# Patient Record
Sex: FEMALE | Race: WHITE | Employment: OTHER | URBAN - METROPOLITAN AREA
[De-identification: names, ages, dates, MRNs, and addresses within clinical notes are randomized per-mention and may not be internally consistent; named-entity substitution may affect disease eponyms.]

---

## 2017-01-31 ENCOUNTER — HOSPITAL ENCOUNTER (OUTPATIENT)
Dept: MAMMOGRAPHY | Age: 72
Discharge: HOME | End: 2017-01-31
Attending: FAMILY MEDICINE
Payer: MEDICARE

## 2017-01-31 DIAGNOSIS — Z12.31 VISIT FOR SCREENING MAMMOGRAM: ICD-10-CM

## 2017-01-31 PROCEDURE — 77067 SCR MAMMO BI INCL CAD: CPT

## 2017-03-30 ENCOUNTER — HOSPITAL ENCOUNTER (OUTPATIENT)
Dept: ULTRASOUND IMAGING | Age: 72
Discharge: HOME | End: 2017-03-30
Attending: INTERNAL MEDICINE
Payer: MEDICARE

## 2017-03-30 DIAGNOSIS — E04.9 GOITER: ICD-10-CM

## 2017-03-30 PROCEDURE — 76536 US EXAM OF HEAD AND NECK: CPT

## 2017-10-31 ENCOUNTER — HOSPITAL ENCOUNTER (OUTPATIENT)
Dept: NON INVASIVE DIAGNOSTICS | Age: 72
Discharge: HOME | End: 2017-10-31
Attending: INTERNAL MEDICINE
Payer: MEDICARE

## 2017-10-31 DIAGNOSIS — R00.0 TACHYCARDIA: ICD-10-CM

## 2017-10-31 PROCEDURE — 93225 XTRNL ECG REC<48 HRS REC: CPT

## 2017-11-02 NOTE — PROCEDURES
[place] Fermin Daniel, [address] [facility]       Name:  Elizabeth Gee   MR#:  [id number]   :  1945   Account #:  [de-identified]        Date of Adm:  10/31/2017       PROCEDURE PERFORMED: Holter Monitor. INDICATION: \"Tachycardia. \"     DESCRIPTION OF PROCEDURE: Holter monitor was carried out   between 10/31 and . During the recording, there were isolated   premature ventricular contractions. There was multiple atrial premature   contractions. The minimum heart rate was 55 beats per minute. The   maximum heart rate was 113 beats per minute. The patient has   sequential APCs resulting in 3 beats of sequential APCs. No significant   pauses. SUMMARY   1. Regular sinus rhythm is observed. 2. Sinus tachycardia with a heart rate of 113.         Onur Werner MD       / [place].[facility]   D:  2017   17:14   T:  2017   11:08   Job #:  [id number]

## 2018-02-27 ENCOUNTER — APPOINTMENT (OUTPATIENT)
Dept: GENERAL RADIOLOGY | Age: 73
DRG: 287 | End: 2018-02-27
Attending: EMERGENCY MEDICINE
Payer: MEDICARE

## 2018-02-27 ENCOUNTER — HOSPITAL ENCOUNTER (INPATIENT)
Age: 73
LOS: 1 days | Discharge: HOME | DRG: 287 | End: 2018-02-28
Attending: EMERGENCY MEDICINE | Admitting: INTERNAL MEDICINE
Payer: MEDICARE

## 2018-02-27 DIAGNOSIS — I21.4 NSTEMI (NON-ST ELEVATED MYOCARDIAL INFARCTION) (HCC): ICD-10-CM

## 2018-02-27 DIAGNOSIS — J20.9 ACUTE BRONCHITIS, UNSPECIFIED ORGANISM: Primary | ICD-10-CM

## 2018-02-27 PROBLEM — I10 HTN (HYPERTENSION): Status: ACTIVE | Noted: 2018-02-27

## 2018-02-27 PROBLEM — K21.9 GERD (GASTROESOPHAGEAL REFLUX DISEASE): Status: ACTIVE | Noted: 2018-02-27

## 2018-02-27 PROBLEM — J45.909 ASTHMA: Status: ACTIVE | Noted: 2018-02-27

## 2018-02-27 PROBLEM — E11.9 DM (DIABETES MELLITUS) (HCC): Status: ACTIVE | Noted: 2018-02-27

## 2018-02-27 LAB
ALBUMIN SERPL-MCNC: 3.5 G/DL (ref 3.5–5)
ALBUMIN/GLOB SERPL: 0.9 {RATIO} (ref 1.1–2.2)
ALP SERPL-CCNC: 113 U/L (ref 45–117)
ALT SERPL-CCNC: 40 U/L (ref 12–78)
ANION GAP SERPL CALC-SCNC: 6 MMOL/L (ref 5–15)
APPEARANCE UR: CLEAR
AST SERPL-CCNC: 31 U/L (ref 15–37)
ATRIAL RATE: 84 BPM
ATRIAL RATE: 93 BPM
BACTERIA URNS QL MICRO: NEGATIVE /HPF
BASOPHILS # BLD: 0.1 K/UL (ref 0–0.1)
BASOPHILS NFR BLD: 0 % (ref 0–1)
BILIRUB SERPL-MCNC: 0.4 MG/DL (ref 0.2–1)
BILIRUB UR QL: NEGATIVE
BNP SERPL-MCNC: 1507 PG/ML (ref 0–125)
BUN SERPL-MCNC: 25 MG/DL (ref 6–20)
BUN/CREAT SERPL: 20 (ref 12–20)
CALCIUM SERPL-MCNC: 9.1 MG/DL (ref 8.5–10.1)
CALCULATED P AXIS, ECG09: 75 DEGREES
CALCULATED P AXIS, ECG09: 86 DEGREES
CALCULATED R AXIS, ECG10: 29 DEGREES
CALCULATED R AXIS, ECG10: 45 DEGREES
CALCULATED T AXIS, ECG11: 45 DEGREES
CALCULATED T AXIS, ECG11: 61 DEGREES
CHLORIDE SERPL-SCNC: 103 MMOL/L (ref 97–108)
CK MB CFR SERPL CALC: 2.3 % (ref 0–2.5)
CK MB CFR SERPL CALC: 4.2 % (ref 0–2.5)
CK MB SERPL-MCNC: 1.3 NG/ML (ref 5–25)
CK MB SERPL-MCNC: 2.2 NG/ML (ref 5–25)
CK SERPL-CCNC: 53 U/L (ref 26–192)
CK SERPL-CCNC: 57 U/L (ref 26–192)
CO2 SERPL-SCNC: 30 MMOL/L (ref 21–32)
COLOR UR: ABNORMAL
CREAT SERPL-MCNC: 1.28 MG/DL (ref 0.55–1.02)
DIAGNOSIS, 93000: NORMAL
DIAGNOSIS, 93000: NORMAL
DIFFERENTIAL METHOD BLD: ABNORMAL
EOSINOPHIL # BLD: 0.3 K/UL (ref 0–0.4)
EOSINOPHIL NFR BLD: 1 % (ref 0–7)
EPITH CASTS URNS QL MICRO: ABNORMAL /LPF
ERYTHROCYTE [DISTWIDTH] IN BLOOD BY AUTOMATED COUNT: 13.7 % (ref 11.5–14.5)
GLOBULIN SER CALC-MCNC: 4 G/DL (ref 2–4)
GLUCOSE BLD STRIP.AUTO-MCNC: 221 MG/DL (ref 65–100)
GLUCOSE BLD STRIP.AUTO-MCNC: 286 MG/DL (ref 65–100)
GLUCOSE BLD STRIP.AUTO-MCNC: 313 MG/DL (ref 65–100)
GLUCOSE SERPL-MCNC: 181 MG/DL (ref 65–100)
GLUCOSE UR STRIP.AUTO-MCNC: 100 MG/DL
HCT VFR BLD AUTO: 38.9 % (ref 35–47)
HGB BLD-MCNC: 12.1 G/DL (ref 11.5–16)
HGB UR QL STRIP: ABNORMAL
HYALINE CASTS URNS QL MICRO: ABNORMAL /LPF (ref 0–5)
IMM GRANULOCYTES # BLD: 0.1 K/UL (ref 0–0.04)
IMM GRANULOCYTES NFR BLD AUTO: 1 % (ref 0–0.5)
INR PPP: 1 (ref 0.9–1.1)
KETONES UR QL STRIP.AUTO: NEGATIVE MG/DL
LACTATE SERPL-SCNC: 1.4 MMOL/L (ref 0.4–2)
LEUKOCYTE ESTERASE UR QL STRIP.AUTO: NEGATIVE
LYMPHOCYTES # BLD: 3.5 K/UL (ref 0.8–3.5)
LYMPHOCYTES NFR BLD: 19 % (ref 12–49)
MCH RBC QN AUTO: 26.7 PG (ref 26–34)
MCHC RBC AUTO-ENTMCNC: 31.1 G/DL (ref 30–36.5)
MCV RBC AUTO: 85.9 FL (ref 80–99)
MONOCYTES # BLD: 1 K/UL (ref 0–1)
MONOCYTES NFR BLD: 5 % (ref 5–13)
NEUTS SEG # BLD: 13.7 K/UL (ref 1.8–8)
NEUTS SEG NFR BLD: 74 % (ref 32–75)
NITRITE UR QL STRIP.AUTO: NEGATIVE
NRBC # BLD: 0 K/UL (ref 0–0.01)
NRBC BLD-RTO: 0 PER 100 WBC
P-R INTERVAL, ECG05: 160 MS
P-R INTERVAL, ECG05: 180 MS
PH UR STRIP: 7 [PH] (ref 5–8)
PLATELET # BLD AUTO: 292 K/UL (ref 150–400)
PMV BLD AUTO: 10.6 FL (ref 8.9–12.9)
POTASSIUM SERPL-SCNC: 3.5 MMOL/L (ref 3.5–5.1)
PROT SERPL-MCNC: 7.5 G/DL (ref 6.4–8.2)
PROT UR STRIP-MCNC: 100 MG/DL
PROTHROMBIN TIME: 10.3 SEC (ref 9–11.1)
Q-T INTERVAL, ECG07: 324 MS
Q-T INTERVAL, ECG07: 362 MS
QRS DURATION, ECG06: 84 MS
QRS DURATION, ECG06: 90 MS
QTC CALCULATION (BEZET), ECG08: 402 MS
QTC CALCULATION (BEZET), ECG08: 427 MS
RBC # BLD AUTO: 4.53 M/UL (ref 3.8–5.2)
RBC #/AREA URNS HPF: ABNORMAL /HPF (ref 0–5)
SERVICE CMNT-IMP: ABNORMAL
SODIUM SERPL-SCNC: 139 MMOL/L (ref 136–145)
SP GR UR REFRACTOMETRY: 1.01 (ref 1–1.03)
TROPONIN I BLD-MCNC: 0.12 NG/ML (ref 0–0.08)
TROPONIN I SERPL-MCNC: 0.32 NG/ML
TROPONIN I SERPL-MCNC: 0.53 NG/ML
TROPONIN I SERPL-MCNC: <0.04 NG/ML
UA: UC IF INDICATED,UAUC: ABNORMAL
UROBILINOGEN UR QL STRIP.AUTO: 0.2 EU/DL (ref 0.2–1)
VENTRICULAR RATE, ECG03: 84 BPM
VENTRICULAR RATE, ECG03: 93 BPM
WBC # BLD AUTO: 18.6 K/UL (ref 3.6–11)
WBC URNS QL MICRO: ABNORMAL /HPF (ref 0–4)

## 2018-02-27 PROCEDURE — 93005 ELECTROCARDIOGRAM TRACING: CPT

## 2018-02-27 PROCEDURE — 96365 THER/PROPH/DIAG IV INF INIT: CPT

## 2018-02-27 PROCEDURE — 83880 ASSAY OF NATRIURETIC PEPTIDE: CPT | Performed by: EMERGENCY MEDICINE

## 2018-02-27 PROCEDURE — 87040 BLOOD CULTURE FOR BACTERIA: CPT | Performed by: EMERGENCY MEDICINE

## 2018-02-27 PROCEDURE — 85025 COMPLETE CBC W/AUTO DIFF WBC: CPT | Performed by: EMERGENCY MEDICINE

## 2018-02-27 PROCEDURE — 82962 GLUCOSE BLOOD TEST: CPT

## 2018-02-27 PROCEDURE — 93306 TTE W/DOPPLER COMPLETE: CPT

## 2018-02-27 PROCEDURE — 71046 X-RAY EXAM CHEST 2 VIEWS: CPT

## 2018-02-27 PROCEDURE — 74011250636 HC RX REV CODE- 250/636: Performed by: EMERGENCY MEDICINE

## 2018-02-27 PROCEDURE — 36415 COLL VENOUS BLD VENIPUNCTURE: CPT | Performed by: EMERGENCY MEDICINE

## 2018-02-27 PROCEDURE — 99285 EMERGENCY DEPT VISIT HI MDM: CPT

## 2018-02-27 PROCEDURE — 82553 CREATINE MB FRACTION: CPT | Performed by: EMERGENCY MEDICINE

## 2018-02-27 PROCEDURE — 65660000000 HC RM CCU STEPDOWN

## 2018-02-27 PROCEDURE — 94762 N-INVAS EAR/PLS OXIMTRY CONT: CPT

## 2018-02-27 PROCEDURE — 81001 URINALYSIS AUTO W/SCOPE: CPT | Performed by: EMERGENCY MEDICINE

## 2018-02-27 PROCEDURE — 84484 ASSAY OF TROPONIN QUANT: CPT | Performed by: EMERGENCY MEDICINE

## 2018-02-27 PROCEDURE — 83605 ASSAY OF LACTIC ACID: CPT | Performed by: EMERGENCY MEDICINE

## 2018-02-27 PROCEDURE — 74011250636 HC RX REV CODE- 250/636: Performed by: INTERNAL MEDICINE

## 2018-02-27 PROCEDURE — 74011250637 HC RX REV CODE- 250/637: Performed by: EMERGENCY MEDICINE

## 2018-02-27 PROCEDURE — 80053 COMPREHEN METABOLIC PANEL: CPT | Performed by: EMERGENCY MEDICINE

## 2018-02-27 PROCEDURE — 74011636637 HC RX REV CODE- 636/637: Performed by: INTERNAL MEDICINE

## 2018-02-27 PROCEDURE — 85610 PROTHROMBIN TIME: CPT | Performed by: EMERGENCY MEDICINE

## 2018-02-27 PROCEDURE — 74011250637 HC RX REV CODE- 250/637: Performed by: INTERNAL MEDICINE

## 2018-02-27 RX ORDER — LEVOTHYROXINE SODIUM 100 UG/1
100 TABLET ORAL
COMMUNITY

## 2018-02-27 RX ORDER — PREDNISONE 5 MG/1
TABLET ORAL
Qty: 21 TAB | Refills: 0 | Status: SHIPPED | OUTPATIENT
Start: 2018-02-27 | End: 2018-10-10

## 2018-02-27 RX ORDER — ACETAMINOPHEN 500 MG
1000 TABLET ORAL 2 TIMES DAILY
COMMUNITY
End: 2021-05-07

## 2018-02-27 RX ORDER — SODIUM CHLORIDE 0.9 % (FLUSH) 0.9 %
5-10 SYRINGE (ML) INJECTION EVERY 8 HOURS
Status: DISCONTINUED | OUTPATIENT
Start: 2018-02-27 | End: 2018-02-28 | Stop reason: HOSPADM

## 2018-02-27 RX ORDER — AZITHROMYCIN 250 MG/1
TABLET, FILM COATED ORAL
Qty: 6 TAB | Refills: 0 | Status: SHIPPED | OUTPATIENT
Start: 2018-02-27 | End: 2018-02-28

## 2018-02-27 RX ORDER — CLONIDINE HYDROCHLORIDE 0.1 MG/1
0.1 TABLET ORAL 2 TIMES DAILY
Status: DISCONTINUED | OUTPATIENT
Start: 2018-02-27 | End: 2018-02-28 | Stop reason: HOSPADM

## 2018-02-27 RX ORDER — INSULIN LISPRO 100 [IU]/ML
INJECTION, SOLUTION INTRAVENOUS; SUBCUTANEOUS
Status: DISCONTINUED | OUTPATIENT
Start: 2018-02-27 | End: 2018-02-28 | Stop reason: HOSPADM

## 2018-02-27 RX ORDER — METOPROLOL SUCCINATE 50 MG/1
200 TABLET, EXTENDED RELEASE ORAL
Status: DISCONTINUED | OUTPATIENT
Start: 2018-02-27 | End: 2018-02-28 | Stop reason: HOSPADM

## 2018-02-27 RX ORDER — CLONIDINE HYDROCHLORIDE 0.1 MG/1
0.1 TABLET ORAL 2 TIMES DAILY
Status: ON HOLD | COMMUNITY
End: 2021-05-07 | Stop reason: SDUPTHER

## 2018-02-27 RX ORDER — GUAIFENESIN 100 MG/5ML
400 SOLUTION ORAL 3 TIMES DAILY
Status: DISCONTINUED | OUTPATIENT
Start: 2018-02-27 | End: 2018-02-27

## 2018-02-27 RX ORDER — METOPROLOL SUCCINATE 200 MG/1
100 TABLET, EXTENDED RELEASE ORAL
COMMUNITY
End: 2021-05-07

## 2018-02-27 RX ORDER — VALSARTAN AND HYDROCHLOROTHIAZIDE 320; 25 MG/1; MG/1
1 TABLET, FILM COATED ORAL DAILY
COMMUNITY
End: 2019-05-08

## 2018-02-27 RX ORDER — ONDANSETRON 2 MG/ML
4 INJECTION INTRAMUSCULAR; INTRAVENOUS
Status: DISCONTINUED | OUTPATIENT
Start: 2018-02-27 | End: 2018-02-28 | Stop reason: HOSPADM

## 2018-02-27 RX ORDER — ACETAMINOPHEN 325 MG/1
650 TABLET ORAL
Status: DISCONTINUED | OUTPATIENT
Start: 2018-02-27 | End: 2018-02-28 | Stop reason: HOSPADM

## 2018-02-27 RX ORDER — GUAIFENESIN 100 MG/5ML
400 SOLUTION ORAL 3 TIMES DAILY
Status: DISCONTINUED | OUTPATIENT
Start: 2018-02-27 | End: 2018-02-28 | Stop reason: HOSPADM

## 2018-02-27 RX ORDER — MORPHINE SULFATE 10 MG/ML
2 INJECTION, SOLUTION INTRAMUSCULAR; INTRAVENOUS
Status: DISCONTINUED | OUTPATIENT
Start: 2018-02-27 | End: 2018-02-28 | Stop reason: HOSPADM

## 2018-02-27 RX ORDER — MORPHINE SULFATE 2 MG/ML
2 INJECTION, SOLUTION INTRAMUSCULAR; INTRAVENOUS
Status: DISCONTINUED | OUTPATIENT
Start: 2018-02-27 | End: 2018-02-27

## 2018-02-27 RX ORDER — ENOXAPARIN SODIUM 100 MG/ML
1 INJECTION SUBCUTANEOUS
Status: DISCONTINUED | OUTPATIENT
Start: 2018-02-27 | End: 2018-02-27

## 2018-02-27 RX ORDER — IPRATROPIUM BROMIDE AND ALBUTEROL SULFATE 2.5; .5 MG/3ML; MG/3ML
3 SOLUTION RESPIRATORY (INHALATION)
Status: DISCONTINUED | OUTPATIENT
Start: 2018-02-27 | End: 2018-02-28 | Stop reason: HOSPADM

## 2018-02-27 RX ORDER — LEVOTHYROXINE SODIUM 25 UG/1
100 TABLET ORAL
Status: DISCONTINUED | OUTPATIENT
Start: 2018-02-27 | End: 2018-02-28 | Stop reason: HOSPADM

## 2018-02-27 RX ORDER — LEVOTHYROXINE SODIUM 50 UG/1
50 TABLET ORAL
Status: DISCONTINUED | OUTPATIENT
Start: 2018-02-28 | End: 2018-02-27

## 2018-02-27 RX ORDER — SODIUM CHLORIDE 0.9 % (FLUSH) 0.9 %
5-10 SYRINGE (ML) INJECTION AS NEEDED
Status: DISCONTINUED | OUTPATIENT
Start: 2018-02-27 | End: 2018-02-28 | Stop reason: HOSPADM

## 2018-02-27 RX ORDER — INSULIN LISPRO 100 [IU]/ML
0-15 INJECTION, SOLUTION INTRAVENOUS; SUBCUTANEOUS
COMMUNITY
End: 2021-05-07

## 2018-02-27 RX ORDER — FUROSEMIDE 40 MG/1
40 TABLET ORAL
COMMUNITY

## 2018-02-27 RX ORDER — AMLODIPINE BESYLATE 5 MG/1
10 TABLET ORAL DAILY
Status: DISCONTINUED | OUTPATIENT
Start: 2018-02-27 | End: 2018-02-27

## 2018-02-27 RX ORDER — GUAIFENESIN 100 MG/5ML
81 LIQUID (ML) ORAL DAILY
Status: DISCONTINUED | OUTPATIENT
Start: 2018-02-28 | End: 2018-02-28

## 2018-02-27 RX ORDER — METOPROLOL SUCCINATE 50 MG/1
200 TABLET, EXTENDED RELEASE ORAL DAILY
Status: DISCONTINUED | OUTPATIENT
Start: 2018-02-27 | End: 2018-02-27

## 2018-02-27 RX ORDER — ENOXAPARIN SODIUM 100 MG/ML
100 INJECTION SUBCUTANEOUS EVERY 12 HOURS
Status: DISCONTINUED | OUTPATIENT
Start: 2018-02-27 | End: 2018-02-27

## 2018-02-27 RX ORDER — GUAIFENESIN 100 MG/5ML
324 LIQUID (ML) ORAL
Status: COMPLETED | OUTPATIENT
Start: 2018-02-27 | End: 2018-02-27

## 2018-02-27 RX ORDER — PANTOPRAZOLE SODIUM 40 MG/1
40 TABLET, DELAYED RELEASE ORAL
Status: DISCONTINUED | OUTPATIENT
Start: 2018-02-28 | End: 2018-02-28 | Stop reason: HOSPADM

## 2018-02-27 RX ORDER — ALBUTEROL SULFATE 90 UG/1
1 AEROSOL, METERED RESPIRATORY (INHALATION)
Status: ON HOLD | COMMUNITY
End: 2018-02-28

## 2018-02-27 RX ORDER — DEXTROSE 50 % IN WATER (D50W) INTRAVENOUS SYRINGE
12.5-25 AS NEEDED
Status: DISCONTINUED | OUTPATIENT
Start: 2018-02-27 | End: 2018-02-28 | Stop reason: HOSPADM

## 2018-02-27 RX ORDER — ALBUTEROL SULFATE 90 UG/1
2 AEROSOL, METERED RESPIRATORY (INHALATION)
Qty: 1 INHALER | Refills: 0 | Status: SHIPPED | OUTPATIENT
Start: 2018-02-27

## 2018-02-27 RX ORDER — METOPROLOL TARTRATE 25 MG/1
25 TABLET, FILM COATED ORAL 2 TIMES DAILY
Status: DISCONTINUED | OUTPATIENT
Start: 2018-02-27 | End: 2018-02-27

## 2018-02-27 RX ORDER — LABETALOL HYDROCHLORIDE 5 MG/ML
10 INJECTION, SOLUTION INTRAVENOUS
Status: DISCONTINUED | OUTPATIENT
Start: 2018-02-27 | End: 2018-02-28 | Stop reason: HOSPADM

## 2018-02-27 RX ORDER — MAGNESIUM SULFATE 100 %
4 CRYSTALS MISCELLANEOUS AS NEEDED
Status: DISCONTINUED | OUTPATIENT
Start: 2018-02-27 | End: 2018-02-28 | Stop reason: HOSPADM

## 2018-02-27 RX ADMIN — CLONIDINE HYDROCHLORIDE 0.1 MG: 0.1 TABLET ORAL at 14:21

## 2018-02-27 RX ADMIN — Medication 10 ML: at 21:21

## 2018-02-27 RX ADMIN — INSULIN LISPRO 5 UNITS: 100 INJECTION, SOLUTION INTRAVENOUS; SUBCUTANEOUS at 18:13

## 2018-02-27 RX ADMIN — CLONIDINE HYDROCHLORIDE 0.1 MG: 0.1 TABLET ORAL at 18:13

## 2018-02-27 RX ADMIN — GUAIFENESIN 400 MG: 200 SOLUTION ORAL at 18:13

## 2018-02-27 RX ADMIN — INSULIN LISPRO 4 UNITS: 100 INJECTION, SOLUTION INTRAVENOUS; SUBCUTANEOUS at 21:44

## 2018-02-27 RX ADMIN — METOPROLOL SUCCINATE 200 MG: 50 TABLET, EXTENDED RELEASE ORAL at 21:20

## 2018-02-27 RX ADMIN — GUAIFENESIN 400 MG: 200 SOLUTION ORAL at 21:20

## 2018-02-27 RX ADMIN — Medication 10 ML: at 18:14

## 2018-02-27 RX ADMIN — ASPIRIN 81 MG 324 MG: 81 TABLET ORAL at 07:40

## 2018-02-27 RX ADMIN — AZITHROMYCIN 500 MG: 500 INJECTION, POWDER, LYOPHILIZED, FOR SOLUTION INTRAVENOUS at 06:56

## 2018-02-27 RX ADMIN — Medication 10 ML: at 14:27

## 2018-02-27 RX ADMIN — INSULIN LISPRO 3 UNITS: 100 INJECTION, SOLUTION INTRAVENOUS; SUBCUTANEOUS at 11:32

## 2018-02-27 RX ADMIN — ENOXAPARIN SODIUM 100 MG: 100 INJECTION SUBCUTANEOUS at 10:56

## 2018-02-27 NOTE — IP AVS SNAPSHOT
[address] [facility] 
[phone] Patient: Sherren Mar MRN: [id number] LOIS: About your hospitalization You were admitted on:  2018 [facility] You were discharged on:  2018 Why you were hospitalized Your primary diagnosis was:  Not on File Your diagnoses also included:  Nstemi (Non-St Elevated Myocardial Infarction) (Hcc), Asthma, Dm (Diabetes Mellitus) (Hcc), Htn (Hypertension), Gerd (Gastroesophageal Reflux Disease) Follow-up Information Follow up With Details Comments Contact Info Isela Melchor MD In 2 days  [address] 
[phone] [facility] EMERGENCY DEPT  If symptoms worsen [address] [facility] 
[phone] Discharge Orders None A check masood indicates which time of day the medication should be taken. My Medications START taking these medications Instructions Each Dose to Equal  
 Morning Noon Evening Bedtime  
 aspirin delayed-release 81 mg tablet Your last dose was: Your next dose is: Take 1 Tab by mouth daily. 81 mg  
    
   
   
   
  
 azithromycin 250 mg tablet Commonly known as:  Angela Ortiz Your last dose was: Your next dose is: As directed  
     
   
   
   
  
 predniSONE 5 mg dose pack Commonly known as:  STERAPRED Your last dose was: Your next dose is:    
   
   
 See administration instruction per 5mg dose pack CHANGE how you take these medications Instructions Each Dose to Equal  
 Morning Noon Evening Bedtime * albuterol 90 mcg/actuation inhaler Commonly known as:  PROVENTIL HFA, VENTOLIN HFA, PROAIR HFA What changed:   You were already taking a medication with the same name, and this prescription was added. Make sure you understand how and when to take each. Your last dose was: Your next dose is: Take 2 Puffs by inhalation every four (4) hours as needed for Wheezing. 2 Puff * albuterol 90 mcg/actuation inhaler Commonly known as:  PROVENTIL HFA, VENTOLIN HFA, PROAIR HFA What changed:  Another medication with the same name was added. Make sure you understand how and when to take each. Your last dose was: Your next dose is: Take 1 Puff by inhalation every six (6) hours as needed for Wheezing. 1 Puff  
    
   
   
   
  
 levothyroxine 100 mcg tablet Commonly known as:  SYNTHROID What changed:  Another medication with the same name was removed. Continue taking this medication, and follow the directions you see here. Your last dose was: Your next dose is: Take 100 mcg by mouth Daily (before breakfast). 100 mcg * Notice: This list has 2 medication(s) that are the same as other medications prescribed for you. Read the directions carefully, and ask your doctor or other care provider to review them with you. CONTINUE taking these medications Instructions Each Dose to Equal  
 Morning Noon Evening Bedtime  
 acetaminophen 500 mg tablet Commonly known as:  TYLENOL Your last dose was: Your next dose is: Take 1,000 mg by mouth two (2) times a day. 1000 mg  
    
   
   
   
  
 cloNIDine HCl 0.1 mg tablet Commonly known as:  CATAPRES Your last dose was: Your next dose is: Take 0.1 mg by mouth two (2) times a day. 0.1 mg  
    
   
   
   
  
 furosemide 40 mg tablet Commonly known as:  LASIX Your last dose was: Your next dose is: Take 40 mg by mouth daily as needed (lower extremity swelling).   
 40 mg  
    
   
   
   
  
 insulin lispro 100 unit/mL injection Commonly known as:  HUMALOG Your last dose was: Your next dose is:    
   
   
 0-15 Units by SubCUTAneous route Before breakfast, lunch, and dinner. Patient states that she administers agent per corrective scale, but was unable to clarify the blood glucose ranges and associated insulin dosages per scale 0-15 Units  
    
   
   
   
  
 insulin  unit/mL injection Commonly known as:  Christinia Crate Your last dose was: Your next dose is:    
   
   
 0-4 Units by SubCUTAneous route nightly. Patient states that she administers agent per corrective scale, but was unable to clarify the blood glucose ranges and associated insulin dosages per scale 0-4 Units  
    
   
   
   
  
 metFORMIN 1,000 mg tablet Commonly known as:  GLUCOPHAGE Your last dose was: Your next dose is: Take 1,000 mg by mouth two (2) times daily (with meals). 1000 mg  
    
   
   
   
  
 metoprolol succinate 200 mg XL tablet Commonly known as:  TOPROL-XL Your last dose was: Your next dose is: Take 200 mg by mouth nightly. 200 mg NexIUM 40 mg capsule Generic drug:  esomeprazole Your last dose was: Your next dose is: Take 40 mg by mouth daily. 40 mg  
    
   
   
   
  
 valsartan-hydroCHLOROthiazide 320-25 mg per tablet Commonly known as:  DIOVAN-HCT Your last dose was: Your next dose is: Take 1 Tab by mouth daily. 1 Tab ASK your doctor about these medications Instructions Each Dose to Equal  
 Morning Noon Evening Bedtime  
 metoprolol tartrate 25 mg tablet Commonly known as:  LOPRESSOR Your last dose was: Your next dose is: Take 25 mg by mouth two (2) times a day. 25 mg Where to Get Your Medications Information on where to get these meds will be given to you by the nurse or doctor. ! Ask your nurse or doctor about these medications  
  albuterol 90 mcg/actuation inhaler  
 albuterol 90 mcg/actuation inhaler  
 aspirin delayed-release 81 mg tablet  
 azithromycin 250 mg tablet  
 predniSONE 5 mg dose pack Discharge Instructions Left Heart Catheterization: About This Test 
What is it? Cardiac catheterization is a test to check the left side of your heart. Your doctor might look at the shape of your heart, the motion of your heart, or the blood pressure inside the chambers. Why is this test done? This test gives information about how your heart is working. It can: · Check blood flow and blood pressure in the chambers of the heart. · Check the pumping action of the heart. · Find out if a heart defect is present and how severe it is. · Find out how well the heart valves work. What happens during the test? 
· You will get medicine to help you relax. · A thin tube called a catheter is put into a blood vessel in the groin or the arm. The doctor moves the catheter through the blood vessel into your heart. · You will get a shot to numb the skin where the catheter goes in. You may feel pressure when the doctor moves the catheter through your blood vessel into your heart. · Dye may be injected into your heart. Your doctor can watch on special monitors as the dye moves in your heart. The dye helps your doctor see blood flow in your heart. · You may feel hot or flushed for several seconds when the dye is put in. 
· If a heart defect is found, cardiac catheterization sometimes is used to correct it during the test. 
How long does it take? · The test will take about 30 minutes. If a problem is found and the doctor treats it, it can take a few hours longer.  
What happens after the test? 
· You will stay in a room for at least a few hours to make sure the catheter site starts to heal. You may have a bandage or a compression device on your groin or arm to prevent bleeding. · If the catheter was placed in your groin, you may lie in bed for a few hours. If the catheter was put in your arm, you will need to keep your arm still for at least 1 hour. · You may or may not need to stay in the hospital overnight. You will get more instructions for what to do at home. · Drink plenty of fluids for several hours after the test. 
Follow-up care is a key part of your treatment and safety. Be sure to make and go to all appointments, and call your doctor if you are having problems. It's also a good idea to know your test results and keep a list of the medicines you take. Where can you learn more? Go to http://mell-narendra.info/. Enter W306 in the search box to learn more about \"Left Heart Catheterization: About This Test.\" Current as of: September 21, 2016 Content Version: 11.4 © 6712-3312 [employer]. Care instructions adapted under license by [employer] (which disclaims liability or warranty for this information). If you have questions about a medical condition or this instruction, always ask your healthcare professional. [address] any warranty or liability for your use of this information. Bronchitis: Care Instructions Your Care Instructions Bronchitis is inflammation of the bronchial tubes, which carry air to the lungs. The tubes swell and produce mucus, or phlegm. The mucus and inflamed bronchial tubes make you cough. You may have trouble breathing. Most cases of bronchitis are caused by viruses like those that cause colds. Antibiotics usually do not help and they may be harmful. Bronchitis usually develops rapidly and lasts about 2 to 3 weeks in otherwise healthy people. Follow-up care is a key part of your treatment and safety.  Be sure to make and go to all appointments, and call your doctor if you are having problems. It's also a good idea to know your test results and keep a list of the medicines you take. How can you care for yourself at home? · Take all medicines exactly as prescribed. Call your doctor if you think you are having a problem with your medicine. · Get some extra rest. 
· Take an over-the-counter pain medicine, such as acetaminophen (Tylenol), ibuprofen (Advil, Motrin), or naproxen (Aleve) to reduce fever and relieve body aches. Read and follow all instructions on the label. · Do not take two or more pain medicines at the same time unless the doctor told you to. Many pain medicines have acetaminophen, which is Tylenol. Too much acetaminophen (Tylenol) can be harmful. · Take an over-the-counter cough medicine that contains dextromethorphan to help quiet a dry, hacking cough so that you can sleep. Avoid cough medicines that have more than one active ingredient. Read and follow all instructions on the label. · Breathe moist air from a humidifier, hot shower, or sink filled with hot water. The heat and moisture will thin mucus so you can cough it out. · Do not smoke. Smoking can make bronchitis worse. If you need help quitting, talk to your doctor about stop-smoking programs and medicines. These can increase your chances of quitting for good. When should you call for help? Call 911 anytime you think you may need emergency care. For example, call if: 
? · You have severe trouble breathing. ?Call your doctor now or seek immediate medical care if: 
? · You have new or worse trouble breathing. ? · You cough up dark brown or bloody mucus (sputum). ? · You have a new or higher fever. ? · You have a new rash. ? Watch closely for changes in your health, and be sure to contact your doctor if: 
? · You cough more deeply or more often, especially if you notice more mucus or a change in the color of your mucus. ? · You are not getting better as expected. Where can you learn more? Go to http://mell-narendra.info/. Enter H333 in the search box to learn more about \"Bronchitis: Care Instructions. \" Current as of: May 12, 2017 Content Version: 11.4 © 5301-7974 [employer]. Care instructions adapted under license by [employer] (which disclaims liability or warranty for this information). If you have questions about a medical condition or this instruction, always ask your healthcare professional. [address] any warranty or liability for your use of this information. Other instructions: Take your medications as prescribed. Follow-up with your doctor Seek medical attention if recurrence of symptoms Doostang Announcement We are excited to announce that we are making your provider's discharge notes available to you in Doostang. You will see these notes when they are completed and signed by the physician that discharged you from your recent hospital stay. If you have any questions or concerns about any information you see in Doostang, please call the Health Information Department where you were seen or reach out to your Primary Care Provider for more information about your plan of care. Introducing \[facility]\"" & HEALTH SERVICES! [facility] introduces Doostang patient portal. Now you can access parts of your medical record, email your doctor's office, and request medication refills online. 1. In your internet browser, go to [email or web address] 2. Click on the First Time User? Click Here link in the Sign In box. You will see the New Member Sign Up page. 3. Enter your Doostang Access Code exactly as it appears below. You will not need to use this code after youve completed the sign-up process. If you do not sign up before the expiration date, you must request a new code.  
 
· Doostang Access Code: [email or web address] 
 Expires: 5/28/2018  6:39 AM 
 
4. Enter the last four digits of your Social Security Number (xxxx) and Date of Birth (mm/dd/yyyy) as indicated and click Submit. You will be taken to the next sign-up page. 5. Create a EyeScribest ID. This will be your MenInvest login ID and cannot be changed, so think of one that is secure and easy to remember. 6. Create a MenInvest password. You can change your password at any time. 7. Enter your Password Reset Question and Answer. This can be used at a later time if you forget your password. 8. Enter your e-mail address. You will receive e-mail notification when new information is available in [address] 9. Click Sign Up. You can now view and download portions of your medical record. 10. Click the Download Summary menu link to download a portable copy of your medical information. If you have questions, please visit the Frequently Asked Questions section of the MenInvest website. Remember, MenInvest is NOT to be used for urgent needs. For medical emergencies, dial 911. Now available from your iPhone and Android! Unresulted Labs-Please follow up with your PCP about these lab tests Order Current Status CULTURE, BLOOD, PAIRED Preliminary result Providers Seen During Your Hospitalization Provider Specialty Primary office phone April Jelani Motley MD Emergency Medicine [phone] Liane Walton MD Internal Medicine [phone] Your Primary Care Physician (PCP) Primary Care Physician Office Phone Office Fax Mickel Mortimer [phone] You are allergic to the following No active allergies Recent Documentation Height Weight BMI OB Status Smoking Status 1.727 m 108.4 kg 36.34 kg/m2 Postmenopausal Never Smoker Emergency Contacts Name Discharge Info Relation Home Work [address] CAREGIVER [3] Son [22] [phone] Patient Belongings The following personal items are in your possession at time of discharge: 
  Dental Appliances: None  Visual Aid: Glasses, At home      Home Medications: Kept at bedside   Jewelry: With patient  Clothing: At bedside    Other Valuables: With patient Please provide this summary of care documentation to your next provider. Signatures-by signing, you are acknowledging that this After Visit Summary has been reviewed with you and you have received a copy. Patient Signature:  ____________________________________________________________ Date:  ____________________________________________________________  
  
[facility] [place] Provider Signature:  ____________________________________________________________ Date:  ____________________________________________________________

## 2018-02-27 NOTE — PROGRESS NOTES
Pharmacy Clarification of the Prior to Admission Medication Regimen Retrospective to the Admission Medication Reconciliation    The patient was interviewed regarding clarification of the prior to admission medication regimen. Daughter-in-law present in room and obtained permission from patient to discuss drug regimen with visitor(s) present. Patient was questioned regarding use of any other inhalers, topical products, over the counter medications, herbal medications, vitamin products or ophthalmic/nasal/otic medication use. Information Obtained From: RX query, medication bottles, medication list and from patient    Recommendations/Findings: The following amendments were made to the patient's active medication list on file at [facility]:     1) Additions:    acetaminophen (TYLENOL) 500 mg tablet   albuterol (PROVENTIL HFA, VENTOLIN HFA, PROAIR HFA) 90 mcg/actuation inhaler   cloNIDine HCl (CATAPRES) 0.1 mg tablet   furosemide (LASIX) 40 mg tablet   insulin NPH (NOVOLIN N, HUMULIN N) 100 unit/mL injection   insulin lispro (HUMALOG) 100 unit/mL injection   valsartan-hydroCHLOROthiazide (DIOVAN-HCT) 320-25 mg per tablet    2) Removals:    Amlodipine 10 mg tab   Insulin regular    3) Changes:   Levothyroxine tab (Old regimen: 50 mcg orally once daily /New regimen: 100 mcg orally once daily)   Metoprolol (Old regimen: (tartrate) 25 mg orally bid /New regimen: (succinate) 200 mg orally QHS)    4) Pertinent Pharmacy Findings:   Updated patients preferred outpatient pharmacy to: [facility] located on [address]   albuterol (PROVENTIL HFA, VENTOLIN HFA, PROAIR HFA) 90 mcg/actuation inhaler-patient stated that she used her \"old\" inhaler yesterday when she experienced her SOB, but that was the last dose in the inhaler   Insulin NPH and Lispro: patient states she administers agents via a \"correctional\" dose range, but unable to clarify what blood glucose ranges correspond to a specific insulin dose. Pharmacy was able to capture the lowest and highest doses that the patient administers to herself based on the corrective scale. PTA medication list was corrected to the following:     Prior to Admission Medications   Prescriptions Last Dose Informant Patient Reported? Taking?   acetaminophen (TYLENOL) 500 mg tablet 2018 at Unknown time Self Yes Yes   Sig: Take 1,000 mg by mouth two (2) times a day. albuterol (PROVENTIL HFA, VENTOLIN HFA, PROAIR HFA) 90 mcg/actuation inhaler 2018 at Unknown time Other Yes Yes   Sig: Take 1 Puff by inhalation every six (6) hours as needed for Wheezing. cloNIDine HCl (CATAPRES) 0.1 mg tablet 2018 at Unknown time Self Yes Yes   Sig: Take 0.1 mg by mouth two (2) times a day. esomeprazole (NEXIUM) 40 mg capsule 2018 at Unknown time Self Yes Yes   Sig: Take 40 mg by mouth daily. furosemide (LASIX) 40 mg tablet 2018 at Unknown time Other Yes Yes   Sig: Take 40 mg by mouth daily as needed (lower extremity swelling). insulin NPH (NOVOLIN N, HUMULIN N) 100 unit/mL injection 2018 at Unknown time Self Yes Yes   Si-4 Units by SubCUTAneous route nightly. Patient states that she administers agent per corrective scale, but was unable to clarify the blood glucose ranges and associated insulin dosages per scale   insulin lispro (HUMALOG) 100 unit/mL injection 2018 at Unknown time Self Yes Yes   Si-15 Units by SubCUTAneous route Before breakfast, lunch, and dinner. Patient states that she administers agent per corrective scale, but was unable to clarify the blood glucose ranges and associated insulin dosages per scale   levothyroxine (SYNTHROID) 100 mcg tablet 2018 at Unknown time Self Yes Yes   Sig: Take 100 mcg by mouth Daily (before breakfast). metFORMIN (GLUCOPHAGE) 1,000 mg tablet 2018 at Unknown time Self Yes Yes   Sig: Take 1,000 mg by mouth two (2) times daily (with meals).      metoprolol succinate (TOPROL-XL) 200 mg XL tablet 2/26/2018 at Unknown time Self Yes Yes   Sig: Take 200 mg by mouth nightly. valsartan-hydroCHLOROthiazide (DIOVAN-HCT) 320-25 mg per tablet 2/27/2018 at Unknown time Self Yes Yes   Sig: Take 1 Tab by mouth daily.       Facility-Administered Medications: None          Thank you,    Alisson Solis, PharmD, BCPS  Clinical Pharmacy Specialist

## 2018-02-27 NOTE — ED PROVIDER NOTES
EMERGENCY DEPARTMENT HISTORY AND PHYSICAL EXAM      Date: 2/27/2018  Patient Name: Agatha Babinski    History of Presenting Illness     Chief Complaint   Patient presents with    Cough     dry since yesterday. hx of asthma. EMS reports given pt duoneb PTA. pt states \"i feel much better now\"    Shortness of Breath     since 10pm       History Provided By: Patient and EMS    HPI: Agatha Babinski is a 74 y.o. female, pmhx HTN / DM / GERD / hypothyroid, who presents via EMS to the ED c/o gradually worsening dry cough that began yesterday. She reports additional SOB that began x 2200 yesterday evening. Pt states her sxs were exacerbated when lying flat to try and sleep. She denies any recent medications for her current sxs. Pt denies any use of inhalers or nebulizers at home. She denies any recent ABX. Pt reports a hx of BLE swelling at baseline and states she has a diuretic to take PRN. EMS notes giving pt a duoneb en route to the ED. On evaluation, pt states \"I feel much better now\". She otherwise specifically denies any recent fever, chills, nausea, vomiting, diarrhea, abd pain, CP, lightheadedness, dizziness, numbness, weakness, tingling, BLE swelling, HA, heart palpitations, urinary sxs, changes in BM, changes in PO intake, melena, hematochezia, or congestion. PCP: Dr. Cari Montana  Endocrinology: [address] [place] [address]   Cardiology: Kyleigh Johnson  ENT: Mary Saab MD    Allergies: NKDA  PMHx: Significant for HTN asthma, DM, GERD, hypothyroid  PSHx: Significant for hysterectomy, lipoma removal, L knee surgery  Social Hx: -tobacco, -EtOH, -Illicit Drugs    There are no other complaints, changes, or physical findings at this time.      Current Facility-Administered Medications   Medication Dose Route Frequency Provider Last Rate Last Dose    sodium chloride (NS) flush 5-10 mL  5-10 mL IntraVENous Q8H Odalys Oconnell MD        sodium chloride (NS) flush 5-10 mL  5-10 mL IntraVENous PRN Odalys Oconnell MD        azithromycin (ZITHROMAX) 500 mg in 0.9% sodium chloride 250 mL IVPB  500 mg IntraVENous Q24H April N MD Korutney   Stopped at 02/27/18 0732     Current Outpatient Prescriptions   Medication Sig Dispense Refill    azithromycin (ZITHROMAX Z-EMERITA) 250 mg tablet As directed 6 Tab 0    albuterol (PROVENTIL HFA, VENTOLIN HFA, PROAIR HFA) 90 mcg/actuation inhaler Take 2 Puffs by inhalation every four (4) hours as needed for Wheezing. 1 Inhaler 0    predniSONE (STERAPRED) 5 mg dose pack See administration instruction per 5mg dose pack 21 Tab 0    esomeprazole (NEXIUM) 40 mg capsule Take  by mouth daily.  metoprolol (LOPRESSOR) 25 mg tablet Take 25 mg by mouth two (2) times a day.  insulin regular (NOVOLIN, HUMULIN) 100 unit/mL injection by SubCUTAneous route.  metFORMIN (GLUCOPHAGE) 1,000 mg tablet Take 1,000 mg by mouth two (2) times daily (with meals).  levothyroxine (SYNTHROID) 50 mcg tablet Take 50 mcg by mouth daily (before breakfast).  amLODIPine (NORVASC) 10 mg tablet Take  by mouth daily. Past History     Past Medical History:  Past Medical History:   Diagnosis Date    Asthma     Diabetes ([facility] [address]     GERD (gastroesophageal reflux disease)     Hypertension     Other ill-defined conditions(799.89)     hypothyroid       Past Surgical History:  Past Surgical History:   Procedure Laterality Date    HX GYN      hysterectomy    HX ORTHOPAEDIC      left knee    HX OTHER SURGICAL      lipoma removal       Family History:  History reviewed. No pertinent family history. Social History:  Social History   Substance Use Topics    Smoking status: Never Smoker    Smokeless tobacco: None    Alcohol use No       Allergies:  No Known Allergies      Review of Systems   Review of Systems   Constitutional: Negative for chills and fever. HENT: Negative for congestion, ear pain, rhinorrhea and sore throat. Respiratory: Positive for cough and shortness of breath.     Cardiovascular: Negative for chest pain, palpitations and leg swelling. Gastrointestinal: Negative for abdominal pain, constipation, diarrhea, nausea and vomiting. No melena  No hematochezia   Endocrine: Negative for polyuria. Genitourinary: Negative for dysuria, frequency and hematuria. Neurological: Negative for dizziness, weakness, light-headedness, numbness and headaches. No tingling   All other systems reviewed and are negative. Physical Exam   Physical Exam   Nursing note and vitals reviewed.     General appearance - well nourished, well appearing, and in no distress  Eyes - pupils equal and reactive, extraocular eye movements intact  ENT - mucous membranes moist, pharynx normal without lesions  Neck - supple, no significant adenopathy; non-tender to palpation  Chest - clear to auscultation, no wheezes, rales or rhonchi; non-tender to palpation, tachypneic, increased WOB  Heart - normal rate and regular rhythm, S1 and S2 normal, no murmurs noted  Abdomen - soft, nontender, nondistended, no masses or organomegaly  Musculoskeletal - no joint tenderness, deformity or swelling; normal ROM  Extremities - peripheral pulses normal, no pedal edema  Skin - normal coloration and turgor, no rashes  Neurological - alert, oriented x3, normal speech, no focal findings or movement disorder noted  Written by Jasmeet Lopez ED Scribkristen, as dictated by Odalys Carlson MD    Diagnostic Study Results     Labs -     Recent Results (from the past 12 hour(s))   EKG, 12 LEAD, INITIAL    Collection Time: 02/27/18  3:41 AM   Result Value Ref Range    Ventricular Rate 93 BPM    Atrial Rate 93 BPM    P-R Interval 180 ms    QRS Duration 90 ms    Q-T Interval 324 ms    QTC Calculation (Bezet) 402 ms    Calculated P Axis 75 degrees    Calculated R Axis 45 degrees    Calculated T Axis 61 degrees    Diagnosis       ** Poor data quality, interpretation may be adversely affected  Sinus rhythm with occasional ventricular-paced complexes  Nonspecific ST and T wave abnormality  When compared with ECG of 31-MAY-2011 00:46,  Electronic ventricular pacemaker has replaced Sinus rhythm     CBC WITH AUTOMATED DIFF    Collection Time: 02/27/18  3:49 AM   Result Value Ref Range    WBC 18.6 (H) 3.6 - 11.0 K/uL    RBC 4.53 3.80 - 5.20 M/uL    HGB 12.1 11.5 - 16.0 g/dL    HCT 38.9 35.0 - 47.0 %    MCV 85.9 80.0 - 99.0 FL    MCH 26.7 26.0 - 34.0 PG    MCHC 31.1 30.0 - 36.5 g/dL    RDW 13.7 11.5 - 14.5 %    PLATELET 233 674 - 008 K/uL    MPV 10.6 8.9 - 12.9 FL    NRBC 0.0 0  WBC    ABSOLUTE NRBC 0.00 0.00 - 0.01 K/uL    NEUTROPHILS 74 32 - 75 %    LYMPHOCYTES 19 12 - 49 %    MONOCYTES 5 5 - 13 %    EOSINOPHILS 1 0 - 7 %    BASOPHILS 0 0 - 1 %    IMMATURE GRANULOCYTES 1 (H) 0.0 - 0.5 %    ABS. NEUTROPHILS 13.7 (H) 1.8 - 8.0 K/UL    ABS. LYMPHOCYTES 3.5 0.8 - 3.5 K/UL    ABS. MONOCYTES 1.0 0.0 - 1.0 K/UL    ABS. EOSINOPHILS 0.3 0.0 - 0.4 K/UL    ABS. BASOPHILS 0.1 0.0 - 0.1 K/UL    ABS. IMM. GRANS. 0.1 (H) 0.00 - 0.04 K/UL    DF AUTOMATED     METABOLIC PANEL, COMPREHENSIVE    Collection Time: 02/27/18  3:49 AM   Result Value Ref Range    Sodium 139 136 - 145 mmol/L    Potassium 3.5 3.5 - 5.1 mmol/L    Chloride 103 97 - 108 mmol/L    CO2 30 21 - 32 mmol/L    Anion gap 6 5 - 15 mmol/L    Glucose 181 (H) 65 - 100 mg/dL    BUN 25 (H) 6 - 20 MG/DL    Creatinine 1.28 (H) 0.55 - 1.02 MG/DL    BUN/Creatinine ratio 20 12 - 20      GFR est AA 49 (L) >60 ml/min/1.73m2    GFR est non-AA 41 (L) >60 ml/min/1.73m2    Calcium 9.1 8.5 - 10.1 MG/DL    Bilirubin, total 0.4 0.2 - 1.0 MG/DL    ALT (SGPT) 40 12 - 78 U/L    AST (SGOT) 31 15 - 37 U/L    Alk.  phosphatase 113 45 - 117 U/L    Protein, total 7.5 6.4 - 8.2 g/dL    Albumin 3.5 3.5 - 5.0 g/dL    Globulin 4.0 2.0 - 4.0 g/dL    A-G Ratio 0.9 (L) 1.1 - 2.2     CK W/ CKMB & INDEX    Collection Time: 02/27/18  3:49 AM   Result Value Ref Range    CK 57 26 - 192 U/L    CK - MB 1.3 <3.6 NG/ML    CK-MB Index 2.3 0 - 2.5 NT-PRO BNP    Collection Time: 02/27/18  3:49 AM   Result Value Ref Range    NT pro-BNP 1507 (H) 0 - 125 PG/ML   TROPONIN I    Collection Time: 02/27/18  3:49 AM   Result Value Ref Range    Troponin-I, Qt. <0.04 <0.05 ng/mL   LACTIC ACID    Collection Time: 02/27/18  4:37 AM   Result Value Ref Range    Lactic acid 1.4 0.4 - 2.0 MMOL/L   URINALYSIS W/ REFLEX CULTURE    Collection Time: 02/27/18  4:37 AM   Result Value Ref Range    Color YELLOW/STRAW      Appearance CLEAR CLEAR      Specific gravity 1.012 1.003 - 1.030      pH (UA) 7.0 5.0 - 8.0      Protein 100 (A) NEG mg/dL    Glucose 100 (A) NEG mg/dL    Ketone NEGATIVE  NEG mg/dL    Bilirubin NEGATIVE  NEG      Blood TRACE (A) NEG      Urobilinogen 0.2 0.2 - 1.0 EU/dL    Nitrites NEGATIVE  NEG      Leukocyte Esterase NEGATIVE  NEG      WBC 0-4 0 - 4 /hpf    RBC 0-5 0 - 5 /hpf    Epithelial cells FEW FEW /lpf    Bacteria NEGATIVE  NEG /hpf    UA:UC IF INDICATED CULTURE NOT INDICATED BY UA RESULT CNI      Hyaline cast 0-2 0 - 5 /lpf   CULTURE, BLOOD, PAIRED    Collection Time: 02/27/18  4:50 AM   Result Value Ref Range    Special Requests: NO SPECIAL REQUESTS      Culture result: NO GROWTH AFTER 2 HOURS     POC TROPONIN-I    Collection Time: 02/27/18  7:06 AM   Result Value Ref Range    Troponin-I (POC) 0.12 (H) 0.00 - 0.08 ng/mL       Radiologic Studies -     CXR Results  (Last 48 hours)               02/27/18 0521  XR CHEST PA LAT Final result    Impression:  IMPRESSION:       Possible trace effusion. Otherwise unremarkable. Narrative:  CLINICAL HISTORY: Chest pain    INDICATION: Chest pain       COMPARISON: None       FINDINGS:    PA and lateral views of the chest are obtained. The cardiopericardial silhouette is within normal limits. There is no   pneumothorax or focal consolidation present. Likely small effusion on the left. Medical Decision Making   I am the first provider for this patient.     I reviewed the vital signs, available nursing notes, past medical history, past surgical history, family history and social history. Vital Signs-Reviewed the patient's vital signs. Patient Vitals for the past 12 hrs:   Temp Pulse Resp BP SpO2   02/27/18 0715 - 87 16 133/62 96 %   02/27/18 0707 - 87 15 147/61 97 %   02/27/18 0542 - 89 17 - 96 %   02/27/18 0530 - 91 17 144/66 95 %   02/27/18 0523 - 91 16 149/64 95 %   02/27/18 0503 - 91 18 - 98 %   02/27/18 0415 - 93 15 149/68 97 %   02/27/18 0400 - 89 16 166/85 96 %   02/27/18 0345 - 95 25 (!) 192/101 96 %   02/27/18 0341 98.2 °F (36.8 °C) 91 21 (!) 192/101 97 %       Pulse Oximetry Analysis - 96% on RA    Cardiac Monitor:   Rate: 95bpm  Rhythm: Normal Sinus Rhythm       Records Reviewed: Nursing Notes, Old Medical Records, Previous electrocardiograms, Ambulance Run Sheet, Previous Radiology Studies and Previous Laboratory Studies    Provider Notes (Medical Decision Making):     DDx: bronchitis, URI, viral syndrome, PNA, ACS    ED Course:   Initial assessment performed. The patients presenting problems have been discussed, and they are in agreement with the care plan formulated and outlined with them. I have encouraged them to ask questions as they arise throughout their visit. EKG interpretation: (Preliminary) 0345  Rhythm: sinus rhythm. Rate (approx.): 93bpm; Axis: normal; Normal [place], QRS, QTc intervals; ST/T wave: non-specific changes; Other findings: possible ischemia. Written by Laina Nicolas ED Scribe, as dictated by Corine Reyna MD    PROGRESS NOTE:  5:45 AM  Pt reevaluated. Pt states she is feeling much better at this time. Will await IV Zithromax prior to discharge. Written by JR Dong, as dictated by Corine Reyna MD    PROGRESS NOTE:  7:19 AM  POC troponin 0.12 at this time. Will reevaluate with lab troponin.    Written by JR Dong, as dictated by Corine Reyna MD    SIGN OUT:  8:00 AM  Patient's presentation, labs/imaging and plan of care was reviewed with Blaise Shaffer DO as part of sign out. They will await repeat troponin and dispo as part of the plan discussed with the patient. Blaise Shaffer DO's assistance in completion of this plan is greatly appreciated but it should be noted that I will be the provider of record for this patient. Kathleen Mcmahon MD     This note is prepared by Wayne Vazquez, acting as Scribe for MD Odalys Cortez MD : The scribe's documentation has been prepared under my direction and personally reviewed by me in its entirety. I confirm that the note above accurately reflects all work, treatment, procedures, and medical decision making performed by me.    8:33 AM  Second troponin 0.32. Will call Dr. Deborah Olvera. Written by JR Galdamezibkristen, as dictated by Blaise Shaffer DO    EKG interpretation: (Repeat) 8:43 AM  Rhythm: sinus rhythm with premature supraventricular complexes. Rate (approx.): 84; ST/T wave: non specific lateral ST segment changes. Written by Alisson Jensen ED Scribkristen, as dictated by Blaise Shaffer DO    Consult Note:  9:25 AM  Blaise Shaffer DO spoke with Rd English MD  Specialty: Cardiology  Discussed pt's hx, disposition, and available diagnostic and imaging results. Dr. Deborah Olvera recommended admitting to hospitalist  Written by JR Galdamezibkristen, as dictated by Blaise Shaffer DO    2:16 PM  Pt's family member Chalo Chow is leaving but would like to be contacted if needed at [phone]. Disposition  Admit Note:  10:00 AM  Pt is being admitted by Tita Gonzalez MD. The results of their tests and reason(s) for their admission have been discussed with pt and/or available family. They convey agreement and understanding for the need to be admitted and for admission diagnosis. Diagnosis     Clinical Impression:   1. Acute bronchitis, unspecified organism    2.  NSTEMI (non-ST elevated myocardial infarction) ([facility] [address] Attestations: This note is prepared by Micki Mckinney, acting as a Scribe for MD Odalys Sheehan MD: The scribe's documentation has been prepared under my direction and personally reviewed by me in its entirety. I confirm that the notes above accurately reflects all work, treatment, procedures, and medical decision making performed by me. This note will not be viewable in [address]

## 2018-02-27 NOTE — CONSULTS
[address]  MR#: [id number]  : 1945  ACCOUNT #: [de-identified]   DATE OF SERVICE: 2018    HISTORY OF PRESENT ILLNESS:  The patient is a 57-year-old lady seen at the request of Dr. Lisa Griffin in reference to chest tightness and elevated troponin. The patient has experienced significant recent difficulty with hypertension. In addition, she has had asthma. She awakened and had difficulty with shortness of breath, chest tightness. She has had bronchitis and has been coughing recently. She thought she was coming down with bronchitis. She has not had a fever, but she thought she had some purulent sputum. She has the following known problems:  1. Diabetes mellitus, on insulin. 2.  Hypertension. SOCIAL HISTORY:  She is a hairdresser. She was born and raised in [address] [place] [address]. Tobacco:  She never smoked. Alcohol:  None. CURRENT MEDICATIONS:  As follows:  Metoprolol 200 mg daily, clonidine 0.1 twice daily, Nifedical 60 daily, valsartan 320/25 daily, Levoxyl, furosemide 40 as needed, metformin 1000 twice daily, Novolin, and Humalog. REVIEW OF SYSTEMS:    HEENT:  No dizziness, no fainting. No nausea, no diaphoresis. The chest tightness that she experienced has been alleviated by breathing treatment. GASTROINTESTINAL:  No difficulty with blood in stools, black stools or abdominal pain. GENITOURINARY:  No difficulty with dysuria, frequency or hematuria. EXTREMITIES:  She has had edema. Her blood pressure has been difficult to manage and she has recently stopped her Nifedical and started clonidine 0.1 twice daily. PHYSICAL EXAMINATION:  VITAL SIGNS:  Blood pressure is currently 146/84. Her weight is 246. HEENT:  She is a very pleasant lady in no distress. No facial asymmetry. NECK:  Carotids are full. Pharynx normal.  LUNGS:  Reveal diminished breath sounds, a few dry rales at the bases.   HEART: Reveals regular rhythm. No gallop, click or murmur. ABDOMEN:  Nontender, no organomegaly. EXTREMITIES:  Pulses are present, no edema. EKG:  Regular sinus rhythm, nonspecific ST changes. CHEST X-RAY:  No acute infiltrates. LABORATORY DATA:  Initial troponin was negative. It subsequently has risen to 0.3. PROBLEMS:  1. Unstable angina with positive troponin. 2.  Severe hypertension. 3.  Strong family history of coronary heart disease. 4.  Diabetes mellitus. DISCUSSION AND RECOMMENDATIONS:  This lady had tightness in the chest, which may have been respiratory in etiology, but nevertheless her troponin has gone up significantly. She has a significant family history of coronary heart disease. She also has a history of gastroesophageal reflux disorder. Her father  at 64 of heart trouble. Mother lived to be 66,  of cancer. Two brothers, one has had stents and a pacemaker, one  of heart trouble at age 61. One sister  at 32 of cancer. The patient will be admitted to the hospital.  I agree with initiation of Lovenox. She should continue aspirin. I believe that she will likely need invasive evaluation. We will follow up with you.       Renetta Bergman MD       SCT / Lashae Sal  D: 2018 15:22     T: 2018 15:42  JOB #: [id number]  CC: Davida Roldan MD  CC: Joycelyn Das MD  CC: Merlin Richter MD

## 2018-02-27 NOTE — ED NOTES
TRANSFER - OUT REPORT:    Verbal report given to Alix Naila (name) on Merline Ponds  being transferred to [facility] (unit) for routine progression of care       Report consisted of patients Situation, Background, Assessment and   Recommendations(SBAR). Information from the following report(s) SBAR, Kardex, ED Summary and MAR was reviewed with the receiving nurse. Lines:   Peripheral IV 02/27/18 Right Antecubital (Active)   Site Assessment Clean, dry, & intact 2/27/2018  5:08 AM   Phlebitis Assessment 0 2/27/2018  5:08 AM   Infiltration Assessment 0 2/27/2018  5:08 AM   Dressing Status Clean, dry, & intact 2/27/2018  5:08 AM   Dressing Type 4 X 4 2/27/2018  5:08 AM        Opportunity for questions and clarification was provided.

## 2018-02-27 NOTE — ED NOTES
Report received from [facility]. Blair CAPUTO, ED Summary, MAR and Recent Results was discussed.     Micheal Wilder RN

## 2018-02-27 NOTE — ED NOTES
While administering pt's meds, she mentioned that she normally takes metoprolol 200 mg at 7:00pm (did last night) and not 25mg during the day.  Spoke with pharmacy and recommends holding the medication until tonight

## 2018-02-27 NOTE — PROGRESS NOTES
Pharmacy  Enoxaparin (Lovenox®) Monitoring/Dosing      Indication: NSTEMI     Current Dose: Enoxaparin 110 subcutaneously every 12 hours    Creatinine Clearance (mL/min): 38 mL/min    Concomitant antiplatelets: aspirin (received 325 mg x 1 on 2/27 and 81 mg daily ordered to con't there after)    Labs:  Recent Labs      02/27/18   0850  02/27/18   0349   CREA   --   1.28*   HGB   --   12.1   PLT   --   292   INR  1.0   --      Wt Readings from Last 1 Encounters:   02/27/18 108.4 kg (239 lb)     Ht Readings from Last 1 Encounters:   02/27/18 172.7 cm (68\")       Impression/Plan:    Scr and H/H stable   Pharmacy adjusted dose to 100 mg SC q 12 hours per protocol for indication, weight and renal function   CMP and CBC w/dif already ordered for 2/28, so pharmacy ordered daily BMP and every other day CBC w/o diff to be collected per protocol after 2/28 labs     Thanks,    Durga Dean, PharmD, [facility]

## 2018-02-27 NOTE — IP AVS SNAPSHOT
[address] [facility] [address]. 
[phone] Patient: Zackery Aguilar MRN: [id number] MDS:[birth year] A check masood indicates which time of day the medication should be taken. My Medications START taking these medications Instructions Each Dose to Equal  
 Morning Noon Evening Bedtime  
 aspirin delayed-release 81 mg tablet Your last dose was: Your next dose is: Take 1 Tab by mouth daily. 81 mg  
    
   
   
   
  
 azithromycin 250 mg tablet Commonly known as:  Elfrieda Fill Your last dose was: Your next dose is: As directed  
     
   
   
   
  
 predniSONE 5 mg dose pack Commonly known as:  STERAPRED Your last dose was: Your next dose is:    
   
   
 See administration instruction per 5mg dose pack CHANGE how you take these medications Instructions Each Dose to Equal  
 Morning Noon Evening Bedtime * albuterol 90 mcg/actuation inhaler Commonly known as:  PROVENTIL HFA, VENTOLIN HFA, PROAIR HFA What changed: You were already taking a medication with the same name, and this prescription was added. Make sure you understand how and when to take each. Your last dose was: Your next dose is: Take 2 Puffs by inhalation every four (4) hours as needed for Wheezing. 2 Puff * albuterol 90 mcg/actuation inhaler Commonly known as:  PROVENTIL HFA, VENTOLIN HFA, PROAIR HFA What changed:  Another medication with the same name was added. Make sure you understand how and when to take each. Your last dose was: Your next dose is: Take 1 Puff by inhalation every six (6) hours as needed for Wheezing. 1 Puff  
    
   
   
   
  
 levothyroxine 100 mcg tablet Commonly known as:  SYNTHROID  
 What changed:  Another medication with the same name was removed. Continue taking this medication, and follow the directions you see here. Your last dose was: Your next dose is: Take 100 mcg by mouth Daily (before breakfast). 100 mcg * Notice: This list has 2 medication(s) that are the same as other medications prescribed for you. Read the directions carefully, and ask your doctor or other care provider to review them with you. CONTINUE taking these medications Instructions Each Dose to Equal  
 Morning Noon Evening Bedtime  
 acetaminophen 500 mg tablet Commonly known as:  TYLENOL Your last dose was: Your next dose is: Take 1,000 mg by mouth two (2) times a day. 1000 mg  
    
   
   
   
  
 cloNIDine HCl 0.1 mg tablet Commonly known as:  CATAPRES Your last dose was: Your next dose is: Take 0.1 mg by mouth two (2) times a day. 0.1 mg  
    
   
   
   
  
 furosemide 40 mg tablet Commonly known as:  LASIX Your last dose was: Your next dose is: Take 40 mg by mouth daily as needed (lower extremity swelling). 40 mg  
    
   
   
   
  
 insulin lispro 100 unit/mL injection Commonly known as:  HUMALOG Your last dose was: Your next dose is:    
   
   
 0-15 Units by SubCUTAneous route Before breakfast, lunch, and dinner. Patient states that she administers agent per corrective scale, but was unable to clarify the blood glucose ranges and associated insulin dosages per scale 0-15 Units  
    
   
   
   
  
 insulin  unit/mL injection Commonly known as:  Gary Turner Your last dose was: Your next dose is:    
   
   
 0-4 Units by SubCUTAneous route nightly.  Patient states that she administers agent per corrective scale, but was unable to clarify the blood glucose ranges and associated insulin dosages per scale 0-4 Units  
    
   
   
   
  
 metFORMIN 1,000 mg tablet Commonly known as:  GLUCOPHAGE Your last dose was: Your next dose is: Take 1,000 mg by mouth two (2) times daily (with meals). 1000 mg  
    
   
   
   
  
 metoprolol succinate 200 mg XL tablet Commonly known as:  TOPROL-XL Your last dose was: Your next dose is: Take 200 mg by mouth nightly. 200 mg NexIUM 40 mg capsule Generic drug:  esomeprazole Your last dose was: Your next dose is: Take 40 mg by mouth daily. 40 mg  
    
   
   
   
  
 valsartan-hydroCHLOROthiazide 320-25 mg per tablet Commonly known as:  DIOVAN-HCT Your last dose was: Your next dose is: Take 1 Tab by mouth daily. 1 Tab ASK your doctor about these medications Instructions Each Dose to Equal  
 Morning Noon Evening Bedtime  
 metoprolol tartrate 25 mg tablet Commonly known as:  LOPRESSOR Your last dose was: Your next dose is: Take 25 mg by mouth two (2) times a day. 25 mg Where to Get Your Medications Information on where to get these meds will be given to you by the nurse or doctor. ! Ask your nurse or doctor about these medications  
  albuterol 90 mcg/actuation inhaler  
 albuterol 90 mcg/actuation inhaler  
 aspirin delayed-release 81 mg tablet  
 azithromycin 250 mg tablet  
 predniSONE 5 mg dose pack

## 2018-02-27 NOTE — H&P
Hospitalist Admission Note    NAME: Feliz Tay   :  1945   MRN:  [id number]     Date/Time:  2018 10:03 AM    Patient PCP: Rach Harris MD  ______________________________________________________________________  Given the patient's current clinical presentation, I have a high level of concern for decompensation if discharged from the emergency department. Complex decision making was performed, which includes reviewing the patient's available past medical records, laboratory results, and x-ray films. My assessment of this patient's clinical condition and my plan of care is as follows. Assessment / Plan:  Chest Pain with increased troponin (NSTEMI): check serial enzymes, start ASA, BB, check lipids, check ECHO, start lovenox full dose, get Cardiology evaluation. Asthma/Acute Bronchitis: start Z-max, bronchodilators, monitor. HTN: c/w BB, c/w clonidine, use labetalol prn. Hold Diovan /HCTZ  DM: hold metformin for now, place on SSI, check HbA1C. GERD: c/w PPI. Hypothyroidism: c/w L-thyroxine  Code Status: DNR as per patient decision  Surrogate Decision Maker: shola Jay [phone] or DTR in law [place] [phone]  DVT Prophylaxis: lovenox  GI Prophylaxis: on PPI  Baseline: fairly independent full ADL      Subjective:   CHIEF COMPLAINT: \"I have chest tightness and SOB\"    HISTORY OF PRESENT ILLNESS:     Feliz Tay is a 74 y.o.  female with pmhx HTN / DM / GERD / hypothyroid, who presents via EMS to the ED c/o gradually worsening dry cough that began yesterday. She reports additional SOB that began x 2200 yesterday evening. Pt states her sxs were exacerbated when lying flat to try and sleep. She denies any recent medications for her current sxs. Pt denies any use of inhalers or nebulizers at home. She denies any recent ABX. Pt reports a hx of BLE swelling at baseline and states she has a diuretic to take PRN. EMS notes giving pt a duoneb en route to the ED.  On evaluation, pt states \"I feel much better now\". She otherwise specifically denies any recent fever, chills, nausea, vomiting, diarrhea, abd pain, CP, lightheadedness, dizziness, numbness, weakness, tingling, BLE swelling, HA, heart palpitations, urinary sxs, changes in BM, changes in PO intake, melena, hematochezia, or congestion. At this time patient is lying in bed c/o chest tightness, SOB, cough, malaise, denies fever, no N./V no diarrhea, no urinary symptoms, no other associated symptoms. We were asked to admit for work up and evaluation of the above problems. Past Medical History:   Diagnosis Date    Asthma     Diabetes ([facility] [address]     GERD (gastroesophageal reflux disease)     Hypertension     Other ill-defined conditions(799.89)     hypothyroid        Past Surgical History:   Procedure Laterality Date    HX GYN      hysterectomy    HX ORTHOPAEDIC      left knee    HX OTHER SURGICAL      lipoma removal       Social History   Substance Use Topics    Smoking status: Never Smoker    Smokeless tobacco: Never Used    Alcohol use No        Family History   Problem Relation Age of Onset    No Known Problems Mother     No Known Problems Father      No Known Allergies     Prior to Admission medications    Medication Sig Start Date End Date Taking? Authorizing Provider   azithromycin (ZITHROMAX Z-EMERITA) 250 mg tablet As directed 2/27/18  Yes Odalys Fortune MD   albuterol (PROVENTIL HFA, VENTOLIN HFA, PROAIR HFA) 90 mcg/actuation inhaler Take 2 Puffs by inhalation every four (4) hours as needed for Wheezing. 2/27/18  Yes Odalys Oconnell MD   predniSONE (STERAPRED) 5 mg dose pack See administration instruction per 5mg dose pack 2/27/18  Yes Odalys Oconnell MD   esomeprazole (NEXIUM) 40 mg capsule Take  by mouth daily. Yes Olman Armenta MD   metoprolol (LOPRESSOR) 25 mg tablet Take 25 mg by mouth two (2) times a day.      Yes Olman Armenta MD   insulin regular (NOVOLIN, HUMULIN) 100 unit/mL injection by SubCUTAneous route. Yes Olman Armenta MD   metFORMIN (GLUCOPHAGE) 1,000 mg tablet Take 1,000 mg by mouth two (2) times daily (with meals). Yes Olman Armenta MD   levothyroxine (SYNTHROID) 50 mcg tablet Take 50 mcg by mouth daily (before breakfast). Yes Olman Armenta MD   amLODIPine (NORVASC) 10 mg tablet Take  by mouth daily. Olman Armenta MD       REVIEW OF SYSTEMS:     I am not able to complete the review of systems because:    The patient is intubated and sedated    The patient has altered mental status due to his acute medical problems    The patient has baseline aphasia from prior stroke(s)    The patient has baseline dementia and is not reliable historian    The patient is in acute medical distress and unable to provide information           Total of 12 systems reviewed as follows:       POSITIVE= underlined text  Negative = text not underlined  General:  fever, chills, sweats, generalized weakness, weight loss/gain,      loss of appetite   Eyes:    blurred vision, eye pain, loss of vision, double vision  ENT:    rhinorrhea, pharyngitis   Respiratory:   cough, sputum production, SOB, GARCIA, wheezing, pleuritic pain   Cardiology:   chest pain, palpitations, orthopnea, PND, edema, syncope   Gastrointestinal:  abdominal pain , N/V, diarrhea, dysphagia, constipation, bleeding   Genitourinary:  frequency, urgency, dysuria, hematuria, incontinence   Muskuloskeletal :  arthralgia, myalgia, back pain  Hematology:  easy bruising, nose or gum bleeding, lymphadenopathy   Dermatological: rash, ulceration, pruritis, color change / jaundice  Endocrine:   hot flashes or polydipsia   Neurological:  headache, dizziness, confusion, focal weakness, paresthesia,     Speech difficulties, memory loss, gait difficulty  Psychological: Feelings of anxiety, depression, agitation    Objective:   VITALS:    Visit Vitals    /57    Pulse 80    Temp 98.2 °F (36.8 °C)    Resp 21    Ht 5' 8\" (1.727 m)    Wt 108.4 kg (239 lb)  SpO2 94%    BMI 36.34 kg/m2       PHYSICAL EXAM:    General:    Alert, cooperative, no distress, appears stated age. HEENT: Atraumatic, anicteric sclerae, pink conjunctivae     No oral ulcers, mucosa moist, throat clear, dentition poor  Neck:  Supple, symmetrical,  thyroid: non tender  Lungs:   Coarse BS, rhonchi, not wheezing at this time  Chest wall:  No tenderness  No Accessory muscle use. Heart:   Regular  rhythm,  No  murmur   No edema  Abdomen:   Soft, non-tender. Not distended. Bowel sounds normal  Extremities: No cyanosis. No clubbing,      Skin turgor normal, Capillary refill normal, Radial  pulse 2+  Skin:     Not pale. Not Jaundiced  No rashes   Psych:  Good insight. Not depressed. Not anxious or agitated. Neurologic: EOMs intact. No facial asymmetry. No aphasia or slurred speech. Symmetrical strength, Sensation grossly intact. Alert and oriented X 4.     _______________________________________________________________________  Care Plan discussed with:    Comments   Patient y    Family  y DTR in law   RN y    Care Manager                    Consultant:      _______________________________________________________________________  Expected  Disposition:   Home with Family    HH/PT/OT/RN y   SNF/LTC    PEGGY    ________________________________________________________________________  TOTAL TIME:  61 Minutes    Critical Care Provided     Minutes non procedure based      Comments    y Reviewed previous records   >50% of visit spent in counseling and coordination of care y Discussion with patient and/or family and questions answered       ________________________________________________________________________  Signed: Allegra Butterfield MD    Procedures: see electronic medical records for all procedures/Xrays and details which were not copied into this note but were reviewed prior to creation of Plan.     LAB DATA REVIEWED:    Recent Results (from the past 24 hour(s))   EKG, 12 LEAD, INITIAL Collection Time: 02/27/18  3:41 AM   Result Value Ref Range    Ventricular Rate 93 BPM    Atrial Rate 93 BPM    P-R Interval 180 ms    QRS Duration 90 ms    Q-T Interval 324 ms    QTC Calculation (Bezet) 402 ms    Calculated P Axis 75 degrees    Calculated R Axis 45 degrees    Calculated T Axis 61 degrees    Diagnosis       ** Poor data quality, interpretation may be adversely affected  Sinus rhythm  Nonspecific ST and T wave abnormality    Confirmed by Jeffrey Skinner ([id number]) on 2/27/2018 8:14:04 AM     CBC WITH AUTOMATED DIFF    Collection Time: 02/27/18  3:49 AM   Result Value Ref Range    WBC 18.6 (H) 3.6 - 11.0 K/uL    RBC 4.53 3.80 - 5.20 M/uL    HGB 12.1 11.5 - 16.0 g/dL    HCT 38.9 35.0 - 47.0 %    MCV 85.9 80.0 - 99.0 FL    MCH 26.7 26.0 - 34.0 PG    MCHC 31.1 30.0 - 36.5 g/dL    RDW 13.7 11.5 - 14.5 %    PLATELET 741 166 - 696 K/uL    MPV 10.6 8.9 - 12.9 FL    NRBC 0.0 0  WBC    ABSOLUTE NRBC 0.00 0.00 - 0.01 K/uL    NEUTROPHILS 74 32 - 75 %    LYMPHOCYTES 19 12 - 49 %    MONOCYTES 5 5 - 13 %    EOSINOPHILS 1 0 - 7 %    BASOPHILS 0 0 - 1 %    IMMATURE GRANULOCYTES 1 (H) 0.0 - 0.5 %    ABS. NEUTROPHILS 13.7 (H) 1.8 - 8.0 K/UL    ABS. LYMPHOCYTES 3.5 0.8 - 3.5 K/UL    ABS. MONOCYTES 1.0 0.0 - 1.0 K/UL    ABS. EOSINOPHILS 0.3 0.0 - 0.4 K/UL    ABS. BASOPHILS 0.1 0.0 - 0.1 K/UL    ABS. IMM.  GRANS. 0.1 (H) 0.00 - 0.04 K/UL    DF AUTOMATED     METABOLIC PANEL, COMPREHENSIVE    Collection Time: 02/27/18  3:49 AM   Result Value Ref Range    Sodium 139 136 - 145 mmol/L    Potassium 3.5 3.5 - 5.1 mmol/L    Chloride 103 97 - 108 mmol/L    CO2 30 21 - 32 mmol/L    Anion gap 6 5 - 15 mmol/L    Glucose 181 (H) 65 - 100 mg/dL    BUN 25 (H) 6 - 20 MG/DL    Creatinine 1.28 (H) 0.55 - 1.02 MG/DL    BUN/Creatinine ratio 20 12 - 20      GFR est AA 49 (L) >60 ml/min/1.73m2    GFR est non-AA 41 (L) >60 ml/min/1.73m2    Calcium 9.1 8.5 - 10.1 MG/DL    Bilirubin, total 0.4 0.2 - 1.0 MG/DL    ALT (SGPT) 40 12 - 78 U/L AST (SGOT) 31 15 - 37 U/L    Alk.  phosphatase 113 45 - 117 U/L    Protein, total 7.5 6.4 - 8.2 g/dL    Albumin 3.5 3.5 - 5.0 g/dL    Globulin 4.0 2.0 - 4.0 g/dL    A-G Ratio 0.9 (L) 1.1 - 2.2     CK W/ CKMB & INDEX    Collection Time: 02/27/18  3:49 AM   Result Value Ref Range    CK 57 26 - 192 U/L    CK - MB 1.3 <3.6 NG/ML    CK-MB Index 2.3 0 - 2.5     NT-PRO BNP    Collection Time: 02/27/18  3:49 AM   Result Value Ref Range    NT pro-BNP 1507 (H) 0 - 125 PG/ML   TROPONIN I    Collection Time: 02/27/18  3:49 AM   Result Value Ref Range    Troponin-I, Qt. <0.04 <0.05 ng/mL   LACTIC ACID    Collection Time: 02/27/18  4:37 AM   Result Value Ref Range    Lactic acid 1.4 0.4 - 2.0 MMOL/L   URINALYSIS W/ REFLEX CULTURE    Collection Time: 02/27/18  4:37 AM   Result Value Ref Range    Color YELLOW/STRAW      Appearance CLEAR CLEAR      Specific gravity 1.012 1.003 - 1.030      pH (UA) 7.0 5.0 - 8.0      Protein 100 (A) NEG mg/dL    Glucose 100 (A) NEG mg/dL    Ketone NEGATIVE  NEG mg/dL    Bilirubin NEGATIVE  NEG      Blood TRACE (A) NEG      Urobilinogen 0.2 0.2 - 1.0 EU/dL    Nitrites NEGATIVE  NEG      Leukocyte Esterase NEGATIVE  NEG      WBC 0-4 0 - 4 /hpf    RBC 0-5 0 - 5 /hpf    Epithelial cells FEW FEW /lpf    Bacteria NEGATIVE  NEG /hpf    UA:UC IF INDICATED CULTURE NOT INDICATED BY UA RESULT CNI      Hyaline cast 0-2 0 - 5 /lpf   CULTURE, BLOOD, PAIRED    Collection Time: 02/27/18  4:50 AM   Result Value Ref Range    Special Requests: NO SPECIAL REQUESTS      Culture result: NO GROWTH AFTER 2 HOURS     POC TROPONIN-I    Collection Time: 02/27/18  7:06 AM   Result Value Ref Range    Troponin-I (POC) 0.12 (H) 0.00 - 0.08 ng/mL   CK W/ CKMB & INDEX    Collection Time: 02/27/18  8:02 AM   Result Value Ref Range    CK 53 26 - 192 U/L    CK - MB 2.2 <3.6 NG/ML    CK-MB Index 4.2 (H) 0 - 2.5     TROPONIN I    Collection Time: 02/27/18  8:02 AM   Result Value Ref Range    Troponin-I, Qt. 0.32 (H) <0.05 ng/mL   EKG, 12 LEAD, SUBSEQUENT    Collection Time: 02/27/18  8:43 AM   Result Value Ref Range    Ventricular Rate 84 BPM    Atrial Rate 84 BPM    P-R Interval 160 ms    QRS Duration 84 ms    Q-T Interval 362 ms    QTC Calculation (Bezet) 427 ms    Calculated P Axis 86 degrees    Calculated R Axis 29 degrees    Calculated T Axis 45 degrees    Diagnosis       Sinus rhythm with premature supraventricular complexes  Nonspecific ST abnormality    Confirmed by Neha Hernandez ([id number]) on 2/27/2018 9:29:09 AM     PROTHROMBIN TIME + INR    Collection Time: 02/27/18  8:50 AM   Result Value Ref Range    INR 1.0 0.9 - 1.1      Prothrombin time 10.3 9.0 - 11.1 sec

## 2018-02-27 NOTE — ED NOTES
Pt presents by EMS with dry cough and SOB since yesterday. Pt reports hx of asthma. EMS reports given pt duoneb PTA. pt states \"i feel much better now\". Cardiac monitor x3. Call bell in reach. SRx2.

## 2018-02-28 VITALS
DIASTOLIC BLOOD PRESSURE: 72 MMHG | RESPIRATION RATE: 18 BRPM | HEIGHT: 68 IN | BODY MASS INDEX: 36.22 KG/M2 | HEART RATE: 78 BPM | SYSTOLIC BLOOD PRESSURE: 153 MMHG | WEIGHT: 239 LBS | TEMPERATURE: 97.5 F | OXYGEN SATURATION: 97 %

## 2018-02-28 PROBLEM — I21.4 NSTEMI, INITIAL EPISODE OF CARE (HCC): Status: ACTIVE | Noted: 2018-02-28

## 2018-02-28 LAB
ALBUMIN SERPL-MCNC: 3.1 G/DL (ref 3.5–5)
ALBUMIN/GLOB SERPL: 0.9 {RATIO} (ref 1.1–2.2)
ALP SERPL-CCNC: 86 U/L (ref 45–117)
ALT SERPL-CCNC: 27 U/L (ref 12–78)
ANION GAP SERPL CALC-SCNC: 8 MMOL/L (ref 5–15)
AST SERPL-CCNC: 17 U/L (ref 15–37)
BASOPHILS # BLD: 0 K/UL (ref 0–0.1)
BASOPHILS NFR BLD: 0 % (ref 0–1)
BILIRUB SERPL-MCNC: 1.1 MG/DL (ref 0.2–1)
BUN SERPL-MCNC: 18 MG/DL (ref 6–20)
BUN/CREAT SERPL: 17 (ref 12–20)
CALCIUM SERPL-MCNC: 9.2 MG/DL (ref 8.5–10.1)
CHLORIDE SERPL-SCNC: 102 MMOL/L (ref 97–108)
CHOLEST SERPL-MCNC: 133 MG/DL
CO2 SERPL-SCNC: 27 MMOL/L (ref 21–32)
CREAT SERPL-MCNC: 1.03 MG/DL (ref 0.55–1.02)
DIFFERENTIAL METHOD BLD: ABNORMAL
EOSINOPHIL # BLD: 0.1 K/UL (ref 0–0.4)
EOSINOPHIL NFR BLD: 1 % (ref 0–7)
ERYTHROCYTE [DISTWIDTH] IN BLOOD BY AUTOMATED COUNT: 14 % (ref 11.5–14.5)
EST. AVERAGE GLUCOSE BLD GHB EST-MCNC: 157 MG/DL
GLOBULIN SER CALC-MCNC: 3.6 G/DL (ref 2–4)
GLUCOSE BLD STRIP.AUTO-MCNC: 223 MG/DL (ref 65–100)
GLUCOSE BLD STRIP.AUTO-MCNC: 248 MG/DL (ref 65–100)
GLUCOSE SERPL-MCNC: 204 MG/DL (ref 65–100)
HBA1C MFR BLD: 7.1 % (ref 4.2–6.3)
HCT VFR BLD AUTO: 33.4 % (ref 35–47)
HDLC SERPL-MCNC: 63 MG/DL
HDLC SERPL: 2.1 {RATIO} (ref 0–5)
HGB BLD-MCNC: 10.6 G/DL (ref 11.5–16)
IMM GRANULOCYTES # BLD: 0.1 K/UL (ref 0–0.04)
IMM GRANULOCYTES NFR BLD AUTO: 0 % (ref 0–0.5)
LDLC SERPL CALC-MCNC: 53.4 MG/DL (ref 0–100)
LIPID PROFILE,FLP: NORMAL
LYMPHOCYTES # BLD: 2.5 K/UL (ref 0.8–3.5)
LYMPHOCYTES NFR BLD: 21 % (ref 12–49)
MAGNESIUM SERPL-MCNC: 1.8 MG/DL (ref 1.6–2.4)
MCH RBC QN AUTO: 26.4 PG (ref 26–34)
MCHC RBC AUTO-ENTMCNC: 31.7 G/DL (ref 30–36.5)
MCV RBC AUTO: 83.3 FL (ref 80–99)
MONOCYTES # BLD: 1 K/UL (ref 0–1)
MONOCYTES NFR BLD: 8 % (ref 5–13)
NEUTS SEG # BLD: 8.4 K/UL (ref 1.8–8)
NEUTS SEG NFR BLD: 70 % (ref 32–75)
NRBC # BLD: 0 K/UL (ref 0–0.01)
NRBC BLD-RTO: 0 PER 100 WBC
PLATELET # BLD AUTO: 238 K/UL (ref 150–400)
PMV BLD AUTO: 10.8 FL (ref 8.9–12.9)
POTASSIUM SERPL-SCNC: 3.5 MMOL/L (ref 3.5–5.1)
PROT SERPL-MCNC: 6.7 G/DL (ref 6.4–8.2)
RBC # BLD AUTO: 4.01 M/UL (ref 3.8–5.2)
SERVICE CMNT-IMP: ABNORMAL
SERVICE CMNT-IMP: ABNORMAL
SODIUM SERPL-SCNC: 137 MMOL/L (ref 136–145)
TRIGL SERPL-MCNC: 83 MG/DL (ref ?–150)
TROPONIN I SERPL-MCNC: 0.36 NG/ML
VLDLC SERPL CALC-MCNC: 16.6 MG/DL
WBC # BLD AUTO: 12.1 K/UL (ref 3.6–11)

## 2018-02-28 PROCEDURE — 74011250636 HC RX REV CODE- 250/636

## 2018-02-28 PROCEDURE — C1769 GUIDE WIRE: HCPCS

## 2018-02-28 PROCEDURE — 74011250636 HC RX REV CODE- 250/636: Performed by: EMERGENCY MEDICINE

## 2018-02-28 PROCEDURE — 4A023N7 MEASUREMENT OF CARDIAC SAMPLING AND PRESSURE, LEFT HEART, PERCUTANEOUS APPROACH: ICD-10-PCS | Performed by: INTERNAL MEDICINE

## 2018-02-28 PROCEDURE — 83036 HEMOGLOBIN GLYCOSYLATED A1C: CPT | Performed by: INTERNAL MEDICINE

## 2018-02-28 PROCEDURE — 80061 LIPID PANEL: CPT | Performed by: INTERNAL MEDICINE

## 2018-02-28 PROCEDURE — 77030029065 HC DRSG HEMO QCLOT ZMED -B

## 2018-02-28 PROCEDURE — 77030004543 HC CATH ANGI DX MRTM -A

## 2018-02-28 PROCEDURE — 83735 ASSAY OF MAGNESIUM: CPT | Performed by: INTERNAL MEDICINE

## 2018-02-28 PROCEDURE — 80053 COMPREHEN METABOLIC PANEL: CPT | Performed by: INTERNAL MEDICINE

## 2018-02-28 PROCEDURE — B2151ZZ FLUOROSCOPY OF LEFT HEART USING LOW OSMOLAR CONTRAST: ICD-10-PCS | Performed by: INTERNAL MEDICINE

## 2018-02-28 PROCEDURE — 82962 GLUCOSE BLOOD TEST: CPT

## 2018-02-28 PROCEDURE — 77030028837 HC SYR ANGI PWR INJ COEU -A

## 2018-02-28 PROCEDURE — 84484 ASSAY OF TROPONIN QUANT: CPT | Performed by: INTERNAL MEDICINE

## 2018-02-28 PROCEDURE — 74011636637 HC RX REV CODE- 636/637: Performed by: INTERNAL MEDICINE

## 2018-02-28 PROCEDURE — 74011250636 HC RX REV CODE- 250/636: Performed by: INTERNAL MEDICINE

## 2018-02-28 PROCEDURE — B2111ZZ FLUOROSCOPY OF MULTIPLE CORONARY ARTERIES USING LOW OSMOLAR CONTRAST: ICD-10-PCS | Performed by: INTERNAL MEDICINE

## 2018-02-28 PROCEDURE — 77030000299 HC FEMSTP COMP GLD STJU -B

## 2018-02-28 PROCEDURE — 36415 COLL VENOUS BLD VENIPUNCTURE: CPT | Performed by: INTERNAL MEDICINE

## 2018-02-28 PROCEDURE — 93458 L HRT ARTERY/VENTRICLE ANGIO: CPT

## 2018-02-28 PROCEDURE — 74011636320 HC RX REV CODE- 636/320

## 2018-02-28 PROCEDURE — 85025 COMPLETE CBC W/AUTO DIFF WBC: CPT | Performed by: INTERNAL MEDICINE

## 2018-02-28 PROCEDURE — 74011250637 HC RX REV CODE- 250/637: Performed by: INTERNAL MEDICINE

## 2018-02-28 PROCEDURE — 74011000250 HC RX REV CODE- 250

## 2018-02-28 PROCEDURE — C1894 INTRO/SHEATH, NON-LASER: HCPCS

## 2018-02-28 RX ORDER — MIDAZOLAM HYDROCHLORIDE 1 MG/ML
.5-2 INJECTION, SOLUTION INTRAMUSCULAR; INTRAVENOUS
Status: DISCONTINUED | OUTPATIENT
Start: 2018-02-28 | End: 2018-02-28

## 2018-02-28 RX ORDER — ALBUTEROL SULFATE 90 UG/1
1 AEROSOL, METERED RESPIRATORY (INHALATION)
Qty: 1 INHALER | Refills: 0 | Status: SHIPPED | OUTPATIENT
Start: 2018-02-28 | End: 2019-05-08

## 2018-02-28 RX ORDER — HEPARIN SODIUM 200 [USP'U]/100ML
500 INJECTION, SOLUTION INTRAVENOUS ONCE
Status: COMPLETED | OUTPATIENT
Start: 2018-02-28 | End: 2018-02-28

## 2018-02-28 RX ORDER — SODIUM CHLORIDE 0.9 % (FLUSH) 0.9 %
5-10 SYRINGE (ML) INJECTION AS NEEDED
Status: DISCONTINUED | OUTPATIENT
Start: 2018-02-28 | End: 2018-02-28 | Stop reason: HOSPADM

## 2018-02-28 RX ORDER — FENTANYL CITRATE 50 UG/ML
INJECTION, SOLUTION INTRAMUSCULAR; INTRAVENOUS
Status: COMPLETED
Start: 2018-02-28 | End: 2018-02-28

## 2018-02-28 RX ORDER — HEPARIN SODIUM 200 [USP'U]/100ML
INJECTION, SOLUTION INTRAVENOUS
Status: COMPLETED
Start: 2018-02-28 | End: 2018-02-28

## 2018-02-28 RX ORDER — SODIUM CHLORIDE 9 MG/ML
75 INJECTION, SOLUTION INTRAVENOUS CONTINUOUS
Status: DISCONTINUED | OUTPATIENT
Start: 2018-02-28 | End: 2018-02-28 | Stop reason: HOSPADM

## 2018-02-28 RX ORDER — ASPIRIN 81 MG/1
81 TABLET ORAL DAILY
Qty: 100 TAB | Refills: 0 | Status: SHIPPED | OUTPATIENT
Start: 2018-02-28 | End: 2019-05-08

## 2018-02-28 RX ORDER — AZITHROMYCIN 250 MG/1
TABLET, FILM COATED ORAL
Qty: 6 TAB | Refills: 0 | Status: SHIPPED | OUTPATIENT
Start: 2018-02-28 | End: 2018-10-10

## 2018-02-28 RX ORDER — SODIUM CHLORIDE 0.9 % (FLUSH) 0.9 %
5-10 SYRINGE (ML) INJECTION EVERY 8 HOURS
Status: DISCONTINUED | OUTPATIENT
Start: 2018-02-28 | End: 2018-02-28 | Stop reason: HOSPADM

## 2018-02-28 RX ORDER — FENTANYL CITRATE 50 UG/ML
25-50 INJECTION, SOLUTION INTRAMUSCULAR; INTRAVENOUS
Status: DISCONTINUED | OUTPATIENT
Start: 2018-02-28 | End: 2018-02-28

## 2018-02-28 RX ORDER — GUAIFENESIN 100 MG/5ML
81 LIQUID (ML) ORAL DAILY
Status: DISCONTINUED | OUTPATIENT
Start: 2018-03-01 | End: 2018-02-28 | Stop reason: HOSPADM

## 2018-02-28 RX ORDER — LIDOCAINE HYDROCHLORIDE 10 MG/ML
INJECTION, SOLUTION EPIDURAL; INFILTRATION; INTRACAUDAL; PERINEURAL
Status: COMPLETED
Start: 2018-02-28 | End: 2018-02-28

## 2018-02-28 RX ORDER — LIDOCAINE HYDROCHLORIDE 10 MG/ML
1-30 INJECTION, SOLUTION EPIDURAL; INFILTRATION; INTRACAUDAL; PERINEURAL
Status: DISCONTINUED | OUTPATIENT
Start: 2018-02-28 | End: 2018-02-28

## 2018-02-28 RX ORDER — MIDAZOLAM HYDROCHLORIDE 1 MG/ML
INJECTION, SOLUTION INTRAMUSCULAR; INTRAVENOUS
Status: COMPLETED
Start: 2018-02-28 | End: 2018-02-28

## 2018-02-28 RX ADMIN — HEPARIN SODIUM 1000 UNITS: 200 INJECTION, SOLUTION INTRAVENOUS at 07:48

## 2018-02-28 RX ADMIN — IOPAMIDOL 50 ML: 755 INJECTION, SOLUTION INTRAVENOUS at 08:14

## 2018-02-28 RX ADMIN — PANTOPRAZOLE SODIUM 40 MG: 40 TABLET, DELAYED RELEASE ORAL at 09:39

## 2018-02-28 RX ADMIN — ASPIRIN 81 MG 81 MG: 81 TABLET ORAL at 07:22

## 2018-02-28 RX ADMIN — INSULIN LISPRO 3 UNITS: 100 INJECTION, SOLUTION INTRAVENOUS; SUBCUTANEOUS at 13:10

## 2018-02-28 RX ADMIN — LEVOTHYROXINE SODIUM 100 MCG: 100 TABLET ORAL at 05:35

## 2018-02-28 RX ADMIN — LIDOCAINE HYDROCHLORIDE 17 ML: 10 INJECTION, SOLUTION EPIDURAL; INFILTRATION; INTRACAUDAL; PERINEURAL at 07:47

## 2018-02-28 RX ADMIN — FENTANYL CITRATE 50 MCG: 50 INJECTION, SOLUTION INTRAMUSCULAR; INTRAVENOUS at 07:41

## 2018-02-28 RX ADMIN — MIDAZOLAM 1 MG: 1 INJECTION INTRAMUSCULAR; INTRAVENOUS at 07:41

## 2018-02-28 RX ADMIN — INSULIN LISPRO 3 UNITS: 100 INJECTION, SOLUTION INTRAVENOUS; SUBCUTANEOUS at 09:39

## 2018-02-28 RX ADMIN — MIDAZOLAM HYDROCHLORIDE 1 MG: 1 INJECTION, SOLUTION INTRAMUSCULAR; INTRAVENOUS at 07:55

## 2018-02-28 RX ADMIN — AZITHROMYCIN 500 MG: 500 INJECTION, POWDER, LYOPHILIZED, FOR SOLUTION INTRAVENOUS at 05:27

## 2018-02-28 RX ADMIN — GUAIFENESIN 400 MG: 200 SOLUTION ORAL at 09:39

## 2018-02-28 RX ADMIN — IOPAMIDOL 24 ML: 755 INJECTION, SOLUTION INTRAVENOUS at 08:15

## 2018-02-28 RX ADMIN — CLONIDINE HYDROCHLORIDE 0.1 MG: 0.1 TABLET ORAL at 07:22

## 2018-02-28 RX ADMIN — Medication 10 ML: at 05:44

## 2018-02-28 RX ADMIN — MIDAZOLAM HYDROCHLORIDE 1 MG: 1 INJECTION, SOLUTION INTRAMUSCULAR; INTRAVENOUS at 07:41

## 2018-02-28 NOTE — PROGRESS NOTES
Occupational Therapy  Chart reviewed. Referral received. Pt is in cardiac cath lab. Will continue to follow. Thank you.  Aries Clement MS, OTR/L

## 2018-02-28 NOTE — PROGRESS NOTES
Pt is a 73 yo  female admitted on 2/27/18 for NSTEMI. Pt lives alone in a two-story house (0 NOAH through back), bed/bath on main level of home. Pt's son and daughter-in-law live nearby, provide support and assistance as needed. PTA, pt independent in ADLs/IADLs to include driving. Pt works part-time 3 days/week. Pt has no history of HH, SNF, or acute inpatient rehab. Pt has straight canes at home. Pt to discharge home by private vehicle with daughter-in-law or son. Pt to transport self or use family support for follow-up care. Pt's preferred Rx is [facility] ([address]). CM met with pt to verify demographic info and complete initial assessment, dc planning. Pt is alert and oriented x 4, currently on bed rest after cardiac cath early this morning. PT/OT to evaluate pt once off of bedrest. Pt sees Dr. Jeff Barrera or Dr Gabriella Granados (PCP) and Dr. Darryl Cordon (Cardiology) outpatient. Pt is currently on continuous O2 at this time, nursing to wean pt off of O2 or complete home oxygen challenge test and CM will arrange as needed. CM will continue to follow-up to ensure any additional dc planning needs are met. Possible dc 24-48 hrs. Care Management Interventions  PCP Verified by CM: Yes (Sees both Dr. Jeff Barrera and Dr. Gabriella Granados)  Palliative Care Criteria Met (RRAT>21 & CHF Dx)?: No  Mode of Transport at Discharge:  Other (see comment) (By private vehicle with daughter-in-law)  Transition of Care Consult (CM Consult): Discharge Planning  Discharge Durable Medical Equipment: No (Canes at home)  Health Maintenance Reviewed: Yes  Physical Therapy Consult: Yes  Occupational Therapy Consult: Yes  Speech Therapy Consult: No  Current Support Network: Family Lives [place], Lives Alone, Own Home ([address] ([address] through back); bed/bath on main level; lives alone; son and DIL in area)  Confirm Follow Up Transport: Self  Plan discussed with Pt/Family/Caregiver: Yes  Discharge Location  Discharge Placement:  (TBD)    Akira Og MSW Super[employer]ise[employer] in Social Work, [address] [place] [address]  [phone]

## 2018-02-28 NOTE — PROGRESS NOTES
Bedside shift change report given to Tayla Mulligan (oncoming nurse) by Beatriz Malin (offgoing nurse). Report included the following information SBAR, Kardex, ED Summary, Procedure Summary, Intake/Output, MAR and Recent Results. SHIFT SUMMARY:            [facility] NURSING NOTE   Admission Date 2/27/2018   Admission Diagnosis NSTEMI (non-ST elevated myocardial infarction) ([address]   Consults IP CONSULT TO CARDIOLOGY      Cardiac Monitoring [x] Yes [] No      Purposeful Hourly Rounding [x] Yes    Medina Score Total Score: 1   Medina score 3 or > [] Bed Alarm [] Avasys [] 1:1 sitter [] Patient refused (Place signed refusal form in chart)   Leonard Score Leonard Score: 22   Leonard score 14 or < [] PMT consult [] Wound Care consult    []  Specialty bed  [] Nutrition consult      Influenza Vaccine Received Flu Vaccine for Current Season (usually Sept-March): Yes           Oxygen needs? [x] Room air Oxygen @  []1L    []2L    []3L   []4L    []5L   []6L     Use home O2? [] Yes [] No  Perform O2 challenge test using  smartphrase (.Homeoxygen)      Last bowel movement        Urinary Catheter             LDAs               Peripheral IV 02/27/18 Right Antecubital (Active)   Site Assessment Clean, dry, & intact 2/28/2018  3:58 AM   Phlebitis Assessment 0 2/28/2018  3:58 AM   Infiltration Assessment 0 2/28/2018  3:58 AM   Dressing Status Clean, dry, & intact 2/28/2018  3:58 AM   Dressing Type Transparent 2/28/2018  3:58 AM   Hub Color/Line Status Patent; Flushed;Pink 2/28/2018  3:58 AM                         Readmission Risk Assessment Tool Score Medium Risk            16       Total Score        3 Has Seen PCP in Last 6 Months (Yes=3, No=0)    5 Pt. Coverage (Medicare=5 , Medicaid, or Self-Pay=4)    8 Charlson Comorbidity Score (Age + Comorbid Conditions)        Criteria that do not apply:    . Living with Significant Other. Assisted Living. LTAC. SNF.  or   Rehab    Patient Length of Stay (>5 days = 3)    IP Visits Last 12 Months (1-3=4, 4=9, >4=11)       Expected Length of Stay - - -   Actual Length of Stay 1

## 2018-02-28 NOTE — DIABETES MGMT
DTC Progress Note    Recommendations/ Comments: If appropriate, please consider adding diabetic restrictions to current diet    Chart reviewed on [facility]. Patient is a 74 y.o. female with known diabetes on Metformin and correctional insulin at home. A1c:   Lab Results   Component Value Date/Time    Hemoglobin A1c 7.1 (H) 02/28/2018 02:59 AM       Recent Glucose Results:   Lab Results   Component Value Date/Time     (H) 02/28/2018 02:59 AM    GLUCPOC 248 (H) 02/28/2018 08:30 AM    GLUCPOC 313 (H) 02/27/2018 09:31 PM    GLUCPOC 286 (H) 02/27/2018 05:59 PM        Lab Results   Component Value Date/Time    Creatinine 1.03 (H) 02/28/2018 02:59 AM     Estimated Creatinine Clearance: 61.8 mL/min (based on Cr of 1.03). Active Orders   Diet    DIET CARDIAC Regular        PO intake: No data found. Current hospital DM medication: Lispro Correctional insulin with normal sensitivity      Will continue to follow as needed. Thank you.   Alisson Nelson, [address] [place] [address], [address]  Office:  [phone]

## 2018-02-28 NOTE — DISCHARGE SUMMARY
Discharge Summary    Patient: Meir Lynn               Sex: female          DOA: 2/27/2018         YOB: 1945      Age:  74 y.o.        LOS:  LOS: 1 day                Admit Date: 2/27/2018    Discharge Date: 2/28/2018    Admission Diagnoses: NSTEMI (non-ST elevated myocardial infarction) [facility])    Discharge Diagnoses:    Problem List as of 2/28/2018  Date Reviewed: 2/27/2018          Codes Class Noted - Resolved    Chest pain with elevated troponin ICD-10-CM: I21.4  ICD-9-CM: 410.70  2/27/2018 - Present        Asthma/Bronchitis ICD-10-CM: J45.909  ICD-9-CM: 493.90  2/27/2018 - Present        DM (diabetes mellitus) ([facility] ICD-10-CM: E11.9  ICD-9-CM: 250.00  2/27/2018 - Present        HTN (hypertension) ICD-10-CM: I10  ICD-9-CM: 401.9  2/27/2018 - Present        GERD (gastroesophageal reflux disease) ICD-10-CM: K21.9  ICD-9-CM: 530.81  2/27/2018 - Present              Discharge Medications:     Current Discharge Medication List      START taking these medications    Details   azithromycin (ZITHROMAX Z-EMERITA) 250 mg tablet As directed  Qty: 6 Tab, Refills: 0      aspirin delayed-release 81 mg tablet Take 1 Tab by mouth daily. Qty: 100 Tab, Refills: 0      predniSONE (STERAPRED) 5 mg dose pack See administration instruction per 5mg dose pack  Qty: 21 Tab, Refills: 0         CONTINUE these medications which have CHANGED    Details   !! albuterol (PROVENTIL HFA, VENTOLIN HFA, PROAIR HFA) 90 mcg/actuation inhaler Take 1 Puff by inhalation every six (6) hours as needed for Wheezing. Qty: 1 Inhaler, Refills: 0      !! albuterol (PROVENTIL HFA, VENTOLIN HFA, PROAIR HFA) 90 mcg/actuation inhaler Take 2 Puffs by inhalation every four (4) hours as needed for Wheezing. Qty: 1 Inhaler, Refills: 0       !! - Potential duplicate medications found. Please discuss with provider.       CONTINUE these medications which have NOT CHANGED    Details   insulin NPH (NOVOLIN N, HUMULIN N) 100 unit/mL injection 0-4 Units by SubCUTAneous route nightly. Patient states that she administers agent per corrective scale, but was unable to clarify the blood glucose ranges and associated insulin dosages per scale      insulin lispro (HUMALOG) 100 unit/mL injection 0-15 Units by SubCUTAneous route Before breakfast, lunch, and dinner. Patient states that she administers agent per corrective scale, but was unable to clarify the blood glucose ranges and associated insulin dosages per scale      levothyroxine (SYNTHROID) 100 mcg tablet Take 100 mcg by mouth Daily (before breakfast). metoprolol succinate (TOPROL-XL) 200 mg XL tablet Take 200 mg by mouth nightly. furosemide (LASIX) 40 mg tablet Take 40 mg by mouth daily as needed (lower extremity swelling). cloNIDine HCl (CATAPRES) 0.1 mg tablet Take 0.1 mg by mouth two (2) times a day. valsartan-hydroCHLOROthiazide (DIOVAN-HCT) 320-25 mg per tablet Take 1 Tab by mouth daily. acetaminophen (TYLENOL) 500 mg tablet Take 1,000 mg by mouth two (2) times a day. esomeprazole (NEXIUM) 40 mg capsule Take 40 mg by mouth daily. metFORMIN (GLUCOPHAGE) 1,000 mg tablet Take 1,000 mg by mouth two (2) times daily (with meals). STOP taking these medications       metoprolol (LOPRESSOR) 25 mg tablet Comments:   Reason for Stopping:                Follow-up:pcp    Discharge condition:good    Diet:heart healthy    Discharge:home    Labs:  Labs: Results:       Chemistry Recent Labs      02/28/18 0259 02/27/18   0349   GLU  204*  181*   NA  137  139   K  3.5  3.5   CL  102  103   CO2  27  30   BUN  18  25*   CREA  1.03*  1.28*   CA  9.2  9.1   AGAP  8  6   BUCR  17  20   AP  86  113   TP  6.7  7.5   ALB  3.1*  3.5   GLOB  3.6  4.0   AGRAT  0.9*  0.9*      CBC w/Diff Recent Labs      02/28/18   0259  02/27/18   0349   WBC  12.1*  18.6*   RBC  4.01  4.53   HGB  10.6*  12.1   HCT  33.4*  38.9   PLT  238  292   GRANS  70  74   LYMPH  21  19   EOS  1  1      Cardiac Enzymes Recent Labs      02/27/18   0802  02/27/18   0349   CPK  53  57      Coagulation Recent Labs      02/27/18   0850   PTP  10.3   INR  1.0       Lipid Panel Lab Results   Component Value Date/Time    Cholesterol, total 133 02/28/2018 02:59 AM    HDL Cholesterol 63 02/28/2018 02:59 AM    LDL, calculated 53.4 02/28/2018 02:59 AM    VLDL, calculated 16.6 02/28/2018 02:59 AM    Triglyceride 83 02/28/2018 02:59 AM    CHOL/HDL Ratio 2.1 02/28/2018 02:59 AM      BNP No results for input(s): BNPP in the last 72 hours. Liver Enzymes Recent Labs      02/28/18   0259   TP  6.7   ALB  3.1*   AP  86   SGOT  17      Thyroid Studies No results found for: T4, T3U, TSH, TSHEXT       Imaging:    CXR on 2/27/2018  Possible trace effusion. Otherwise unremarkable    Consults:cardiology    Treatment Team: Treatment Team: Attending Provider: Katheren Fothergill, MD; Physician: Alex Brooks MD; Consulting Provider: Alex Brooks MD; Care Manager: Maico Carl    Significant Diagnostic Studies:see recent lab results    HISTORY OF PRESENT ILLNESS:     Merline Ponds is a 74 y.o.  female with pmhx HTN / DM / GERD / hypothyroid, who presents via EMS to the ED c/o gradually worsening dry cough that began yesterday. She reports additional SOB that began x 2200 yesterday evening. Pt states her sxs were exacerbated when lying flat to try and sleep. She denies any recent medications for her current sxs. Pt denies any use of inhalers or nebulizers at home. She denies any recent ABX. Pt reports a hx of BLE swelling at baseline and states she has a diuretic to take PRN. EMS notes giving pt a duoneb en route to the ED. On evaluation, pt states \"I feel much better now\".  She otherwise specifically denies any recent fever, chills, nausea, vomiting, diarrhea, abd pain, CP, lightheadedness, dizziness, numbness, weakness, tingling, BLE swelling, HA, heart palpitations, urinary sxs, changes in BM, changes in PO intake, melena, hematochezia, or congestion. At this time patient is lying in bed c/o chest tightness, SOB, cough, malaise, denies fever, no N./V no diarrhea, no urinary symptoms, no other associated symptoms. We were asked to admit for work up and evaluation of the above problems. Hospital Course:    Chest Pain with increased troponin:   -No evidence of ACS on cardiac cath  -Troponin 0.32 ->0.53 -> 0.36 - inderterminate  - Patient was started on aspirin,bb  - Was started on lovenox  - ECHO c/w EF 55 % to 60 %,No obvious wall motion  -Pt had cardiac cath today 2/28:Mild coronary artery disease,normal left ventricular function and ejection fraction  -Cardiology has cleared patient for discharge. .  Asthma/Acute Bronchitis: On Z-max, bronchodilators. Improved  HTN: c/w BB, c/w clonidine. Use labetalol prn while in the hospital.  DM:resume home meds. Ildx6e=8.1  GERD: c/w PPI. Hypothyroidism: c/w L-thyroxine    P/E  Lungs ctab  Heart s1s2 nl  Abdm:soft,bs present  Extr:no edema  Neuro:aaox4    Time for discharge:40 minutes.   Cy Moncada MD  February 28, 2018

## 2018-02-28 NOTE — PROGRESS NOTES
6fr sheath removed from the right femoral artery, manual pressure and quickclot held for 12 min. Upon release no oozing or hematoma noted. femostop in place at 40mmhg, groin inspected and care take over by MICHAEL Wood R.N.

## 2018-02-28 NOTE — PROCEDURES
[address]    Prabha Pink  MR#: [id number]  : 1945  ACCOUNT #: [de-identified]   DATE OF SERVICE: 2018    PROCEDURE NUMBER:  . PROCEDURES:  1. Left heart catheterization. 2.  Coronary arteriography. 3.  Left ventriculography. INDICATIONS AND HISTORY:  This 77-year-old diabetic hypertensive female, with strong family history of coronary heart disease, presented with severe chest tightness and difficulty breathing. She had a troponin which was initially negative but subsequently went to 0.3. It was felt that the patient had suffered a non-STEMI. Coronary angiography was indicated. TECHNIQUE:  1.  Joanna right groin. 2.  Catheters:  5 for left, 5 for right, pigtail, 6-Niuean sheath. ESTIMATED BLOOD LOSS:  10 mL. SPECIMENS REMOVED:  None. HEMODYNAMICS:  Ascending aorta 158/75, mean 110, /20. COMMENTS:  Mildly elevated left ventricular end diastolic pressure, systemic arterial hypertension, no aortic transvalvular gradient. CORONARY ANGIOGRAPHY:  1. Left main:  Normal.  2.  Left anterior descending:  Mild disease, 5-10%. 3.  Diagonal branches and interventricular septal branches:  Normal.  4.  Circumflex: The circumflex and its marginal branches are free of disease. 5.  Right coronary artery:  Right coronary is dominant. Right coronary PDA and posterolateral branches are all free of disease. 6.  Left ventriculogram:  SAM monoplane left ventriculography reveals normal size left ventricle, ejection fraction 55-60%, normal ejection fraction. SUMMARY:  1.  Mild coronary artery disease. 2.  Normal left ventricular function and ejection fraction.       Randa Lobo MD       SCT / MN  D: 2018 08:19     T: 2018 13:26  JOB #: [id number]

## 2018-02-28 NOTE — PHYSICIAN ADVISORY
Short Stay Review       Pt Name:  Angel Burrows   MR#  [id number]   CSN#   [id number]   [address] [place] [address]  2160/01  @ [facility]   Hospitalization date  2/27/2018  3:35 AM   Current Attending Physician  Carlitos Hicks MD     A discharge order has been placed for this episode of hospital care for Ms. Angel Burrows; since this hospital stay is less than two midnights, I reviewed MsBoom Tijerinaanthony BLACK Rayo's chart. Ms. Rick Rayo's healthcare insurance/benefit include:  Payor: [place] MEDICARE / Plan: VA MEDICARE PART A & B / Product Type: Medicare /     Utilization Review related clinical summary:   The pt was hospitalized for Rx of NSTEMI. Appropriate measures were taken and cardiac cath performed. Medical management is opted.      On the basis of chart review, this patient's hospitalization status      is appropriate for Shane Harp MD MPH FACP   Physician Advisor    [address]   Utilization Review, Care Management         CSN:  [id number]   SOFIA:   [id number]  Admitted on :  2/27/2018   Discharge order

## 2018-02-28 NOTE — DISCHARGE INSTRUCTIONS
Left Heart Catheterization: About This Test  What is it? Cardiac catheterization is a test to check the left side of your heart. Your doctor might look at the shape of your heart, the motion of your heart, or the blood pressure inside the chambers. Why is this test done? This test gives information about how your heart is working. It can:  · Check blood flow and blood pressure in the chambers of the heart. · Check the pumping action of the heart. · Find out if a heart defect is present and how severe it is. · Find out how well the heart valves work. What happens during the test?  · You will get medicine to help you relax. · A thin tube called a catheter is put into a blood vessel in the groin or the arm. The doctor moves the catheter through the blood vessel into your heart. · You will get a shot to numb the skin where the catheter goes in. You may feel pressure when the doctor moves the catheter through your blood vessel into your heart. · Dye may be injected into your heart. Your doctor can watch on special monitors as the dye moves in your heart. The dye helps your doctor see blood flow in your heart. · You may feel hot or flushed for several seconds when the dye is put in.  · If a heart defect is found, cardiac catheterization sometimes is used to correct it during the test.  How long does it take? · The test will take about 30 minutes. If a problem is found and the doctor treats it, it can take a few hours longer. What happens after the test?  · You will stay in a room for at least a few hours to make sure the catheter site starts to heal. You may have a bandage or a compression device on your groin or arm to prevent bleeding. · If the catheter was placed in your groin, you may lie in bed for a few hours. If the catheter was put in your arm, you will need to keep your arm still for at least 1 hour. · You may or may not need to stay in the hospital overnight.  You will get more instructions for what to do at home. · Drink plenty of fluids for several hours after the test.  Follow-up care is a key part of your treatment and safety. Be sure to make and go to all appointments, and call your doctor if you are having problems. It's also a good idea to know your test results and keep a list of the medicines you take. Where can you learn more? Go to http://mell-narendra.info/. Enter W306 in the search box to learn more about \"Left Heart Catheterization: About This Test.\"  Current as of: September 21, 2016  Content Version: 11.4  © 7480-3537 [employer]. Care instructions adapted under license by [employer] (which disclaims liability or warranty for this information). If you have questions about a medical condition or this instruction, always ask your healthcare professional. [address] any warranty or liability for your use of this information. Bronchitis: Care Instructions  Your Care Instructions    Bronchitis is inflammation of the bronchial tubes, which carry air to the lungs. The tubes swell and produce mucus, or phlegm. The mucus and inflamed bronchial tubes make you cough. You may have trouble breathing. Most cases of bronchitis are caused by viruses like those that cause colds. Antibiotics usually do not help and they may be harmful. Bronchitis usually develops rapidly and lasts about 2 to 3 weeks in otherwise healthy people. Follow-up care is a key part of your treatment and safety. Be sure to make and go to all appointments, and call your doctor if you are having problems. It's also a good idea to know your test results and keep a list of the medicines you take. How can you care for yourself at home? · Take all medicines exactly as prescribed. Call your doctor if you think you are having a problem with your medicine.   · Get some extra rest.  · Take an over-the-counter pain medicine, such as acetaminophen (Tylenol), ibuprofen (Advil, Motrin), or naproxen (Aleve) to reduce fever and relieve body aches. Read and follow all instructions on the label. · Do not take two or more pain medicines at the same time unless the doctor told you to. Many pain medicines have acetaminophen, which is Tylenol. Too much acetaminophen (Tylenol) can be harmful. · Take an over-the-counter cough medicine that contains dextromethorphan to help quiet a dry, hacking cough so that you can sleep. Avoid cough medicines that have more than one active ingredient. Read and follow all instructions on the label. · Breathe moist air from a humidifier, hot shower, or sink filled with hot water. The heat and moisture will thin mucus so you can cough it out. · Do not smoke. Smoking can make bronchitis worse. If you need help quitting, talk to your doctor about stop-smoking programs and medicines. These can increase your chances of quitting for good. When should you call for help? Call 911 anytime you think you may need emergency care. For example, call if:  ? · You have severe trouble breathing. ?Call your doctor now or seek immediate medical care if:  ? · You have new or worse trouble breathing. ? · You cough up dark brown or bloody mucus (sputum). ? · You have a new or higher fever. ? · You have a new rash. ? Watch closely for changes in your health, and be sure to contact your doctor if:  ? · You cough more deeply or more often, especially if you notice more mucus or a change in the color of your mucus. ? · You are not getting better as expected. Where can you learn more? Go to http://mell-narendra.info/. Enter H333 in the search box to learn more about \"Bronchitis: Care Instructions. \"  Current as of: May 12, 2017  Content Version: 11.4  © 1905-2100 [employer], [employer]. Care instructions adapted under license by [employer] (which disclaims liability or warranty for this information).  If you have questions about a medical condition or this instruction, always ask your healthcare professional. [place] [address] any warranty or liability for your use of this information. Other instructions: Take your medications as prescribed.   Follow-up with your doctor  Seek medical attention if recurrence of symptoms

## 2018-02-28 NOTE — PROGRESS NOTES
Physical Therapy  Consult received and chart reviewed. Patient has just had cardiac cath and is currently on bedrest.  Will follow back once bedrest orders discontinued and patient cleared for mobility.   Thank you,  Efraín Fairchild, PT

## 2018-02-28 NOTE — PROGRESS NOTES
Pt received discharge instructions and prescriptions. Pt states understanding of follow-up care and side effects of medications. Pt given opportunity for questions and clarifications. IV removed. Pt has all belongings.  Cristal Espinosa RN

## 2018-02-28 NOTE — PROGRESS NOTES
Bedside shift change report given to [address] (oncoming nurse) by Argelia George (offgoing nurse). Report included the following information SBAR.

## 2018-02-28 NOTE — CDMP QUERY
Dr. Colon :  Patient is noted to have a BMI of 36.34. Please clarify if this patient is:     =>Morbidly obese (BMI ³ 40)  =>Obese (BMI 30 - 39.9)  =>Overweight (BMI 25 - 29.9)  =>Other explanation of clinical findings  =>Unable to determine (no explanation for clinical findings)    Presentation: 5'8\", 239 lbs = BMI 36.34    REFERENCE:  The [address] [place] [address] has issued a statement indicating that, \"Individuals who are overweight, obese, or morbidly obese are at an increased risk for certain medical conditions when compared to persons of normal weight. Therefore, these conditions are always clinically significant and reportable when documented by the provider. Please clarify and document your clinical opinion in the progress notes and discharge summary, including the definitive and or presumptive diagnosis, (suspected or probable), related to the above clinical findings. Please include clinical findings supporting your diagnosis.      Thank Na Heard

## 2018-03-01 NOTE — PROGRESS NOTES
Documented on March 1, 2018. Spiritual Care Partner Volunteer visited patient in [place] on February 28, 2018.   Documented by:  PRITESH Rodriguez, [address],   VONDA HENSLEY [facility] Paging Service  [phone]

## 2018-03-04 LAB
BACTERIA SPEC CULT: NORMAL
SERVICE CMNT-IMP: NORMAL

## 2018-03-19 ENCOUNTER — TELEPHONE (OUTPATIENT)
Dept: CARDIAC REHAB | Age: 73
End: 2018-03-19

## 2018-03-19 NOTE — TELEPHONE ENCOUNTER
Cardiopulmonary Rehab Nursing Entry:    Phone call to check on status of enrollment in out-patient Cardiac Rehab Program.  Left message on voicemail.

## 2018-03-21 ENCOUNTER — TELEPHONE (OUTPATIENT)
Dept: CARDIAC REHAB | Age: 73
End: 2018-03-21

## 2018-03-21 NOTE — TELEPHONE ENCOUNTER
Cardiopulmonary Rehab Nursing Entry:    Phone call to check on status of enrollment in out-patient Cardiac Rehab Program.  Pt reports that she would like to attend, however, she has \"two bad knees\". She reports decreased ability to weight-bear, has some tests in progress right now to evaluate the swelling. Advised pt that Dr. Benjamin Oconnell can refer her again at a later date if her knee function improves.

## 2018-04-23 ENCOUNTER — HOSPITAL ENCOUNTER (OUTPATIENT)
Dept: MAMMOGRAPHY | Age: 73
Discharge: HOME | End: 2018-04-23
Attending: FAMILY MEDICINE
Payer: MEDICARE

## 2018-04-23 DIAGNOSIS — Z12.39 SCREENING BREAST EXAMINATION: ICD-10-CM

## 2018-04-23 PROCEDURE — 77067 SCR MAMMO BI INCL CAD: CPT

## 2018-10-10 ENCOUNTER — HOSPITAL ENCOUNTER (EMERGENCY)
Age: 73
Discharge: HOME | End: 2018-10-10
Attending: EMERGENCY MEDICINE | Admitting: EMERGENCY MEDICINE
Payer: MEDICARE

## 2018-10-10 VITALS
BODY MASS INDEX: 34.51 KG/M2 | HEART RATE: 72 BPM | OXYGEN SATURATION: 99 % | WEIGHT: 233.03 LBS | HEIGHT: 69 IN | DIASTOLIC BLOOD PRESSURE: 61 MMHG | RESPIRATION RATE: 16 BRPM | TEMPERATURE: 98.1 F | SYSTOLIC BLOOD PRESSURE: 134 MMHG

## 2018-10-10 DIAGNOSIS — G57.11 MERALGIA PARESTHETICA, RIGHT LOWER LIMB: ICD-10-CM

## 2018-10-10 DIAGNOSIS — N17.9 AKI (ACUTE KIDNEY INJURY) (HCC): Primary | ICD-10-CM

## 2018-10-10 LAB
ALBUMIN SERPL-MCNC: 3.2 G/DL (ref 3.5–5)
ALBUMIN/GLOB SERPL: 1 {RATIO} (ref 1.1–2.2)
ALP SERPL-CCNC: 91 U/L (ref 45–117)
ALT SERPL-CCNC: 29 U/L (ref 12–78)
ANION GAP SERPL CALC-SCNC: 7 MMOL/L (ref 5–15)
AST SERPL-CCNC: 17 U/L (ref 15–37)
BASOPHILS # BLD: 0 K/UL (ref 0–0.1)
BASOPHILS NFR BLD: 0 % (ref 0–1)
BILIRUB SERPL-MCNC: 0.6 MG/DL (ref 0.2–1)
BUN SERPL-MCNC: 52 MG/DL (ref 6–20)
BUN/CREAT SERPL: 29 (ref 12–20)
CALCIUM SERPL-MCNC: 9.1 MG/DL (ref 8.5–10.1)
CHLORIDE SERPL-SCNC: 102 MMOL/L (ref 97–108)
CO2 SERPL-SCNC: 27 MMOL/L (ref 21–32)
CREAT SERPL-MCNC: 1.78 MG/DL (ref 0.55–1.02)
DIFFERENTIAL METHOD BLD: ABNORMAL
EOSINOPHIL # BLD: 0.2 K/UL (ref 0–0.4)
EOSINOPHIL NFR BLD: 2 % (ref 0–7)
ERYTHROCYTE [DISTWIDTH] IN BLOOD BY AUTOMATED COUNT: 14.4 % (ref 11.5–14.5)
GLOBULIN SER CALC-MCNC: 3.2 G/DL (ref 2–4)
GLUCOSE SERPL-MCNC: 167 MG/DL (ref 65–100)
HCT VFR BLD AUTO: 33.8 % (ref 35–47)
HGB BLD-MCNC: 10.7 G/DL (ref 11.5–16)
IMM GRANULOCYTES # BLD: 0 K/UL (ref 0–0.04)
IMM GRANULOCYTES NFR BLD AUTO: 0 % (ref 0–0.5)
LYMPHOCYTES # BLD: 2.7 K/UL (ref 0.8–3.5)
LYMPHOCYTES NFR BLD: 34 % (ref 12–49)
MCH RBC QN AUTO: 26.3 PG (ref 26–34)
MCHC RBC AUTO-ENTMCNC: 31.7 G/DL (ref 30–36.5)
MCV RBC AUTO: 83 FL (ref 80–99)
MONOCYTES # BLD: 0.6 K/UL (ref 0–1)
MONOCYTES NFR BLD: 7 % (ref 5–13)
NEUTS SEG # BLD: 4.4 K/UL (ref 1.8–8)
NEUTS SEG NFR BLD: 56 % (ref 32–75)
NRBC # BLD: 0 K/UL (ref 0–0.01)
NRBC BLD-RTO: 0 PER 100 WBC
PLATELET # BLD AUTO: 241 K/UL (ref 150–400)
PMV BLD AUTO: 10.7 FL (ref 8.9–12.9)
POTASSIUM SERPL-SCNC: 3.5 MMOL/L (ref 3.5–5.1)
PROT SERPL-MCNC: 6.4 G/DL (ref 6.4–8.2)
RBC # BLD AUTO: 4.07 M/UL (ref 3.8–5.2)
SODIUM SERPL-SCNC: 136 MMOL/L (ref 136–145)
WBC # BLD AUTO: 7.9 K/UL (ref 3.6–11)

## 2018-10-10 PROCEDURE — 74011250636 HC RX REV CODE- 250/636: Performed by: EMERGENCY MEDICINE

## 2018-10-10 PROCEDURE — 96360 HYDRATION IV INFUSION INIT: CPT

## 2018-10-10 PROCEDURE — 80053 COMPREHEN METABOLIC PANEL: CPT | Performed by: EMERGENCY MEDICINE

## 2018-10-10 PROCEDURE — 36415 COLL VENOUS BLD VENIPUNCTURE: CPT | Performed by: EMERGENCY MEDICINE

## 2018-10-10 PROCEDURE — 85025 COMPLETE CBC W/AUTO DIFF WBC: CPT | Performed by: EMERGENCY MEDICINE

## 2018-10-10 PROCEDURE — 99282 EMERGENCY DEPT VISIT SF MDM: CPT

## 2018-10-10 RX ORDER — SODIUM CHLORIDE 9 MG/ML
1000 INJECTION, SOLUTION INTRAVENOUS ONCE
Status: COMPLETED | OUTPATIENT
Start: 2018-10-10 | End: 2018-10-10

## 2018-10-10 RX ORDER — METHYLPREDNISOLONE 4 MG/1
TABLET ORAL
Qty: 1 DOSE PACK | Refills: 0 | Status: SHIPPED | OUTPATIENT
Start: 2018-10-10 | End: 2019-05-08

## 2018-10-10 RX ADMIN — SODIUM CHLORIDE 1000 ML: 900 INJECTION, SOLUTION INTRAVENOUS at 07:40

## 2018-10-10 NOTE — ED PROVIDER NOTES
EMERGENCY DEPARTMENT HISTORY AND PHYSICAL EXAM 
 
 
Date: 10/10/2018 Patient Name: Michelle Lira History of Presenting Illness Chief Complaint Patient presents with  Leg Pain Ambulatory c/o R upper leg/thigh pain x yesterday Pt states \"hurts to touch it\"  Vomiting Pt c/o of \"bug\" x several days with vomiting History Provided By: Patient HPI: Michelle Lira, 74 y.o. female with PMHx significant for HTN, DM, GERD, and asthma, presents ambulatory to the ED with cc of burning right lateral thigh pain x ~1 day. Pt reports subjective warmth to touch over the affected area. She reports onset while working outside yesterday. She states she has been applying warm and cold compresses without improvement. Pt denies recent trauma, injury, or fall to which her symptoms might be attributed. She states she has been ambulatory since onset of her current symptoms. Pt notes she had N/V/D \"for a few days last week,\" all of which have resolved as of Sunday (10/7/18). Pt states she takes diuretics at baseline, but has not been taking them since initial onset of nausea and vomiting. She states she last took steroids ~4 weeks ago. Pt specifically denies hip pain, knee pain, ABD pain, rash, and numbness or tingling of any nature. There are no other complaints, changes, or physical findings at this time. PCP: Nydia Flores MD 
 
Current Outpatient Prescriptions Medication Sig Dispense Refill  methylPREDNISolone (MEDROL, EMERITA,) 4 mg tablet Take as directed on package lable  Indications: Meralgia Paresthetica 1 Dose Pack 0  
 albuterol (PROVENTIL HFA, VENTOLIN HFA, PROAIR HFA) 90 mcg/actuation inhaler Take 1 Puff by inhalation every six (6) hours as needed for Wheezing. 1 Inhaler 0  
 albuterol (PROVENTIL HFA, VENTOLIN HFA, PROAIR HFA) 90 mcg/actuation inhaler Take 2 Puffs by inhalation every four (4) hours as needed for Wheezing.  1 Inhaler 0  
  insulin NPH (NOVOLIN N, HUMULIN N) 100 unit/mL injection 0-4 Units by SubCUTAneous route nightly. Patient states that she administers agent per corrective scale, but was unable to clarify the blood glucose ranges and associated insulin dosages per scale  insulin lispro (HUMALOG) 100 unit/mL injection 0-15 Units by SubCUTAneous route Before breakfast, lunch, and dinner. Patient states that she administers agent per corrective scale, but was unable to clarify the blood glucose ranges and associated insulin dosages per scale  levothyroxine (SYNTHROID) 100 mcg tablet Take 100 mcg by mouth Daily (before breakfast).  metoprolol succinate (TOPROL-XL) 200 mg XL tablet Take 200 mg by mouth nightly.  furosemide (LASIX) 40 mg tablet Take 40 mg by mouth daily as needed (lower extremity swelling).  esomeprazole (NEXIUM) 40 mg capsule Take 40 mg by mouth daily.  metFORMIN (GLUCOPHAGE) 1,000 mg tablet Take 1,000 mg by mouth two (2) times daily (with meals).  aspirin delayed-release 81 mg tablet Take 1 Tab by mouth daily. 100 Tab 0  cloNIDine HCl (CATAPRES) 0.1 mg tablet Take 0.1 mg by mouth two (2) times a day.  valsartan-hydroCHLOROthiazide (DIOVAN-HCT) 320-25 mg per tablet Take 1 Tab by mouth daily.  acetaminophen (TYLENOL) 500 mg tablet Take 1,000 mg by mouth two (2) times a day. Past History Past Medical History: 
Past Medical History:  
Diagnosis Date  Asthma  Diabetes ([address]  GERD (gastroesophageal reflux disease)  Hypertension  Other ill-defined conditions(799.89) hypothyroid Past Surgical History: 
Past Surgical History:  
Procedure Laterality Date  HX GYN    
 hysterectomy  HX ORTHOPAEDIC    
 left knee  HX OTHER SURGICAL    
 lipoma removal  
 
 
Family History: 
Family History Problem Relation Age of Onset  No Known Problems Mother  No Known Problems Father Social History: 
Social History Substance Use Topics  Smoking status: Never Smoker  Smokeless tobacco: Never Used  Alcohol use No  
 
 
Allergies: Allergies Allergen Reactions  Pcn [Penicillins] Hives Review of Systems Review of Systems Constitutional: Negative for chills and fever. HENT: Negative for congestion. Eyes: Negative for visual disturbance. Respiratory: Negative for chest tightness. Cardiovascular: Negative for chest pain and leg swelling. Gastrointestinal: Positive for diarrhea (resolved), nausea (resolved) and vomiting (resolved). Negative for abdominal pain. Endocrine: Negative for polyuria. Genitourinary: Negative for dysuria and frequency. Musculoskeletal: Positive for myalgias (right lateral thigh). Negative for arthralgias (knees, hips). Skin: Negative for color change and rash. Allergic/Immunologic: Negative for immunocompromised state. Neurological: Negative for numbness. Physical Exam  
Physical Exam  
 
Nursing note and vitals reviewed. General appearance: non-toxic Eyes: PERRL, EOMI, conjunctiva normal, anicteric sclera HEENT: mucous membranes tacky, oropharynx is clear; lips dry Pulmonary: clear to auscultation bilaterally Cardiac: normal rate and regular rhythm, no murmurs, gallops, or rubs, 2+DP pulses, 2+ radial pulses Abdomen: soft, nontender, nondistended, bowel sounds present MSK: no pre-tibial edema; hip ROM full without pain; -SLR, localizes sx to R lateral thigh, compartments soft Neuro: Alert, answers questions appropriately Skin: capillary refill brisk; no overlying rash or vesicles, no erythema or signs of cellulitis Diagnostic Study Results Labs - Recent Results (from the past 12 hour(s)) CBC WITH AUTOMATED DIFF Collection Time: 10/10/18  7:37 AM  
Result Value Ref Range WBC 7.9 3.6 - 11.0 K/uL  
 RBC 4.07 3.80 - 5.20 M/uL  
 HGB 10.7 (L) 11.5 - 16.0 g/dL HCT 33.8 (L) 35.0 - 47.0 %  MCV 83.0 80.0 - 99.0 FL  
 MCH 26.3 26.0 - 34.0 PG  
 MCHC 31.7 30.0 - 36.5 g/dL  
 RDW 14.4 11.5 - 14.5 % PLATELET 984 779 - 997 K/uL MPV 10.7 8.9 - 12.9 FL  
 NRBC 0.0 0  WBC ABSOLUTE NRBC 0.00 0.00 - 0.01 K/uL NEUTROPHILS 56 32 - 75 % LYMPHOCYTES 34 12 - 49 % MONOCYTES 7 5 - 13 % EOSINOPHILS 2 0 - 7 % BASOPHILS 0 0 - 1 % IMMATURE GRANULOCYTES 0 0.0 - 0.5 % ABS. NEUTROPHILS 4.4 1.8 - 8.0 K/UL  
 ABS. LYMPHOCYTES 2.7 0.8 - 3.5 K/UL  
 ABS. MONOCYTES 0.6 0.0 - 1.0 K/UL  
 ABS. EOSINOPHILS 0.2 0.0 - 0.4 K/UL  
 ABS. BASOPHILS 0.0 0.0 - 0.1 K/UL  
 ABS. IMM. GRANS. 0.0 0.00 - 0.04 K/UL  
 DF AUTOMATED METABOLIC PANEL, COMPREHENSIVE Collection Time: 10/10/18  7:37 AM  
Result Value Ref Range Sodium 136 136 - 145 mmol/L Potassium 3.5 3.5 - 5.1 mmol/L Chloride 102 97 - 108 mmol/L  
 CO2 27 21 - 32 mmol/L Anion gap 7 5 - 15 mmol/L Glucose 167 (H) 65 - 100 mg/dL BUN 52 (H) 6 - 20 MG/DL Creatinine 1.78 (H) 0.55 - 1.02 MG/DL  
 BUN/Creatinine ratio 29 (H) 12 - 20 GFR est AA 34 (L) >60 ml/min/1.73m2 GFR est non-AA 28 (L) >60 ml/min/1.73m2 Calcium 9.1 8.5 - 10.1 MG/DL Bilirubin, total 0.6 0.2 - 1.0 MG/DL  
 ALT (SGPT) 29 12 - 78 U/L  
 AST (SGOT) 17 15 - 37 U/L Alk. phosphatase 91 45 - 117 U/L Protein, total 6.4 6.4 - 8.2 g/dL Albumin 3.2 (L) 3.5 - 5.0 g/dL Globulin 3.2 2.0 - 4.0 g/dL A-G Ratio 1.0 (L) 1.1 - 2.2 Medical Decision Making I am the first provider for this patient. I reviewed the vital signs, available nursing notes, past medical history, past surgical history, family history and social history. Vital Signs-Reviewed the patient's vital signs. Patient Vitals for the past 12 hrs: 
 Temp Pulse Resp BP SpO2  
10/10/18 0720 - - - - 99 % 10/10/18 0718 - - - 134/61 -  
10/10/18 0704 98.1 °F (36.7 °C) 72 16 158/66 100 % Pulse Oximetry Analysis - 100% on RA Records Reviewed: Nursing Notes and Old Medical Records Provider Notes (Medical Decision Making): DDx: meralgia paresthetica, lumbar referred pain, dehydration; sx isolated to lateral thigh only and given hx, no suspicion for DVT. GI symptoms resolved, ABD exam benign ED Course:  
Initial assessment performed. The patients presenting problems have been discussed, and they are in agreement with the care plan formulated and outlined with them. I have encouraged them to ask questions as they arise throughout their visit. 8:22 AM 
Pt's labs reviewed, showing mild SILAS. Will finish IVF prior to discharged, follow up PCP for recheck. Disposition: 
DISCHARGE NOTE: 
8:57 AM 
The patient is ready for discharge. The patients signs, symptoms, diagnosis, and instructions for discharge have been discussed and the pt has conveyed their understanding. The patient is to follow up as recommended or return to the ER should their symptoms worsen. Plan has been discussed and patient has conveyed their agreement. PLAN: 
1. Current Discharge Medication List  
  
START taking these medications Details  
methylPREDNISolone (MEDROL, EMERITA,) 4 mg tablet Take as directed on package lable  Indications: Meralgia Paresthetica Qty: 1 Dose Pack, Refills: 0 CONTINUE these medications which have NOT CHANGED Details  
!! albuterol (PROVENTIL HFA, VENTOLIN HFA, PROAIR HFA) 90 mcg/actuation inhaler Take 1 Puff by inhalation every six (6) hours as needed for Wheezing. Qty: 1 Inhaler, Refills: 0  
  
!! albuterol (PROVENTIL HFA, VENTOLIN HFA, PROAIR HFA) 90 mcg/actuation inhaler Take 2 Puffs by inhalation every four (4) hours as needed for Wheezing. Qty: 1 Inhaler, Refills: 0  
  
insulin NPH (NOVOLIN N, HUMULIN N) 100 unit/mL injection 0-4 Units by SubCUTAneous route nightly. Patient states that she administers agent per corrective scale, but was unable to clarify the blood glucose ranges and associated insulin dosages per scale insulin lispro (HUMALOG) 100 unit/mL injection 0-15 Units by SubCUTAneous route Before breakfast, lunch, and dinner. Patient states that she administers agent per corrective scale, but was unable to clarify the blood glucose ranges and associated insulin dosages per scale  
  
levothyroxine (SYNTHROID) 100 mcg tablet Take 100 mcg by mouth Daily (before breakfast). metoprolol succinate (TOPROL-XL) 200 mg XL tablet Take 200 mg by mouth nightly. furosemide (LASIX) 40 mg tablet Take 40 mg by mouth daily as needed (lower extremity swelling). esomeprazole (NEXIUM) 40 mg capsule Take 40 mg by mouth daily. metFORMIN (GLUCOPHAGE) 1,000 mg tablet Take 1,000 mg by mouth two (2) times daily (with meals). aspirin delayed-release 81 mg tablet Take 1 Tab by mouth daily. Qty: 100 Tab, Refills: 0  
  
cloNIDine HCl (CATAPRES) 0.1 mg tablet Take 0.1 mg by mouth two (2) times a day. valsartan-hydroCHLOROthiazide (DIOVAN-HCT) 320-25 mg per tablet Take 1 Tab by mouth daily. acetaminophen (TYLENOL) 500 mg tablet Take 1,000 mg by mouth two (2) times a day. !! - Potential duplicate medications found. Please discuss with provider. STOP taking these medications  
  
 predniSONE (STERAPRED) 5 mg dose pack Comments:  
Reason for Stoppin.  
Follow-up Information Follow up With Details Comments Contact Info Edward Jaime MD Schedule an appointment as soon as possible for a visit in 2 days to recheck your kidney function and assess right leg pain [address] 
[phone] [facility] EMERGENCY DEPT Go in 1 day If symptoms worsen [address] [place] [address] [address] [facility] 
[phone] Return to ED if worse Diagnosis Clinical Impression: 1. SILAS (acute kidney injury) ([address] 2. Meralgia paresthetica, right lower limb Attestations: This note is prepared by Zack Obrien, acting as Scribe for Deborah Castanon.  Christophe Henao MD. 
 
 Rick Parisi. Rajesh Nuno MD: The scribe's documentation has been prepared under my direction and personally reviewed by me in its entirety. I confirm that the note above accurately reflects all work, treatment, procedures, and medical decision making performed by me.

## 2018-10-10 NOTE — ED TRIAGE NOTES
Assumed care of this patient. She is alert and oriented x4. Patient placed on monitor x2. She presents ambulatory to ED with c/o right hip and leg pain that began this morning. She denies injury to the area. Patient states that this is a burning pain sensation and warmth.

## 2018-10-10 NOTE — DISCHARGE INSTRUCTIONS
Acute Kidney Injury: Care Instructions  Your Care Instructions    Acute kidney injury (SILAS) is a sudden decrease in kidney function. This can happen over a period of hours, days or, in some cases, weeks. SILAS used to be called acute renal failure, but kidney failure doesn't always happen with SILAS. Common causes of SILAS are dehydration, blood loss, and medicines. When SILAS happens, the kidneys have trouble removing waste and excess fluids from the body. The waste and fluids build up and become harmful. Kidney function may return to normal if the cause of SILAS is treated quickly. Your chance of a full recovery depends on what caused the problem, how quickly the cause was treated, and what other medical problems you have. A machine may be used to help your kidneys remove waste and fluids for a short period of time. This is called dialysis. Follow-up care is a key part of your treatment and safety. Be sure to make and go to all appointments, and call your doctor if you are having problems. It's also a good idea to know your test results and keep a list of the medicines you take. How can you care for yourself at home? · Talk to your doctor about how much fluid you should drink. · Eat a balanced diet. Talk to your doctor or a dietitian about what type of diet may be best for you. You may need to limit sodium, potassium, and phosphorus. · If you need dialysis, follow the instructions and schedule for dialysis that your doctor gives you. · Do not smoke. Smoking can make your condition worse. If you need help quitting, talk to your doctor about stop-smoking programs and medicines. These can increase your chances of quitting for good. · Do not drink alcohol. · Review all of your medicines with your doctor. Do not take any medicines, including nonsteroidal anti-inflammatory drugs (NSAIDs) such as ibuprofen (Advil, Motrin) or naproxen (Aleve), unless your doctor says it is safe for you to do so.   · Make sure that anyone treating you for any health problem knows that you have had SILAS. When should you call for help? Call 911 anytime you think you may need emergency care. For example, call if:    · You passed out (lost consciousness).    Call your doctor now or seek immediate medical care if:    · You have new or worse nausea and vomiting.     · You have much less urine than normal, or you have no urine.     · You are feeling confused or cannot think clearly.     · You have new or more blood in your urine.     · You have new swelling.     · You are dizzy or lightheaded, or feel like you may faint.    Watch closely for changes in your health, and be sure to contact your doctor if:    · You do not get better as expected. Where can you learn more? Go to http://mell-narendra.info/. Enter Z507 in the search box to learn more about \"Acute Kidney Injury: Care Instructions. \"  Current as of: March 15, 2018  Content Version: 11.8  © 8339-8856 [employer]. Care instructions adapted under license by [employer] (which disclaims liability or warranty for this information). If you have questions about a medical condition or this instruction, always ask your healthcare professional. [place] [address] any warranty or liability for your use of this information. Meralgia Paresthetica: Care Instructions  Your Care Instructions  Meralgia paresthetica (say \"muh-RAL-juh pof-fvi-VEGC-ick-uh\") is pain and numbness in the outer part of your thigh. The pain might get worse after you walk or stand for a long time. This pain and numbness occur when a nerve in your thigh is pinched (compressed). Sometimes the problem is caused by wearing tight clothing or being overweight. Most of the time the problem goes away on its own in a few months. Lowering any pressure on the thigh area may help. Wear loose clothes, and lose weight if you need to.   Follow-up care is a key part of your treatment and safety. Be sure to make and go to all appointments, and call your doctor if you are having problems. It's also a good idea to know your test results and keep a list of the medicines you take. How can you care for yourself at home? · Most times the problem gets better on its own. Try wearing loose clothing to see if this helps. · Lose weight if you need to. Talk with your doctor if you need help. When should you call for help? Watch closely for changes in your health, and be sure to contact your doctor if:    · You have new symptoms, such as pain that gets worse or new numbness in your thigh.     · You do not get better as expected. Where can you learn more? Go to http://mell-narendra.info/. Enter D965 in the search box to learn more about \"Meralgia Paresthetica: Care Instructions. \"  Current as of: June 4, 2018  Content Version: 11.8  © 7704-5132 [employer]. Care instructions adapted under license by [employer] (which disclaims liability or warranty for this information). If you have questions about a medical condition or this instruction, always ask your healthcare professional. [address] any warranty or liability for your use of this information.

## 2018-10-10 NOTE — ED NOTES
Patient discharged by Dr. Wyatt Majano. Patient provided with discharge instructions Rx and instructions on follow up care. Patient out of ED ambulatory accompanied by family.

## 2019-02-04 ENCOUNTER — HOSPITAL ENCOUNTER (OUTPATIENT)
Dept: ULTRASOUND IMAGING | Age: 74
Discharge: HOME | End: 2019-02-04
Attending: INTERNAL MEDICINE
Payer: MEDICARE

## 2019-02-04 DIAGNOSIS — N18.30 CHRONIC KIDNEY DISEASE, STAGE III (MODERATE) (HCC): ICD-10-CM

## 2019-02-04 PROCEDURE — 76770 US EXAM ABDO BACK WALL COMP: CPT

## 2019-02-13 ENCOUNTER — OFFICE VISIT (OUTPATIENT)
Dept: ONCOLOGY | Age: 74
End: 2019-02-13

## 2019-02-13 VITALS
SYSTOLIC BLOOD PRESSURE: 136 MMHG | HEART RATE: 67 BPM | OXYGEN SATURATION: 96 % | BODY MASS INDEX: 34.8 KG/M2 | WEIGHT: 235 LBS | DIASTOLIC BLOOD PRESSURE: 76 MMHG | RESPIRATION RATE: 16 BRPM | HEIGHT: 69 IN | TEMPERATURE: 97.6 F

## 2019-02-13 DIAGNOSIS — N18.30 ANEMIA DUE TO STAGE 3 CHRONIC KIDNEY DISEASE (HCC): Primary | ICD-10-CM

## 2019-02-13 DIAGNOSIS — D63.1 ANEMIA DUE TO STAGE 3 CHRONIC KIDNEY DISEASE (HCC): Primary | ICD-10-CM

## 2019-02-13 NOTE — PROGRESS NOTES
Robbie Alicia is a 74 y.o. female new patient referred by Dr. Tam Call to provider for anemia. VS stable. Patient denies pain. Patient denies falls. Recent colonoscopy. Patient denies active bleeding. Visit Vitals /76 (BP 1 Location: Left arm, BP Patient Position: Sitting) Pulse 67 Temp 97.6 °F (36.4 °C) (Oral) Resp 16 SpO2 96% Pain Scale: /10 Pain Location:  
 
Health Maintenance Review: Patient reminded of \"due or due soon\" health maintenance. I have asked the patient to contact his/her primary care provider (PCP) for follow-up on his/her health maintenance.

## 2019-02-14 NOTE — PROGRESS NOTES
Hematology Consultation Patient: Katie Rousseau MRN: [id number]  SSN: [id number] YOB: 1945  Age: 74 y.o. Sex: female Subjective:  
  
Katie Rousseau is a 74 y.o. female who I am seeing in consultation for mild anemia. She is asymptomatic. She denies bleeding. Anemia has been present for over 2-3 years. She denies excessive fatigue. She also suffers with chronic kidney disease and sees Dr. Michael Hoyos. Hgb has been relatively stable recently. She has undergone both upper and lower endoscopy. .  
 
Review of Systems: 
 
Constitutional: negative Eyes: negative Ears, Nose, Mouth, Throat, and Face: negative Respiratory: negative Cardiovascular: negative Gastrointestinal: negative Genitourinary:negative Integument/Breast: negative Hematologic/Lymphatic: negative Musculoskeletal:negative Neurological: negative Past Medical History:  
Diagnosis Date  Asthma  Diabetes ([facility] [address]  GERD (gastroesophageal reflux disease)  Hypertension  Other ill-defined conditions(799.89) hypothyroid Past Surgical History:  
Procedure Laterality Date  HX GYN    
 hysterectomy  HX ORTHOPAEDIC    
 left knee  HX OTHER SURGICAL    
 lipoma removal  
  
Family History Problem Relation Age of Onset  No Known Problems Mother  No Known Problems Father Social History Tobacco Use  Smoking status: Never Smoker  Smokeless tobacco: Never Used Substance Use Topics  Alcohol use: No  
  
Prior to Admission medications Medication Sig Start Date End Date Taking? Authorizing Provider  
albuterol (PROVENTIL HFA, VENTOLIN HFA, PROAIR HFA) 90 mcg/actuation inhaler Take 1 Puff by inhalation every six (6) hours as needed for Wheezing. 2/28/18  Yes Stef Tirado MD  
aspirin delayed-release 81 mg tablet Take 1 Tab by mouth daily.  2/28/18  Yes Stef Tirado MD  
albuterol (PROVENTIL HFA, VENTOLIN HFA, PROAIR HFA) 90 mcg/actuation inhaler Take 2 Puffs by inhalation every four (4) hours as needed for Wheezing. 2/27/18  Yes Odalys Oconnell MD  
insulin NPH (NOVOLIN N, HUMULIN N) 100 unit/mL injection 0-4 Units by SubCUTAneous route nightly. Patient states that she administers agent per corrective scale, but was unable to clarify the blood glucose ranges and associated insulin dosages per scale   Yes Provider, Historical  
insulin lispro (HUMALOG) 100 unit/mL injection 0-15 Units by SubCUTAneous route Before breakfast, lunch, and dinner. Patient states that she administers agent per corrective scale, but was unable to clarify the blood glucose ranges and associated insulin dosages per scale   Yes Provider, Historical  
levothyroxine (SYNTHROID) 100 mcg tablet Take 100 mcg by mouth Daily (before breakfast). Yes Provider, Historical  
metoprolol succinate (TOPROL-XL) 200 mg XL tablet Take 200 mg by mouth nightly. Yes Provider, Historical  
furosemide (LASIX) 40 mg tablet Take 40 mg by mouth daily as needed (lower extremity swelling). Yes Provider, Historical  
cloNIDine HCl (CATAPRES) 0.1 mg tablet Take 0.1 mg by mouth two (2) times a day. Yes Provider, Historical  
valsartan-hydroCHLOROthiazide (DIOVAN-HCT) 320-25 mg per tablet Take 1 Tab by mouth daily. Yes Provider, Historical  
acetaminophen (TYLENOL) 500 mg tablet Take 1,000 mg by mouth two (2) times a day. Yes Provider, Historical  
esomeprazole (NEXIUM) 40 mg capsule Take 40 mg by mouth daily. Yes Other, MD Olman  
metFORMIN (GLUCOPHAGE) 1,000 mg tablet Take 1,000 mg by mouth two (2) times daily (with meals). Yes Other, MD Olman  
methylPREDNISolone (MEDROL, EMERITA,) 4 mg tablet Take as directed on package lable  Indications: Meralgia Paresthetica 10/10/18   Darling Cannon MD  
  
 
 
 
 
Allergies Allergen Reactions  Pcn [Penicillins] Hives Objective:  
 
Vitals:  
 02/13/19 1351 BP: 136/76 Pulse: 67 Resp: 16 Temp: 97.6 °F (36.4 °C) TempSrc: Oral  
SpO2: 96% Weight: 235 lb (106.6 kg) Height: 5' 9\" (1.753 m) Physical Exam: 
 
GENERAL: alert, cooperative, no distress, appears stated age EYE: conjunctivae/corneas clear. PERRL, EOM's intact. Fundi benign LYMPHATIC: Cervical, supraclavicular, and axillary nodes normal.  
THROAT & NECK: normal and no erythema or exudates noted. LUNG: clear to auscultation bilaterally HEART: regular rate and rhythm, S1, S2 normal, no murmur, click, rub or gallop ABDOMEN: soft, non-tender. Bowel sounds normal. No masses,  no organomegaly EXTREMITIES:  extremities normal, atraumatic, no cyanosis or edema SKIN: Normal. 
NEUROLOGIC: AOx3. Gait normal. Reflexes and motor strength normal and symmetric. Cranial nerves 2-12 and sensation grossly intact. External labs reviewed Hgb: 9.9 WBC and platelet count are normal 
 
 
 
 
Assessment: 1. Anemia secondary to chronic kidney disease: 
 
Mild Asymptomatic I recommend observation She sees Nephrology. Dr. Danyel Rivero should be able to follow the anemia Plan: 1. Return as needed Signed by: Clement Parsons MD 
                   February 14, 2019  
 
 
CC.  Zaynab Kahn MD

## 2019-03-02 ENCOUNTER — APPOINTMENT (OUTPATIENT)
Dept: GENERAL RADIOLOGY | Age: 74
End: 2019-03-02
Attending: EMERGENCY MEDICINE
Payer: MEDICARE

## 2019-03-02 ENCOUNTER — HOSPITAL ENCOUNTER (EMERGENCY)
Age: 74
Discharge: HOME | End: 2019-03-02
Attending: EMERGENCY MEDICINE
Payer: MEDICARE

## 2019-03-02 VITALS
OXYGEN SATURATION: 96 % | WEIGHT: 235.89 LBS | RESPIRATION RATE: 16 BRPM | DIASTOLIC BLOOD PRESSURE: 69 MMHG | SYSTOLIC BLOOD PRESSURE: 167 MMHG | TEMPERATURE: 100.2 F | BODY MASS INDEX: 33.77 KG/M2 | HEART RATE: 99 BPM | HEIGHT: 70 IN

## 2019-03-02 DIAGNOSIS — R06.03 RESPIRATORY DISTRESS: ICD-10-CM

## 2019-03-02 DIAGNOSIS — J10.1 INFLUENZA A: Primary | ICD-10-CM

## 2019-03-02 LAB
ALBUMIN SERPL-MCNC: 3.7 G/DL (ref 3.5–5)
ALBUMIN/GLOB SERPL: 1.1 {RATIO} (ref 1.1–2.2)
ALP SERPL-CCNC: 134 U/L (ref 45–117)
ALT SERPL-CCNC: 127 U/L (ref 12–78)
ANION GAP SERPL CALC-SCNC: 9 MMOL/L (ref 5–15)
AST SERPL-CCNC: 78 U/L (ref 15–37)
ATRIAL RATE: 88 BPM
BASOPHILS # BLD: 0.1 K/UL (ref 0–0.1)
BASOPHILS NFR BLD: 1 % (ref 0–1)
BILIRUB SERPL-MCNC: 0.5 MG/DL (ref 0.2–1)
BUN SERPL-MCNC: 22 MG/DL (ref 6–20)
BUN/CREAT SERPL: 19 (ref 12–20)
CALCIUM SERPL-MCNC: 9.4 MG/DL (ref 8.5–10.1)
CALCULATED R AXIS, ECG10: 161 DEGREES
CALCULATED T AXIS, ECG11: 136 DEGREES
CHLORIDE SERPL-SCNC: 104 MMOL/L (ref 97–108)
CK MB CFR SERPL CALC: NORMAL % (ref 0–2.5)
CK MB SERPL-MCNC: <1 NG/ML (ref 5–25)
CK SERPL-CCNC: 30 U/L (ref 26–192)
CO2 SERPL-SCNC: 26 MMOL/L (ref 21–32)
CREAT SERPL-MCNC: 1.16 MG/DL (ref 0.55–1.02)
DIAGNOSIS, 93000: NORMAL
DIFFERENTIAL METHOD BLD: ABNORMAL
EOSINOPHIL # BLD: 0.1 K/UL (ref 0–0.4)
EOSINOPHIL NFR BLD: 1 % (ref 0–7)
ERYTHROCYTE [DISTWIDTH] IN BLOOD BY AUTOMATED COUNT: 13.7 % (ref 11.5–14.5)
FLUAV AG NPH QL IA: POSITIVE
FLUBV AG NOSE QL IA: NEGATIVE
GLOBULIN SER CALC-MCNC: 3.4 G/DL (ref 2–4)
GLUCOSE SERPL-MCNC: 182 MG/DL (ref 65–100)
HCT VFR BLD AUTO: 32.6 % (ref 35–47)
HGB BLD-MCNC: 10 G/DL (ref 11.5–16)
IMM GRANULOCYTES # BLD AUTO: 0.1 K/UL (ref 0–0.04)
IMM GRANULOCYTES NFR BLD AUTO: 1 % (ref 0–0.5)
LYMPHOCYTES # BLD: 2.1 K/UL (ref 0.8–3.5)
LYMPHOCYTES NFR BLD: 16 % (ref 12–49)
MCH RBC QN AUTO: 25.7 PG (ref 26–34)
MCHC RBC AUTO-ENTMCNC: 30.7 G/DL (ref 30–36.5)
MCV RBC AUTO: 83.8 FL (ref 80–99)
MONOCYTES # BLD: 1.4 K/UL (ref 0–1)
MONOCYTES NFR BLD: 11 % (ref 5–13)
NEUTS SEG # BLD: 9.6 K/UL (ref 1.8–8)
NEUTS SEG NFR BLD: 72 % (ref 32–75)
NRBC # BLD: 0 K/UL (ref 0–0.01)
NRBC BLD-RTO: 0 PER 100 WBC
PLATELET # BLD AUTO: 296 K/UL (ref 150–400)
PMV BLD AUTO: 10.8 FL (ref 8.9–12.9)
POTASSIUM SERPL-SCNC: 3.7 MMOL/L (ref 3.5–5.1)
PROT SERPL-MCNC: 7.1 G/DL (ref 6.4–8.2)
Q-T INTERVAL, ECG07: 344 MS
QRS DURATION, ECG06: 82 MS
QTC CALCULATION (BEZET), ECG08: 418 MS
RBC # BLD AUTO: 3.89 M/UL (ref 3.8–5.2)
SODIUM SERPL-SCNC: 139 MMOL/L (ref 136–145)
TROPONIN I SERPL-MCNC: <0.05 NG/ML
VENTRICULAR RATE, ECG03: 89 BPM
WBC # BLD AUTO: 13.3 K/UL (ref 3.6–11)

## 2019-03-02 PROCEDURE — 74011000250 HC RX REV CODE- 250: Performed by: EMERGENCY MEDICINE

## 2019-03-02 PROCEDURE — 36415 COLL VENOUS BLD VENIPUNCTURE: CPT

## 2019-03-02 PROCEDURE — 99284 EMERGENCY DEPT VISIT MOD MDM: CPT

## 2019-03-02 PROCEDURE — 96361 HYDRATE IV INFUSION ADD-ON: CPT

## 2019-03-02 PROCEDURE — 94640 AIRWAY INHALATION TREATMENT: CPT

## 2019-03-02 PROCEDURE — 85025 COMPLETE CBC W/AUTO DIFF WBC: CPT

## 2019-03-02 PROCEDURE — 82550 ASSAY OF CK (CPK): CPT

## 2019-03-02 PROCEDURE — 84484 ASSAY OF TROPONIN QUANT: CPT

## 2019-03-02 PROCEDURE — 87804 INFLUENZA ASSAY W/OPTIC: CPT

## 2019-03-02 PROCEDURE — 74011250636 HC RX REV CODE- 250/636: Performed by: EMERGENCY MEDICINE

## 2019-03-02 PROCEDURE — 74011250637 HC RX REV CODE- 250/637: Performed by: EMERGENCY MEDICINE

## 2019-03-02 PROCEDURE — 71046 X-RAY EXAM CHEST 2 VIEWS: CPT

## 2019-03-02 PROCEDURE — 80053 COMPREHEN METABOLIC PANEL: CPT

## 2019-03-02 PROCEDURE — 96360 HYDRATION IV INFUSION INIT: CPT

## 2019-03-02 PROCEDURE — 77030029684 HC NEB SM VOL KT MONA -A

## 2019-03-02 PROCEDURE — 93005 ELECTROCARDIOGRAM TRACING: CPT

## 2019-03-02 RX ORDER — ALBUTEROL SULFATE 2.5 MG/.5ML
5 SOLUTION RESPIRATORY (INHALATION)
Status: COMPLETED | OUTPATIENT
Start: 2019-03-02 | End: 2019-03-02

## 2019-03-02 RX ORDER — ACETAMINOPHEN 500 MG
1000 TABLET ORAL ONCE
Status: COMPLETED | OUTPATIENT
Start: 2019-03-02 | End: 2019-03-02

## 2019-03-02 RX ORDER — SODIUM CHLORIDE 0.9 % (FLUSH) 0.9 %
5-40 SYRINGE (ML) INJECTION EVERY 8 HOURS
Status: DISCONTINUED | OUTPATIENT
Start: 2019-03-02 | End: 2019-03-02 | Stop reason: HOSPADM

## 2019-03-02 RX ORDER — SODIUM CHLORIDE 0.9 % (FLUSH) 0.9 %
5-40 SYRINGE (ML) INJECTION AS NEEDED
Status: DISCONTINUED | OUTPATIENT
Start: 2019-03-02 | End: 2019-03-02 | Stop reason: HOSPADM

## 2019-03-02 RX ORDER — ALBUTEROL SULFATE 0.83 MG/ML
2.5 SOLUTION RESPIRATORY (INHALATION)
Qty: 75 EACH | Refills: 0 | Status: SHIPPED | OUTPATIENT
Start: 2019-03-02 | End: 2019-05-08

## 2019-03-02 RX ORDER — ALBUTEROL SULFATE 0.83 MG/ML
2.5 SOLUTION RESPIRATORY (INHALATION)
Qty: 24 EACH | Refills: 0 | Status: SHIPPED | OUTPATIENT
Start: 2019-03-02

## 2019-03-02 RX ORDER — OSELTAMIVIR PHOSPHATE 75 MG/1
75 CAPSULE ORAL 2 TIMES DAILY
Qty: 10 CAP | Refills: 0 | Status: SHIPPED | OUTPATIENT
Start: 2019-03-02 | End: 2019-03-07

## 2019-03-02 RX ORDER — ALBUTEROL SULFATE 0.83 MG/ML
2.5 SOLUTION RESPIRATORY (INHALATION)
Qty: 24 EACH | Refills: 0 | Status: SHIPPED | OUTPATIENT
Start: 2019-03-02 | End: 2019-03-02

## 2019-03-02 RX ORDER — PROMETHAZINE HYDROCHLORIDE AND DEXTROMETHORPHAN HYDROBROMIDE 6.25; 15 MG/5ML; MG/5ML
5 SYRUP ORAL
Qty: 120 ML | Refills: 0 | Status: SHIPPED | OUTPATIENT
Start: 2019-03-02 | End: 2019-03-09

## 2019-03-02 RX ADMIN — SODIUM CHLORIDE 1000 ML: 900 INJECTION, SOLUTION INTRAVENOUS at 07:49

## 2019-03-02 RX ADMIN — ACETAMINOPHEN 1000 MG: 500 TABLET ORAL at 07:49

## 2019-03-02 RX ADMIN — ALBUTEROL SULFATE 5 MG: 2.5 SOLUTION RESPIRATORY (INHALATION) at 07:51

## 2019-03-02 RX ADMIN — ALBUTEROL SULFATE 5 MG: 2.5 SOLUTION RESPIRATORY (INHALATION) at 10:05

## 2019-03-02 NOTE — ED PROVIDER NOTES
EMERGENCY DEPARTMENT HISTORY AND PHYSICAL EXAM 
 
 
Date: 3/2/2019 Patient Name: Luis Fernandez History of Presenting Illness Chief Complaint Patient presents with  Shortness of Breath  
  having difficulty ambulating & notes trouble breathing when walking around. saw MD & has been sick since wednesday History Provided By: Patient HPI: Luis Fernandez, 74 y.o. female with PMHx significant for HTN, asthma, DM, GERD, presents ambulatory to the ED with cc of gradual onset SOB, onset s/p diagnoses of bronchitis by PCP on 02/27/2019. Pt reports SOB exacerbated by ambulation, and reports onset dizziness with ambulation. Pt denies being prescribed abx by PCP. Pt also c/o associated chest tightness, which pt reports is constant. She also reports having a non-productive cough and chills. Pt denies myalgia, or having flu test performed on 02/27/2019. There are no other complaints, changes, or physical findings at this time. PCP: Judith Trujillo MD 
 
No current facility-administered medications on file prior to encounter. Current Outpatient Medications on File Prior to Encounter Medication Sig Dispense Refill  methylPREDNISolone (MEDROL, EMERITA,) 4 mg tablet Take as directed on package lable  Indications: Meralgia Paresthetica 1 Dose Pack 0  
 albuterol (PROVENTIL HFA, VENTOLIN HFA, PROAIR HFA) 90 mcg/actuation inhaler Take 1 Puff by inhalation every six (6) hours as needed for Wheezing. 1 Inhaler 0  
 aspirin delayed-release 81 mg tablet Take 1 Tab by mouth daily. 100 Tab 0  
 albuterol (PROVENTIL HFA, VENTOLIN HFA, PROAIR HFA) 90 mcg/actuation inhaler Take 2 Puffs by inhalation every four (4) hours as needed for Wheezing. 1 Inhaler 0  
 insulin NPH (NOVOLIN N, HUMULIN N) 100 unit/mL injection 0-4 Units by SubCUTAneous route nightly.  Patient states that she administers agent per corrective scale, but was unable to clarify the blood glucose ranges and associated insulin dosages per scale  insulin lispro (HUMALOG) 100 unit/mL injection 0-15 Units by SubCUTAneous route Before breakfast, lunch, and dinner. Patient states that she administers agent per corrective scale, but was unable to clarify the blood glucose ranges and associated insulin dosages per scale  levothyroxine (SYNTHROID) 100 mcg tablet Take 100 mcg by mouth Daily (before breakfast).  metoprolol succinate (TOPROL-XL) 200 mg XL tablet Take 200 mg by mouth nightly.  furosemide (LASIX) 40 mg tablet Take 40 mg by mouth daily as needed (lower extremity swelling).  cloNIDine HCl (CATAPRES) 0.1 mg tablet Take 0.1 mg by mouth two (2) times a day.  valsartan-hydroCHLOROthiazide (DIOVAN-HCT) 320-25 mg per tablet Take 1 Tab by mouth daily.  acetaminophen (TYLENOL) 500 mg tablet Take 1,000 mg by mouth two (2) times a day.  esomeprazole (NEXIUM) 40 mg capsule Take 40 mg by mouth daily.  metFORMIN (GLUCOPHAGE) 1,000 mg tablet Take 1,000 mg by mouth two (2) times daily (with meals). Past History Past Medical History: 
Past Medical History:  
Diagnosis Date  Asthma  Diabetes ([address]  GERD (gastroesophageal reflux disease)  Hypertension  Other ill-defined conditions(799.89) hypothyroid Past Surgical History: 
Past Surgical History:  
Procedure Laterality Date  HX GYN    
 hysterectomy  HX ORTHOPAEDIC    
 left knee  HX OTHER SURGICAL    
 lipoma removal  
 
 
Family History: 
Family History Problem Relation Age of Onset  No Known Problems Mother  No Known Problems Father Social History: 
Social History Tobacco Use  Smoking status: Never Smoker  Smokeless tobacco: Never Used Substance Use Topics  Alcohol use: No  
 Drug use: No  
 
 
Allergies: Allergies Allergen Reactions  Pcn [Penicillins] Hives Review of Systems Review of Systems Constitutional: Positive for chills. Negative for appetite change, fatigue and fever. HENT: Negative. Negative for congestion, rhinorrhea, sinus pressure and sore throat. Eyes: Negative. Respiratory: Positive for cough, chest tightness and shortness of breath. Negative for choking and wheezing. Cardiovascular: Negative. Negative for chest pain, palpitations and leg swelling. Gastrointestinal: Negative for abdominal pain, constipation, diarrhea, nausea and vomiting. Endocrine: Negative. Genitourinary: Negative. Negative for difficulty urinating, dysuria, flank pain and urgency. Musculoskeletal: Negative. Negative for myalgias. Skin: Negative. Neurological: Positive for dizziness. Negative for speech difficulty, weakness, light-headedness, numbness and headaches. Psychiatric/Behavioral: Negative. All other systems reviewed and are negative. Physical Exam  
Physical Exam  
Constitutional: She is oriented to person, place, and time. She appears well-developed and well-nourished. Elderly female, appears fatigued HENT:  
Head: Normocephalic and atraumatic. Right Ear: External ear normal.  
Left Ear: External ear normal.  
Mouth/Throat: Oropharynx is clear and moist. No oropharyngeal exudate. Eyes: Conjunctivae and EOM are normal. Pupils are equal, round, and reactive to light. Neck: Normal range of motion. Neck supple. No JVD present. No tracheal deviation present. Cardiovascular: Normal rate, regular rhythm, normal heart sounds and intact distal pulses. No murmur heard. Pulmonary/Chest: No stridor. She is in respiratory distress (Mild-mod- no accessory muscle use, but increase resp rate). She has wheezes. She has no rales. She exhibits no tenderness. Increase effort Abdominal: Soft. She exhibits no distension. There is no tenderness. There is no rebound and no guarding. Musculoskeletal: Normal range of motion. She exhibits no edema or tenderness. Neurological: She is alert and oriented to person, place, and time. No cranial nerve deficit. No gross motor or sensory deficits Skin: Skin is warm and dry. She is not diaphoretic. Psychiatric: She has a normal mood and affect. Her behavior is normal.  
Nursing note and vitals reviewed. Diagnostic Study Results Labs - Recent Results (from the past 12 hour(s)) EKG, 12 LEAD, INITIAL Collection Time: 03/02/19  7:02 AM  
Result Value Ref Range Ventricular Rate 89 BPM  
 Atrial Rate 88 BPM  
 QRS Duration 82 ms Q-T Interval 344 ms QTC Calculation (Bezet) 418 ms Calculated R Axis 161 degrees Calculated T Axis 136 degrees Diagnosis ** Suspect arm lead reversal, interpretation assumes no reversal 
Accelerated Junctional rhythm Lateral infarct , age undetermined When compared with ECG of 27-FEB-2018 08:43, 
Junctional rhythm has replaced Sinus rhythm QRS axis shifted right Lateral infarct is now present T wave inversion now evident in Lateral leads CBC WITH AUTOMATED DIFF Collection Time: 03/02/19  7:33 AM  
Result Value Ref Range WBC 13.3 (H) 3.6 - 11.0 K/uL  
 RBC 3.89 3.80 - 5.20 M/uL  
 HGB 10.0 (L) 11.5 - 16.0 g/dL HCT 32.6 (L) 35.0 - 47.0 % MCV 83.8 80.0 - 99.0 FL  
 MCH 25.7 (L) 26.0 - 34.0 PG  
 MCHC 30.7 30.0 - 36.5 g/dL  
 RDW 13.7 11.5 - 14.5 % PLATELET 462 135 - 876 K/uL MPV 10.8 8.9 - 12.9 FL  
 NRBC 0.0 0  WBC ABSOLUTE NRBC 0.00 0.00 - 0.01 K/uL NEUTROPHILS 72 32 - 75 % LYMPHOCYTES 16 12 - 49 % MONOCYTES 11 5 - 13 % EOSINOPHILS 1 0 - 7 % BASOPHILS 1 0 - 1 % IMMATURE GRANULOCYTES 1 (H) 0.0 - 0.5 % ABS. NEUTROPHILS 9.6 (H) 1.8 - 8.0 K/UL  
 ABS. LYMPHOCYTES 2.1 0.8 - 3.5 K/UL  
 ABS. MONOCYTES 1.4 (H) 0.0 - 1.0 K/UL  
 ABS. EOSINOPHILS 0.1 0.0 - 0.4 K/UL  
 ABS. BASOPHILS 0.1 0.0 - 0.1 K/UL  
 ABS. IMM. GRANS. 0.1 (H) 0.00 - 0.04 K/UL  
 DF AUTOMATED METABOLIC PANEL, COMPREHENSIVE  
 Collection Time: 03/02/19  7:33 AM  
Result Value Ref Range Sodium 139 136 - 145 mmol/L Potassium 3.7 3.5 - 5.1 mmol/L Chloride 104 97 - 108 mmol/L  
 CO2 26 21 - 32 mmol/L Anion gap 9 5 - 15 mmol/L Glucose 182 (H) 65 - 100 mg/dL BUN 22 (H) 6 - 20 MG/DL Creatinine 1.16 (H) 0.55 - 1.02 MG/DL  
 BUN/Creatinine ratio 19 12 - 20 GFR est AA 55 (L) >60 ml/min/1.73m2 GFR est non-AA 46 (L) >60 ml/min/1.73m2 Calcium 9.4 8.5 - 10.1 MG/DL Bilirubin, total 0.5 0.2 - 1.0 MG/DL  
 ALT (SGPT) 127 (H) 12 - 78 U/L  
 AST (SGOT) 78 (H) 15 - 37 U/L Alk. phosphatase 134 (H) 45 - 117 U/L Protein, total 7.1 6.4 - 8.2 g/dL Albumin 3.7 3.5 - 5.0 g/dL Globulin 3.4 2.0 - 4.0 g/dL A-G Ratio 1.1 1.1 - 2.2 CK W/ CKMB & INDEX Collection Time: 03/02/19  7:33 AM  
Result Value Ref Range CK 30 26 - 192 U/L  
 CK - MB <1.0 <3.6 NG/ML  
 CK-MB Index Cannot be calculated 0.0 - 2.5    
TROPONIN I Collection Time: 03/02/19  7:33 AM  
Result Value Ref Range Troponin-I, Qt. <0.05 <0.05 ng/mL INFLUENZA A & B AG (RAPID TEST) Collection Time: 03/02/19  8:04 AM  
Result Value Ref Range Influenza A Antigen POSITIVE (A) NEG Influenza B Antigen NEGATIVE  NEG Radiologic Studies -  
XR CHEST PA LAT Final Result IMPRESSION:   
Clear lungs. CXR Results  (Last 48 hours) 03/02/19 0742  XR CHEST PA LAT Final result Impression:  IMPRESSION:   
Clear lungs. Narrative:  PA AND LATERAL CHEST RADIOGRAPHS: 3/2/2019 7:42 AM  
   
INDICATION: Chest pain. COMPARISON: 2/27/2018, 5/31/2011. TECHNIQUE: Frontal and lateral radiographs of the chest.  
   
FINDINGS:  
The lungs are clear. The central airways are patent. The heart size is normal.  
No pneumothorax or pleural effusion. Medical Decision Making I am the first provider for this patient.  
 
I reviewed the vital signs, available nursing notes, past medical history, past surgical history, family history and social history. Vital Signs-Reviewed the patient's vital signs. Patient Vitals for the past 12 hrs: 
 Temp Pulse Resp BP SpO2  
03/02/19 1121 100.2 °F (37.9 °C)      
03/02/19 1120 98.6 °F (37 °C) 99 16 167/69 96 % 03/02/19 1100    183/58 95 % 03/02/19 1030    177/62 94 % 03/02/19 0727 (!) 100.8 °F (38.2 °C) 93 18 (!) 192/96 95 % 03/02/19 0656 98.1 °F (36.7 °C) 95 22 (!) 149/132 92 % Pulse Oximetry Analysis - 92% on RA Cardiac Monitor:  
Rate: 95 bpm 
Rhythm: Normal Sinus Rhythm 0656 Records Reviewed: Nursing Notes and Old Medical Records Provider Notes (Medical Decision Making): DDx: flu, bronchitis, pneumonia, asthma exacerbation EKG: Read as done Accelerated Junctional, rate 89, normal axis/qrs, T wave inversion lateral, no other acute ST changes, Sonda Comings, DO 
 
ED Course:  
Initial assessment performed. The patients presenting problems have been discussed, and they are in agreement with the care plan formulated and outlined with them. I have encouraged them to ask questions as they arise throughout their visit. Progress Note: 
9:31 AM 
Pt is positive for flu. Disposition: 
Discharge Note: 
11:28 AM 
The pt is ready for discharge. The pt's signs, symptoms, diagnosis, and discharge instructions have been discussed and pt has conveyed their understanding. The pt is to follow up as recommended or return to ER should their symptoms worsen. Plan has been discussed and pt is in agreement. PLAN: 
1. Current Discharge Medication List  
  
START taking these medications Details  
oseltamivir (TAMIFLU) 75 mg capsule Take 1 Cap by mouth two (2) times a day for 5 days. Qty: 10 Cap, Refills: 0 Compressor, For Nebulizer jen 1 Each by Does Not Apply route every four (4) hours as needed for Other.  
Qty: 1 Device, Refills: 0  
  
promethazine-dextromethorphan (PROMETHAZINE-DM) 6.25-15 mg/5 mL syrup Take 5 mL by mouth every four (4) hours as needed for Cough for up to 7 days. Qty: 120 mL, Refills: 0  
  
!! albuterol (PROVENTIL VENTOLIN) 2.5 mg /3 mL (0.083 %) nebulizer solution 3 mL by Nebulization route every four (4) hours as needed for Wheezing. Qty: 24 Each, Refills: 0  
  
!! albuterol (PROVENTIL VENTOLIN) 2.5 mg /3 mL (0.083 %) nebulizer solution 3 mL by Nebulization route every four (4) hours as needed for Wheezing. Qty: 75 Each, Refills: 0  
  
 !! - Potential duplicate medications found. Please discuss with provider. CONTINUE these medications which have NOT CHANGED Details  
!! albuterol (PROVENTIL HFA, VENTOLIN HFA, PROAIR HFA) 90 mcg/actuation inhaler Take 1 Puff by inhalation every six (6) hours as needed for Wheezing. Qty: 1 Inhaler, Refills: 0  
  
!! albuterol (PROVENTIL HFA, VENTOLIN HFA, PROAIR HFA) 90 mcg/actuation inhaler Take 2 Puffs by inhalation every four (4) hours as needed for Wheezing. Qty: 1 Inhaler, Refills: 0  
  
 !! - Potential duplicate medications found. Please discuss with provider. 2.  
Follow-up Information Follow up With Specialties Details Why Contact Info Miguel Hernandez MD Family Practice  As needed [address] 
[phone] Return to ED if worse Diagnosis Clinical Impression:  
1. Influenza A 2. Respiratory distress Attestations: This note is prepared by Alisa Malloy. Savi, acting as Scribe for DO Madhuri Wells DO: The scribe's documentation has been prepared under my direction and personally reviewed by me in its entirety. I confirm that the note above accurately reflects all work, treatment, procedures, and medical decision making performed by me. This note will not be viewable in [address]

## 2019-03-02 NOTE — DISCHARGE INSTRUCTIONS
Patient Education        Influenza (Flu): Care Instructions  Your Care Instructions    Influenza (flu) is an infection in the lungs and breathing passages. It is caused by the influenza virus. There are different strains, or types, of the flu virus from year to year. Unlike the common cold, the flu comes on suddenly and the symptoms, such as a cough, congestion, fever, chills, fatigue, aches, and pains, are more severe. These symptoms may last up to 10 days. Although the flu can make you feel very sick, it usually doesn't cause serious health problems. Home treatment is usually all you need for flu symptoms. But your doctor may prescribe antiviral medicine to prevent other health problems, such as pneumonia, from developing. Older people and those who have a long-term health condition, such as lung disease, are most at risk for having pneumonia or other health problems. Follow-up care is a key part of your treatment and safety. Be sure to make and go to all appointments, and call your doctor if you are having problems. It's also a good idea to know your test results and keep a list of the medicines you take. How can you care for yourself at home? · Get plenty of rest.  · Drink plenty of fluids, enough so that your urine is light yellow or clear like water. If you have kidney, heart, or liver disease and have to limit fluids, talk with your doctor before you increase the amount of fluids you drink. · Take an over-the-counter pain medicine if needed, such as acetaminophen (Tylenol), ibuprofen (Advil, Motrin), or naproxen (Aleve), to relieve fever, headache, and muscle aches. Read and follow all instructions on the label. No one younger than 20 should take aspirin. It has been linked to Reye syndrome, a serious illness. · Do not smoke. Smoking can make the flu worse. If you need help quitting, talk to your doctor about stop-smoking programs and medicines.  These can increase your chances of quitting for good.  · Breathe moist air from a hot shower or from a sink filled with hot water to help clear a stuffy nose. · Before you use cough and cold medicines, check the label. These medicines may not be safe for young children or for people with certain health problems. · If the skin around your nose and lips becomes sore, put some petroleum jelly on the area. · To ease coughing:  ? Drink fluids to soothe a scratchy throat. ? Suck on cough drops or plain hard candy. ? Take an over-the-counter cough medicine that contains dextromethorphan to help you get some sleep. Read and follow all instructions on the label. ? Raise your head at night with an extra pillow. This may help you rest if coughing keeps you awake. · Take any prescribed medicine exactly as directed. Call your doctor if you think you are having a problem with your medicine. To avoid spreading the flu  · Wash your hands regularly, and keep your hands away from your face. · Stay home from school, work, and other public places until you are feeling better and your fever has been gone for at least 24 hours. The fever needs to have gone away on its own without the help of medicine. · Ask people living with you to talk to their doctors about preventing the flu. They may get antiviral medicine to keep from getting the flu from you. · To prevent the flu in the future, get a flu vaccine every fall. Encourage people living with you to get the vaccine. · Cover your mouth when you cough or sneeze. When should you call for help? Call 911 anytime you think you may need emergency care.  For example, call if:    · You have severe trouble breathing.    Call your doctor now or seek immediate medical care if:    · You have new or worse trouble breathing.     · You seem to be getting much sicker.     · You feel very sleepy or confused.     · You have a new or higher fever.     · You get a new rash.    Watch closely for changes in your health, and be sure to contact your doctor if:    · You begin to get better and then get worse.     · You are not getting better after 1 week. Where can you learn more? Go to http://mell-narendra.info/. Enter G486 in the search box to learn more about \"Influenza (Flu): Care Instructions. \"  Current as of: September 5, 2018  Content Version: 11.9  © 6364-2776 [employer]. Care instructions adapted under license by [employer] (which disclaims liability or warranty for this information). If you have questions about a medical condition or this instruction, always ask your healthcare professional. [place] [address] any warranty or liability for your use of this information. Patient Education        Shortness of Breath: Care Instructions  Your Care Instructions  Shortness of breath has many causes. Sometimes conditions such as anxiety can lead to shortness of breath. Some people get mild shortness of breath when they exercise. Trouble breathing also can be a symptom of a serious problem, such as asthma, lung disease, emphysema, heart problems, and pneumonia. If your shortness of breath continues, you may need tests and treatment. Watch for any changes in your breathing and other symptoms. Follow-up care is a key part of your treatment and safety. Be sure to make and go to all appointments, and call your doctor if you are having problems. It's also a good idea to know your test results and keep a list of the medicines you take. How can you care for yourself at home? · Do not smoke or allow others to smoke around you. If you need help quitting, talk to your doctor about stop-smoking programs and medicines. These can increase your chances of quitting for good. · Get plenty of rest and sleep. · Take your medicines exactly as prescribed. Call your doctor if you think you are having a problem with your medicine. · Find healthy ways to deal with stress. ? Exercise daily. ?  Get plenty of sleep. ? Eat regularly and well. When should you call for help? Call 911 anytime you think you may need emergency care. For example, call if:    · You have severe shortness of breath.     · You have symptoms of a heart attack. These may include:  ? Chest pain or pressure, or a strange feeling in the chest.  ? Sweating. ? Shortness of breath. ? Nausea or vomiting. ? Pain, pressure, or a strange feeling in the back, neck, jaw, or upper belly or in one or both shoulders or arms. ? Lightheadedness or sudden weakness. ? A fast or irregular heartbeat. After you call 911, the  may tell you to chew 1 adult-strength or 2 to 4 low-dose aspirin. Wait for an ambulance. Do not try to drive yourself.    Call your doctor now or seek immediate medical care if:    · Your shortness of breath gets worse or you start to wheeze. Wheezing is a high-pitched sound when you breathe.     · You wake up at night out of breath or have to prop your head up on several pillows to breathe.     · You are short of breath after only light activity or while at rest.    Watch closely for changes in your health, and be sure to contact your doctor if:    · You do not get better over the next 1 to 2 days. Where can you learn more? Go to http://mell-narendra.info/. Enter S780 in the search box to learn more about \"Shortness of Breath: Care Instructions. \"  Current as of: September 5, 2018  Content Version: 11.9  © 6689-9213 [employer]. Care instructions adapted under license by [employer] (which disclaims liability or warranty for this information). If you have questions about a medical condition or this instruction, always ask your healthcare professional. [address] any warranty or liability for your use of this information.

## 2019-04-30 ENCOUNTER — HOSPITAL ENCOUNTER (OUTPATIENT)
Dept: MAMMOGRAPHY | Age: 74
Discharge: HOME | End: 2019-04-30
Attending: FAMILY MEDICINE
Payer: MEDICARE

## 2019-04-30 DIAGNOSIS — Z12.31 VISIT FOR SCREENING MAMMOGRAM: ICD-10-CM

## 2019-04-30 PROCEDURE — 77067 SCR MAMMO BI INCL CAD: CPT

## 2019-05-08 ENCOUNTER — HOSPITAL ENCOUNTER (OUTPATIENT)
Dept: NON INVASIVE DIAGNOSTICS | Age: 74
Discharge: HOME | End: 2019-05-08
Payer: MEDICARE

## 2019-05-08 VITALS
HEART RATE: 55 BPM | HEIGHT: 70 IN | BODY MASS INDEX: 33.64 KG/M2 | OXYGEN SATURATION: 89 % | RESPIRATION RATE: 18 BRPM | SYSTOLIC BLOOD PRESSURE: 119 MMHG | WEIGHT: 235 LBS | DIASTOLIC BLOOD PRESSURE: 64 MMHG

## 2019-05-08 DIAGNOSIS — I48.91 ATRIAL FIBRILLATION, UNSPECIFIED TYPE (HCC): ICD-10-CM

## 2019-05-08 LAB
ATRIAL RATE: 51 BPM
CALCULATED P AXIS, ECG09: 82 DEGREES
CALCULATED R AXIS, ECG10: 48 DEGREES
CALCULATED T AXIS, ECG11: 44 DEGREES
DIAGNOSIS, 93000: NORMAL
P-R INTERVAL, ECG05: 194 MS
Q-T INTERVAL, ECG07: 462 MS
QRS DURATION, ECG06: 84 MS
QTC CALCULATION (BEZET), ECG08: 425 MS
VENTRICULAR RATE, ECG03: 51 BPM

## 2019-05-08 PROCEDURE — 74011250636 HC RX REV CODE- 250/636

## 2019-05-08 PROCEDURE — 99152 MOD SED SAME PHYS/QHP 5/>YRS: CPT

## 2019-05-08 PROCEDURE — 93005 ELECTROCARDIOGRAM TRACING: CPT

## 2019-05-08 PROCEDURE — 77030018729 HC ELECTRD DEFIB PAD CARD -B

## 2019-05-08 PROCEDURE — 92960 CARDIOVERSION ELECTRIC EXT: CPT

## 2019-05-08 RX ORDER — LOSARTAN POTASSIUM 100 MG/1
100 TABLET ORAL DAILY
COMMUNITY
End: 2021-05-07

## 2019-05-08 RX ORDER — POTASSIUM CHLORIDE 20 MEQ/1
20 TABLET, EXTENDED RELEASE ORAL DAILY
COMMUNITY

## 2019-05-08 RX ORDER — MIDAZOLAM HYDROCHLORIDE 1 MG/ML
.5-2 INJECTION, SOLUTION INTRAMUSCULAR; INTRAVENOUS
Status: DISCONTINUED | OUTPATIENT
Start: 2019-05-08 | End: 2019-05-08

## 2019-05-08 RX ORDER — NIFEDIPINE 60 MG/1
60 TABLET, EXTENDED RELEASE ORAL DAILY
COMMUNITY
End: 2021-05-07

## 2019-05-08 RX ORDER — FENTANYL CITRATE 50 UG/ML
25-50 INJECTION, SOLUTION INTRAMUSCULAR; INTRAVENOUS
Status: DISCONTINUED | OUTPATIENT
Start: 2019-05-08 | End: 2019-05-08

## 2019-05-08 RX ORDER — HYDRALAZINE HYDROCHLORIDE 25 MG/1
25 TABLET, FILM COATED ORAL 2 TIMES DAILY
COMMUNITY
End: 2021-05-07

## 2019-05-08 RX ADMIN — MIDAZOLAM HYDROCHLORIDE 1 MG: 1 INJECTION, SOLUTION INTRAMUSCULAR; INTRAVENOUS at 07:40

## 2019-05-08 RX ADMIN — FENTANYL CITRATE 25 MCG: 50 INJECTION, SOLUTION INTRAMUSCULAR; INTRAVENOUS at 07:38

## 2019-05-08 RX ADMIN — MIDAZOLAM HYDROCHLORIDE 2 MG: 1 INJECTION, SOLUTION INTRAMUSCULAR; INTRAVENOUS at 07:37

## 2019-05-08 NOTE — PROGRESS NOTES
Pt sedated with 3mg Versed and 25mcg Fentanyl for Cardioversion, given 1 synchronized shock(s) at 360 Joules, Afib converted to Sinus Glorya Stair with PACs.  (monitored sedation from 0737 to 0753)    EKG obtained

## 2019-05-08 NOTE — DISCHARGE INSTRUCTIONS
DISCHARGE SUMMARY       The following personal items collected during your admission are returned to you:   Clothing:  shirt        PATIENT INSTRUCTIONS: Continue taking all the same medications. The cardioversion procedure can cause redness to the skin where the patches were placed. You may treat this with Aloe or Cortisone cream over the counter lotion. If the skin appears very reddened or blistered contact your physician      Dr [facility] office will contact you to make an appointment with  [facility] in 2-3 week(s). What to do at Home:  Recommended activity: No driving today      The discharge information has been reviewed with the PATIENT . The PATIENT  verbalized understanding.

## 2019-05-08 NOTE — PROGRESS NOTES
Discharge instructions reviewed with patient and son, Ryan Cox. Allowed adequate time to ask questions, all questions answered. Printed copy of AVS given to patient. All belongings gathered, IV and tele discontinued. Transported via wheelchair to main entrance and into care of family.

## 2019-05-10 NOTE — PROCEDURES
[facility]    Name:  Uche Glasgow  MR#:  [id number]  :  1945  ACCOUNT #:  [de-identified]  DATE OF SERVICE:  2019      PROCEDURE:  Direct current cardioversion. :  Saba Lou MD    ASSISTANT:  Win Pereira RN    INDICATION:  Atrial fibrillation. DESCRIPTION OF PROCEDURE:  Following Versed and fentanyl, the patient received single 360 joule synchronized shock with restoration of sinus rhythm. Versed 3, fentanyl 25. Start time 5356, stop 1629 9717. SUMMARY:  Successful direct current cardioversion.       Myla Norwood MD      [id number]  D:  2019 15:19  T:  2019 20:56  JOB #:  [id number]

## 2019-07-22 ENCOUNTER — HOSPITAL ENCOUNTER (OUTPATIENT)
Dept: ULTRASOUND IMAGING | Age: 74
Discharge: HOME | End: 2019-07-22
Attending: INTERNAL MEDICINE
Payer: MEDICARE

## 2019-07-22 DIAGNOSIS — E04.1 NONTOXIC UNINODULAR GOITER: ICD-10-CM

## 2019-07-22 PROCEDURE — 76536 US EXAM OF HEAD AND NECK: CPT

## 2020-08-11 ENCOUNTER — HOSPITAL ENCOUNTER (OUTPATIENT)
Dept: MAMMOGRAPHY | Age: 75
Discharge: HOME | End: 2020-08-11
Attending: FAMILY MEDICINE
Payer: MEDICARE

## 2020-08-11 DIAGNOSIS — Z12.31 VISIT FOR SCREENING MAMMOGRAM: ICD-10-CM

## 2020-08-11 PROCEDURE — 77067 SCR MAMMO BI INCL CAD: CPT

## 2021-04-27 ENCOUNTER — APPOINTMENT (OUTPATIENT)
Dept: GENERAL RADIOLOGY | Age: 76
DRG: 481 | End: 2021-04-27
Attending: EMERGENCY MEDICINE
Payer: MEDICARE

## 2021-04-27 ENCOUNTER — HOSPITAL ENCOUNTER (INPATIENT)
Age: 76
LOS: 10 days | Discharge: NURSING, REHAB OR LONG-TERM CARE | DRG: 481 | End: 2021-05-07
Attending: EMERGENCY MEDICINE | Admitting: INTERNAL MEDICINE
Payer: MEDICARE

## 2021-04-27 DIAGNOSIS — S72.22XA CLOSED DISPLACED SUBTROCHANTERIC FRACTURE OF LEFT FEMUR, INITIAL ENCOUNTER (HCC): ICD-10-CM

## 2021-04-27 DIAGNOSIS — S72.142A CLOSED DISPLACED INTERTROCHANTERIC FRACTURE OF LEFT FEMUR, INITIAL ENCOUNTER (HCC): Primary | ICD-10-CM

## 2021-04-27 PROBLEM — S72.009A HIP FRACTURE (HCC): Status: ACTIVE | Noted: 2021-04-27

## 2021-04-27 LAB
ALBUMIN SERPL-MCNC: 3.4 G/DL (ref 3.5–5)
ALBUMIN SERPL-MCNC: 3.7 G/DL (ref 3.5–5)
ALBUMIN/GLOB SERPL: 1.1 {RATIO} (ref 1.1–2.2)
ALBUMIN/GLOB SERPL: 1.2 {RATIO} (ref 1.1–2.2)
ALP SERPL-CCNC: 77 U/L (ref 45–117)
ALP SERPL-CCNC: 80 U/L (ref 45–117)
ALT SERPL-CCNC: 17 U/L (ref 12–78)
ALT SERPL-CCNC: 18 U/L (ref 12–78)
ANION GAP SERPL CALC-SCNC: 5 MMOL/L (ref 5–15)
ANION GAP SERPL CALC-SCNC: 6 MMOL/L (ref 5–15)
APPEARANCE UR: CLEAR
AST SERPL-CCNC: 11 U/L (ref 15–37)
AST SERPL-CCNC: 13 U/L (ref 15–37)
BACTERIA URNS QL MICRO: NEGATIVE /HPF
BASOPHILS # BLD: 0 K/UL (ref 0–0.1)
BASOPHILS # BLD: 0 K/UL (ref 0–0.1)
BASOPHILS NFR BLD: 0 % (ref 0–1)
BASOPHILS NFR BLD: 0 % (ref 0–1)
BILIRUB SERPL-MCNC: 0.9 MG/DL (ref 0.2–1)
BILIRUB SERPL-MCNC: 1.1 MG/DL (ref 0.2–1)
BILIRUB UR QL: NEGATIVE
BUN SERPL-MCNC: 20 MG/DL (ref 6–20)
BUN SERPL-MCNC: 20 MG/DL (ref 6–20)
BUN/CREAT SERPL: 18 (ref 12–20)
BUN/CREAT SERPL: 19 (ref 12–20)
CALCIUM SERPL-MCNC: 10 MG/DL (ref 8.5–10.1)
CALCIUM SERPL-MCNC: 9.5 MG/DL (ref 8.5–10.1)
CHLORIDE SERPL-SCNC: 102 MMOL/L (ref 97–108)
CHLORIDE SERPL-SCNC: 103 MMOL/L (ref 97–108)
CO2 SERPL-SCNC: 28 MMOL/L (ref 21–32)
CO2 SERPL-SCNC: 30 MMOL/L (ref 21–32)
COLOR UR: ABNORMAL
CREAT SERPL-MCNC: 1.04 MG/DL (ref 0.55–1.02)
CREAT SERPL-MCNC: 1.12 MG/DL (ref 0.55–1.02)
DIFFERENTIAL METHOD BLD: ABNORMAL
DIFFERENTIAL METHOD BLD: ABNORMAL
EOSINOPHIL # BLD: 0.1 K/UL (ref 0–0.4)
EOSINOPHIL # BLD: 0.2 K/UL (ref 0–0.4)
EOSINOPHIL NFR BLD: 0 % (ref 0–7)
EOSINOPHIL NFR BLD: 2 % (ref 0–7)
EPITH CASTS URNS QL MICRO: ABNORMAL /LPF
ERYTHROCYTE [DISTWIDTH] IN BLOOD BY AUTOMATED COUNT: 13.6 % (ref 11.5–14.5)
ERYTHROCYTE [DISTWIDTH] IN BLOOD BY AUTOMATED COUNT: 13.7 % (ref 11.5–14.5)
GLOBULIN SER CALC-MCNC: 3.2 G/DL (ref 2–4)
GLOBULIN SER CALC-MCNC: 3.2 G/DL (ref 2–4)
GLUCOSE BLD STRIP.AUTO-MCNC: 255 MG/DL (ref 65–100)
GLUCOSE SERPL-MCNC: 182 MG/DL (ref 65–100)
GLUCOSE SERPL-MCNC: 240 MG/DL (ref 65–100)
GLUCOSE UR STRIP.AUTO-MCNC: 100 MG/DL
HCT VFR BLD AUTO: 37.5 % (ref 35–47)
HCT VFR BLD AUTO: 41.3 % (ref 35–47)
HGB BLD-MCNC: 11.8 G/DL (ref 11.5–16)
HGB BLD-MCNC: 12.9 G/DL (ref 11.5–16)
HGB UR QL STRIP: NEGATIVE
HYALINE CASTS URNS QL MICRO: ABNORMAL /LPF (ref 0–5)
IMM GRANULOCYTES # BLD AUTO: 0.1 K/UL (ref 0–0.04)
IMM GRANULOCYTES # BLD AUTO: 0.1 K/UL (ref 0–0.04)
IMM GRANULOCYTES NFR BLD AUTO: 1 % (ref 0–0.5)
IMM GRANULOCYTES NFR BLD AUTO: 1 % (ref 0–0.5)
INR PPP: 1.1 (ref 0.9–1.1)
KETONES UR QL STRIP.AUTO: 15 MG/DL
LEUKOCYTE ESTERASE UR QL STRIP.AUTO: NEGATIVE
LYMPHOCYTES # BLD: 1.6 K/UL (ref 0.8–3.5)
LYMPHOCYTES # BLD: 3.3 K/UL (ref 0.8–3.5)
LYMPHOCYTES NFR BLD: 33 % (ref 12–49)
LYMPHOCYTES NFR BLD: 9 % (ref 12–49)
MCH RBC QN AUTO: 27.2 PG (ref 26–34)
MCH RBC QN AUTO: 27.3 PG (ref 26–34)
MCHC RBC AUTO-ENTMCNC: 31.2 G/DL (ref 30–36.5)
MCHC RBC AUTO-ENTMCNC: 31.5 G/DL (ref 30–36.5)
MCV RBC AUTO: 86.8 FL (ref 80–99)
MCV RBC AUTO: 87.1 FL (ref 80–99)
MONOCYTES # BLD: 0.6 K/UL (ref 0–1)
MONOCYTES # BLD: 0.8 K/UL (ref 0–1)
MONOCYTES NFR BLD: 5 % (ref 5–13)
MONOCYTES NFR BLD: 6 % (ref 5–13)
NEUTS SEG # BLD: 14.9 K/UL (ref 1.8–8)
NEUTS SEG # BLD: 5.8 K/UL (ref 1.8–8)
NEUTS SEG NFR BLD: 58 % (ref 32–75)
NEUTS SEG NFR BLD: 85 % (ref 32–75)
NITRITE UR QL STRIP.AUTO: NEGATIVE
NRBC # BLD: 0 K/UL (ref 0–0.01)
NRBC # BLD: 0 K/UL (ref 0–0.01)
NRBC BLD-RTO: 0 PER 100 WBC
NRBC BLD-RTO: 0 PER 100 WBC
PH UR STRIP: 5.5 [PH] (ref 5–8)
PLATELET # BLD AUTO: 239 K/UL (ref 150–400)
PLATELET # BLD AUTO: 250 K/UL (ref 150–400)
PMV BLD AUTO: 10.5 FL (ref 8.9–12.9)
PMV BLD AUTO: 10.5 FL (ref 8.9–12.9)
POTASSIUM SERPL-SCNC: 3.5 MMOL/L (ref 3.5–5.1)
POTASSIUM SERPL-SCNC: 3.7 MMOL/L (ref 3.5–5.1)
PROT SERPL-MCNC: 6.6 G/DL (ref 6.4–8.2)
PROT SERPL-MCNC: 6.9 G/DL (ref 6.4–8.2)
PROT UR STRIP-MCNC: NEGATIVE MG/DL
PROTHROMBIN TIME: 11.6 SEC (ref 9–11.1)
RBC # BLD AUTO: 4.32 M/UL (ref 3.8–5.2)
RBC # BLD AUTO: 4.74 M/UL (ref 3.8–5.2)
RBC #/AREA URNS HPF: ABNORMAL /HPF (ref 0–5)
SERVICE CMNT-IMP: ABNORMAL
SODIUM SERPL-SCNC: 137 MMOL/L (ref 136–145)
SODIUM SERPL-SCNC: 137 MMOL/L (ref 136–145)
SP GR UR REFRACTOMETRY: 1.02 (ref 1–1.03)
UA: UC IF INDICATED,UAUC: ABNORMAL
UROBILINOGEN UR QL STRIP.AUTO: 0.2 EU/DL (ref 0.2–1)
WBC # BLD AUTO: 10 K/UL (ref 3.6–11)
WBC # BLD AUTO: 17.5 K/UL (ref 3.6–11)
WBC URNS QL MICRO: ABNORMAL /HPF (ref 0–4)

## 2021-04-27 PROCEDURE — 85025 COMPLETE CBC W/AUTO DIFF WBC: CPT

## 2021-04-27 PROCEDURE — 74011250636 HC RX REV CODE- 250/636: Performed by: EMERGENCY MEDICINE

## 2021-04-27 PROCEDURE — 81001 URINALYSIS AUTO W/SCOPE: CPT

## 2021-04-27 PROCEDURE — 73502 X-RAY EXAM HIP UNI 2-3 VIEWS: CPT

## 2021-04-27 PROCEDURE — 0QH736Z INSERTION OF INTRAMEDULLARY INTERNAL FIXATION DEVICE INTO LEFT UPPER FEMUR, PERCUTANEOUS APPROACH: ICD-10-PCS | Performed by: ORTHOPAEDIC SURGERY

## 2021-04-27 PROCEDURE — 94761 N-INVAS EAR/PLS OXIMETRY MLT: CPT

## 2021-04-27 PROCEDURE — 99285 EMERGENCY DEPT VISIT HI MDM: CPT

## 2021-04-27 PROCEDURE — 65270000029 HC RM PRIVATE

## 2021-04-27 PROCEDURE — 85610 PROTHROMBIN TIME: CPT

## 2021-04-27 PROCEDURE — 96374 THER/PROPH/DIAG INJ IV PUSH: CPT

## 2021-04-27 PROCEDURE — 73110 X-RAY EXAM OF WRIST: CPT

## 2021-04-27 PROCEDURE — 74011250636 HC RX REV CODE- 250/636: Performed by: PHYSICIAN ASSISTANT

## 2021-04-27 PROCEDURE — 93005 ELECTROCARDIOGRAM TRACING: CPT

## 2021-04-27 PROCEDURE — 96375 TX/PRO/DX INJ NEW DRUG ADDON: CPT

## 2021-04-27 PROCEDURE — 36415 COLL VENOUS BLD VENIPUNCTURE: CPT

## 2021-04-27 PROCEDURE — 82962 GLUCOSE BLOOD TEST: CPT

## 2021-04-27 PROCEDURE — 73552 X-RAY EXAM OF FEMUR 2/>: CPT

## 2021-04-27 PROCEDURE — 86901 BLOOD TYPING SEROLOGIC RH(D): CPT

## 2021-04-27 PROCEDURE — 74011250637 HC RX REV CODE- 250/637: Performed by: PHYSICIAN ASSISTANT

## 2021-04-27 PROCEDURE — 80053 COMPREHEN METABOLIC PANEL: CPT

## 2021-04-27 PROCEDURE — 71045 X-RAY EXAM CHEST 1 VIEW: CPT

## 2021-04-27 RX ORDER — SODIUM CHLORIDE 0.9 % (FLUSH) 0.9 %
5-40 SYRINGE (ML) INJECTION AS NEEDED
Status: DISCONTINUED | OUTPATIENT
Start: 2021-04-27 | End: 2021-05-07 | Stop reason: HOSPADM

## 2021-04-27 RX ORDER — POTASSIUM CHLORIDE 20 MEQ/1
20 TABLET, EXTENDED RELEASE ORAL DAILY
Status: DISCONTINUED | OUTPATIENT
Start: 2021-04-28 | End: 2021-05-07 | Stop reason: HOSPADM

## 2021-04-27 RX ORDER — FENTANYL CITRATE 50 UG/ML
50 INJECTION, SOLUTION INTRAMUSCULAR; INTRAVENOUS
Status: COMPLETED | OUTPATIENT
Start: 2021-04-27 | End: 2021-04-27

## 2021-04-27 RX ORDER — PROMETHAZINE HYDROCHLORIDE 25 MG/1
12.5 TABLET ORAL
Status: DISCONTINUED | OUTPATIENT
Start: 2021-04-27 | End: 2021-05-07 | Stop reason: HOSPADM

## 2021-04-27 RX ORDER — POLYETHYLENE GLYCOL 3350 17 G/17G
17 POWDER, FOR SOLUTION ORAL DAILY PRN
Status: DISCONTINUED | OUTPATIENT
Start: 2021-04-27 | End: 2021-05-07 | Stop reason: HOSPADM

## 2021-04-27 RX ORDER — NALOXONE HYDROCHLORIDE 0.4 MG/ML
0.4 INJECTION, SOLUTION INTRAMUSCULAR; INTRAVENOUS; SUBCUTANEOUS AS NEEDED
Status: DISCONTINUED | OUTPATIENT
Start: 2021-04-27 | End: 2021-04-28 | Stop reason: SDUPTHER

## 2021-04-27 RX ORDER — LOSARTAN POTASSIUM 100 MG/1
100 TABLET ORAL DAILY
Status: DISCONTINUED | OUTPATIENT
Start: 2021-04-28 | End: 2021-04-30

## 2021-04-27 RX ORDER — OXYCODONE HYDROCHLORIDE 5 MG/1
5 TABLET ORAL
Status: DISCONTINUED | OUTPATIENT
Start: 2021-04-27 | End: 2021-05-07

## 2021-04-27 RX ORDER — AMOXICILLIN 250 MG
2 CAPSULE ORAL 2 TIMES DAILY
Status: DISCONTINUED | OUTPATIENT
Start: 2021-04-28 | End: 2021-05-07 | Stop reason: HOSPADM

## 2021-04-27 RX ORDER — FENTANYL CITRATE 50 UG/ML
50 INJECTION, SOLUTION INTRAMUSCULAR; INTRAVENOUS
Status: COMPLETED | OUTPATIENT
Start: 2021-04-27 | End: 2021-04-30

## 2021-04-27 RX ORDER — LEVOTHYROXINE SODIUM 100 UG/1
100 TABLET ORAL
Status: DISCONTINUED | OUTPATIENT
Start: 2021-04-28 | End: 2021-05-07 | Stop reason: HOSPADM

## 2021-04-27 RX ORDER — OXYCODONE HYDROCHLORIDE 5 MG/1
2.5 TABLET ORAL
Status: DISCONTINUED | OUTPATIENT
Start: 2021-04-27 | End: 2021-04-28 | Stop reason: SDUPTHER

## 2021-04-27 RX ORDER — METFORMIN HYDROCHLORIDE 500 MG/1
1000 TABLET ORAL 2 TIMES DAILY WITH MEALS
Status: DISCONTINUED | OUTPATIENT
Start: 2021-04-28 | End: 2021-05-07 | Stop reason: HOSPADM

## 2021-04-27 RX ORDER — SODIUM CHLORIDE 0.9 % (FLUSH) 0.9 %
5-40 SYRINGE (ML) INJECTION EVERY 8 HOURS
Status: DISCONTINUED | OUTPATIENT
Start: 2021-04-27 | End: 2021-05-07 | Stop reason: HOSPADM

## 2021-04-27 RX ORDER — PANTOPRAZOLE SODIUM 40 MG/1
40 TABLET, DELAYED RELEASE ORAL
Status: DISCONTINUED | OUTPATIENT
Start: 2021-04-28 | End: 2021-04-29 | Stop reason: CLARIF

## 2021-04-27 RX ORDER — CLONIDINE HYDROCHLORIDE 0.1 MG/1
0.1 TABLET ORAL 2 TIMES DAILY
Status: DISCONTINUED | OUTPATIENT
Start: 2021-04-28 | End: 2021-05-03

## 2021-04-27 RX ORDER — FUROSEMIDE 40 MG/1
40 TABLET ORAL
Status: DISCONTINUED | OUTPATIENT
Start: 2021-04-27 | End: 2021-05-03

## 2021-04-27 RX ORDER — ACETAMINOPHEN 325 MG/1
650 TABLET ORAL
Status: DISCONTINUED | OUTPATIENT
Start: 2021-04-27 | End: 2021-05-07 | Stop reason: HOSPADM

## 2021-04-27 RX ORDER — ALBUTEROL SULFATE 0.83 MG/ML
2.5 SOLUTION RESPIRATORY (INHALATION)
Status: DISCONTINUED | OUTPATIENT
Start: 2021-04-27 | End: 2021-05-07 | Stop reason: HOSPADM

## 2021-04-27 RX ORDER — METOPROLOL SUCCINATE 50 MG/1
100 TABLET, EXTENDED RELEASE ORAL
Status: DISCONTINUED | OUTPATIENT
Start: 2021-04-27 | End: 2021-05-02

## 2021-04-27 RX ORDER — NIFEDIPINE 30 MG/1
60 TABLET, EXTENDED RELEASE ORAL DAILY
Status: DISCONTINUED | OUTPATIENT
Start: 2021-04-28 | End: 2021-05-04

## 2021-04-27 RX ORDER — HYDROMORPHONE HYDROCHLORIDE 1 MG/ML
.5-1 INJECTION, SOLUTION INTRAMUSCULAR; INTRAVENOUS; SUBCUTANEOUS
Status: DISCONTINUED | OUTPATIENT
Start: 2021-04-27 | End: 2021-04-28 | Stop reason: SDUPTHER

## 2021-04-27 RX ORDER — ONDANSETRON 2 MG/ML
4 INJECTION INTRAMUSCULAR; INTRAVENOUS
Status: DISCONTINUED | OUTPATIENT
Start: 2021-04-27 | End: 2021-04-28 | Stop reason: SDUPTHER

## 2021-04-27 RX ORDER — ACETAMINOPHEN 650 MG/1
650 SUPPOSITORY RECTAL
Status: DISCONTINUED | OUTPATIENT
Start: 2021-04-27 | End: 2021-05-07 | Stop reason: HOSPADM

## 2021-04-27 RX ORDER — DEXTROSE 50 % IN WATER (D50W) INTRAVENOUS SYRINGE
12.5-25 AS NEEDED
Status: DISCONTINUED | OUTPATIENT
Start: 2021-04-27 | End: 2021-05-07 | Stop reason: HOSPADM

## 2021-04-27 RX ORDER — MAGNESIUM SULFATE 100 %
4 CRYSTALS MISCELLANEOUS AS NEEDED
Status: DISCONTINUED | OUTPATIENT
Start: 2021-04-27 | End: 2021-05-07 | Stop reason: HOSPADM

## 2021-04-27 RX ORDER — ACETAMINOPHEN 325 MG/1
650 TABLET ORAL EVERY 6 HOURS
Status: DISCONTINUED | OUTPATIENT
Start: 2021-04-27 | End: 2021-05-07 | Stop reason: HOSPADM

## 2021-04-27 RX ORDER — INSULIN LISPRO 100 [IU]/ML
INJECTION, SOLUTION INTRAVENOUS; SUBCUTANEOUS
Status: DISCONTINUED | OUTPATIENT
Start: 2021-04-28 | End: 2021-05-07 | Stop reason: HOSPADM

## 2021-04-27 RX ORDER — HYDRALAZINE HYDROCHLORIDE 25 MG/1
25 TABLET, FILM COATED ORAL 2 TIMES DAILY
Status: DISCONTINUED | OUTPATIENT
Start: 2021-04-28 | End: 2021-05-07 | Stop reason: HOSPADM

## 2021-04-27 RX ADMIN — HYDROMORPHONE HYDROCHLORIDE 1 MG: 1 INJECTION, SOLUTION INTRAMUSCULAR; INTRAVENOUS; SUBCUTANEOUS at 22:43

## 2021-04-27 RX ADMIN — FENTANYL CITRATE 50 MCG: 50 INJECTION, SOLUTION INTRAMUSCULAR; INTRAVENOUS at 20:41

## 2021-04-27 RX ADMIN — ACETAMINOPHEN 650 MG: 325 TABLET ORAL at 22:43

## 2021-04-28 ENCOUNTER — APPOINTMENT (OUTPATIENT)
Dept: GENERAL RADIOLOGY | Age: 76
DRG: 481 | End: 2021-04-28
Attending: PHYSICIAN ASSISTANT
Payer: MEDICARE

## 2021-04-28 ENCOUNTER — ANESTHESIA EVENT (OUTPATIENT)
Dept: SURGERY | Age: 76
DRG: 481 | End: 2021-04-28
Payer: MEDICARE

## 2021-04-28 ENCOUNTER — APPOINTMENT (OUTPATIENT)
Dept: GENERAL RADIOLOGY | Age: 76
DRG: 481 | End: 2021-04-28
Attending: ORTHOPAEDIC SURGERY
Payer: MEDICARE

## 2021-04-28 ENCOUNTER — ANESTHESIA (OUTPATIENT)
Dept: SURGERY | Age: 76
DRG: 481 | End: 2021-04-28
Payer: MEDICARE

## 2021-04-28 LAB
ABO + RH BLD: NORMAL
ANION GAP SERPL CALC-SCNC: 8 MMOL/L (ref 5–15)
ATRIAL RATE: 119 BPM
BLOOD GROUP ANTIBODIES SERPL: NORMAL
BUN SERPL-MCNC: 20 MG/DL (ref 6–20)
BUN/CREAT SERPL: 19 (ref 12–20)
CALCIUM SERPL-MCNC: 9.7 MG/DL (ref 8.5–10.1)
CALCULATED R AXIS, ECG10: 29 DEGREES
CALCULATED T AXIS, ECG11: -164 DEGREES
CHLORIDE SERPL-SCNC: 102 MMOL/L (ref 97–108)
CO2 SERPL-SCNC: 25 MMOL/L (ref 21–32)
CREAT SERPL-MCNC: 1.07 MG/DL (ref 0.55–1.02)
DIAGNOSIS, 93000: NORMAL
ERYTHROCYTE [DISTWIDTH] IN BLOOD BY AUTOMATED COUNT: 13.8 % (ref 11.5–14.5)
GLUCOSE BLD STRIP.AUTO-MCNC: 226 MG/DL (ref 65–100)
GLUCOSE BLD STRIP.AUTO-MCNC: 249 MG/DL (ref 65–100)
GLUCOSE BLD STRIP.AUTO-MCNC: 294 MG/DL (ref 65–100)
GLUCOSE BLD STRIP.AUTO-MCNC: 301 MG/DL (ref 65–100)
GLUCOSE BLD STRIP.AUTO-MCNC: 327 MG/DL (ref 65–100)
GLUCOSE SERPL-MCNC: 334 MG/DL (ref 65–100)
HCT VFR BLD AUTO: 37.5 % (ref 35–47)
HGB BLD-MCNC: 11.8 G/DL (ref 11.5–16)
MCH RBC QN AUTO: 27.5 PG (ref 26–34)
MCHC RBC AUTO-ENTMCNC: 31.5 G/DL (ref 30–36.5)
MCV RBC AUTO: 87.4 FL (ref 80–99)
NRBC # BLD: 0 K/UL (ref 0–0.01)
NRBC BLD-RTO: 0 PER 100 WBC
PLATELET # BLD AUTO: 235 K/UL (ref 150–400)
PMV BLD AUTO: 10.8 FL (ref 8.9–12.9)
POTASSIUM SERPL-SCNC: 3.8 MMOL/L (ref 3.5–5.1)
Q-T INTERVAL, ECG07: 294 MS
QRS DURATION, ECG06: 104 MS
QTC CALCULATION (BEZET), ECG08: 418 MS
RBC # BLD AUTO: 4.29 M/UL (ref 3.8–5.2)
SERVICE CMNT-IMP: ABNORMAL
SODIUM SERPL-SCNC: 135 MMOL/L (ref 136–145)
SPECIMEN EXP DATE BLD: NORMAL
VENTRICULAR RATE, ECG03: 122 BPM
WBC # BLD AUTO: 15.3 K/UL (ref 3.6–11)

## 2021-04-28 PROCEDURE — C1713 ANCHOR/SCREW BN/BN,TIS/BN: HCPCS | Performed by: ORTHOPAEDIC SURGERY

## 2021-04-28 PROCEDURE — 74011250636 HC RX REV CODE- 250/636: Performed by: NURSE ANESTHETIST, CERTIFIED REGISTERED

## 2021-04-28 PROCEDURE — 77030002933 HC SUT MCRYL J&J -A: Performed by: ORTHOPAEDIC SURGERY

## 2021-04-28 PROCEDURE — 80048 BASIC METABOLIC PNL TOTAL CA: CPT

## 2021-04-28 PROCEDURE — 74011250637 HC RX REV CODE- 250/637: Performed by: INTERNAL MEDICINE

## 2021-04-28 PROCEDURE — 74011250636 HC RX REV CODE- 250/636: Performed by: ORTHOPAEDIC SURGERY

## 2021-04-28 PROCEDURE — 76060000035 HC ANESTHESIA 2 TO 2.5 HR: Performed by: ORTHOPAEDIC SURGERY

## 2021-04-28 PROCEDURE — 76210000016 HC OR PH I REC 1 TO 1.5 HR: Performed by: ORTHOPAEDIC SURGERY

## 2021-04-28 PROCEDURE — 74011000250 HC RX REV CODE- 250: Performed by: NURSE PRACTITIONER

## 2021-04-28 PROCEDURE — 74011636637 HC RX REV CODE- 636/637: Performed by: STUDENT IN AN ORGANIZED HEALTH CARE EDUCATION/TRAINING PROGRAM

## 2021-04-28 PROCEDURE — 74011636637 HC RX REV CODE- 636/637: Performed by: PHYSICIAN ASSISTANT

## 2021-04-28 PROCEDURE — 74011250637 HC RX REV CODE- 250/637: Performed by: ORTHOPAEDIC SURGERY

## 2021-04-28 PROCEDURE — 2709999900 HC NON-CHARGEABLE SUPPLY

## 2021-04-28 PROCEDURE — 74011250637 HC RX REV CODE- 250/637: Performed by: STUDENT IN AN ORGANIZED HEALTH CARE EDUCATION/TRAINING PROGRAM

## 2021-04-28 PROCEDURE — 74011250637 HC RX REV CODE- 250/637: Performed by: PHYSICIAN ASSISTANT

## 2021-04-28 PROCEDURE — 74011250636 HC RX REV CODE- 250/636: Performed by: PHYSICIAN ASSISTANT

## 2021-04-28 PROCEDURE — 74011636637 HC RX REV CODE- 636/637: Performed by: INTERNAL MEDICINE

## 2021-04-28 PROCEDURE — 74011000250 HC RX REV CODE- 250: Performed by: ANESTHESIOLOGY

## 2021-04-28 PROCEDURE — 74011250636 HC RX REV CODE- 250/636

## 2021-04-28 PROCEDURE — 65660000000 HC RM CCU STEPDOWN

## 2021-04-28 PROCEDURE — 76010000131 HC OR TIME 2 TO 2.5 HR: Performed by: ORTHOPAEDIC SURGERY

## 2021-04-28 PROCEDURE — 76000 FLUOROSCOPY <1 HR PHYS/QHP: CPT

## 2021-04-28 PROCEDURE — 77030008684 HC TU ET CUF COVD -B: Performed by: NURSE ANESTHETIST, CERTIFIED REGISTERED

## 2021-04-28 PROCEDURE — 74011250636 HC RX REV CODE- 250/636: Performed by: INTERNAL MEDICINE

## 2021-04-28 PROCEDURE — 77030028907 HC WRP KNEE WO BGS SOLM -B

## 2021-04-28 PROCEDURE — 74011000250 HC RX REV CODE- 250: Performed by: NURSE ANESTHETIST, CERTIFIED REGISTERED

## 2021-04-28 PROCEDURE — 82962 GLUCOSE BLOOD TEST: CPT

## 2021-04-28 PROCEDURE — 77030008846 HC WRE K STRY -C: Performed by: ORTHOPAEDIC SURGERY

## 2021-04-28 PROCEDURE — 72170 X-RAY EXAM OF PELVIS: CPT

## 2021-04-28 PROCEDURE — 77030016451 HC BIT DRL AO3 STRY -C: Performed by: ORTHOPAEDIC SURGERY

## 2021-04-28 PROCEDURE — 77030040361 HC SLV COMPR DVT MDII -B: Performed by: ORTHOPAEDIC SURGERY

## 2021-04-28 PROCEDURE — 77030013079 HC BLNKT BAIR HGGR 3M -A: Performed by: NURSE ANESTHETIST, CERTIFIED REGISTERED

## 2021-04-28 PROCEDURE — 2709999900 HC NON-CHARGEABLE SUPPLY: Performed by: ORTHOPAEDIC SURGERY

## 2021-04-28 PROCEDURE — 77030005518 HC CATH URETH FOL 2W BARD -B

## 2021-04-28 PROCEDURE — 77030026438 HC STYL ET INTUB CARD -A: Performed by: NURSE ANESTHETIST, CERTIFIED REGISTERED

## 2021-04-28 PROCEDURE — 77030031139 HC SUT VCRL2 J&J -A: Performed by: ORTHOPAEDIC SURGERY

## 2021-04-28 PROCEDURE — 77030008771 HC TU NG SALEM SUMP -A: Performed by: NURSE ANESTHETIST, CERTIFIED REGISTERED

## 2021-04-28 PROCEDURE — 85027 COMPLETE CBC AUTOMATED: CPT

## 2021-04-28 PROCEDURE — 74011250636 HC RX REV CODE- 250/636: Performed by: NURSE PRACTITIONER

## 2021-04-28 PROCEDURE — 77030010507 HC ADH SKN DERMBND J&J -B: Performed by: ORTHOPAEDIC SURGERY

## 2021-04-28 PROCEDURE — 36415 COLL VENOUS BLD VENIPUNCTURE: CPT

## 2021-04-28 PROCEDURE — 94760 N-INVAS EAR/PLS OXIMETRY 1: CPT

## 2021-04-28 PROCEDURE — 74011250636 HC RX REV CODE- 250/636: Performed by: ANESTHESIOLOGY

## 2021-04-28 PROCEDURE — 73501 X-RAY EXAM HIP UNI 1 VIEW: CPT

## 2021-04-28 DEVICE — TROCHANTERIC NAIL KIT, TI
Type: IMPLANTABLE DEVICE | Site: FEMUR | Status: FUNCTIONAL
Brand: GAMMA

## 2021-04-28 DEVICE — LOCKING SCREW, FULLY THREADED: Type: IMPLANTABLE DEVICE | Site: FEMUR | Status: FUNCTIONAL

## 2021-04-28 DEVICE — LAG SCREW, TI
Type: IMPLANTABLE DEVICE | Site: FEMUR | Status: FUNCTIONAL
Brand: GAMMA

## 2021-04-28 RX ORDER — SODIUM CHLORIDE, SODIUM LACTATE, POTASSIUM CHLORIDE, CALCIUM CHLORIDE 600; 310; 30; 20 MG/100ML; MG/100ML; MG/100ML; MG/100ML
25 INJECTION, SOLUTION INTRAVENOUS CONTINUOUS
Status: DISCONTINUED | OUTPATIENT
Start: 2021-04-28 | End: 2021-04-28 | Stop reason: HOSPADM

## 2021-04-28 RX ORDER — DIPHENHYDRAMINE HYDROCHLORIDE 50 MG/ML
12.5 INJECTION, SOLUTION INTRAMUSCULAR; INTRAVENOUS AS NEEDED
Status: DISCONTINUED | OUTPATIENT
Start: 2021-04-28 | End: 2021-04-28 | Stop reason: HOSPADM

## 2021-04-28 RX ORDER — NEOSTIGMINE METHYLSULFATE 1 MG/ML
INJECTION, SOLUTION INTRAVENOUS AS NEEDED
Status: DISCONTINUED | OUTPATIENT
Start: 2021-04-28 | End: 2021-04-28 | Stop reason: HOSPADM

## 2021-04-28 RX ORDER — FENTANYL CITRATE 50 UG/ML
25 INJECTION, SOLUTION INTRAMUSCULAR; INTRAVENOUS
Status: DISCONTINUED | OUTPATIENT
Start: 2021-04-28 | End: 2021-04-28 | Stop reason: HOSPADM

## 2021-04-28 RX ORDER — PHENYLEPHRINE HCL IN 0.9% NACL 0.4MG/10ML
SYRINGE (ML) INTRAVENOUS AS NEEDED
Status: DISCONTINUED | OUTPATIENT
Start: 2021-04-28 | End: 2021-04-28 | Stop reason: HOSPADM

## 2021-04-28 RX ORDER — TRAMADOL HYDROCHLORIDE 50 MG/1
50 TABLET ORAL
Status: DISCONTINUED | OUTPATIENT
Start: 2021-04-28 | End: 2021-05-07 | Stop reason: HOSPADM

## 2021-04-28 RX ORDER — SODIUM CHLORIDE 0.9 % (FLUSH) 0.9 %
5-40 SYRINGE (ML) INJECTION EVERY 8 HOURS
Status: DISCONTINUED | OUTPATIENT
Start: 2021-04-28 | End: 2021-04-28 | Stop reason: HOSPADM

## 2021-04-28 RX ORDER — INSULIN GLARGINE 100 [IU]/ML
10 INJECTION, SOLUTION SUBCUTANEOUS
Status: DISCONTINUED | OUTPATIENT
Start: 2021-04-28 | End: 2021-04-29

## 2021-04-28 RX ORDER — METOPROLOL TARTRATE 5 MG/5ML
5 INJECTION INTRAVENOUS ONCE
Status: COMPLETED | OUTPATIENT
Start: 2021-04-28 | End: 2021-04-28

## 2021-04-28 RX ORDER — METOPROLOL TARTRATE 5 MG/5ML
INJECTION INTRAVENOUS AS NEEDED
Status: DISCONTINUED | OUTPATIENT
Start: 2021-04-28 | End: 2021-04-28 | Stop reason: HOSPADM

## 2021-04-28 RX ORDER — CALCIUM CARBONATE 200(500)MG
200 TABLET,CHEWABLE ORAL
Status: DISCONTINUED | OUTPATIENT
Start: 2021-04-28 | End: 2021-05-07 | Stop reason: HOSPADM

## 2021-04-28 RX ORDER — NALOXONE HYDROCHLORIDE 0.4 MG/ML
0.4 INJECTION, SOLUTION INTRAMUSCULAR; INTRAVENOUS; SUBCUTANEOUS AS NEEDED
Status: DISCONTINUED | OUTPATIENT
Start: 2021-04-28 | End: 2021-05-07 | Stop reason: HOSPADM

## 2021-04-28 RX ORDER — ONDANSETRON 4 MG/1
4 TABLET, ORALLY DISINTEGRATING ORAL
Status: DISCONTINUED | OUTPATIENT
Start: 2021-04-28 | End: 2021-05-07 | Stop reason: HOSPADM

## 2021-04-28 RX ORDER — CEFAZOLIN SODIUM 1 G/3ML
INJECTION, POWDER, FOR SOLUTION INTRAMUSCULAR; INTRAVENOUS AS NEEDED
Status: DISCONTINUED | OUTPATIENT
Start: 2021-04-28 | End: 2021-04-28 | Stop reason: HOSPADM

## 2021-04-28 RX ORDER — ONDANSETRON 2 MG/ML
INJECTION INTRAMUSCULAR; INTRAVENOUS AS NEEDED
Status: DISCONTINUED | OUTPATIENT
Start: 2021-04-28 | End: 2021-04-28 | Stop reason: HOSPADM

## 2021-04-28 RX ORDER — HYDROMORPHONE HYDROCHLORIDE 1 MG/ML
0.5 INJECTION, SOLUTION INTRAMUSCULAR; INTRAVENOUS; SUBCUTANEOUS
Status: ACTIVE | OUTPATIENT
Start: 2021-04-28 | End: 2021-04-29

## 2021-04-28 RX ORDER — FENTANYL CITRATE 50 UG/ML
INJECTION, SOLUTION INTRAMUSCULAR; INTRAVENOUS AS NEEDED
Status: DISCONTINUED | OUTPATIENT
Start: 2021-04-28 | End: 2021-04-28 | Stop reason: HOSPADM

## 2021-04-28 RX ORDER — GLYCOPYRROLATE 0.2 MG/ML
INJECTION INTRAMUSCULAR; INTRAVENOUS AS NEEDED
Status: DISCONTINUED | OUTPATIENT
Start: 2021-04-28 | End: 2021-04-28 | Stop reason: HOSPADM

## 2021-04-28 RX ORDER — HYDRALAZINE HYDROCHLORIDE 20 MG/ML
10 INJECTION INTRAMUSCULAR; INTRAVENOUS
Status: DISCONTINUED | OUTPATIENT
Start: 2021-04-28 | End: 2021-05-03

## 2021-04-28 RX ORDER — SUCCINYLCHOLINE CHLORIDE 20 MG/ML
INJECTION INTRAMUSCULAR; INTRAVENOUS AS NEEDED
Status: DISCONTINUED | OUTPATIENT
Start: 2021-04-28 | End: 2021-04-28 | Stop reason: HOSPADM

## 2021-04-28 RX ORDER — PROPOFOL 10 MG/ML
INJECTION, EMULSION INTRAVENOUS AS NEEDED
Status: DISCONTINUED | OUTPATIENT
Start: 2021-04-28 | End: 2021-04-28 | Stop reason: HOSPADM

## 2021-04-28 RX ORDER — ROCURONIUM BROMIDE 10 MG/ML
INJECTION, SOLUTION INTRAVENOUS AS NEEDED
Status: DISCONTINUED | OUTPATIENT
Start: 2021-04-28 | End: 2021-04-28 | Stop reason: HOSPADM

## 2021-04-28 RX ORDER — MIDAZOLAM HYDROCHLORIDE 1 MG/ML
INJECTION, SOLUTION INTRAMUSCULAR; INTRAVENOUS AS NEEDED
Status: DISCONTINUED | OUTPATIENT
Start: 2021-04-28 | End: 2021-04-28 | Stop reason: HOSPADM

## 2021-04-28 RX ORDER — POLYETHYLENE GLYCOL 3350 17 G/17G
17 POWDER, FOR SOLUTION ORAL DAILY
Status: DISCONTINUED | OUTPATIENT
Start: 2021-04-29 | End: 2021-05-07 | Stop reason: HOSPADM

## 2021-04-28 RX ORDER — SODIUM CHLORIDE 0.9 % (FLUSH) 0.9 %
5-40 SYRINGE (ML) INJECTION AS NEEDED
Status: DISCONTINUED | OUTPATIENT
Start: 2021-04-28 | End: 2021-04-28 | Stop reason: HOSPADM

## 2021-04-28 RX ORDER — HYDROXYZINE HYDROCHLORIDE 10 MG/1
10 TABLET, FILM COATED ORAL
Status: DISCONTINUED | OUTPATIENT
Start: 2021-04-28 | End: 2021-05-07 | Stop reason: HOSPADM

## 2021-04-28 RX ORDER — LIDOCAINE HYDROCHLORIDE 10 MG/ML
0.1 INJECTION, SOLUTION EPIDURAL; INFILTRATION; INTRACAUDAL; PERINEURAL AS NEEDED
Status: DISCONTINUED | OUTPATIENT
Start: 2021-04-28 | End: 2021-04-28 | Stop reason: HOSPADM

## 2021-04-28 RX ORDER — FERROUS SULFATE, DRIED 160(50) MG
1 TABLET, EXTENDED RELEASE ORAL
Status: DISCONTINUED | OUTPATIENT
Start: 2021-04-29 | End: 2021-05-07 | Stop reason: HOSPADM

## 2021-04-28 RX ORDER — SODIUM CHLORIDE, SODIUM LACTATE, POTASSIUM CHLORIDE, CALCIUM CHLORIDE 600; 310; 30; 20 MG/100ML; MG/100ML; MG/100ML; MG/100ML
INJECTION, SOLUTION INTRAVENOUS
Status: DISCONTINUED | OUTPATIENT
Start: 2021-04-28 | End: 2021-04-28 | Stop reason: HOSPADM

## 2021-04-28 RX ORDER — LIDOCAINE HYDROCHLORIDE 20 MG/ML
INJECTION, SOLUTION EPIDURAL; INFILTRATION; INTRACAUDAL; PERINEURAL AS NEEDED
Status: DISCONTINUED | OUTPATIENT
Start: 2021-04-28 | End: 2021-04-28 | Stop reason: HOSPADM

## 2021-04-28 RX ORDER — ONDANSETRON 2 MG/ML
4 INJECTION INTRAMUSCULAR; INTRAVENOUS
Status: ACTIVE | OUTPATIENT
Start: 2021-04-28 | End: 2021-04-29

## 2021-04-28 RX ORDER — HYDROMORPHONE HYDROCHLORIDE 1 MG/ML
0.2 INJECTION, SOLUTION INTRAMUSCULAR; INTRAVENOUS; SUBCUTANEOUS
Status: DISCONTINUED | OUTPATIENT
Start: 2021-04-28 | End: 2021-04-28 | Stop reason: HOSPADM

## 2021-04-28 RX ORDER — FACIAL-BODY WIPES
10 EACH TOPICAL DAILY PRN
Status: DISCONTINUED | OUTPATIENT
Start: 2021-04-30 | End: 2021-05-07 | Stop reason: HOSPADM

## 2021-04-28 RX ORDER — LABETALOL HYDROCHLORIDE 5 MG/ML
10 INJECTION, SOLUTION INTRAVENOUS
Status: DISCONTINUED | OUTPATIENT
Start: 2021-04-28 | End: 2021-05-03

## 2021-04-28 RX ADMIN — OXYCODONE 5 MG: 5 TABLET ORAL at 04:18

## 2021-04-28 RX ADMIN — ROCURONIUM BROMIDE 10 MG: 10 INJECTION INTRAVENOUS at 17:48

## 2021-04-28 RX ADMIN — METOPROLOL SUCCINATE 100 MG: 50 TABLET, EXTENDED RELEASE ORAL at 21:49

## 2021-04-28 RX ADMIN — OXYCODONE 5 MG: 5 TABLET ORAL at 20:23

## 2021-04-28 RX ADMIN — OXYCODONE 5 MG: 5 TABLET ORAL at 13:16

## 2021-04-28 RX ADMIN — LIDOCAINE HYDROCHLORIDE 60 MG: 20 INJECTION, SOLUTION EPIDURAL; INFILTRATION; INTRACAUDAL; PERINEURAL at 16:36

## 2021-04-28 RX ADMIN — MEPERIDINE HYDROCHLORIDE 12.5 MG: 25 INJECTION INTRAMUSCULAR; INTRAVENOUS; SUBCUTANEOUS at 19:00

## 2021-04-28 RX ADMIN — METOPROLOL SUCCINATE 100 MG: 50 TABLET, EXTENDED RELEASE ORAL at 00:24

## 2021-04-28 RX ADMIN — NIFEDIPINE 60 MG: 30 TABLET, EXTENDED RELEASE ORAL at 08:48

## 2021-04-28 RX ADMIN — LOSARTAN POTASSIUM 100 MG: 100 TABLET, FILM COATED ORAL at 08:48

## 2021-04-28 RX ADMIN — CEFAZOLIN 2 G: 1 INJECTION, POWDER, FOR SOLUTION INTRAMUSCULAR; INTRAVENOUS; PARENTERAL at 17:19

## 2021-04-28 RX ADMIN — Medication 40 MCG: at 17:57

## 2021-04-28 RX ADMIN — FENTANYL CITRATE 25 MCG: 50 INJECTION, SOLUTION INTRAMUSCULAR; INTRAVENOUS at 17:45

## 2021-04-28 RX ADMIN — Medication 3 AMPULE: at 15:00

## 2021-04-28 RX ADMIN — ONDANSETRON HYDROCHLORIDE 4 MG: 2 INJECTION, SOLUTION INTRAMUSCULAR; INTRAVENOUS at 16:30

## 2021-04-28 RX ADMIN — MEPERIDINE HYDROCHLORIDE 12.5 MG: 25 INJECTION, SOLUTION INTRAMUSCULAR; INTRAVENOUS; SUBCUTANEOUS at 19:00

## 2021-04-28 RX ADMIN — SUCCINYLCHOLINE CHLORIDE 120 MG: 20 INJECTION, SOLUTION INTRAMUSCULAR; INTRAVENOUS at 16:36

## 2021-04-28 RX ADMIN — Medication 40 MCG: at 16:45

## 2021-04-28 RX ADMIN — SODIUM CHLORIDE, POTASSIUM CHLORIDE, SODIUM LACTATE AND CALCIUM CHLORIDE: 600; 310; 30; 20 INJECTION, SOLUTION INTRAVENOUS at 16:26

## 2021-04-28 RX ADMIN — HUMAN INSULIN 7 UNITS: 100 INJECTION, SUSPENSION SUBCUTANEOUS at 10:16

## 2021-04-28 RX ADMIN — LABETALOL HYDROCHLORIDE 10 MG: 5 INJECTION INTRAVENOUS at 02:37

## 2021-04-28 RX ADMIN — Medication 40 MCG: at 18:03

## 2021-04-28 RX ADMIN — FENTANYL CITRATE 100 MCG: 50 INJECTION, SOLUTION INTRAMUSCULAR; INTRAVENOUS at 16:36

## 2021-04-28 RX ADMIN — VANCOMYCIN HYDROCHLORIDE 1000 MG: 1 INJECTION, POWDER, LYOPHILIZED, FOR SOLUTION INTRAVENOUS at 15:07

## 2021-04-28 RX ADMIN — FENTANYL CITRATE 25 MCG: 50 INJECTION, SOLUTION INTRAMUSCULAR; INTRAVENOUS at 19:00

## 2021-04-28 RX ADMIN — METOPROLOL TARTRATE 5 MG: 5 INJECTION, SOLUTION INTRAVENOUS at 15:17

## 2021-04-28 RX ADMIN — NEOSTIGMINE METHYLSULFATE 3 MG: 1 INJECTION, SOLUTION INTRAVENOUS at 18:15

## 2021-04-28 RX ADMIN — LEVOTHYROXINE SODIUM 100 MCG: 0.1 TABLET ORAL at 06:07

## 2021-04-28 RX ADMIN — FENTANYL CITRATE 25 MCG: 50 INJECTION, SOLUTION INTRAMUSCULAR; INTRAVENOUS at 19:10

## 2021-04-28 RX ADMIN — INSULIN LISPRO 5 UNITS: 100 INJECTION, SOLUTION INTRAVENOUS; SUBCUTANEOUS at 13:16

## 2021-04-28 RX ADMIN — ROCURONIUM BROMIDE 30 MG: 10 INJECTION INTRAVENOUS at 16:49

## 2021-04-28 RX ADMIN — METOPROLOL TARTRATE 1 MG: 5 INJECTION, SOLUTION INTRAVENOUS at 18:20

## 2021-04-28 RX ADMIN — SODIUM CHLORIDE, POTASSIUM CHLORIDE, SODIUM LACTATE AND CALCIUM CHLORIDE 25 ML/HR: 600; 310; 30; 20 INJECTION, SOLUTION INTRAVENOUS at 15:06

## 2021-04-28 RX ADMIN — MIDAZOLAM HYDROCHLORIDE 1 MG: 1 INJECTION, SOLUTION INTRAMUSCULAR; INTRAVENOUS at 16:28

## 2021-04-28 RX ADMIN — HYDROMORPHONE HYDROCHLORIDE 1 MG: 1 INJECTION, SOLUTION INTRAMUSCULAR; INTRAVENOUS; SUBCUTANEOUS at 01:05

## 2021-04-28 RX ADMIN — Medication 40 MCG: at 17:00

## 2021-04-28 RX ADMIN — FENTANYL CITRATE 50 MCG: 50 INJECTION, SOLUTION INTRAMUSCULAR; INTRAVENOUS at 17:19

## 2021-04-28 RX ADMIN — CLONIDINE HYDROCHLORIDE 0.1 MG: 0.1 TABLET ORAL at 08:48

## 2021-04-28 RX ADMIN — Medication 80 MCG: at 17:25

## 2021-04-28 RX ADMIN — METOPROLOL TARTRATE 3 MG: 5 INJECTION, SOLUTION INTRAVENOUS at 18:25

## 2021-04-28 RX ADMIN — ROCURONIUM BROMIDE 10 MG: 10 INJECTION INTRAVENOUS at 17:45

## 2021-04-28 RX ADMIN — SODIUM CHLORIDE, POTASSIUM CHLORIDE, SODIUM LACTATE AND CALCIUM CHLORIDE: 600; 310; 30; 20 INJECTION, SOLUTION INTRAVENOUS at 18:30

## 2021-04-28 RX ADMIN — GLYCOPYRROLATE 0.5 MG: 0.2 INJECTION, SOLUTION INTRAMUSCULAR; INTRAVENOUS at 18:15

## 2021-04-28 RX ADMIN — ACETAMINOPHEN 650 MG: 325 TABLET ORAL at 10:16

## 2021-04-28 RX ADMIN — MEPERIDINE HYDROCHLORIDE 12.5 MG: 25 INJECTION INTRAMUSCULAR; INTRAVENOUS; SUBCUTANEOUS at 18:55

## 2021-04-28 RX ADMIN — OXYCODONE 5 MG: 5 TABLET ORAL at 08:50

## 2021-04-28 RX ADMIN — LABETALOL HYDROCHLORIDE 10 MG: 5 INJECTION INTRAVENOUS at 06:40

## 2021-04-28 RX ADMIN — ONDANSETRON 4 MG: 2 INJECTION INTRAMUSCULAR; INTRAVENOUS at 06:06

## 2021-04-28 RX ADMIN — PANTOPRAZOLE SODIUM 40 MG: 40 TABLET, DELAYED RELEASE ORAL at 08:48

## 2021-04-28 RX ADMIN — CALCIUM CARBONATE (ANTACID) CHEW TAB 500 MG 200 MG: 500 CHEW TAB at 13:16

## 2021-04-28 RX ADMIN — POTASSIUM CHLORIDE 20 MEQ: 1500 TABLET, EXTENDED RELEASE ORAL at 08:49

## 2021-04-28 RX ADMIN — INSULIN LISPRO 7 UNITS: 100 INJECTION, SOLUTION INTRAVENOUS; SUBCUTANEOUS at 08:48

## 2021-04-28 RX ADMIN — Medication 10 ML: at 06:07

## 2021-04-28 RX ADMIN — HYDRALAZINE HYDROCHLORIDE 10 MG: 20 INJECTION INTRAMUSCULAR; INTRAVENOUS at 04:17

## 2021-04-28 RX ADMIN — HYDRALAZINE HYDROCHLORIDE 25 MG: 25 TABLET, FILM COATED ORAL at 08:48

## 2021-04-28 RX ADMIN — MIDAZOLAM HYDROCHLORIDE 1 MG: 1 INJECTION, SOLUTION INTRAMUSCULAR; INTRAVENOUS at 16:30

## 2021-04-28 RX ADMIN — Medication 10 ML: at 21:52

## 2021-04-28 RX ADMIN — INSULIN LISPRO 4 UNITS: 100 INJECTION, SOLUTION INTRAVENOUS; SUBCUTANEOUS at 21:48

## 2021-04-28 RX ADMIN — ACETAMINOPHEN 650 MG: 325 TABLET ORAL at 04:18

## 2021-04-28 RX ADMIN — Medication 10 ML: at 06:08

## 2021-04-28 RX ADMIN — FENTANYL CITRATE 25 MCG: 50 INJECTION, SOLUTION INTRAMUSCULAR; INTRAVENOUS at 19:15

## 2021-04-28 RX ADMIN — METOPROLOL TARTRATE 1 MG: 5 INJECTION, SOLUTION INTRAVENOUS at 18:17

## 2021-04-28 RX ADMIN — INSULIN GLARGINE 10 UNITS: 100 INJECTION, SOLUTION SUBCUTANEOUS at 21:48

## 2021-04-28 RX ADMIN — PROPOFOL 130 MG: 10 INJECTION, EMULSION INTRAVENOUS at 16:36

## 2021-04-28 RX ADMIN — LABETALOL HYDROCHLORIDE 10 MG: 5 INJECTION INTRAVENOUS at 22:33

## 2021-04-28 NOTE — OP NOTES
Name: Tierra Sexton  MRN:  [id number]  : 1945  Age:  66 y.o. Surgery Date: 2021      OPERATIVE REPORT -LEFT HIP CEPHALOMEDULLARY NAIL      PREOPERATIVE DIAGNOSIS: Left hip fracture- subtrochanteric femur    POSTOPERATIVE DIAGNOSIS: Left hip fracture- subtrochanteric femur    PROCEDURE PERFORMED: Cephalomedullary nailing left hip    SURGEON: Violette Maynard MD    FIRST ASSISTANT:  Fede Rivero PA-C    ANESTHESIA: Gen    PRE-OP ANTIBIOTIC: Ancef 2 g    COMPLICATIONS: None. ESTIMATED BLOOD LOSS: 200 mL    SPECIMENS REMOVED: None. COMPONENTS IMPLANTED:   Implant Name Type Inv. Item Serial No.  Lot No. LRB No. Used Action   NAIL IM [id number] 125DEG TROCHANTERIC FEM TI SUSANNE RUBÉN - SNA Rajiv NAIL IM L170MM [id number] 125DEG TROCHANTERIC FEM TI SUSANNE RUBÉN NA [employer] [facility]_ [id number] Left 1 Implanted   SCREW BNE L110MM DIA10.5MM CANC HIP TI LAG ST SUSANNE RUBÉN - SNA Screw SCREW BNE L110MM DIA10.5MM CANC HIP TI LAG ST SUSANNE RUBÉN NA [employer]_ [id number] Left 1 Implanted   SCREW BNE L40MM DIA5MM JACE TI RUBÉN FULL THRD SHFT FOR T2 IM - SNA Screw SCREW BNE L40MM DIA5MM JACE TI RUBÉN FULL THRD SHFT FOR T2 IM NA [employer] HOW_ [id number] Left 1 Implanted       INDICATIONS: The patient is an 66 yrs female with a left hip fracture. Pre-operaitve medical optimization was confirmed with the medical team.  Risks, benefits, alternatives of the procedure were reviewed with him in detail and he desires to proceed. He understands the increased risk for perioperative medical complications due to his medical comorbidities. DESCRIPTION OF PROCEDURE: Anesthetic was initiated. Preoperative dose of IV  antibiotic was given. Case catheter was placed. The left side was confirmed as the operative side, prepped and draped in the usual sterile fashion. Skin was covered with Ioban occlusive dressing.     After a time out was held, xrays were taken to confirm fracture site as well as entry for skin incision. An incision was made proximal to the tip of the greater trochanter and carried down through skin and subcutaneous tissue. The IT band was incised and a grossman scissor was used to clear a track to the greater trochanter. A guide wire was drilled into the tip of the greater trochanter and position was check under fluoroscopy. An exntry reamer was then used with a protective sleeve to gain access to the intramedullary canal. A ball tipped guidewire was advanced to the physeal scar and the length was measure. Flexible reamers were used sequentially to 1.5mm above the planned nail. The nail was then opened (size mentioned above) and advanced. Position was confirmed under fluoroscopy. Using the 125 degree jig a skin incision was made in the appropriate position and a guide wire was drilled to within 2 mm of the cortex of the femoral head. This was again check fluoroscopically in AP and lateral planes and the wire was then measured and overdrilled. The above mentioned cephalomedullary screw was advanced and locked into place. One screw was placed distally using the jig. Final shots were taken. After copious irrigation, the wounds were closed with #2 Vicryl sutures. I irrigated the skin, subcutaneous tissue. Soft tissues were infiltrated with local anesthetic. Skin and subcutaneous were closed in a standard fashion with 2-0 vicryl and 3-0 monocryl followed by staples. Sterile dressings were applied. Marla Subramanian PA-C was critical throughout the case to assist with positioning, retraction, instrument manipulation, and wound closure. Please note that no intern, resident, or other hospital surgical staff was available to assist during this procedure. There were no complications. No specimen was sent. The patient was transferred to the recovery room in stable condition. I was present for the entirety of the case.     Jovani Wan MD

## 2021-04-28 NOTE — ANESTHESIA PREPROCEDURE EVALUATION
Relevant Problems   RESPIRATORY SYSTEM   (+) Asthma      CARDIOVASCULAR   (+) HTN (hypertension)   (+) NSTEMI (non-ST elevated myocardial infarction) (HCC)   (+) NSTEMI, initial episode of care ([facility]      GASTROINTESTINAL   (+) GERD (gastroesophageal reflux disease)      ENDOCRINE   (+) DM (diabetes mellitus) ([facility]       Anesthetic History   No history of anesthetic complications            Review of Systems / Medical History  Patient summary reviewed, nursing notes reviewed and pertinent labs reviewed    Pulmonary            Asthma        Neuro/Psych   Within defined limits           Cardiovascular    Hypertension        Dysrhythmias : atrial fibrillation  CAD    Exercise tolerance: >4 METS  Comments: EKG:  A fib with rapid ventricular response     EF:  55-60% in 2018    Giving metoprolol 2.5 mg in pre-op for HR in 120's   GI/Hepatic/Renal     GERD           Endo/Other    Diabetes    Obesity     Other Findings   Comments: Left hip fracture due to fall     Took Xarelto 2 days ago         Physical Exam    Airway  Mallampati: III  TM Distance: 4 - 6 cm  Neck ROM: normal range of motion   Mouth opening: Normal     Cardiovascular    Rhythm: irregular  Rate: abnormal         Dental    Dentition: Bridges     Pulmonary  Breath sounds clear to auscultation               Abdominal  GI exam deferred       Other Findings            Anesthetic Plan    ASA: 3  Anesthesia type: general    Monitoring Plan: BIS      Induction: Intravenous  Anesthetic plan and risks discussed with: Patient

## 2021-04-28 NOTE — PROGRESS NOTES
Received notification from bedside RN about patient with regards to: elavated DBP and HR, needs prn medication  VS: /112, , RR 18, O2 sat 96% on RA    Intervention given: Labetalol IV prn ordered    0351: Notified of persistently elevated BP s/p Labetalol  VS: /104, , RR 18, O2 sat 96% on RA    - Hydralazine IV prn ordered, address pain it it is a contributory factor

## 2021-04-28 NOTE — ED NOTES
Spoke with Dr. Lui Hawkins regarding the patient's EKG showing afib with RVR. He said to resume the patient's home meds because she has not taken them.

## 2021-04-28 NOTE — PROGRESS NOTES
Problem: Falls - Risk of  Goal: *Absence of Falls  Description: Document [facility] Fall Risk and appropriate interventions in the flowsheet. Outcome: Progressing Towards Goal  Note: Fall Risk Interventions:            Medication Interventions: Evaluate medications/consider consulting pharmacy, Patient to call before getting OOB    Elimination Interventions: Call light in reach, Patient to call for help with toileting needs    History of Falls Interventions: Bed/chair exit alarm, Door open when patient unattended, Room close to nurse's station         Problem: Patient Education: Go to Patient Education Activity  Goal: Patient/Family Education  Outcome: Progressing Towards Goal     Problem: Pressure Injury - Risk of  Goal: *Prevention of pressure injury  Description: Document Leonard Scale and appropriate interventions in the flowsheet. Outcome: Progressing Towards Goal  Note: Pressure Injury Interventions:       Moisture Interventions: Absorbent underpads, Internal/External urinary devices    Activity Interventions: Pressure redistribution bed/mattress(bed type)    Mobility Interventions: Float heels, HOB 30 degrees or less, Pressure redistribution bed/mattress (bed type)    Nutrition Interventions: Document food/fluid/supplement intake                     Problem: Patient Education: Go to Patient Education Activity  Goal: Patient/Family Education  Outcome: Progressing Towards Goal     Problem: Falls - Risk of  Goal: *Absence of Falls  Description: Document [facility] Fall Risk and appropriate interventions in the flowsheet.   Outcome: Progressing Towards Goal  Note: Fall Risk Interventions:            Medication Interventions: Evaluate medications/consider consulting pharmacy, Patient to call before getting OOB    Elimination Interventions: Call light in reach, Patient to call for help with toileting needs    History of Falls Interventions: Bed/chair exit alarm, Door open when patient unattended, Room close to nurse's station         Problem: Patient Education: Go to Patient Education Activity  Goal: Patient/Family Education  Outcome: Progressing Towards Goal     Problem: Pressure Injury - Risk of  Goal: *Prevention of pressure injury  Description: Document Leonard Scale and appropriate interventions in the flowsheet.   Outcome: Progressing Towards Goal  Note: Pressure Injury Interventions:       Moisture Interventions: Absorbent underpads, Internal/External urinary devices    Activity Interventions: Pressure redistribution bed/mattress(bed type)    Mobility Interventions: Float heels, HOB 30 degrees or less, Pressure redistribution bed/mattress (bed type)    Nutrition Interventions: Document food/fluid/supplement intake                     Problem: Patient Education: Go to Patient Education Activity  Goal: Patient/Family Education  Outcome: Progressing Towards Goal

## 2021-04-28 NOTE — PERIOP NOTES
TRANSFER - IN REPORT:    Verbal report received from Fior Nicolas RN(name) on [employer]  being received from Orthopedics(unit) for ordered procedure      Report consisted of patients Situation, Background, Assessment and   Recommendations(SBAR). Information from the following report(s) SBAR, Kardex, Intake/Output, MAR, Recent Results and Cardiac Rhythm Afib was reviewed with the receiving nurse. Opportunity for questions and clarification was provided. Assessment completed upon patients arrival to unit and care assumed.

## 2021-04-28 NOTE — PROGRESS NOTES
1:55 AM  NP notified of elevated BP, HR. No PRN available and patient states she hasn't had all her BP meds.     2:37 AM  IV labetalol given after patient placed on cardiac monitor    3:44 AM  NP notified of /104,

## 2021-04-28 NOTE — CONSULTS
Left hip fracture  Admit to medicine  Npo after midnight  Bedrest  Consent for left hip intramedullary nail -  Surgery tomorrow afternoon if cleared    Case discussed with DR Valladares.  Agrees with above

## 2021-04-28 NOTE — H&P
Hospitalist Admission Note    NAME: Alva Rivers   :  1945   MRN:  [id number]     Date/Time:  2021 11:19 PM    Patient PCP: Edmar Washington MD  ______________________________________________________________________  Given the patient's current clinical presentation, I have a high level of concern for decompensation if discharged from the emergency department. Complex decision making was performed, which includes reviewing the patient's available past medical records, laboratory results, and x-ray films. My assessment of this patient's clinical condition and my plan of care is as follows. Assessment / Plan:  Left hip fracture POA  -Patient presented with mechanical fall and noticed to have left hip fracture  -Appreciate orthopedic consultation, plan for intramedullary nail fixation tomorrow  -Pain control  -Postop PT OT  -SCDs for now, will resume Xarelto postoperatively  -Patient's last dose of Xarelto was 24 hours ago  -According to RCRI criteria this patient is moderate risk for moderate risk surgery like hip surgery and she is clinically optimized to undergo left hip surgery    History of hypertension  -Resume home medication including clonidine, hydralazine, losartan, metoprolol and nifedipine    Hypothyroidism  -Continue levothyroxine    Chronic atrial fibrillation  -Continue metoprolol  -Patient is rate controlled  -Hold Xarelto for anticipated surgery, will resume after surgery    Insulin-dependent type 2 diabetes  -Continue Metformin and sliding scale insulin    GERD  -Continue PPI    Code Status: Patient wishes to be DNR  Surrogate Decision Maker: She wants to designate her harryon Trevon Ewing as surrogate POA    DVT Prophylaxis: SCDs for now, resume Xarelto after surgery  GI Prophylaxis: not indicated    Baseline: Independent/ambulatory      Subjective:   CHIEF COMPLAINT: Left hip pain    HISTORY OF PRESENT ILLNESS:     Sissy Dewitt is a 66 y.o.    female relation, hypertension, GERD, hypothyroidism and type 2 diabetes who presents with status post fall. Patient reports she was trying to change birdfeeders in her lawn today when she stumbled on a rock and fell resulting in left hip pain she denies head or losing consciousness. Denies any chest pain, palpitations, nausea, vomiting, diarrhea, urine urgency frequency dysuria, heat or cold intolerance  On evaluation in the ED she was noted to have left hip fracture. Her exercise capacity is more than 4 METS    We were asked to admit for work up and evaluation of the above problems. Past Medical History:   Diagnosis Date    Asthma     Diabetes ([facility]     GERD (gastroesophageal reflux disease)     Hypertension     Other ill-defined conditions(799.89)     hypothyroid        Past Surgical History:   Procedure Laterality Date    HX GYN      hysterectomy    HX ORTHOPAEDIC      left knee    HX OTHER SURGICAL      lipoma removal       Social History     Tobacco Use    Smoking status: Never Smoker    Smokeless tobacco: Never Used   Substance Use Topics    Alcohol use: No        Family History   Problem Relation Age of Onset    No Known Problems Mother     No Known Problems Father    Reviewed  Allergies   Allergen Reactions    Pcn [Penicillins] Hives        Prior to Admission medications    Medication Sig Start Date End Date Taking? Authorizing Provider   rivaroxaban (XARELTO) 20 mg tab tablet Take 20 mg by mouth daily. Yes Provider, Historical   losartan (COZAAR) 100 mg tablet Take 100 mg by mouth daily. Yes Provider, Historical   hydrALAZINE (APRESOLINE) 25 mg tablet Take 25 mg by mouth two (2) times a day. Yes Provider, Historical   potassium chloride (KLOR-CON M20) 20 mEq tablet Take 20 mEq by mouth daily.    Yes Provider, Historical   albuterol (PROVENTIL HFA, VENTOLIN HFA, PROAIR HFA) 90 mcg/actuation inhaler Take 2 Puffs by inhalation every four (4) hours as needed for Wheezing. 2/27/18  Yes Kourtney, Odalys LAI MD   insulin NPH (NOVOLIN N, HUMULIN N) 100 unit/mL injection 0-4 Units by SubCUTAneous route nightly. Patient states that she administers agent per corrective scale, but was unable to clarify the blood glucose ranges and associated insulin dosages per scale   Yes Provider, Historical   insulin lispro (HUMALOG) 100 unit/mL injection 0-15 Units by SubCUTAneous route Before breakfast, lunch, and dinner. Patient states that she administers agent per corrective scale, but was unable to clarify the blood glucose ranges and associated insulin dosages per scale   Yes Provider, Historical   levothyroxine (SYNTHROID) 100 mcg tablet Take 100 mcg by mouth Daily (before breakfast). Yes Provider, Historical   metoprolol succinate (TOPROL-XL) 200 mg XL tablet Take 100 mg by mouth nightly. Yes Provider, Historical   furosemide (LASIX) 40 mg tablet Take 40 mg by mouth daily as needed (lower extremity swelling). Yes Provider, Historical   cloNIDine HCl (CATAPRES) 0.1 mg tablet Take 0.1 mg by mouth two (2) times a day. Yes Provider, Historical   acetaminophen (TYLENOL) 500 mg tablet Take 1,000 mg by mouth two (2) times a day. Yes Provider, Historical   esomeprazole (NEXIUM) 40 mg capsule Take 40 mg by mouth daily. Yes Other, MD Olman   metFORMIN (GLUCOPHAGE) 1,000 mg tablet Take 1,000 mg by mouth two (2) times daily (with meals). Yes Other, MD Olman   NIFEdipine ER (PROCARDIA XL) 60 mg ER tablet Take 60 mg by mouth daily. Provider, Historical   Compressor, For Nebulizer jen 1 Each by Does Not Apply route every four (4) hours as needed for Other. 3/2/19   Fouzia Martínez DO   albuterol (PROVENTIL VENTOLIN) 2.5 mg /3 mL (0.083 %) nebulizer solution 3 mL by Nebulization route every four (4) hours as needed for Wheezing. 3/2/19   Fouzia Martínez DO       REVIEW OF SYSTEMS:     I am not able to complete the review of systems because:    The patient is intubated and sedated    The patient has altered mental status due to his acute medical problems    The patient has baseline aphasia from prior stroke(s)    The patient has baseline dementia and is not reliable historian    The patient is in acute medical distress and unable to provide information           Total of 12 systems reviewed as follows:       POSITIVE= underlined text  Negative = text not underlined  General:  fever, chills, sweats, generalized weakness, weight loss/gain,      loss of appetite   Eyes:    blurred vision, eye pain, loss of vision, double vision  ENT:    rhinorrhea, pharyngitis   Respiratory:   cough, sputum production, SOB, GARCIA, wheezing, pleuritic pain   Cardiology:   chest pain, palpitations, orthopnea, PND, edema, syncope   Gastrointestinal:  abdominal pain , N/V, diarrhea, dysphagia, constipation, bleeding   Genitourinary:  frequency, urgency, dysuria, hematuria, incontinence   Muskuloskeletal :  arthralgia, myalgia, back pain  Hematology:  easy bruising, nose or gum bleeding, lymphadenopathy   Dermatological: rash, ulceration, pruritis, color change / jaundice  Endocrine:   hot flashes or polydipsia   Neurological:  headache, dizziness, confusion, focal weakness, paresthesia,     Speech difficulties, memory loss, gait difficulty  Psychological: Feelings of anxiety, depression, agitation    Objective:   VITALS:    Visit Vitals  BP (!) 176/104   Pulse 91   Temp 98.4 °F (36.9 °C)   Resp 18   Ht 5' 10\" (1.778 m)   Wt 99.8 kg (220 lb)   SpO2 94%   BMI 31.57 kg/m²       PHYSICAL EXAM:    General:    Alert, cooperative, no distress, appears stated age. HEENT: Atraumatic, anicteric sclerae, pink conjunctivae     No oral ulcers, mucosa moist, throat clear, dentition fair  Neck:  Supple, symmetrical,  thyroid: non tender  Lungs:   Clear to auscultation bilaterally. No Wheezing or Rhonchi. No rales. Chest wall:  No tenderness  No Accessory muscle use. Heart:   Irregularly irregular rhythm,  No  murmur   No edema  Abdomen:   Soft, non-tender.  Not distended. Bowel sounds normal  Extremities: No cyanosis. No clubbing,      Skin turgor normal, Capillary refill normal, Radial dial pulse 2+  Skin:     Not pale. Not Jaundiced  No rashes   Psych:  Good insight. Not depressed. Not anxious or agitated. Neurologic: EOMs intact. No facial asymmetry. No aphasia or slurred speech. Symmetrical strength, Sensation grossly intact. Alert and oriented X 4.     _______________________________________________________________________  Care Plan discussed with:    Comments   Patient x    Family      RN x    Care Manager                    Consultant:      _______________________________________________________________________  Expected  Disposition:   Home with Family    HH/PT/OT/RN    SNF/LTC x   PEGGY    ________________________________________________________________________  TOTAL TIME: 36 Minutes    Critical Care Provided     Minutes non procedure based      Comments    x Reviewed previous records   >50% of visit spent in counseling and coordination of care x Discussion with patient and/or family and questions answered       ________________________________________________________________________  Signed: Shazia Trujillo MD    Procedures: see electronic medical records for all procedures/Xrays and details which were not copied into this note but were reviewed prior to creation of Plan.     LAB DATA REVIEWED:    Recent Results (from the past 24 hour(s))   CBC WITH AUTOMATED DIFF    Collection Time: 04/27/21  7:47 PM   Result Value Ref Range    WBC 10.0 3.6 - 11.0 K/uL    RBC 4.74 3.80 - 5.20 M/uL    HGB 12.9 11.5 - 16.0 g/dL    HCT 41.3 35.0 - 47.0 %    MCV 87.1 80.0 - 99.0 FL    MCH 27.2 26.0 - 34.0 PG    MCHC 31.2 30.0 - 36.5 g/dL    RDW 13.7 11.5 - 14.5 %    PLATELET 871 962 - 164 K/uL    MPV 10.5 8.9 - 12.9 FL    NRBC 0.0 0  WBC    ABSOLUTE NRBC 0.00 0.00 - 0.01 K/uL    NEUTROPHILS 58 32 - 75 %    LYMPHOCYTES 33 12 - 49 %    MONOCYTES 6 5 - 13 %    EOSINOPHILS 2 0 - 7 %    BASOPHILS 0 0 - 1 %    IMMATURE GRANULOCYTES 1 (H) 0.0 - 0.5 %    ABS. NEUTROPHILS 5.8 1.8 - 8.0 K/UL    ABS. LYMPHOCYTES 3.3 0.8 - 3.5 K/UL    ABS. MONOCYTES 0.6 0.0 - 1.0 K/UL    ABS. EOSINOPHILS 0.2 0.0 - 0.4 K/UL    ABS. BASOPHILS 0.0 0.0 - 0.1 K/UL    ABS. IMM. GRANS. 0.1 (H) 0.00 - 0.04 K/UL    DF AUTOMATED     METABOLIC PANEL, COMPREHENSIVE    Collection Time: 04/27/21  7:47 PM   Result Value Ref Range    Sodium 137 136 - 145 mmol/L    Potassium 3.5 3.5 - 5.1 mmol/L    Chloride 102 97 - 108 mmol/L    CO2 30 21 - 32 mmol/L    Anion gap 5 5 - 15 mmol/L    Glucose 182 (H) 65 - 100 mg/dL    BUN 20 6 - 20 MG/DL    Creatinine 1.12 (H) 0.55 - 1.02 MG/DL    BUN/Creatinine ratio 18 12 - 20      GFR est AA 57 (L) >60 ml/min/1.73m2    GFR est non-AA 47 (L) >60 ml/min/1.73m2    Calcium 10.0 8.5 - 10.1 MG/DL    Bilirubin, total 0.9 0.2 - 1.0 MG/DL    ALT (SGPT) 18 12 - 78 U/L    AST (SGOT) 13 (L) 15 - 37 U/L    Alk. phosphatase 80 45 - 117 U/L    Protein, total 6.9 6.4 - 8.2 g/dL    Albumin 3.7 3.5 - 5.0 g/dL    Globulin 3.2 2.0 - 4.0 g/dL    A-G Ratio 1.2 1.1 - 2.2     CBC WITH AUTOMATED DIFF    Collection Time: 04/27/21 10:22 PM   Result Value Ref Range    WBC 17.5 (H) 3.6 - 11.0 K/uL    RBC 4.32 3.80 - 5.20 M/uL    HGB 11.8 11.5 - 16.0 g/dL    HCT 37.5 35.0 - 47.0 %    MCV 86.8 80.0 - 99.0 FL    MCH 27.3 26.0 - 34.0 PG    MCHC 31.5 30.0 - 36.5 g/dL    RDW 13.6 11.5 - 14.5 %    PLATELET 974 846 - 205 K/uL    MPV 10.5 8.9 - 12.9 FL    NRBC 0.0 0  WBC    ABSOLUTE NRBC 0.00 0.00 - 0.01 K/uL    NEUTROPHILS 85 (H) 32 - 75 %    LYMPHOCYTES 9 (L) 12 - 49 %    MONOCYTES 5 5 - 13 %    EOSINOPHILS 0 0 - 7 %    BASOPHILS 0 0 - 1 %    IMMATURE GRANULOCYTES 1 (H) 0.0 - 0.5 %    ABS. NEUTROPHILS 14.9 (H) 1.8 - 8.0 K/UL    ABS. LYMPHOCYTES 1.6 0.8 - 3.5 K/UL    ABS. MONOCYTES 0.8 0.0 - 1.0 K/UL    ABS. EOSINOPHILS 0.1 0.0 - 0.4 K/UL    ABS. BASOPHILS 0.0 0.0 - 0.1 K/UL    ABS. IMM.  GRANS. 0.1 (H) 0.00 - 0.04 K/UL DF AUTOMATED     PROTHROMBIN TIME + INR    Collection Time: 04/27/21 10:22 PM   Result Value Ref Range    INR 1.1 0.9 - 1.1      Prothrombin time 11.6 (H) 9.0 - 11.1 sec   GLUCOSE, POC    Collection Time: 04/27/21 10:56 PM   Result Value Ref Range    Glucose (POC) 255 (H) 65 - 100 mg/dL    Performed by Kain Carreon RN

## 2021-04-28 NOTE — ED NOTES
Patient is being transferred to [facility] Orthopedics, Room # 8961. Report given to NORRIS Jiménez on [employer] for routine progression of care. Report consisted of the following information SBAR, Kardex and ED Summary. Patient transferred to receiving unit by: Donna Hyatt (RN or tech name). Outstanding consults needed: No     Next labs due: Yes    The following personal items will be sent with the patient during transfer to the floor:     All valuables:    Cardiac monitoring ordered: No     The following CURRENT information was reported to the receiving RN:    Code status: DNR at time of transfer    Last set of vital signs:  Vital Signs  Level of Consciousness: Alert (0) (04/27/21 1938)  Temp: 98.4 °F (36.9 °C) (04/27/21 1938)  Temp Source: Oral (04/27/21 1938)  Pulse (Heart Rate): 91 (04/27/21 1938)  Heart Rate Source: Monitor (04/27/21 1938)  Resp Rate: 18 (04/27/21 1938)  BP: (!) 176/104 (04/27/21 2045)  MAP (Monitor): 121 (04/27/21 2045)  MAP (Calculated): 128 (04/27/21 2045)  BP 1 Location: Right upper arm (04/27/21 1938)  BP 1 Method: Automatic (04/27/21 1938)  BP Patient Position: At rest (04/27/21 1938)  MEWS Score: 1 (04/27/21 1938)         Oxygen Therapy  O2 Sat (%): 94 % (04/27/21 2045)  Pulse via Oximetry: 98 beats per minute (04/27/21 2045)  O2 Device: None (Room air) (04/27/21 1946)      Last pain assessment:  Pain 1  Pain Scale 1: Numeric (0 - 10)  Pain Intensity 1: 10  Patient Stated Pain Goal: 0      Wounds: No     Urinary catheter: voiding  Is there a lainez order: Yes    LDAs:       Peripheral IV 04/27/21 Right Antecubital (Active)   Site Assessment Clean, dry, & intact 04/27/21 1951   Phlebitis Assessment 0 04/27/21 1951   Infiltration Assessment 0 04/27/21 1951   Dressing Status Clean, dry, & intact 04/27/21 1951   Dressing Type Tape;Transparent 04/27/21 1951   Hub Color/Line Status Pink;Flushed;Patent 04/27/21 1951   Action Taken Blood drawn 04/27/21 1951   Alcohol Cap Used No 04/27/21 1951 Opportunity for questions and clarification was provided.     Thony Mayberry

## 2021-04-28 NOTE — PROGRESS NOTES
End of Shift Note    Bedside shift change report given to Jessy (oncoming nurse) by Cathey Sicard (offgoing nurse). Report included the following information SBAR, Kardex, Intake/Output, MAR and Accordion    Shift worked:  [id number]     Shift summary and any significant changes:     Patient currently off of floor in surgery. Patient received PRN pain medication x2. Concerns for physician to address:       Zone phone for oncoming shift:   [phone]       Activity:  Activity Level: Bed Rest  Number times ambulated in hallways past shift: 0  Number of times OOB to chair past shift: 0    Cardiac:   Cardiac Monitoring: Yes      Cardiac Rhythm: Atrial fibrillation    Access:   Current line(s): PIV     Genitourinary:   Urinary status: lainez    Respiratory:   O2 Device: Nasal cannula  Chronic home O2 use?: NO  Incentive spirometer at bedside: NO     GI:  Last Bowel Movement Date: 04/26/21  Current diet:  DIET NPO  Passing flatus: YES  Tolerating current diet: YES       Pain Management:   Patient states pain is manageable on current regimen: YES    Skin:  Leonard Score: 17  Interventions: float heels, increase time out of bed and internal/external urinary devices    Patient Safety:  Fall Score:  Total Score: 3  Interventions: bed/chair alarm, assistive device (walker, cane, etc), gripper socks and pt to call before getting OOB  High Fall Risk: Yes    Length of Stay:  Expected LOS: 2d 21h  Actual LOS: [facility]

## 2021-04-28 NOTE — PERIOP NOTES
1436 - SBAR IN NOTE - PT ARRIVED INTO PRE-OP HOLDING 16 VIA BED AND MONITOR. PT A&O X4. BRISENO SPON AND TO COMMAND. RESP EVEN AND UNLABORED. BSB AND CLEAR ON AUSC. ABD SLIGHTLY DISTENDED, BS HYPOACTIVE, NON-TENDER TO TOUCH. PT C/O FEELING NAUSEATED - PT RECEIVED PO PAIN MEDICATION APPROX 45 MINS AGO. PT ALSO C/O 7/10 LEFT HIP PAIN - REPOSITIONED FOR COMFORT - ABLE TO REST IN SHORT NAPS. [address] NINO IN TO SEE PT - AWARE HR AND RHYTHM - LOPRESSOR 5MG IVP ADM WITH FAIR EFFECT. PRE-OP TCHING DONE - PT VERBALIZES UNDERSTANDING. SR UP X4 AND CB IN PLACE. Tracee Profit

## 2021-04-28 NOTE — PERIOP NOTES
TRANSFER - OUT REPORT:    Verbal report given to Jessy RN(name) on Toña Garcia  being transferred to [facility](unit) for routine progression of care       Report consisted of patients Situation, Background, Assessment and   Recommendations(SBAR). Information from the following report(s) OR Summary, Intake/Output and MAR was reviewed with the receiving nurse. Opportunity for questions and clarification was provided.       Patient transported with:   Monitor  O2 @ 2 liters  Registered Nurse

## 2021-04-28 NOTE — CONSULTS
ORTHOPAEDIC CONSULT NOTE    Subjective:     Date of Consultation:  April 28, 2021      Adeel Lopez is a 66 y.o. female who is being seen for left hip fracture Pt reports injury GLF, tripped in the garden  Ambulates at baseline unassisted. Family at bedside      Patient Active Problem List    Diagnosis Date Noted    Hip fracture ([facility] 04/27/2021    NSTEMI, initial episode of care [facility]) 02/28/2018    NSTEMI (non-ST elevated myocardial infarction) ([facility] 02/27/2018    Asthma 02/27/2018    DM (diabetes mellitus) ([facility] 02/27/2018    HTN (hypertension) 02/27/2018    GERD (gastroesophageal reflux disease) 02/27/2018     Family History   Problem Relation Age of Onset    No Known Problems Mother     No Known Problems Father       Social History     Tobacco Use    Smoking status: Never Smoker    Smokeless tobacco: Never Used   Substance Use Topics    Alcohol use: No     Past Medical History:   Diagnosis Date    A-fib ([facility]     Asthma     Diabetes ([facility]     GERD (gastroesophageal reflux disease)     Hypertension     Other ill-defined conditions(799.89)     hypothyroid      Past Surgical History:   Procedure Laterality Date    HX GYN      hysterectomy    HX ORTHOPAEDIC      left knee    HX OTHER SURGICAL      lipoma removal      Prior to Admission medications    Medication Sig Start Date End Date Taking? Authorizing Provider   rivaroxaban (XARELTO) 20 mg tab tablet Take 20 mg by mouth daily. Yes Provider, Historical   losartan (COZAAR) 100 mg tablet Take 100 mg by mouth daily. Yes Provider, Historical   hydrALAZINE (APRESOLINE) 25 mg tablet Take 25 mg by mouth two (2) times a day. Yes Provider, Historical   potassium chloride (KLOR-CON M20) 20 mEq tablet Take 20 mEq by mouth daily. Yes Provider, Historical   albuterol (PROVENTIL VENTOLIN) 2.5 mg /3 mL (0.083 %) nebulizer solution 3 mL by Nebulization route every four (4) hours as needed for Wheezing.  3/2/19  Yes Vasquez Nath, DO   albuterol (PROVENTIL HFA, VENTOLIN HFA, PROAIR HFA) 90 mcg/actuation inhaler Take 2 Puffs by inhalation every four (4) hours as needed for Wheezing. 2/27/18  Yes Odalys Oconnell MD   insulin NPH (NOVOLIN N, HUMULIN N) 100 unit/mL injection 0-4 Units by SubCUTAneous route nightly. Patient states that she administers agent per corrective scale, but was unable to clarify the blood glucose ranges and associated insulin dosages per scale   Yes Provider, Historical   insulin lispro (HUMALOG) 100 unit/mL injection 0-15 Units by SubCUTAneous route Before breakfast, lunch, and dinner. Patient states that she administers agent per corrective scale, but was unable to clarify the blood glucose ranges and associated insulin dosages per scale   Yes Provider, Historical   levothyroxine (SYNTHROID) 100 mcg tablet Take 100 mcg by mouth Daily (before breakfast). Yes Provider, Historical   metoprolol succinate (TOPROL-XL) 200 mg XL tablet Take 100 mg by mouth nightly. Yes Provider, Historical   furosemide (LASIX) 40 mg tablet Take 40 mg by mouth daily as needed (lower extremity swelling). Yes Provider, Historical   cloNIDine HCl (CATAPRES) 0.1 mg tablet Take 0.1 mg by mouth two (2) times a day. Yes Provider, Historical   acetaminophen (TYLENOL) 500 mg tablet Take 1,000 mg by mouth two (2) times a day. Yes Provider, Historical   esomeprazole (NEXIUM) 40 mg capsule Take 40 mg by mouth daily. Yes Other, MD Olman   metFORMIN (GLUCOPHAGE) 1,000 mg tablet Take 1,000 mg by mouth two (2) times daily (with meals). Yes Other, MD Olman   NIFEdipine ER (PROCARDIA XL) 60 mg ER tablet Take 60 mg by mouth daily. Provider, Historical   Compressor, For Nebulizer jen 1 Each by Does Not Apply route every four (4) hours as needed for Other.  3/2/19   Onalee Angelucci, DO     Current Facility-Administered Medications   Medication Dose Route Frequency    labetaloL (NORMODYNE;TRANDATE) injection 10 mg  10 mg IntraVENous Q4H PRN    hydrALAZINE (APRESOLINE) 20 mg/mL injection 10 mg  10 mg IntraVENous Q4H PRN    insulin glargine (LANTUS) injection 10 Units  10 Units SubCUTAneous QHS    calcium carbonate (TUMS) chewable tablet 200 mg [elemental]  200 mg Oral TID WITH MEALS    lactated Ringers infusion  25 mL/hr IntraVENous CONTINUOUS    lactated Ringers infusion  25 mL/hr IntraVENous CONTINUOUS    sodium chloride (NS) flush 5-40 mL  5-40 mL IntraVENous Q8H    sodium chloride (NS) flush 5-40 mL  5-40 mL IntraVENous PRN    lidocaine (PF) (XYLOCAINE) 10 mg/mL (1 %) injection 0.1 mL  0.1 mL SubCUTAneous PRN    sodium chloride (NS) flush 5-40 mL  5-40 mL IntraVENous Q8H    sodium chloride (NS) flush 5-40 mL  5-40 mL IntraVENous PRN    acetaminophen (TYLENOL) tablet 650 mg  650 mg Oral Q6H    oxyCODONE IR (ROXICODONE) tablet 2.5 mg  2.5 mg Oral Q4H PRN    oxyCODONE IR (ROXICODONE) tablet 5 mg  5 mg Oral Q4H PRN    naloxone (NARCAN) injection 0.4 mg  0.4 mg IntraVENous PRN    senna-docusate (PERICOLACE) 8.6-50 mg per tablet 2 Tab  2 Tab Oral BID    vancomycin (VANCOCIN) 1,000 mg in 0.9% sodium chloride 250 mL (VIAL-MATE)  1,000 mg IntraVENous ON CALL TO OR    HYDROmorphone (DILAUDID) injection 0.5-1 mg  0.5-1 mg IntraVENous Q2H PRN    albuterol (PROVENTIL VENTOLIN) nebulizer solution 2.5 mg  2.5 mg Nebulization Q4H PRN    cloNIDine HCL (CATAPRES) tablet 0.1 mg  0.1 mg Oral BID    pantoprazole (PROTONIX) tablet 40 mg  40 mg Oral ACB    furosemide (LASIX) tablet 40 mg  40 mg Oral DAILY PRN    hydrALAZINE (APRESOLINE) tablet 25 mg  25 mg Oral BID    levothyroxine (SYNTHROID) tablet 100 mcg  100 mcg Oral 6am    losartan (COZAAR) tablet 100 mg  100 mg Oral DAILY    [Held by provider] metFORMIN (GLUCOPHAGE) tablet 1,000 mg  1,000 mg Oral BID WITH MEALS    metoprolol succinate (TOPROL-XL) XL tablet 100 mg  100 mg Oral QHS    NIFEdipine ER (PROCARDIA XL) tablet 60 mg  60 mg Oral DAILY    potassium chloride (K-DUR, KLOR-CON) SR tablet 20 mEq  20 mEq Oral DAILY    sodium chloride (NS) flush 5-40 mL  5-40 mL IntraVENous Q8H    sodium chloride (NS) flush 5-40 mL  5-40 mL IntraVENous PRN    acetaminophen (TYLENOL) tablet 650 mg  650 mg Oral Q6H PRN    Or    acetaminophen (TYLENOL) suppository 650 mg  650 mg Rectal Q6H PRN    polyethylene glycol (MIRALAX) packet 17 g  17 g Oral DAILY PRN    promethazine (PHENERGAN) tablet 12.5 mg  12.5 mg Oral Q6H PRN    Or    ondansetron (ZOFRAN) injection 4 mg  4 mg IntraVENous Q6H PRN    fentaNYL citrate (PF) injection 50 mcg  50 mcg IntraVENous Q4H PRN    insulin lispro (HUMALOG) injection   SubCUTAneous AC&HS    glucose chewable tablet 16 g  4 Tab Oral PRN    dextrose (D50W) injection syrg 12.5-25 g  12.5-25 g IntraVENous PRN    glucagon (GLUCAGEN) injection 1 mg  1 mg IntraMUSCular PRN      Allergies   Allergen Reactions    Pcn [Penicillins] Hives        Review of Systems:  A comprehensive review of systems was negative except for that written in the HPI. Mental Status: no dementia    Objective:     Patient Vitals for the past 8 hrs:   BP Temp Pulse Resp SpO2 Height Weight   21 1517 (!) 155/80  (!) 112 14 93 %     21 1436 (!) 145/78 98.6 °F (37 °C) (!) 118 13 94 % 5' 10\" (1.778 m) 108.5 kg (239 lb 3.2 oz)   21 1109 (!) 152/71 98.3 °F (36.8 °C) (!) 117 18 94 %     21 0740 (!) 167/85 98.5 °F (36.9 °C) (!) 118 18 97 %       Temp (24hrs), Av.3 °F (36.8 °C), Min:97.9 °F (36.6 °C), Max:98.6 °F (37 °C)      Gen: Well-developed,  in no acute distress   HEENT: Pink conjunctivae, hearing intact to voice, moist mucous membranes   Neck: Supple  Resp: No respiratory distress   Card: RRR, palpable distal pulse-equal bilaterally, birsk cap refill all distal digits   Abd: Soft, non-distended  Musc: LLE  Shortened and rotated. No sign of LE VTE. Intact plant/dorsiflexion of the ankle   Skin: No skin breakdown noted.  Skin warm, pink, dry  Neuro: Cranial nerves are grossly intact, no focal motor weakness, follows commands appropriately   Psych: Good insight, oriented to person, place and time, alert    Imaging Review: EXAM: XR HIP LT W OR WO PELV 2-3 VWS, XR FEMUR LT 2 V   INDICATION: Left hip and thigh pain after injury   COMPARISON: None.   TECHNIQUE: AP pelvis and crosstable lateral left hip views; 4 images of AP and  crosstable lateral left femur views (2 separate studies reported together).   FINDINGS: Proximal left femoral fracture is comminuted and both  intertrochanteric and subtrochanteric. 2.5 cm proximal migration of the lesser  trochanter. No underlying bone lesion. No evidence of intra-articular extension. Mild bilateral hip osteoarthritis. Left knee osteoarthritis is partially imaged. Iliac bones and sacrum are obscured by body habitus.   IMPRESSION  Intertrochanteric and subtrochanteric comminuted proximal left femoral fracture. No underlying bone lesion. Labs:   Recent Results (from the past 24 hour(s))   CBC WITH AUTOMATED DIFF    Collection Time: 04/27/21  7:47 PM   Result Value Ref Range    WBC 10.0 3.6 - 11.0 K/uL    RBC 4.74 3.80 - 5.20 M/uL    HGB 12.9 11.5 - 16.0 g/dL    HCT 41.3 35.0 - 47.0 %    MCV 87.1 80.0 - 99.0 FL    MCH 27.2 26.0 - 34.0 PG    MCHC 31.2 30.0 - 36.5 g/dL    RDW 13.7 11.5 - 14.5 %    PLATELET 500 783 - 914 K/uL    MPV 10.5 8.9 - 12.9 FL    NRBC 0.0 0  WBC    ABSOLUTE NRBC 0.00 0.00 - 0.01 K/uL    NEUTROPHILS 58 32 - 75 %    LYMPHOCYTES 33 12 - 49 %    MONOCYTES 6 5 - 13 %    EOSINOPHILS 2 0 - 7 %    BASOPHILS 0 0 - 1 %    IMMATURE GRANULOCYTES 1 (H) 0.0 - 0.5 %    ABS. NEUTROPHILS 5.8 1.8 - 8.0 K/UL    ABS. LYMPHOCYTES 3.3 0.8 - 3.5 K/UL    ABS. MONOCYTES 0.6 0.0 - 1.0 K/UL    ABS. EOSINOPHILS 0.2 0.0 - 0.4 K/UL    ABS. BASOPHILS 0.0 0.0 - 0.1 K/UL    ABS. IMM.  GRANS. 0.1 (H) 0.00 - 0.04 K/UL    DF AUTOMATED     METABOLIC PANEL, COMPREHENSIVE    Collection Time: 04/27/21  7:47 PM   Result Value Ref Range    Sodium 137 136 - 145 mmol/L Potassium 3.5 3.5 - 5.1 mmol/L    Chloride 102 97 - 108 mmol/L    CO2 30 21 - 32 mmol/L    Anion gap 5 5 - 15 mmol/L    Glucose 182 (H) 65 - 100 mg/dL    BUN 20 6 - 20 MG/DL    Creatinine 1.12 (H) 0.55 - 1.02 MG/DL    BUN/Creatinine ratio 18 12 - 20      GFR est AA 57 (L) >60 ml/min/1.73m2    GFR est non-AA 47 (L) >60 ml/min/1.73m2    Calcium 10.0 8.5 - 10.1 MG/DL    Bilirubin, total 0.9 0.2 - 1.0 MG/DL    ALT (SGPT) 18 12 - 78 U/L    AST (SGOT) 13 (L) 15 - 37 U/L    Alk. phosphatase 80 45 - 117 U/L    Protein, total 6.9 6.4 - 8.2 g/dL    Albumin 3.7 3.5 - 5.0 g/dL    Globulin 3.2 2.0 - 4.0 g/dL    A-G Ratio 1.2 1.1 - 2.2     METABOLIC PANEL, COMPREHENSIVE    Collection Time: 04/27/21 10:22 PM   Result Value Ref Range    Sodium 137 136 - 145 mmol/L    Potassium 3.7 3.5 - 5.1 mmol/L    Chloride 103 97 - 108 mmol/L    CO2 28 21 - 32 mmol/L    Anion gap 6 5 - 15 mmol/L    Glucose 240 (H) 65 - 100 mg/dL    BUN 20 6 - 20 MG/DL    Creatinine 1.04 (H) 0.55 - 1.02 MG/DL    BUN/Creatinine ratio 19 12 - 20      GFR est AA >60 >60 ml/min/1.73m2    GFR est non-AA 51 (L) >60 ml/min/1.73m2    Calcium 9.5 8.5 - 10.1 MG/DL    Bilirubin, total 1.1 (H) 0.2 - 1.0 MG/DL    ALT (SGPT) 17 12 - 78 U/L    AST (SGOT) 11 (L) 15 - 37 U/L    Alk.  phosphatase 77 45 - 117 U/L    Protein, total 6.6 6.4 - 8.2 g/dL    Albumin 3.4 (L) 3.5 - 5.0 g/dL    Globulin 3.2 2.0 - 4.0 g/dL    A-G Ratio 1.1 1.1 - 2.2     CBC WITH AUTOMATED DIFF    Collection Time: 04/27/21 10:22 PM   Result Value Ref Range    WBC 17.5 (H) 3.6 - 11.0 K/uL    RBC 4.32 3.80 - 5.20 M/uL    HGB 11.8 11.5 - 16.0 g/dL    HCT 37.5 35.0 - 47.0 %    MCV 86.8 80.0 - 99.0 FL    MCH 27.3 26.0 - 34.0 PG    MCHC 31.5 30.0 - 36.5 g/dL    RDW 13.6 11.5 - 14.5 %    PLATELET 482 924 - 782 K/uL    MPV 10.5 8.9 - 12.9 FL    NRBC 0.0 0  WBC    ABSOLUTE NRBC 0.00 0.00 - 0.01 K/uL    NEUTROPHILS 85 (H) 32 - 75 %    LYMPHOCYTES 9 (L) 12 - 49 %    MONOCYTES 5 5 - 13 %    EOSINOPHILS 0 0 - 7 % BASOPHILS 0 0 - 1 %    IMMATURE GRANULOCYTES 1 (H) 0.0 - 0.5 %    ABS. NEUTROPHILS 14.9 (H) 1.8 - 8.0 K/UL    ABS. LYMPHOCYTES 1.6 0.8 - 3.5 K/UL    ABS. MONOCYTES 0.8 0.0 - 1.0 K/UL    ABS. EOSINOPHILS 0.1 0.0 - 0.4 K/UL    ABS. BASOPHILS 0.0 0.0 - 0.1 K/UL    ABS. IMM.  GRANS. 0.1 (H) 0.00 - 0.04 K/UL    DF AUTOMATED     PROTHROMBIN TIME + INR    Collection Time: 04/27/21 10:22 PM   Result Value Ref Range    INR 1.1 0.9 - 1.1      Prothrombin time 11.6 (H) 9.0 - 11.1 sec   URINALYSIS W/ REFLEX CULTURE    Collection Time: 04/27/21 10:22 PM    Specimen: Urine   Result Value Ref Range    Color YELLOW/STRAW      Appearance CLEAR CLEAR      Specific gravity 1.016 1.003 - 1.030      pH (UA) 5.5 5.0 - 8.0      Protein Negative NEG mg/dL    Glucose 100 (A) NEG mg/dL    Ketone 15 (A) NEG mg/dL    Bilirubin Negative NEG      Blood Negative NEG      Urobilinogen 0.2 0.2 - 1.0 EU/dL    Nitrites Negative NEG      Leukocyte Esterase Negative NEG      WBC 0-4 0 - 4 /hpf    RBC 0-5 0 - 5 /hpf    Epithelial cells FEW FEW /lpf    Bacteria Negative NEG /hpf    UA:UC IF INDICATED CULTURE NOT INDICATED BY UA RESULT CNI      Hyaline cast 0-2 0 - 5 /lpf   TYPE & SCREEN    Collection Time: 04/27/21 10:22 PM   Result Value Ref Range    Crossmatch Expiration 04/30/2021,2359     ABO/Rh(D) Veronda Jerardo POSITIVE     Antibody screen NEG    GLUCOSE, POC    Collection Time: 04/27/21 10:56 PM   Result Value Ref Range    Glucose (POC) 255 (H) 65 - 100 mg/dL    Performed by Ora Clay RN    EKG, 12 LEAD, INITIAL    Collection Time: 04/27/21 11:42 PM   Result Value Ref Range    Ventricular Rate 122 BPM    Atrial Rate 119 BPM    QRS Duration 104 ms    Q-T Interval 294 ms    QTC Calculation (Bezet) 418 ms    Calculated R Axis 29 degrees    Calculated T Axis -164 degrees    Diagnosis       Atrial fibrillation with rapid ventricular response  Nonspecific ST and T wave abnormality    Confirmed by Carmelo Wiley M.D. ([id number]) on 4/28/2021 9:00:49 AM METABOLIC PANEL, BASIC    Collection Time: 04/28/21  5:52 AM   Result Value Ref Range    Sodium 135 (L) 136 - 145 mmol/L    Potassium 3.8 3.5 - 5.1 mmol/L    Chloride 102 97 - 108 mmol/L    CO2 25 21 - 32 mmol/L    Anion gap 8 5 - 15 mmol/L    Glucose 334 (H) 65 - 100 mg/dL    BUN 20 6 - 20 MG/DL    Creatinine 1.07 (H) 0.55 - 1.02 MG/DL    BUN/Creatinine ratio 19 12 - 20      GFR est AA >60 >60 ml/min/1.73m2    GFR est non-AA 50 (L) >60 ml/min/1.73m2    Calcium 9.7 8.5 - 10.1 MG/DL   CBC W/O DIFF    Collection Time: 04/28/21  5:52 AM   Result Value Ref Range    WBC 15.3 (H) 3.6 - 11.0 K/uL    RBC 4.29 3.80 - 5.20 M/uL    HGB 11.8 11.5 - 16.0 g/dL    HCT 37.5 35.0 - 47.0 %    MCV 87.4 80.0 - 99.0 FL    MCH 27.5 26.0 - 34.0 PG    MCHC 31.5 30.0 - 36.5 g/dL    RDW 13.8 11.5 - 14.5 %    PLATELET 115 040 - 439 K/uL    MPV 10.8 8.9 - 12.9 FL    NRBC 0.0 0  WBC    ABSOLUTE NRBC 0.00 0.00 - 0.01 K/uL   GLUCOSE, POC    Collection Time: 04/28/21  6:37 AM   Result Value Ref Range    Glucose (POC) 327 (H) 65 - 100 mg/dL    Performed by Emiliana Carmona PCT    GLUCOSE, POC    Collection Time: 04/28/21 11:14 AM   Result Value Ref Range    Glucose (POC) 294 (H) 65 - 100 mg/dL    Performed by Saul Reyes PCT    GLUCOSE, POC    Collection Time: 04/28/21  3:00 PM   Result Value Ref Range    Glucose (POC) 226 (H) 65 - 100 mg/dL    Performed by [facility]          Impression:     Patient Active Problem List    Diagnosis Date Noted    Hip fracture ([facility] 04/27/2021    NSTEMI, initial episode of care [facility]) 02/28/2018    NSTEMI (non-ST elevated myocardial infarction) ([facility] 02/27/2018    Asthma 02/27/2018    DM (diabetes mellitus) ([facility] 02/27/2018    HTN (hypertension) 02/27/2018    GERD (gastroesophageal reflux disease) 02/27/2018     Active Problems:    Hip fracture ([facility] (4/27/2021)        Plan:     -  Case   -  Bedrest  -  NPO  -  Plan for Troch nail Weds afternoon  -  Medicine for Pre-Operative Clearence/ Post-Operative management.     -  DVT Prophylaxis - SCDs  -  D/C planning TBD      Dr. Nelson Search  aware and agrees with plan as above.         Susan Ferrara PA-C  [place] [id number]

## 2021-04-28 NOTE — PROGRESS NOTES
Pt has been diagnosed with comminuted proximal fracture of L femur and is currently on bedrest.  Orders are from hospitalist.  Pt is scheduled for hip fracture repair today. Will await orders from ortho with WB status and surgery to be completed prior to start of PT services.      Britney Fiore, PT

## 2021-04-28 NOTE — ED PROVIDER NOTES
EMERGENCY DEPARTMENT HISTORY AND PHYSICAL EXAM      Date: 4/27/2021  Patient Name: Inez Gutierrez    History of Presenting Illness     Chief Complaint   Patient presents with    Hip Pain     Patient tripped over rocks in her flower garden and fell. Patient's left leg is shorter than her right at this time. She is on xarelto for afib, but denies hitting her head or any loss of consciousness. History Provided By: Patient    HPI: Inez Gutierrez, 66 y.o. female with a past medical history significant for Asthma, diabetes, GERD, hypertension presents to the ED with cc of moderate to severe left hip pain and left wrist pain after trip and fall today. Patient reports losing her balance and falling and landing on her left hip and her most severe pain is in her left hip and she cannot move her left lower extremity. She did not hit her head or neck. She has no other associated symptoms. No other exacerbating or mounting factors. She reports she does not have a orthopedic surgeon. There are no other complaints, changes, or physical findings at this time. PCP: Ligia Aden MD    No current facility-administered medications on file prior to encounter. Current Outpatient Medications on File Prior to Encounter   Medication Sig Dispense Refill    rivaroxaban (XARELTO) 20 mg tab tablet Take 20 mg by mouth daily.  losartan (COZAAR) 100 mg tablet Take 100 mg by mouth daily.  hydrALAZINE (APRESOLINE) 25 mg tablet Take 25 mg by mouth two (2) times a day.  potassium chloride (KLOR-CON M20) 20 mEq tablet Take 20 mEq by mouth daily.  albuterol (PROVENTIL HFA, VENTOLIN HFA, PROAIR HFA) 90 mcg/actuation inhaler Take 2 Puffs by inhalation every four (4) hours as needed for Wheezing. 1 Inhaler 0    insulin NPH (NOVOLIN N, HUMULIN N) 100 unit/mL injection 0-4 Units by SubCUTAneous route nightly.  Patient states that she administers agent per corrective scale, but was unable to clarify the blood glucose ranges and associated insulin dosages per scale      insulin lispro (HUMALOG) 100 unit/mL injection 0-15 Units by SubCUTAneous route Before breakfast, lunch, and dinner. Patient states that she administers agent per corrective scale, but was unable to clarify the blood glucose ranges and associated insulin dosages per scale      levothyroxine (SYNTHROID) 100 mcg tablet Take 100 mcg by mouth Daily (before breakfast).  metoprolol succinate (TOPROL-XL) 200 mg XL tablet Take 100 mg by mouth nightly.  furosemide (LASIX) 40 mg tablet Take 40 mg by mouth daily as needed (lower extremity swelling).  cloNIDine HCl (CATAPRES) 0.1 mg tablet Take 0.1 mg by mouth two (2) times a day.  acetaminophen (TYLENOL) 500 mg tablet Take 1,000 mg by mouth two (2) times a day.  esomeprazole (NEXIUM) 40 mg capsule Take 40 mg by mouth daily.  metFORMIN (GLUCOPHAGE) 1,000 mg tablet Take 1,000 mg by mouth two (2) times daily (with meals).  NIFEdipine ER (PROCARDIA XL) 60 mg ER tablet Take 60 mg by mouth daily.  Compressor, For Nebulizer jen 1 Each by Does Not Apply route every four (4) hours as needed for Other. 1 Device 0    albuterol (PROVENTIL VENTOLIN) 2.5 mg /3 mL (0.083 %) nebulizer solution 3 mL by Nebulization route every four (4) hours as needed for Wheezing.  24 Each 0       Past History     Past Medical History:  Past Medical History:   Diagnosis Date    Asthma     Diabetes ([address]     GERD (gastroesophageal reflux disease)     Hypertension     Other ill-defined conditions(799.89)     hypothyroid       Past Surgical History:  Past Surgical History:   Procedure Laterality Date    HX GYN      hysterectomy    HX ORTHOPAEDIC      left knee    HX OTHER SURGICAL      lipoma removal       Family History:  Family History   Problem Relation Age of Onset    No Known Problems Mother     No Known Problems Father        Social History:  Social History     Tobacco Use    Smoking status: Never Smoker    Smokeless tobacco: Never Used   Substance Use Topics    Alcohol use: No    Drug use: No       Allergies: Allergies   Allergen Reactions    Pcn [Penicillins] Hives         Review of Systems   Review of Systems   Constitutional: Negative for chills, diaphoresis, fatigue and fever. HENT: Negative for ear pain and sore throat. Eyes: Negative for pain and redness. Respiratory: Negative for cough and shortness of breath. Cardiovascular: Negative for chest pain and leg swelling. Gastrointestinal: Negative for abdominal pain, diarrhea, nausea and vomiting. Endocrine: Negative for cold intolerance and heat intolerance. Genitourinary: Negative for flank pain and hematuria. Musculoskeletal: Negative for back pain and neck stiffness. Skin: Negative for rash and wound. Neurological: Negative for dizziness, syncope and headaches. All other systems reviewed and are negative. Physical Exam   Physical Exam  Vitals signs and nursing note reviewed. Constitutional:       General: She is in acute distress. Appearance: She is well-developed. She is not ill-appearing. Comments: Patient is in acute distress secondary to pain. HENT:      Head: Normocephalic and atraumatic. Mouth/Throat:      Pharynx: No oropharyngeal exudate. Eyes:      Conjunctiva/sclera: Conjunctivae normal.      Pupils: Pupils are equal, round, and reactive to light. Neck:      Musculoskeletal: Normal range of motion. Cardiovascular:      Rate and Rhythm: Normal rate and regular rhythm. Heart sounds: No murmur. Pulmonary:      Effort: Pulmonary effort is normal. No respiratory distress. Breath sounds: Normal breath sounds. No wheezing. Abdominal:      General: Bowel sounds are normal. There is no distension. Palpations: Abdomen is soft. Tenderness: There is no abdominal tenderness.    Musculoskeletal:      Left hip: She exhibits decreased range of motion, bony tenderness, swelling and deformity. Comments: Patient's left lower extremity is shortened and externally rotated. There is good distal DP and TP pulses. Patient is intact sensation L2-S1. Skin:     General: Skin is warm and dry. Findings: No rash. Neurological:      Mental Status: She is alert and oriented to person, place, and time. Coordination: Coordination normal.   Psychiatric:         Behavior: Behavior normal.         Diagnostic Study Results     Labs -     Recent Results (from the past 24 hour(s))   CBC WITH AUTOMATED DIFF    Collection Time: 04/27/21  7:47 PM   Result Value Ref Range    WBC 10.0 3.6 - 11.0 K/uL    RBC 4.74 3.80 - 5.20 M/uL    HGB 12.9 11.5 - 16.0 g/dL    HCT 41.3 35.0 - 47.0 %    MCV 87.1 80.0 - 99.0 FL    MCH 27.2 26.0 - 34.0 PG    MCHC 31.2 30.0 - 36.5 g/dL    RDW 13.7 11.5 - 14.5 %    PLATELET 922 861 - 278 K/uL    MPV 10.5 8.9 - 12.9 FL    NRBC 0.0 0  WBC    ABSOLUTE NRBC 0.00 0.00 - 0.01 K/uL    NEUTROPHILS 58 32 - 75 %    LYMPHOCYTES 33 12 - 49 %    MONOCYTES 6 5 - 13 %    EOSINOPHILS 2 0 - 7 %    BASOPHILS 0 0 - 1 %    IMMATURE GRANULOCYTES 1 (H) 0.0 - 0.5 %    ABS. NEUTROPHILS 5.8 1.8 - 8.0 K/UL    ABS. LYMPHOCYTES 3.3 0.8 - 3.5 K/UL    ABS. MONOCYTES 0.6 0.0 - 1.0 K/UL    ABS. EOSINOPHILS 0.2 0.0 - 0.4 K/UL    ABS. BASOPHILS 0.0 0.0 - 0.1 K/UL    ABS. IMM.  GRANS. 0.1 (H) 0.00 - 0.04 K/UL    DF AUTOMATED     METABOLIC PANEL, COMPREHENSIVE    Collection Time: 04/27/21  7:47 PM   Result Value Ref Range    Sodium 137 136 - 145 mmol/L    Potassium 3.5 3.5 - 5.1 mmol/L    Chloride 102 97 - 108 mmol/L    CO2 30 21 - 32 mmol/L    Anion gap 5 5 - 15 mmol/L    Glucose 182 (H) 65 - 100 mg/dL    BUN 20 6 - 20 MG/DL    Creatinine 1.12 (H) 0.55 - 1.02 MG/DL    BUN/Creatinine ratio 18 12 - 20      GFR est AA 57 (L) >60 ml/min/1.73m2    GFR est non-AA 47 (L) >60 ml/min/1.73m2    Calcium 10.0 8.5 - 10.1 MG/DL    Bilirubin, total 0.9 0.2 - 1.0 MG/DL    ALT (SGPT) 18 12 - 78 U/L AST (SGOT) 13 (L) 15 - 37 U/L    Alk. phosphatase 80 45 - 117 U/L    Protein, total 6.9 6.4 - 8.2 g/dL    Albumin 3.7 3.5 - 5.0 g/dL    Globulin 3.2 2.0 - 4.0 g/dL    A-G Ratio 1.2 1.1 - 2.2     METABOLIC PANEL, COMPREHENSIVE    Collection Time: 04/27/21 10:22 PM   Result Value Ref Range    Sodium 137 136 - 145 mmol/L    Potassium 3.7 3.5 - 5.1 mmol/L    Chloride 103 97 - 108 mmol/L    CO2 28 21 - 32 mmol/L    Anion gap 6 5 - 15 mmol/L    Glucose 240 (H) 65 - 100 mg/dL    BUN 20 6 - 20 MG/DL    Creatinine 1.04 (H) 0.55 - 1.02 MG/DL    BUN/Creatinine ratio 19 12 - 20      GFR est AA >60 >60 ml/min/1.73m2    GFR est non-AA 51 (L) >60 ml/min/1.73m2    Calcium 9.5 8.5 - 10.1 MG/DL    Bilirubin, total 1.1 (H) 0.2 - 1.0 MG/DL    ALT (SGPT) 17 12 - 78 U/L    AST (SGOT) 11 (L) 15 - 37 U/L    Alk. phosphatase 77 45 - 117 U/L    Protein, total 6.6 6.4 - 8.2 g/dL    Albumin 3.4 (L) 3.5 - 5.0 g/dL    Globulin 3.2 2.0 - 4.0 g/dL    A-G Ratio 1.1 1.1 - 2.2     CBC WITH AUTOMATED DIFF    Collection Time: 04/27/21 10:22 PM   Result Value Ref Range    WBC 17.5 (H) 3.6 - 11.0 K/uL    RBC 4.32 3.80 - 5.20 M/uL    HGB 11.8 11.5 - 16.0 g/dL    HCT 37.5 35.0 - 47.0 %    MCV 86.8 80.0 - 99.0 FL    MCH 27.3 26.0 - 34.0 PG    MCHC 31.5 30.0 - 36.5 g/dL    RDW 13.6 11.5 - 14.5 %    PLATELET 479 908 - 157 K/uL    MPV 10.5 8.9 - 12.9 FL    NRBC 0.0 0  WBC    ABSOLUTE NRBC 0.00 0.00 - 0.01 K/uL    NEUTROPHILS 85 (H) 32 - 75 %    LYMPHOCYTES 9 (L) 12 - 49 %    MONOCYTES 5 5 - 13 %    EOSINOPHILS 0 0 - 7 %    BASOPHILS 0 0 - 1 %    IMMATURE GRANULOCYTES 1 (H) 0.0 - 0.5 %    ABS. NEUTROPHILS 14.9 (H) 1.8 - 8.0 K/UL    ABS. LYMPHOCYTES 1.6 0.8 - 3.5 K/UL    ABS. MONOCYTES 0.8 0.0 - 1.0 K/UL    ABS. EOSINOPHILS 0.1 0.0 - 0.4 K/UL    ABS. BASOPHILS 0.0 0.0 - 0.1 K/UL    ABS. IMM.  GRANS. 0.1 (H) 0.00 - 0.04 K/UL    DF AUTOMATED     PROTHROMBIN TIME + INR    Collection Time: 04/27/21 10:22 PM   Result Value Ref Range    INR 1.1 0.9 - 1.1 Prothrombin time 11.6 (H) 9.0 - 11.1 sec   URINALYSIS W/ REFLEX CULTURE    Collection Time: 04/27/21 10:22 PM    Specimen: Urine   Result Value Ref Range    Color YELLOW/STRAW      Appearance CLEAR CLEAR      Specific gravity 1.016 1.003 - 1.030      pH (UA) 5.5 5.0 - 8.0      Protein Negative NEG mg/dL    Glucose 100 (A) NEG mg/dL    Ketone 15 (A) NEG mg/dL    Bilirubin Negative NEG      Blood Negative NEG      Urobilinogen 0.2 0.2 - 1.0 EU/dL    Nitrites Negative NEG      Leukocyte Esterase Negative NEG      WBC 0-4 0 - 4 /hpf    RBC 0-5 0 - 5 /hpf    Epithelial cells FEW FEW /lpf    Bacteria Negative NEG /hpf    UA:UC IF INDICATED CULTURE NOT INDICATED BY UA RESULT CNI      Hyaline cast 0-2 0 - 5 /lpf   TYPE & SCREEN    Collection Time: 04/27/21 10:22 PM   Result Value Ref Range    Crossmatch Expiration 04/30/2021,2359     ABO/Rh(D) [place] [facility] POSITIVE     Antibody screen NEG    GLUCOSE, POC    Collection Time: 04/27/21 10:56 PM   Result Value Ref Range    Glucose (POC) 255 (H) 65 - 100 mg/dL    Performed by Marce Johnston RN        Radiologic Studies -   XR CHEST SNGL V   Final Result   No acute cardiopulmonary disease radiographically. .  . Poor inspiration. XR WRIST LT AP/LAT/OBL MIN 3V   Final Result   No acute bony abnormality. XR FEMUR LT 2 V   Final Result   Intertrochanteric and subtrochanteric comminuted proximal left femoral fracture. No underlying bone lesion. XR HIP LT W OR WO PELV 2-3 VWS   Final Result   Intertrochanteric and subtrochanteric comminuted proximal left femoral fracture. No underlying bone lesion. CT Results  (Last 48 hours)    None        CXR Results  (Last 48 hours)               04/27/21 2120  XR CHEST SNGL V Final result    Impression:  No acute cardiopulmonary disease radiographically. .  . Poor inspiration. Narrative:  INDICATION:  fall        EXAM: Chest single view. COMPARISON: 3/2/2019.        FINDINGS: A single frontal view of the chest at 2100 hours shows mid inspiratory   effort with crowded lung markings in both lung bases. .  The heart, mediastinum   and pulmonary vasculature are stable . The bony thorax is unremarkable for age. .                   Medical Decision Making   I am the first provider for this patient. I reviewed the vital signs, available nursing notes, past medical history, past surgical history, family history and social history. Vital Signs-Reviewed the patient's vital signs. Patient Vitals for the past 12 hrs:   Temp Pulse Resp BP SpO2   04/28/21 0031 98.3 °F (36.8 °C) (!) 112 18 (!) 156/86 95 %   04/27/21 2045    (!) 176/104 94 %   04/27/21 2030    (!) 177/92 95 %   04/27/21 2000    (!) 167/94 97 %   04/27/21 1949    (!) 177/93 95 %   04/27/21 1946     96 %   04/27/21 1938 98.4 °F (36.9 °C) 91 18 (!) 164/98 95 %       Records Reviewed: Nursing records and medical records reviewed    MDM:  Hip fracture versus septic dislocation versus rib fracture    Provider Notes (Medical Decision Making):   Patient is a 59-year-old female presenting status post mechanical fall with a left intertrochanteric and subtrochanteric fracture of the left hip. Wrist x-rays were normal.  She is neurovascular intact in both extremities. Orthopedics has been consulted and Dr. Mami King will see the patient in the morning. I have communicated with the orthopedic physician assistant we will admit to the hospitalist for medical clearance. ED Course:   Initial assessment performed. The patients presenting problems have been discussed, and they are in agreement with the care plan formulated and outlined with them. I have encouraged them to ask questions as they arise throughout their visit. Critical Care:  None      Disposition:  Admit Note:  12:34 AM  Pt is being admitted by hospitalist. The results of their tests and reason(s) for their admission have been discussed with pt and/or available family.  They convey agreement and understanding for the need to be admitted and for admission diagnosis. Diagnosis     Clinical Impression:   1. Closed displaced intertrochanteric fracture of left femur, initial encounter ([facility] [address]    2. Closed displaced subtrochanteric fracture of left femur, initial encounter ([facility] [address]        Attestations:    Deyanira Pratt MD    Please note that this dictation was completed with Firm[employer], the computer voice recognition software. Quite often unanticipated grammatical, syntax, homophones, and other interpretive errors are inadvertently transcribed by the computer software. Please disregard these errors. Please excuse any errors that have escaped final proofreading. Thank you.

## 2021-04-28 NOTE — PROGRESS NOTES
Pt has been diagnosed with a acute hip fracture and is currently on bedrest.  Orders are from hospitalist.  Pt is scheduled for hip fracture repair today. Will await orders from ortho with WB status and surgery to be completed prior to start of OT services.

## 2021-04-28 NOTE — CONSULTS
FRACTURE CONSULT NOTE    Subjective:     Date of Consultation:  April 28, 2021  Referring Physician:  Lisandro Iniguez is a 66 y.o. female who presents after a GLF with displaced left subtrochanteric femur fracture. No other reported injuries of LOC. Deemed medically optimized for surgery. Patient Active Problem List    Diagnosis Date Noted    Hip fracture ([facility] 04/27/2021    NSTEMI, initial episode of care [facility]) 02/28/2018    NSTEMI (non-ST elevated myocardial infarction) ([facility] 02/27/2018    Asthma 02/27/2018    DM (diabetes mellitus) ([facility] 02/27/2018    HTN (hypertension) 02/27/2018    GERD (gastroesophageal reflux disease) 02/27/2018     Family History   Problem Relation Age of Onset    No Known Problems Mother     No Known Problems Father       Social History     Tobacco Use    Smoking status: Never Smoker    Smokeless tobacco: Never Used   Substance Use Topics    Alcohol use: No     Past Medical History:   Diagnosis Date    A-fib ([facility]     Asthma     Diabetes ([facility]     GERD (gastroesophageal reflux disease)     Hypertension     Other ill-defined conditions(799.89)     hypothyroid      Past Surgical History:   Procedure Laterality Date    HX GYN      hysterectomy    HX ORTHOPAEDIC       LEFT KNEE ARTHROSCOPY    HX OTHER SURGICAL      lipoma removal      Prior to Admission medications    Medication Sig Start Date End Date Taking? Authorizing Provider   rivaroxaban (XARELTO) 20 mg tab tablet Take 20 mg by mouth daily. Yes Provider, Historical   losartan (COZAAR) 100 mg tablet Take 100 mg by mouth daily. Yes Provider, Historical   hydrALAZINE (APRESOLINE) 25 mg tablet Take 25 mg by mouth two (2) times a day. Yes Provider, Historical   potassium chloride (KLOR-CON M20) 20 mEq tablet Take 20 mEq by mouth daily.    Yes Provider, Historical   albuterol (PROVENTIL VENTOLIN) 2.5 mg /3 mL (0.083 %) nebulizer solution 3 mL by Nebulization route every four (4) hours as needed for Wheezing. 3/2/19  Yes Rosa Vicente, DO   albuterol (PROVENTIL HFA, VENTOLIN HFA, PROAIR HFA) 90 mcg/actuation inhaler Take 2 Puffs by inhalation every four (4) hours as needed for Wheezing. 2/27/18  Yes Odalys Oconnell MD   insulin NPH (NOVOLIN N, HUMULIN N) 100 unit/mL injection 0-4 Units by SubCUTAneous route nightly. Patient states that she administers agent per corrective scale, but was unable to clarify the blood glucose ranges and associated insulin dosages per scale   Yes Provider, Historical   insulin lispro (HUMALOG) 100 unit/mL injection 0-15 Units by SubCUTAneous route Before breakfast, lunch, and dinner. Patient states that she administers agent per corrective scale, but was unable to clarify the blood glucose ranges and associated insulin dosages per scale   Yes Provider, Historical   levothyroxine (SYNTHROID) 100 mcg tablet Take 100 mcg by mouth Daily (before breakfast). Yes Provider, Historical   metoprolol succinate (TOPROL-XL) 200 mg XL tablet Take 100 mg by mouth nightly. Yes Provider, Historical   furosemide (LASIX) 40 mg tablet Take 40 mg by mouth daily as needed (lower extremity swelling). Yes Provider, Historical   cloNIDine HCl (CATAPRES) 0.1 mg tablet Take 0.1 mg by mouth two (2) times a day. Yes Provider, Historical   acetaminophen (TYLENOL) 500 mg tablet Take 1,000 mg by mouth two (2) times a day. Yes Provider, Historical   esomeprazole (NEXIUM) 40 mg capsule Take 40 mg by mouth daily. Yes Other, MD Olman   metFORMIN (GLUCOPHAGE) 1,000 mg tablet Take 1,000 mg by mouth two (2) times daily (with meals). Yes Other, MD Olman   NIFEdipine ER (PROCARDIA XL) 60 mg ER tablet Take 60 mg by mouth daily. Provider, Historical   Compressor, For Nebulizer ejn 1 Each by Does Not Apply route every four (4) hours as needed for Other.  3/2/19   Rosa Vicente, DO     Current Facility-Administered Medications   Medication Dose Route Frequency    labetaloL (NORMODYNE;TRANDATE) injection 10 mg  10 mg IntraVENous Q4H PRN    hydrALAZINE (APRESOLINE) 20 mg/mL injection 10 mg  10 mg IntraVENous Q4H PRN    insulin glargine (LANTUS) injection 10 Units  10 Units SubCUTAneous QHS    calcium carbonate (TUMS) chewable tablet 200 mg [elemental]  200 mg Oral TID WITH MEALS    lactated Ringers infusion  25 mL/hr IntraVENous CONTINUOUS    lactated Ringers infusion  25 mL/hr IntraVENous CONTINUOUS    sodium chloride (NS) flush 5-40 mL  5-40 mL IntraVENous Q8H    sodium chloride (NS) flush 5-40 mL  5-40 mL IntraVENous PRN    lidocaine (PF) (XYLOCAINE) 10 mg/mL (1 %) injection 0.1 mL  0.1 mL SubCUTAneous PRN    sodium chloride (NS) flush 5-40 mL  5-40 mL IntraVENous Q8H    sodium chloride (NS) flush 5-40 mL  5-40 mL IntraVENous PRN    acetaminophen (TYLENOL) tablet 650 mg  650 mg Oral Q6H    oxyCODONE IR (ROXICODONE) tablet 2.5 mg  2.5 mg Oral Q4H PRN    oxyCODONE IR (ROXICODONE) tablet 5 mg  5 mg Oral Q4H PRN    naloxone (NARCAN) injection 0.4 mg  0.4 mg IntraVENous PRN    senna-docusate (PERICOLACE) 8.6-50 mg per tablet 2 Tab  2 Tab Oral BID    vancomycin (VANCOCIN) 1,000 mg in 0.9% sodium chloride 250 mL (VIAL-MATE)  1,000 mg IntraVENous ON CALL TO OR    HYDROmorphone (DILAUDID) injection 0.5-1 mg  0.5-1 mg IntraVENous Q2H PRN    albuterol (PROVENTIL VENTOLIN) nebulizer solution 2.5 mg  2.5 mg Nebulization Q4H PRN    cloNIDine HCL (CATAPRES) tablet 0.1 mg  0.1 mg Oral BID    pantoprazole (PROTONIX) tablet 40 mg  40 mg Oral ACB    furosemide (LASIX) tablet 40 mg  40 mg Oral DAILY PRN    hydrALAZINE (APRESOLINE) tablet 25 mg  25 mg Oral BID    levothyroxine (SYNTHROID) tablet 100 mcg  100 mcg Oral 6am    losartan (COZAAR) tablet 100 mg  100 mg Oral DAILY    [Held by provider] metFORMIN (GLUCOPHAGE) tablet 1,000 mg  1,000 mg Oral BID WITH MEALS    metoprolol succinate (TOPROL-XL) XL tablet 100 mg  100 mg Oral QHS    NIFEdipine ER (PROCARDIA XL) tablet 60 mg  60 mg Oral DAILY    potassium chloride (K-DUR, KLOR-CON) SR tablet 20 mEq  20 mEq Oral DAILY    sodium chloride (NS) flush 5-40 mL  5-40 mL IntraVENous Q8H    sodium chloride (NS) flush 5-40 mL  5-40 mL IntraVENous PRN    acetaminophen (TYLENOL) tablet 650 mg  650 mg Oral Q6H PRN    Or    acetaminophen (TYLENOL) suppository 650 mg  650 mg Rectal Q6H PRN    polyethylene glycol (MIRALAX) packet 17 g  17 g Oral DAILY PRN    promethazine (PHENERGAN) tablet 12.5 mg  12.5 mg Oral Q6H PRN    Or    ondansetron (ZOFRAN) injection 4 mg  4 mg IntraVENous Q6H PRN    fentaNYL citrate (PF) injection 50 mcg  50 mcg IntraVENous Q4H PRN    insulin lispro (HUMALOG) injection   SubCUTAneous AC&HS    glucose chewable tablet 16 g  4 Tab Oral PRN    dextrose (D50W) injection syrg 12.5-25 g  12.5-25 g IntraVENous PRN    glucagon (GLUCAGEN) injection 1 mg  1 mg IntraMUSCular PRN      Allergies   Allergen Reactions    Pcn [Penicillins] Hives        Review of Systems:    Negative for fevers, chills, nausea, vomiting, chest pain, shortness of breath, headaches.         Objective:     Patient Vitals for the past 24 hrs:   Temp Pulse Resp BP SpO2   04/28/21 1600  (!) 104 14 (!) 153/77 94 %   04/28/21 1550  (!) 106 15 (!) 143/77 93 %   04/28/21 1545  (!) 109 17 (!) 153/85 95 %   04/28/21 1540  (!) 105 18 133/80 93 %   04/28/21 1535  (!) 106 14 (!) 151/97 93 %   04/28/21 1530  (!) 104 12 (!) 147/83 95 %   04/28/21 1525  (!) 108 12 135/85 93 %   04/28/21 1520  (!) 110 17 (!) 154/74 95 %   04/28/21 1517  (!) 112 14 (!) 155/80 93 %   04/28/21 1436 98.6 °F (37 °C) (!) 118 13 (!) 145/78 94 %   04/28/21 1109 98.3 °F (36.8 °C) (!) 117 18 (!) 152/71 94 %   04/28/21 0740 98.5 °F (36.9 °C) (!) 118 18 (!) 167/85 97 %   04/28/21 0640  (!) 137  (!) 179/91    04/28/21 0601  (!) 108  (!) 182/93    04/28/21 0337 97.9 °F (36.6 °C) (!) 113 18 (!) 184/104 96 %   04/28/21 0150  (!) 115  (!) 145/112    04/28/21 0054 98.2 °F (36.8 °C) (!) 109 18 (!) 172/90 96 % 21 0031 98.3 °F (36.8 °C) (!) 112 18 (!) 156/86 95 %   21    (!) 176/104 94 %   21    (!) 177/92 95 %   21    (!) 167/94 97 %   21    (!) 177/93 95 %   21     96 %   21 98.4 °F (36.9 °C) 91 18 (!) 164/98 95 %       Temp (24hrs), Av.3 °F (36.8 °C), Min:97.9 °F (36.6 °C), Max:98.6 °F (37 °C)      Gen: NAD, A&Ox3  Resp: Non-labored breathing  CV: Extremities well perfused  Abd: soft, NT  LLE: shortened, rotated, did not range due to pain, skin intact, warm well perfused, SILT in al distributions L3-S1, palpable pedal pulses, no calf tenderness  RLE: no pain with ROM, no swelling about knee or ankle, skin intact, warm well perfused, SILT in al distributions L3-S1, palpable pedal pulses, no calf tenderness    Imaging Review: Displaced left subtroch femur fracture    Labs:   Recent Results (from the past 24 hour(s))   CBC WITH AUTOMATED DIFF    Collection Time: 21  7:47 PM   Result Value Ref Range    WBC 10.0 3.6 - 11.0 K/uL    RBC 4.74 3.80 - 5.20 M/uL    HGB 12.9 11.5 - 16.0 g/dL    HCT 41.3 35.0 - 47.0 %    MCV 87.1 80.0 - 99.0 FL    MCH 27.2 26.0 - 34.0 PG    MCHC 31.2 30.0 - 36.5 g/dL    RDW 13.7 11.5 - 14.5 %    PLATELET 595 764 - 833 K/uL    MPV 10.5 8.9 - 12.9 FL    NRBC 0.0 0  WBC    ABSOLUTE NRBC 0.00 0.00 - 0.01 K/uL    NEUTROPHILS 58 32 - 75 %    LYMPHOCYTES 33 12 - 49 %    MONOCYTES 6 5 - 13 %    EOSINOPHILS 2 0 - 7 %    BASOPHILS 0 0 - 1 %    IMMATURE GRANULOCYTES 1 (H) 0.0 - 0.5 %    ABS. NEUTROPHILS 5.8 1.8 - 8.0 K/UL    ABS. LYMPHOCYTES 3.3 0.8 - 3.5 K/UL    ABS. MONOCYTES 0.6 0.0 - 1.0 K/UL    ABS. EOSINOPHILS 0.2 0.0 - 0.4 K/UL    ABS. BASOPHILS 0.0 0.0 - 0.1 K/UL    ABS. IMM.  GRANS. 0.1 (H) 0.00 - 0.04 K/UL    DF AUTOMATED     METABOLIC PANEL, COMPREHENSIVE    Collection Time: 21  7:47 PM   Result Value Ref Range    Sodium 137 136 - 145 mmol/L    Potassium 3.5 3.5 - 5.1 mmol/L    Chloride 102 97 - 108 mmol/L    CO2 30 21 - 32 mmol/L    Anion gap 5 5 - 15 mmol/L    Glucose 182 (H) 65 - 100 mg/dL    BUN 20 6 - 20 MG/DL    Creatinine 1.12 (H) 0.55 - 1.02 MG/DL    BUN/Creatinine ratio 18 12 - 20      GFR est AA 57 (L) >60 ml/min/1.73m2    GFR est non-AA 47 (L) >60 ml/min/1.73m2    Calcium 10.0 8.5 - 10.1 MG/DL    Bilirubin, total 0.9 0.2 - 1.0 MG/DL    ALT (SGPT) 18 12 - 78 U/L    AST (SGOT) 13 (L) 15 - 37 U/L    Alk. phosphatase 80 45 - 117 U/L    Protein, total 6.9 6.4 - 8.2 g/dL    Albumin 3.7 3.5 - 5.0 g/dL    Globulin 3.2 2.0 - 4.0 g/dL    A-G Ratio 1.2 1.1 - 2.2     METABOLIC PANEL, COMPREHENSIVE    Collection Time: 04/27/21 10:22 PM   Result Value Ref Range    Sodium 137 136 - 145 mmol/L    Potassium 3.7 3.5 - 5.1 mmol/L    Chloride 103 97 - 108 mmol/L    CO2 28 21 - 32 mmol/L    Anion gap 6 5 - 15 mmol/L    Glucose 240 (H) 65 - 100 mg/dL    BUN 20 6 - 20 MG/DL    Creatinine 1.04 (H) 0.55 - 1.02 MG/DL    BUN/Creatinine ratio 19 12 - 20      GFR est AA >60 >60 ml/min/1.73m2    GFR est non-AA 51 (L) >60 ml/min/1.73m2    Calcium 9.5 8.5 - 10.1 MG/DL    Bilirubin, total 1.1 (H) 0.2 - 1.0 MG/DL    ALT (SGPT) 17 12 - 78 U/L    AST (SGOT) 11 (L) 15 - 37 U/L    Alk.  phosphatase 77 45 - 117 U/L    Protein, total 6.6 6.4 - 8.2 g/dL    Albumin 3.4 (L) 3.5 - 5.0 g/dL    Globulin 3.2 2.0 - 4.0 g/dL    A-G Ratio 1.1 1.1 - 2.2     CBC WITH AUTOMATED DIFF    Collection Time: 04/27/21 10:22 PM   Result Value Ref Range    WBC 17.5 (H) 3.6 - 11.0 K/uL    RBC 4.32 3.80 - 5.20 M/uL    HGB 11.8 11.5 - 16.0 g/dL    HCT 37.5 35.0 - 47.0 %    MCV 86.8 80.0 - 99.0 FL    MCH 27.3 26.0 - 34.0 PG    MCHC 31.5 30.0 - 36.5 g/dL    RDW 13.6 11.5 - 14.5 %    PLATELET 040 763 - 252 K/uL    MPV 10.5 8.9 - 12.9 FL    NRBC 0.0 0  WBC    ABSOLUTE NRBC 0.00 0.00 - 0.01 K/uL    NEUTROPHILS 85 (H) 32 - 75 %    LYMPHOCYTES 9 (L) 12 - 49 %    MONOCYTES 5 5 - 13 %    EOSINOPHILS 0 0 - 7 %    BASOPHILS 0 0 - 1 %    IMMATURE GRANULOCYTES 1 (H) 0.0 - 0.5 %    ABS. NEUTROPHILS 14.9 (H) 1.8 - 8.0 K/UL    ABS. LYMPHOCYTES 1.6 0.8 - 3.5 K/UL    ABS. MONOCYTES 0.8 0.0 - 1.0 K/UL    ABS. EOSINOPHILS 0.1 0.0 - 0.4 K/UL    ABS. BASOPHILS 0.0 0.0 - 0.1 K/UL    ABS. IMM.  GRANS. 0.1 (H) 0.00 - 0.04 K/UL    DF AUTOMATED     PROTHROMBIN TIME + INR    Collection Time: 04/27/21 10:22 PM   Result Value Ref Range    INR 1.1 0.9 - 1.1      Prothrombin time 11.6 (H) 9.0 - 11.1 sec   URINALYSIS W/ REFLEX CULTURE    Collection Time: 04/27/21 10:22 PM    Specimen: Urine   Result Value Ref Range    Color YELLOW/STRAW      Appearance CLEAR CLEAR      Specific gravity 1.016 1.003 - 1.030      pH (UA) 5.5 5.0 - 8.0      Protein Negative NEG mg/dL    Glucose 100 (A) NEG mg/dL    Ketone 15 (A) NEG mg/dL    Bilirubin Negative NEG      Blood Negative NEG      Urobilinogen 0.2 0.2 - 1.0 EU/dL    Nitrites Negative NEG      Leukocyte Esterase Negative NEG      WBC 0-4 0 - 4 /hpf    RBC 0-5 0 - 5 /hpf    Epithelial cells FEW FEW /lpf    Bacteria Negative NEG /hpf    UA:UC IF INDICATED CULTURE NOT INDICATED BY UA RESULT CNI      Hyaline cast 0-2 0 - 5 /lpf   TYPE & SCREEN    Collection Time: 04/27/21 10:22 PM   Result Value Ref Range    Crossmatch Expiration 04/30/2021,2359     ABO/Rh(D) Maxx Keys POSITIVE     Antibody screen NEG    GLUCOSE, POC    Collection Time: 04/27/21 10:56 PM   Result Value Ref Range    Glucose (POC) 255 (H) 65 - 100 mg/dL    Performed by Amy Davison RN    EKG, 12 LEAD, INITIAL    Collection Time: 04/27/21 11:42 PM   Result Value Ref Range    Ventricular Rate 122 BPM    Atrial Rate 119 BPM    QRS Duration 104 ms    Q-T Interval 294 ms    QTC Calculation (Bezet) 418 ms    Calculated R Axis 29 degrees    Calculated T Axis -164 degrees    Diagnosis       Atrial fibrillation with rapid ventricular response  Nonspecific ST and T wave abnormality    Confirmed by Shirley Ortiz M.D. ([id number]) on 4/28/2021 4:51:92 AM     METABOLIC PANEL, BASIC    Collection Time: 04/28/21  5:52 AM   Result Value Ref Range    Sodium 135 (L) 136 - 145 mmol/L    Potassium 3.8 3.5 - 5.1 mmol/L    Chloride 102 97 - 108 mmol/L    CO2 25 21 - 32 mmol/L    Anion gap 8 5 - 15 mmol/L    Glucose 334 (H) 65 - 100 mg/dL    BUN 20 6 - 20 MG/DL    Creatinine 1.07 (H) 0.55 - 1.02 MG/DL    BUN/Creatinine ratio 19 12 - 20      GFR est AA >60 >60 ml/min/1.73m2    GFR est non-AA 50 (L) >60 ml/min/1.73m2    Calcium 9.7 8.5 - 10.1 MG/DL   CBC W/O DIFF    Collection Time: 04/28/21  5:52 AM   Result Value Ref Range    WBC 15.3 (H) 3.6 - 11.0 K/uL    RBC 4.29 3.80 - 5.20 M/uL    HGB 11.8 11.5 - 16.0 g/dL    HCT 37.5 35.0 - 47.0 %    MCV 87.4 80.0 - 99.0 FL    MCH 27.5 26.0 - 34.0 PG    MCHC 31.5 30.0 - 36.5 g/dL    RDW 13.8 11.5 - 14.5 %    PLATELET 471 746 - 823 K/uL    MPV 10.8 8.9 - 12.9 FL    NRBC 0.0 0  WBC    ABSOLUTE NRBC 0.00 0.00 - 0.01 K/uL   GLUCOSE, POC    Collection Time: 04/28/21  6:37 AM   Result Value Ref Range    Glucose (POC) 327 (H) 65 - 100 mg/dL    Performed by Yash Elliott PCT    GLUCOSE, POC    Collection Time: 04/28/21 11:14 AM   Result Value Ref Range    Glucose (POC) 294 (H) 65 - 100 mg/dL    Performed by Johnny Devine PCT    GLUCOSE, POC    Collection Time: 04/28/21  3:00 PM   Result Value Ref Range    Glucose (POC) 226 (H) 65 - 100 mg/dL    Performed by Sabra Amanda          Current Facility-Administered Medications   Medication Dose Route Frequency    labetaloL (NORMODYNE;TRANDATE) injection 10 mg  10 mg IntraVENous Q4H PRN    hydrALAZINE (APRESOLINE) 20 mg/mL injection 10 mg  10 mg IntraVENous Q4H PRN    insulin glargine (LANTUS) injection 10 Units  10 Units SubCUTAneous QHS    calcium carbonate (TUMS) chewable tablet 200 mg [elemental]  200 mg Oral TID WITH MEALS    lactated Ringers infusion  25 mL/hr IntraVENous CONTINUOUS    lactated Ringers infusion  25 mL/hr IntraVENous CONTINUOUS    sodium chloride (NS) flush 5-40 mL  5-40 mL IntraVENous Q8H    sodium chloride (NS) flush 5-40 mL  5-40 mL IntraVENous PRN    lidocaine (PF) (XYLOCAINE) 10 mg/mL (1 %) injection 0.1 mL  0.1 mL SubCUTAneous PRN    sodium chloride (NS) flush 5-40 mL  5-40 mL IntraVENous Q8H    sodium chloride (NS) flush 5-40 mL  5-40 mL IntraVENous PRN    acetaminophen (TYLENOL) tablet 650 mg  650 mg Oral Q6H    oxyCODONE IR (ROXICODONE) tablet 2.5 mg  2.5 mg Oral Q4H PRN    oxyCODONE IR (ROXICODONE) tablet 5 mg  5 mg Oral Q4H PRN    naloxone (NARCAN) injection 0.4 mg  0.4 mg IntraVENous PRN    senna-docusate (PERICOLACE) 8.6-50 mg per tablet 2 Tab  2 Tab Oral BID    vancomycin (VANCOCIN) 1,000 mg in 0.9% sodium chloride 250 mL (VIAL-MATE)  1,000 mg IntraVENous ON CALL TO OR    HYDROmorphone (DILAUDID) injection 0.5-1 mg  0.5-1 mg IntraVENous Q2H PRN    albuterol (PROVENTIL VENTOLIN) nebulizer solution 2.5 mg  2.5 mg Nebulization Q4H PRN    cloNIDine HCL (CATAPRES) tablet 0.1 mg  0.1 mg Oral BID    pantoprazole (PROTONIX) tablet 40 mg  40 mg Oral ACB    furosemide (LASIX) tablet 40 mg  40 mg Oral DAILY PRN    hydrALAZINE (APRESOLINE) tablet 25 mg  25 mg Oral BID    levothyroxine (SYNTHROID) tablet 100 mcg  100 mcg Oral 6am    losartan (COZAAR) tablet 100 mg  100 mg Oral DAILY    [Held by provider] metFORMIN (GLUCOPHAGE) tablet 1,000 mg  1,000 mg Oral BID WITH MEALS    metoprolol succinate (TOPROL-XL) XL tablet 100 mg  100 mg Oral QHS    NIFEdipine ER (PROCARDIA XL) tablet 60 mg  60 mg Oral DAILY    potassium chloride (K-DUR, KLOR-CON) SR tablet 20 mEq  20 mEq Oral DAILY    sodium chloride (NS) flush 5-40 mL  5-40 mL IntraVENous Q8H    sodium chloride (NS) flush 5-40 mL  5-40 mL IntraVENous PRN    acetaminophen (TYLENOL) tablet 650 mg  650 mg Oral Q6H PRN    Or    acetaminophen (TYLENOL) suppository 650 mg  650 mg Rectal Q6H PRN    polyethylene glycol (MIRALAX) packet 17 g  17 g Oral DAILY PRN    promethazine (PHENERGAN) tablet 12.5 mg  12.5 mg Oral Q6H PRN    Or    ondansetron (ZOFRAN) injection 4 mg  4 mg IntraVENous Q6H PRN    fentaNYL citrate (PF) injection 50 mcg  50 mcg IntraVENous Q4H PRN    insulin lispro (HUMALOG) injection   SubCUTAneous AC&HS    glucose chewable tablet 16 g  4 Tab Oral PRN    dextrose (D50W) injection syrg 12.5-25 g  12.5-25 g IntraVENous PRN    glucagon (GLUCAGEN) injection 1 mg  1 mg IntraMUSCular PRN         Impression:     Active Problems:    Hip fracture (HCC) (4/27/2021)    65 yo female s/p GLF onto left side resulting in a left subtroch femur fracture    Plan:   Plan for IM nailing left subtrochanteric femur fracture        Patrick Fernandez MD

## 2021-04-28 NOTE — PROGRESS NOTES
End of Shift Note    Bedside shift change report given to [employer] (oncoming nurse) by Khalida Castillo (offgoing nurse). Report included the following information SBAR, Kardex, Intake/Output, MAR, Recent Results, Med Rec Status and Cardiac Rhythm A-fib    Shift worked:  night     Shift summary and any significant changes:     Patient admitted to floor from the ED at 1 am, patient given CHG bath and lainez placed. Pain meds given. BP and heart rate remain elevated, cardiac monitoring started, PRN labetalol and hydralazine given     Concerns for physician to address:  Patient has questions about surgery before she feels comfortable signing the consent     Zone phone for oncoming shift:   [phone]       Activity:  Activity Level: Bed Rest  Number times ambulated in hallways past shift: 0  Number of times OOB to chair past shift: 0    Cardiac:   Cardiac Monitoring: Yes      Cardiac Rhythm: Atrial fibrillation    Access:   Current line(s): PIV     Genitourinary:   Urinary status: lainez    Respiratory:   O2 Device: None  Chronic home O2 use?: NO  Incentive spirometer at bedside: YES     GI:     Current diet:  DIET NPO  Passing flatus: YES  Tolerating current diet: YES       Pain Management:   Patient states pain is manageable on current regimen: YES    Skin:  Leonard Score: 17  Interventions: float heels    Patient Safety:  Fall Score:  Total Score: 3  Interventions: bed/chair alarm, assistive device (walker, cane, etc) and gripper socks  High Fall Risk: Yes    Length of Stay:  Expected LOS: - - -  Actual LOS: [address] [facility]

## 2021-04-28 NOTE — ROUTINE PROCESS
TRANSFER - IN REPORT:    Verbal report received from OR(name) on [facility]  being received from OR(unit) for routine post - op      Report consisted of patients Situation, Background, Assessment and   Recommendations(SBAR). Information from the following report(s) OR Summary was reviewed with the receiving nurse. Opportunity for questions and clarification was provided. Assessment completed upon patients arrival to unit and care assumed.

## 2021-04-28 NOTE — PROGRESS NOTES
Hospitalist Progress Note    NAME: Inez Gutierrez   :  1945   MRN:  [id number]       Assessment / Plan:      Left hip fracture POA  -Patient presented with mechanical fall and noticed to have left hip fracture  -Appreciate orthopedic consultation, plan for intramedullary nail fixation tomorrow  -Pain control  -Postop PT OT  -SCDs for now, will resume Xarelto postoperatively  -Patient's last dose of Xarelto was 24 hours ago  -According to RCRI criteria this patient is moderate risk for moderate risk surgery like hip surgery and she is clinically optimized to undergo left hip surgery     History of hypertension  -Resume home medication including clonidine, hydralazine, losartan, metoprolol and nifedipine     Hypothyroidism  -Continue levothyroxine     Chronic atrial fibrillation  -Continue metoprolol  -Hold Xarelto for anticipated surgery, will resume after surgery     Insulin-dependent type 2 diabetes  -Hold Metformin and continue sliding scale insulin  Add lantus 10U at night and lispro 3u TID     GERD  -Continue PPI     Code Status: Patient wishes to be DNR  Surrogate Decision Maker: She wants to designate her stefany Romero Frame as surrogate POA     DVT Prophylaxis: SCDs for now, resume Xarelto after surgery  GI Prophylaxis: not indicated     Baseline: Independent/ambulatory     Subjective:     Chief Complaint / Reason for Physician Visit  Discussed with RN events overnight. L hip pain 7/10  C/o acid reflux    Review of Systems:  Symptom Y/N Comments  Symptom Y/N Comments   Fever/Chills    Chest Pain     Poor Appetite    Edema     Cough    Abdominal Pain     Sputum    Joint Pain     SOB/GARCIA    Pruritis/Rash     Nausea/vomit    Tolerating PT/OT     Diarrhea    Tolerating Diet     Constipation    Other       Could NOT obtain due to:      Objective:     VITALS:   Last 24hrs VS reviewed since prior progress note.  Most recent are:  Patient Vitals for the past 24 hrs:   Temp Pulse Resp BP SpO2   21 0740 98.5 °F (36.9 °C) (!) 118 18 (!) 167/85 97 %   04/28/21 0640  (!) 137  (!) 179/91    04/28/21 0601  (!) 108  (!) 182/93    04/28/21 0337 97.9 °F (36.6 °C) (!) 113 18 (!) 184/104 96 %   04/28/21 0150  (!) 115  (!) 145/112    04/28/21 0054 98.2 °F (36.8 °C) (!) 109 18 (!) 172/90 96 %   04/28/21 0031 98.3 °F (36.8 °C) (!) 112 18 (!) 156/86 95 %   04/27/21 2045    (!) 176/104 94 %   04/27/21 2030    (!) 177/92 95 %   04/27/21 2000    (!) 167/94 97 %   04/27/21 1949    (!) 177/93 95 %   04/27/21 1946     96 %   04/27/21 1938 98.4 °F (36.9 °C) 91 18 (!) 164/98 95 %     No intake or output data in the 24 hours ending 04/28/21 0843     I had a face to face encounter and independently examined this patient on 4/28/2021, as outlined below:  PHYSICAL EXAM:  General: WD, WN. Alert, cooperative, no acute distress    EENT:  EOMI. Anicteric sclerae. MMM  Resp:  CTA bilaterally, no wheezing or rales. No accessory muscle use  CV:  Regular  rhythm,  No edema  GI:  Soft, Non distended, Non tender. +Bowel sounds  Neurologic:  Alert and oriented X 3, normal speech,   Psych:   Good insight. Not anxious nor agitated  Skin:  No rashes. No jaundice    Reviewed most current lab test results and cultures  YES  Reviewed most current radiology test results   YES  Review and summation of old records today    NO  Reviewed patient's current orders and MAR    YES  PMH/SH reviewed - no change compared to H&P  ________________________________________________________________________  Care Plan discussed with:    Comments   Patient x    Family      RN x    Care Manager x    Consultant                        Multidiciplinary team rounds were held today with , nursing, pharmacist and clinical coordinator. Patient's plan of care was discussed; medications were reviewed and discharge planning was addressed.      ________________________________________________________________________  Total NON critical care TIME:  35   Minutes    Total CRITICAL CARE TIME Spent:   Minutes non procedure based      Comments   >50% of visit spent in counseling and coordination of care x    ________________________________________________________________________  Mery Torre MD     Procedures: see electronic medical records for all procedures/Xrays and details which were not copied into this note but were reviewed prior to creation of Plan. LABS:  I reviewed today's most current labs and imaging studies.   Pertinent labs include:  Recent Labs     04/28/21  0552 04/27/21 2222 04/27/21 1947   WBC 15.3* 17.5* 10.0   HGB 11.8 11.8 12.9   HCT 37.5 37.5 41.3    239 250     Recent Labs     04/28/21  0552 04/27/21 2222 04/27/21 1947   * 137 137   K 3.8 3.7 3.5    103 102   CO2 25 28 30   * 240* 182*   BUN 20 20 20   CREA 1.07* 1.04* 1.12*   CA 9.7 9.5 10.0   ALB  --  3.4* 3.7   TBILI  --  1.1* 0.9   ALT  --  17 18   INR  --  1.1  --        Signed: Mery Torre MD

## 2021-04-29 LAB
ANION GAP SERPL CALC-SCNC: 4 MMOL/L (ref 5–15)
BUN SERPL-MCNC: 18 MG/DL (ref 6–20)
BUN/CREAT SERPL: 17 (ref 12–20)
CALCIUM SERPL-MCNC: 9.2 MG/DL (ref 8.5–10.1)
CHLORIDE SERPL-SCNC: 102 MMOL/L (ref 97–108)
CO2 SERPL-SCNC: 30 MMOL/L (ref 21–32)
CREAT SERPL-MCNC: 1.09 MG/DL (ref 0.55–1.02)
ERYTHROCYTE [DISTWIDTH] IN BLOOD BY AUTOMATED COUNT: 13.8 % (ref 11.5–14.5)
GLUCOSE BLD STRIP.AUTO-MCNC: 278 MG/DL (ref 65–100)
GLUCOSE BLD STRIP.AUTO-MCNC: 291 MG/DL (ref 65–100)
GLUCOSE BLD STRIP.AUTO-MCNC: 319 MG/DL (ref 65–100)
GLUCOSE BLD STRIP.AUTO-MCNC: 334 MG/DL (ref 65–100)
GLUCOSE SERPL-MCNC: 286 MG/DL (ref 65–100)
HCT VFR BLD AUTO: 33.2 % (ref 35–47)
HGB BLD-MCNC: 9.9 G/DL (ref 11.5–16)
MCH RBC QN AUTO: 26.5 PG (ref 26–34)
MCHC RBC AUTO-ENTMCNC: 29.8 G/DL (ref 30–36.5)
MCV RBC AUTO: 89 FL (ref 80–99)
NRBC # BLD: 0 K/UL (ref 0–0.01)
NRBC BLD-RTO: 0 PER 100 WBC
PLATELET # BLD AUTO: 193 K/UL (ref 150–400)
PMV BLD AUTO: 10.6 FL (ref 8.9–12.9)
POTASSIUM SERPL-SCNC: 4.2 MMOL/L (ref 3.5–5.1)
RBC # BLD AUTO: 3.73 M/UL (ref 3.8–5.2)
SERVICE CMNT-IMP: ABNORMAL
SODIUM SERPL-SCNC: 136 MMOL/L (ref 136–145)
WBC # BLD AUTO: 12.8 K/UL (ref 3.6–11)

## 2021-04-29 PROCEDURE — 74011636637 HC RX REV CODE- 636/637: Performed by: PHYSICIAN ASSISTANT

## 2021-04-29 PROCEDURE — 65660000000 HC RM CCU STEPDOWN

## 2021-04-29 PROCEDURE — 80048 BASIC METABOLIC PNL TOTAL CA: CPT

## 2021-04-29 PROCEDURE — 36415 COLL VENOUS BLD VENIPUNCTURE: CPT

## 2021-04-29 PROCEDURE — 74011250636 HC RX REV CODE- 250/636: Performed by: STUDENT IN AN ORGANIZED HEALTH CARE EDUCATION/TRAINING PROGRAM

## 2021-04-29 PROCEDURE — 74011250637 HC RX REV CODE- 250/637: Performed by: PHYSICIAN ASSISTANT

## 2021-04-29 PROCEDURE — 97530 THERAPEUTIC ACTIVITIES: CPT

## 2021-04-29 PROCEDURE — 77030038269 HC DRN EXT URIN PURWCK BARD -A

## 2021-04-29 PROCEDURE — 74011636637 HC RX REV CODE- 636/637: Performed by: STUDENT IN AN ORGANIZED HEALTH CARE EDUCATION/TRAINING PROGRAM

## 2021-04-29 PROCEDURE — 2709999900 HC NON-CHARGEABLE SUPPLY

## 2021-04-29 PROCEDURE — 77030036660

## 2021-04-29 PROCEDURE — 97165 OT EVAL LOW COMPLEX 30 MIN: CPT

## 2021-04-29 PROCEDURE — 97535 SELF CARE MNGMENT TRAINING: CPT

## 2021-04-29 PROCEDURE — 82962 GLUCOSE BLOOD TEST: CPT

## 2021-04-29 PROCEDURE — 97163 PT EVAL HIGH COMPLEX 45 MIN: CPT

## 2021-04-29 PROCEDURE — 97110 THERAPEUTIC EXERCISES: CPT

## 2021-04-29 PROCEDURE — 74011000250 HC RX REV CODE- 250: Performed by: PHYSICIAN ASSISTANT

## 2021-04-29 PROCEDURE — 74011250636 HC RX REV CODE- 250/636: Performed by: PHYSICIAN ASSISTANT

## 2021-04-29 PROCEDURE — 85027 COMPLETE CBC AUTOMATED: CPT

## 2021-04-29 PROCEDURE — 94760 N-INVAS EAR/PLS OXIMETRY 1: CPT

## 2021-04-29 RX ORDER — INSULIN LISPRO 100 [IU]/ML
5 INJECTION, SOLUTION INTRAVENOUS; SUBCUTANEOUS
Status: DISCONTINUED | OUTPATIENT
Start: 2021-04-29 | End: 2021-04-30

## 2021-04-29 RX ORDER — ESOMEPRAZOLE MAGNESIUM 20 MG/1
40 TABLET, DELAYED RELEASE ORAL DAILY
Status: DISCONTINUED | OUTPATIENT
Start: 2021-04-29 | End: 2021-04-29 | Stop reason: CLARIF

## 2021-04-29 RX ORDER — HYDROGEN PEROXIDE 3 %
40 SOLUTION, NON-ORAL MISCELLANEOUS DAILY
Status: DISCONTINUED | OUTPATIENT
Start: 2021-04-29 | End: 2021-05-07 | Stop reason: HOSPADM

## 2021-04-29 RX ORDER — INSULIN GLARGINE 100 [IU]/ML
15 INJECTION, SOLUTION SUBCUTANEOUS
Status: DISCONTINUED | OUTPATIENT
Start: 2021-04-29 | End: 2021-04-30

## 2021-04-29 RX ADMIN — INSULIN LISPRO 5 UNITS: 100 INJECTION, SOLUTION INTRAVENOUS; SUBCUTANEOUS at 17:26

## 2021-04-29 RX ADMIN — Medication 10 ML: at 06:36

## 2021-04-29 RX ADMIN — NIFEDIPINE 60 MG: 30 TABLET, EXTENDED RELEASE ORAL at 08:14

## 2021-04-29 RX ADMIN — DOCUSATE SODIUM 50MG AND SENNOSIDES 8.6MG 2 TABLET: 8.6; 5 TABLET, FILM COATED ORAL at 17:28

## 2021-04-29 RX ADMIN — OXYCODONE 5 MG: 5 TABLET ORAL at 04:14

## 2021-04-29 RX ADMIN — INSULIN LISPRO 5 UNITS: 100 INJECTION, SOLUTION INTRAVENOUS; SUBCUTANEOUS at 12:35

## 2021-04-29 RX ADMIN — CALCIUM CARBONATE (ANTACID) CHEW TAB 500 MG 200 MG: 500 CHEW TAB at 12:33

## 2021-04-29 RX ADMIN — ACETAMINOPHEN 650 MG: 325 TABLET ORAL at 17:27

## 2021-04-29 RX ADMIN — HYDRALAZINE HYDROCHLORIDE 25 MG: 25 TABLET, FILM COATED ORAL at 08:14

## 2021-04-29 RX ADMIN — INSULIN LISPRO 4 UNITS: 100 INJECTION, SOLUTION INTRAVENOUS; SUBCUTANEOUS at 22:03

## 2021-04-29 RX ADMIN — CEFAZOLIN SODIUM 2 G: 1 INJECTION, POWDER, FOR SOLUTION INTRAMUSCULAR; INTRAVENOUS at 01:13

## 2021-04-29 RX ADMIN — POLYETHYLENE GLYCOL 3350 17 G: 17 POWDER, FOR SOLUTION ORAL at 08:15

## 2021-04-29 RX ADMIN — CALCIUM CARBONATE 500 MG (1,250 MG)-VITAMIN D3 200 UNIT TABLET 1 TABLET: at 12:34

## 2021-04-29 RX ADMIN — PANTOPRAZOLE SODIUM 40 MG: 40 TABLET, DELAYED RELEASE ORAL at 08:04

## 2021-04-29 RX ADMIN — INSULIN LISPRO 5 UNITS: 100 INJECTION, SOLUTION INTRAVENOUS; SUBCUTANEOUS at 12:34

## 2021-04-29 RX ADMIN — CLONIDINE HYDROCHLORIDE 0.1 MG: 0.1 TABLET ORAL at 08:14

## 2021-04-29 RX ADMIN — CEFAZOLIN SODIUM 2 G: 1 INJECTION, POWDER, FOR SOLUTION INTRAMUSCULAR; INTRAVENOUS at 08:13

## 2021-04-29 RX ADMIN — METOPROLOL SUCCINATE 100 MG: 50 TABLET, EXTENDED RELEASE ORAL at 22:05

## 2021-04-29 RX ADMIN — Medication 40 MG: at 12:33

## 2021-04-29 RX ADMIN — LEVOTHYROXINE SODIUM 100 MCG: 0.1 TABLET ORAL at 06:36

## 2021-04-29 RX ADMIN — CALCIUM CARBONATE (ANTACID) CHEW TAB 500 MG 200 MG: 500 CHEW TAB at 08:04

## 2021-04-29 RX ADMIN — Medication 5 ML: at 14:00

## 2021-04-29 RX ADMIN — ACETAMINOPHEN 650 MG: 325 TABLET ORAL at 10:41

## 2021-04-29 RX ADMIN — OXYCODONE 5 MG: 5 TABLET ORAL at 10:41

## 2021-04-29 RX ADMIN — CALCIUM CARBONATE 500 MG (1,250 MG)-VITAMIN D3 200 UNIT TABLET 1 TABLET: at 17:27

## 2021-04-29 RX ADMIN — INSULIN LISPRO 7 UNITS: 100 INJECTION, SOLUTION INTRAVENOUS; SUBCUTANEOUS at 08:05

## 2021-04-29 RX ADMIN — SODIUM CHLORIDE 1000 ML: 900 INJECTION, SOLUTION INTRAVENOUS at 12:00

## 2021-04-29 RX ADMIN — POTASSIUM CHLORIDE 20 MEQ: 1500 TABLET, EXTENDED RELEASE ORAL at 08:14

## 2021-04-29 RX ADMIN — CALCIUM CARBONATE (ANTACID) CHEW TAB 500 MG 200 MG: 500 CHEW TAB at 17:27

## 2021-04-29 RX ADMIN — CALCIUM CARBONATE 500 MG (1,250 MG)-VITAMIN D3 200 UNIT TABLET 1 TABLET: at 08:04

## 2021-04-29 RX ADMIN — ACETAMINOPHEN 650 MG: 325 TABLET ORAL at 22:05

## 2021-04-29 RX ADMIN — LOSARTAN POTASSIUM 100 MG: 100 TABLET, FILM COATED ORAL at 08:29

## 2021-04-29 RX ADMIN — Medication 10 ML: at 22:06

## 2021-04-29 RX ADMIN — RIVAROXABAN 10 MG: 10 TABLET, FILM COATED ORAL at 08:14

## 2021-04-29 RX ADMIN — DOCUSATE SODIUM 50MG AND SENNOSIDES 8.6MG 2 TABLET: 8.6; 5 TABLET, FILM COATED ORAL at 08:14

## 2021-04-29 RX ADMIN — OXYCODONE 5 MG: 5 TABLET ORAL at 18:15

## 2021-04-29 RX ADMIN — INSULIN LISPRO 5 UNITS: 100 INJECTION, SOLUTION INTRAVENOUS; SUBCUTANEOUS at 08:35

## 2021-04-29 RX ADMIN — INSULIN GLARGINE 15 UNITS: 100 INJECTION, SOLUTION SUBCUTANEOUS at 22:04

## 2021-04-29 NOTE — PROGRESS NOTES
Hospitalist Progress Note    NAME: Alba Capps   :  1945   MRN:  [id number]       Assessment / Plan:    Left hip fracture POA s/p  Cephalomedullary nailing left hip   -Patient presented with mechanical fall and noticed to have left hip fracture  -Appreciate orthopedic consultation  -s/p  Cephalomedullary nailing left hip   -Pain control  -Postop PT OT  -Xarelto resumed     Hypotension  History of hypertension  -On a lot of antihypertensives: clonidine, hydralazine, losartan, metoprolol and nifedipine. Hold all antihypertensives for now. Watch for rebound hypertension with clonidin on hold     Hypothyroidism  -Continue levothyroxine      Chronic atrial fibrillation  -Metoprolol on hold due to hypotension. Resume xarelto     Insulin-dependent type 2 diabetes  -Hold Metformin and continue sliding scale insulin  Add lantus 10U at night and lispro 5u TID     GERD  -Continue PPI     Code Status: Patient wishes to be DNR  Surrogate Decision Maker: She wants to designate her stefany Rey Quill as surrogate POA     DVT Prophylaxis: SCDs for now, resume Xarelto after surgery  GI Prophylaxis: not indicated     Baseline: Independent/ambulatory     Subjective:     Chief Complaint / Reason for Physician Visit  Discussed with RN events overnight. Tolerated procedure well  Complains of pain at the surgical site    Review of Systems:  Symptom Y/N Comments  Symptom Y/N Comments   Fever/Chills    Chest Pain     Poor Appetite    Edema     Cough    Abdominal Pain     Sputum    Joint Pain     SOB/GARCIA    Pruritis/Rash     Nausea/vomit    Tolerating PT/OT     Diarrhea    Tolerating Diet     Constipation    Other       Could NOT obtain due to:      Objective:     VITALS:   Last 24hrs VS reviewed since prior progress note.  Most recent are:  Patient Vitals for the past 24 hrs:   Temp Pulse Resp BP SpO2   21 1147    (!) 101/52    21 1127    (!) 94/47    21 1116    (!) 97/51    21 1100  98  (!) 98/54    04/29/21 1055  85  (!) 86/46    04/29/21 1041  80  (!) 93/52    04/29/21 0712 98.1 °F (36.7 °C) (!) 113 17 (!) 146/83 96 %   04/29/21 0414 98.1 °F (36.7 °C) (!) 110 18 (!) 166/78 96 %   04/29/21 0118 98.1 °F (36.7 °C) (!) 115 19 (!) 164/84 97 %   04/29/21 0009 97.8 °F (36.6 °C) (!) 105 17 (!) 152/81 94 %   04/28/21 2309 97.8 °F (36.6 °C) (!) 114 18 (!) 149/66 96 %   04/28/21 2213 98.1 °F (36.7 °C) (!) 112 19 (!) 173/73 96 %   04/28/21 2128 98.1 °F (36.7 °C) (!) 118 18 (!) 162/80 97 %   04/28/21 2006 97.6 °F (36.4 °C) (!) 107 18 (!) 160/76 99 %   04/28/21 1945  (!) 101 16 (!) 151/79 98 %   04/28/21 1930 98.7 °F (37.1 °C) (!) 107 16 (!) 149/72 95 %   04/28/21 1915  (!) 112 15 132/76 97 %   04/28/21 1910  (!) 109 19 (!) 154/71 99 %   04/28/21 1905  (!) 116 15 (!) 146/70 98 %   04/28/21 1900  (!) 114 22 (!) 153/63 91 %   04/28/21 1855  (!) 104 17 133/72 98 %   04/28/21 1850 97.7 °F (36.5 °C) (!) 111 19 (!) 157/82 93 %   04/28/21 1600  (!) 104 14 (!) 153/77 94 %   04/28/21 1550  (!) 106 15 (!) 143/77 93 %   04/28/21 1545  (!) 109 17 (!) 153/85 95 %   04/28/21 1540  (!) 105 18 133/80 93 %   04/28/21 1535  (!) 106 14 (!) 151/97 93 %       Intake/Output Summary (Last 24 hours) at 4/29/2021 1531  Last data filed at 4/29/2021 3778  Gross per 24 hour   Intake 1700 ml   Output 1100 ml   Net 600 ml        I had a face to face encounter and independently examined this patient on 4/29/2021, as outlined below:  PHYSICAL EXAM:  General: WD, WN. Alert, cooperative, no acute distress    EENT:  EOMI. Anicteric sclerae. MMM  Resp:  CTA bilaterally, no wheezing or rales. No accessory muscle use  CV:  Regular  rhythm,  No edema  GI:  Soft, Non distended, Non tender. +Bowel sounds  Neurologic:  Alert and oriented X 3, normal speech,   Psych:   Good insight. Not anxious nor agitated  Skin:  No rashes.   No jaundice    Reviewed most current lab test results and cultures  YES  Reviewed most current radiology test results   YES  Review and summation of old records today    NO  Reviewed patient's current orders and MAR    YES  PMH/SH reviewed - no change compared to H&P  ________________________________________________________________________  Care Plan discussed with:    Comments   Patient x    Family      RN x    Care Manager x    Consultant                        Multidiciplinary team rounds were held today with , nursing, pharmacist and clinical coordinator. Patient's plan of care was discussed; medications were reviewed and discharge planning was addressed. ________________________________________________________________________  Total NON critical care TIME:  35   Minutes    Total CRITICAL CARE TIME Spent:   Minutes non procedure based      Comments   >50% of visit spent in counseling and coordination of care x    ________________________________________________________________________  Herminia Urban MD     Procedures: see electronic medical records for all procedures/Xrays and details which were not copied into this note but were reviewed prior to creation of Plan. LABS:  I reviewed today's most current labs and imaging studies.   Pertinent labs include:  Recent Labs     04/29/21  0403 04/28/21  0552 04/27/21 2222   WBC 12.8* 15.3* 17.5*   HGB 9.9* 11.8 11.8   HCT 33.2* 37.5 37.5    235 239     Recent Labs     04/29/21  0403 04/28/21  0552 04/27/21 2222 04/27/21  1947    135* 137 137   K 4.2 3.8 3.7 3.5    102 103 102   CO2 30 25 28 30   * 334* 240* 182*   BUN 18 20 20 20   CREA 1.09* 1.07* 1.04* 1.12*   CA 9.2 9.7 9.5 10.0   ALB  --   --  3.4* 3.7   TBILI  --   --  1.1* 0.9   ALT  --   --  17 18   INR  --   --  1.1  --        Signed: Herminia Urban MD

## 2021-04-29 NOTE — PROGRESS NOTES
Problem: Self Care Deficits Care Plan (Adult)  Goal: *Acute Goals and Plan of Care (Insert Text)  Description:   FUNCTIONAL STATUS PRIOR TO ADMISSION: Patient was independent and active without use of DME.     HOME SUPPORT: The patient lived alone with family to provide assistance. Occupational Therapy Goals  Initiated 4/29/2021  1. Patient will perform grooming sitting on EOB with supervision/set-up within 7 day(s). 2.  Patient will perform bathing standing at the sink for at least 3 minutes  with minimal assistance/contact guard assist within 7 day(s). 3.  Patient will perform lower body dressing with moderate assistance  within 7 day(s). 4.  Patient will perform toilet transfers with minimal assistance/contact guard assist within 7 day(s). 5.  Patient will perform all aspects of toileting with supervision/set-up within 7 day(s). 6.  Patient will participate in upper extremity therapeutic exercise/activities with supervision/set-up for 5 minutes within 7 day(s). 7.  Patient will utilize energy conservation techniques during functional activities with verbal cues within 7 day(s). Outcome: Not Met   OCCUPATIONAL THERAPY EVALUATION  Patient: Mik Roman (69 y.o. female)  Date: 4/29/2021  [facility] [S72.009A]  Procedure(s) (LRB):  LEFT FEMORAL INTERTROCHANTERIC NAIL INSERTION (Left) 1 Day Post-Op   Precautions: WBAT       ASSESSMENT  Based on the objective data described below, the patient presents with decreased endurance, strength, functional mobility, ADLs . Pt was living at home alone and was independent with ADLs and ILS and did not use AD. She was supine, and she had difficulty with relaxing her surgical leg. She was max of 2 for supine to sit and once she was sitting up, pt was light headed. Her BP had dropped and worked with pt on moving her legs and arms to increase her BP and BP continued to drop.   She was max of 2 for sit to supine and left in bed with family and PT in room. Pt also reports that her left wrist and forearm are painful due to her fall and per x ray no fracture, and OT will issue pt wrist splint if needed while she is using RW. Patient Vitals for the past 4 hrs:   Pulse BP   04/29/21 1147 -- (!) 101/52   04/29/21 1127 -- (!) 94/47   04/29/21 1116 -- (!) 97/51   04/29/21 1100 98 (!) 98/54   04/29/21 1055 85 (!) 86/46   04/29/21 1041 80 (!) 93/52            Current Level of Function Impacting Discharge (ADLs/self-care): max for ADLs    Functional Outcome Measure: The patient scored 30/100 on the Barthel outcome measure which is indicative of max for ADLs . Patient will benefit from skilled therapy intervention to address the above noted impairments. PLAN :  Recommendations and Planned Interventions: self care training, functional mobility training, therapeutic exercise, balance training, therapeutic activities, endurance activities, patient education, and home safety training    Frequency/Duration: Patient will be followed by occupational therapy 4 times a week to address goals. Recommendation for discharge: (in order for the patient to meet his/her long term goals)  To be determined: pending her progress, pt will need rehab, at In pt or SNF     This discharge recommendation:  Has been made in collaboration with the attending provider and/or case management    IF patient discharges home will need the following DME: tbd       SUBJECTIVE:   Patient stated I feel light headed and weak.     OBJECTIVE DATA SUMMARY:   HISTORY:   Past Medical History:   Diagnosis Date    A[facility]fib ([facility]     Asthma     [facility]     GERD (gastroesophageal reflux disease)     Hypertension     Other ill-defined conditions(799.89)     hypothyroid     Past Surgical History:   Procedure Laterality Date    HX GYN      hysterectomy    HX ORTHOPAEDIC       LEFT KNEE ARTHROSCOPY    HX OTHER SURGICAL      lipoma removal       Expanded or extensive additional review of patient history:     Home Situation  Home Environment: Private residence  # Steps to Enter: 0  One/Two Story Residence: Two story, live on 1st floor  Living Alone: Yes  Support Systems: Child(jalen), Friends \ neighbors(friend stefany rose)  Patient Expects to be Discharged to[de-identified] Rehabilitation facility  Current DME Used/Available at Home: None    Hand dominance: Right    EXAMINATION OF PERFORMANCE DEFICITS:  Cognitive/Behavioral Status:  Neurologic State: Alert  Orientation Level: Oriented X4  Cognition: Follows commands  Perception: Appears intact  Perseveration: No perseveration noted  Safety/Judgement: Fall prevention    Skin: in fair health     Edema: minimal noted at incision site    Hearing: Auditory  Auditory Impairment: None  Hearing Aids/Status: Does not own    Vision/Perceptual:                    Intact                 Range of Motion:    AROM: Generally decreased, functional  PROM: Generally decreased, functional                      Strength:    Strength: Generally decreased, functional                Coordination:  Coordination: Within functional limits  Fine Motor Skills-Upper: Left Intact; Right Intact    Gross Motor Skills-Upper: Left Intact; Right Intact    Tone & Sensation:    Tone: Normal  Sensation: Intact                      Balance:  Sitting: Intact  Standing: Impaired; Without support  Standing - Static: Other (comment)(unable/orthostatic)    Functional Mobility and Transfers for ADLs:  Bed Mobility:  Rolling: Maximum assistance;Assist x2  Supine to Sit: Maximum assistance;Assist x2  Sit to Supine: Maximum assistance;Assist x2  Scooting: Maximum assistance;Assist x2    Transfers:   Unable due to orthostatics     ADL Assessment:  Feeding: Setup    Oral Facial Hygiene/Grooming: Setup    Bathing: Maximum assistance;Minimum assistance    Upper Body Dressing: Minimum assistance;Setup    Lower Body Dressing: Maximum assistance    Toileting: Maximum assistance      Cognitive Retraining  Safety/Judgement: Fall prevention         Functional Measure:  Barthel Index:    Bathin  Bladder: 10  Bowels: 10  Groomin  Dressin  Feedin  Mobility: 0  Stairs: 0  Toilet Use: 0  Transfer (Bed to Chair and Back): 0  Total: 30/100        The Barthel ADL Index: Guidelines  1. The index should be used as a record of what a patient does, not as a record of what a patient could do. 2. The main aim is to establish degree of independence from any help, physical or verbal, however minor and for whatever reason. 3. The need for supervision renders the patient not independent. 4. A patient's performance should be established using the best available evidence. Asking the patient, friends/relatives and nurses are the usual sources, but direct observation and common sense are also important. However direct testing is not needed. 5. Usually the patient's performance over the preceding 24-48 hours is important, but occasionally longer periods will be relevant. 6. Middle categories imply that the patient supplies over 50 per cent of the effort. 7. Use of aids to be independent is allowed. Davida Nieto., Barthel, D.W. (8723). Functional evaluation: the Barthel Index. [address] [facility] (14)2. Nikolay Vance jaky Natty, SHAHABF, Ramila Foster., Thony Sauceda., [place], [address] [place] [address] (). Measuring the change indisability after inpatient rehabilitation; comparison of the responsiveness of the Barthel Index and Functional [place] Measure. Journal of Neurology, Neurosurgery, and Psychiatry, 66(4), 143-079. Elaina West, N.J.A, CHICO Howard.YURIY, & Jorge Alberto Schmitt MMARY. (2004.) Assessment of post-stroke quality of life in cost-effectiveness studies: The usefulness of the Barthel Index and the EuroQoL-5D.  Quality of Life Research, 15, 275-69         Occupational Therapy Evaluation Charge Determination   History Examination Decision-Making   MEDIUM Complexity : Expanded review of history including physical, cognitive and psychosocial  history  MEDIUM Complexity : 3-5 performance deficits relating to physical, cognitive , or psychosocial skils that result in activity limitations and / or participation restrictions LOW Complexity : No comorbidities that affect functional and no verbal or physical assistance needed to complete eval tasks       Based on the above components, the patient evaluation is determined to be of the following complexity level: LOW   Pain Rating:  Pt rates pain as 7-8    Activity Tolerance:   Fair    After treatment patient left in no apparent distress:    Supine in bed, Call bell within reach, and Caregiver / family present    COMMUNICATION/EDUCATION:   The patients plan of care was discussed with: Physical therapist and Registered nurse. Patient/family have participated as able in goal setting and plan of care. This patients plan of care is appropriate for delegation to \[facility]\"".     Thank you for this referral.  Alannah Mcgraw OT  Time Calculation: 40 mins

## 2021-04-29 NOTE — PROGRESS NOTES
Po hip fracture. Pain managed . No pt yet  hgb stable 9.9    Patient Vitals for the past 24 hrs:   Temp Pulse Resp BP SpO2   04/29/21 0712 98.1 °F (36.7 °C) (!) 113 17 (!) 146/83 96 %   04/29/21 0414 98.1 °F (36.7 °C) (!) 110 18 (!) 166/78 96 %   04/29/21 0118 98.1 °F (36.7 °C) (!) 115 19 (!) 164/84 97 %   04/29/21 0009 97.8 °F (36.6 °C) (!) 105 17 (!) 152/81 94 %   04/28/21 2309 97.8 °F (36.6 °C) (!) 114 18 (!) 149/66 96 %   04/28/21 2213 98.1 °F (36.7 °C) (!) 112 19 (!) 173/73 96 %   04/28/21 2128 98.1 °F (36.7 °C) (!) 118 18 (!) 162/80 97 %   04/28/21 2006 97.6 °F (36.4 °C) (!) 107 18 (!) 160/76 99 %   04/28/21 1945  (!) 101 16 (!) 151/79 98 %   04/28/21 1930 98.7 °F (37.1 °C) (!) 107 16 (!) 149/72 95 %   04/28/21 1915  (!) 112 15 132/76 97 %   04/28/21 1910  (!) 109 19 (!) 154/71 99 %   04/28/21 1905  (!) 116 15 (!) 146/70 98 %   04/28/21 1900  (!) 114 22 (!) 153/63 91 %   04/28/21 1855  (!) 104 17 133/72 98 %   04/28/21 1850 97.7 °F (36.5 °C) (!) 111 19 (!) 157/82 93 %   04/28/21 1600  (!) 104 14 (!) 153/77 94 %   04/28/21 1550  (!) 106 15 (!) 143/77 93 %   04/28/21 1545  (!) 109 17 (!) 153/85 95 %   04/28/21 1540  (!) 105 18 133/80 93 %   04/28/21 1535  (!) 106 14 (!) 151/97 93 %   04/28/21 1530  (!) 104 12 (!) 147/83 95 %   04/28/21 1525  (!) 108 12 135/85 93 %   04/28/21 1520  (!) 110 17 (!) 154/74 95 %   04/28/21 1517  (!) 112 14 (!) 155/80 93 %   04/28/21 1436 98.6 °F (37 °C) (!) 118 13 (!) 145/78 94 %   04/28/21 1109 98.3 °F (36.8 °C) (!) 117 18 (!) 152/71 94 %     dressing clean and dry  Musculoskeletal: Jose Miguel's sign negative in bilateral lower extremities. Calves soft, supple, non-tender upon palpation or with passive stretch.      ooob with pt  wbat  Dc planning

## 2021-04-29 NOTE — PROGRESS NOTES
Transition of Care Plan:  RUR: 17%  Disposition: home with home health vs rehab pending progress with therapy post op  Follow up appointments: ortho  DME needed: TBD  Transportation at Discharge: family vs BLS pending progress   Keys or means to access home: family to provide if patient d/c home        IM Medicare letter: to be provided at time of d/c  Caregiver Contact: son Ashanti Summers [phone]  Discharge Caregiver contacted prior to discharge? Yes spoke with him at bedside during initial assessment 4/29      Patient's BP dropping when therapy attempts to work with her. CM spoke with kiera's son the in [place] and provided him with a SNF list in the event patient needs to d/c to a SNF prior to returning home. CM requested son to start reviewing list in the event pt needs to go to SNF. Son also expressed that he can assist patient if she were to d/c home. CM informed son that if pt were to d/c home she would need 24/7 assistance for a while. Pt's son confirmed he would be able to do this. Son reported he and pt both just want what is best for patient at d/c. CM will follow up with patient again tomorrow, hopefully after being able to work with therapy without significant drop in BP again.      [facility], [address] [facility]

## 2021-04-29 NOTE — PROGRESS NOTES
Problem: Falls - Risk of  Goal: *Absence of Falls  Description: Document Charlene Sanchez Fall Risk and appropriate interventions in the flowsheet. Outcome: Progressing Towards Goal  Note: Fall Risk Interventions:       Mentation Interventions: Adequate sleep, hydration, pain control    Medication Interventions: Patient to call before getting OOB, Teach patient to arise slowly    Elimination Interventions: Call light in reach    History of Falls Interventions: Consult care management for discharge planning         Problem: Patient Education: Go to Patient Education Activity  Goal: Patient/Family Education  Outcome: Progressing Towards Goal     Problem: Pressure Injury - Risk of  Goal: *Prevention of pressure injury  Description: Document Leonard Scale and appropriate interventions in the flowsheet.   Outcome: Progressing Towards Goal  Note: Pressure Injury Interventions:  Sensory Interventions: Assess changes in LOC    Moisture Interventions: Minimize layers    Activity Interventions: Increase time out of bed    Mobility Interventions: Float heels    Nutrition Interventions: Document food/fluid/supplement intake    Friction and Shear Interventions: Lift sheet                Problem: Patient Education: Go to Patient Education Activity  Goal: Patient/Family Education  Outcome: Progressing Towards Goal

## 2021-04-29 NOTE — PROGRESS NOTES
26 - Dr. Preston Gain notified via [facility] and in-person of patient becoming orthostatic with PT and blood pressure is currently 91/57 while in the bed, said orders will be placed for fluids. End of Shift Note    Bedside shift change report given to [address] [place] (oncoming nurse) by Frank Mendez RN (offgoing nurse). Report included the following information SBAR, Kardex, Intake/Output, MAR and Recent Results    Shift worked:  Day     Shift summary and any significant changes:    Patient has had stable vital signs today but was orthostatic with PT. Pain controlled with tylenol and oxycodone. Concerns for physician to address:  n/a     Zone phone for oncoming shift:  [phone]     Activity:  Activity Level: Up with Assistance  Number times ambulated in hallways past shift: 0  Number of times OOB to chair past shift: 0    Cardiac:   Cardiac Monitoring: Yes      Cardiac Rhythm: Atrial fibrillation    Access:   Current line(s): PIV     Genitourinary:   Urinary status: voiding    Respiratory:   O2 Device: None (Room air)  Chronic home O2 use?: NO  Incentive spirometer at bedside: YES     GI:  Last Bowel Movement Date: 04/26/21  Current diet:  DIET DIABETIC CONSISTENT CARB Regular  Passing flatus: YES  Tolerating current diet: YES       Pain Management:   Patient states pain is manageable on current regimen: YES    Skin:  Leonard Score: 17  Interventions: float heels and increase time out of bed    Patient Safety:  Fall Score:  Total Score: 3  Interventions: gripper socks and pt to call before getting OOB  High Fall Risk: Yes    Length of Stay:  Expected LOS: 2d 21h  Actual LOS: 2      Frank Mendez RN

## 2021-04-29 NOTE — PROGRESS NOTES
Transition of Care Plan:  RUR: 17%  Disposition: home with home health vs rehab pending progress with therapy post op  Follow up appointments: ortho  DME needed: TBD  Transportation at Discharge: family vs BLS pending progress   Keys or means to access home: family to provide if patient d/c home        IM Medicare letter: to be provided at time of d/c  Caregiver Contact: shola Barney [phone]  Discharge Caregiver contacted prior to discharge? Yes spoke with him at bedside during initial assessment 4/29           Reason for Admission:  Left hip fracture                 RUR Score: 17%                    Plan for utilizing home health: per recommendation          PCP: First and Last name:  Chrissie Manclila MD   Name of Practice: [facility]    Are you a current patient: Yes/No: yes   Approximate date of last visit: Fall 2020   Can you participate in a virtual visit with your PCP: yes                    Current Advanced Directive/Advance Care Plan: Rg Clark (ACP) Conversation      Date of Conversation: 4/29/21  Conducted with: Patient with [address] Decision Maker:     Click here to complete [address] including selection of the [address] Relationship (ie \"Primary\")  Today we documented Decision Maker(s) consistent with Legal Next of Kin hierarchy. Content/Action Overview:   Has NO ACP documents/care preferences - information provided, considering goals and options  Reviewed DNR/DNI and patient confirms current DNR status - completed forms on file (place new order if needed)    Healthcare Decision Maker:   Click here to complete [address] including selection of the Healthcare Decision Maker Relationship (ie \"Primary\")                         Transition of Care Plan:                      CM made room visit with patient who was alert and oriented with son at bedside.  Pt confirmed demographics, insurance, and emergency contact on file. Pt uses [facility] on [address] [facility] and [facility] on [address] [facility]. Pt lives alone in a 2 story home, only uses main level, with 0 max. Pt has no DME. At baseline pt is independent with ADLs and driving. 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 Pt has no hx of HH, SNF, or IPR. CM inquired about d/c planning and patient's goals. Per patient she is unsure what d/c will look like for her until she works with therapy post op. CM agreed to come back later this afternoon or tomorrow morning to further discuss d/c plans. Patient aware of her options being home with home health vs rehab. CM will continue to follow. Care Management Interventions  PCP Verified by CM: Yes(last seen fall 2020)  Mode of Transport at Discharge:  Other (see comment)  Transition of Care Consult (CM Consult): Discharge Planning  Physical Therapy Consult: Yes  Occupational Therapy Consult: Yes  Speech Therapy Consult: No  Current Support Network: Lives Alone, Family Lives Nearby, Own Home(pt lives alone in a 2 story home with 0 max)  Confirm Follow Up Transport: Family  Discharge Location  Discharge Placement: Unable to determine at this time    César De Santiago, [address], [address] 36.8

## 2021-04-29 NOTE — PROGRESS NOTES
End of Shift Note    Bedside shift change report given to Leonila Juarez (oncoming nurse) by Coco James (offgoing nurse). Report included the following information SBAR, Kardex, Intake/Output, MAR, Recent Results, Med Rec Status and Cardiac Rhythm A-fib    Shift worked:  night     Shift summary and any significant changes:     Patient pain managed well with oxy. Concerns for physician to address:        Zone phone for oncoming shift:   [phone]       Activity:  Activity Level: Up with Assistance  Number times ambulated in hallways past shift: 0  Number of times OOB to chair past shift: 0    Cardiac:   Cardiac Monitoring: Yes      Cardiac Rhythm: Atrial fibrillation    Access:   Current line(s): PIV     Genitourinary:   Urinary status: due to void    Respiratory:   O2 Device: Nasal cannula  Chronic home O2 use?: NO  Incentive spirometer at bedside: YES     GI:  Last Bowel Movement Date: 04/26/21  Current diet:  DIET DIABETIC CONSISTENT CARB Regular  Passing flatus: YES  Tolerating current diet: YES       Pain Management:   Patient states pain is manageable on current regimen: YES    Skin:  Leonard Score: 17  Interventions: increase time out of bed    Patient Safety:  Fall Score:  Total Score: 3  Interventions: bed/chair alarm and assistive device (walker, cane, etc)  High Fall Risk: Yes    Length of Stay:  Expected LOS: 2d 21h  Actual LOS: 2      Lori M [facility]

## 2021-04-29 NOTE — PROGRESS NOTES
Problem: Mobility Impaired (Adult and Pediatric)  Goal: *Acute Goals and Plan of Care (Insert Text)  Description: FUNCTIONAL STATUS PRIOR TO ADMISSION: independent without devices with mobility and ADLs. HOME SUPPORT PRIOR TO ADMISSION: Lived alone with family nearby to assist as needed. Physical Therapy Goals  Initiated 4/29/2021  1. Patient will move from supine to sit and sit to supine , scoot up and down, and roll side to side in bed with minimal assistance/contact guard assist within 7 day(s). 2.  Patient will transfer from bed to chair and chair to bed with minimal assistance/contact guard assist using the least restrictive device within 7 day(s). 3.  Patient will perform sit to stand with minimal assistance/contact guard assist within 7 day(s). 4.  Patient will ambulate with minimal assistance/contact guard assist for 35 feet with the least restrictive device within 7 day(s). 5.  Patient will ascend/descend 4 stairs with 2 handrail(s) with minimal assistance/contact guard assist within 7 day(s). Outcome: Not Met   PHYSICAL THERAPY EVALUATION  Patient: Tirso Slater (58 y.o. female)  Date: 4/29/2021  Primary Diagnosis: Hip fracture ([facility] [S72.009A]  Procedure(s) (LRB):  LEFT FEMORAL INTERTROCHANTERIC NAIL INSERTION (Left) 1 Day Post-Op   Precautions:  Fall, DNR, WBAT    ASSESSMENT  Based on the objective data described below, the patient presents with increased pain, decreased strength and decreased ROM of L hip and L knee with soreness in L wrist from fall prior to this admission. As a result, she needed heavy assistance of 2 persons for bed mobility and coming to sitting on the edge of bed. She was started on bed exercises prior to mobilizing to the edge of bed. In sitting, she was able to do additional exercises. She was unable to come to standing this session due to her BP continued to drop with patient beginning to feel faint.   Returned to supine in bed and then repositioned into semi fowlers. Discharge disposition to be determined pending progress over the next 24/48 hours. Vitals:    04/29/21 1520 04/29/21 1526 04/29/21 1532 04/29/21 1545   BP: (!) 92/59 (!) 81/66 (!) 115/56 (!) 106/56   BP 1 Location:       BP Patient Position: Sitting Sitting Semi fowlers    Pulse: 86 (!) 107 97    Resp:   20    Temp:   96.9 °F (36.1 °C)    SpO2:   92%    Weight:       Height:            Current Level of Function Impacting Discharge (mobility/balance): max a x 2    Functional Outcome Measure: The patient scored 30/100 on the Barthel outcome measure which is indicative of 70% functional impairment. Other factors to consider for discharge: independent PLOF; family nearby to assist if needed as patient lives alone. Patient will benefit from skilled therapy intervention to address the above noted impairments. PLAN :  Recommendations and Planned Interventions: bed mobility training, transfer training, gait training, therapeutic exercises, modalities, edema management/control, patient and family training/education, and therapeutic activities      Frequency/Duration: Patient will be followed by physical therapy:  daily to address goals. Recommendation for discharge: (in order for the patient to meet his/her long term goals)  To be determined: rehab vs HH    This discharge recommendation:  Has not yet been discussed the attending provider and/or case management    IF patient discharges home will need the following DME: to be determined (TBD)       SUBJECTIVE:   Patient stated I'm not sure what I will be able to do.     OBJECTIVE DATA SUMMARY:   HISTORY:    Past Medical History:   Diagnosis Date    A-fib ([facility]     Asthma     Diabetes ([facility]     GERD (gastroesophageal reflux disease)     Hypertension     Other ill-defined conditions(799.89)     hypothyroid     Past Surgical History:   Procedure Laterality Date    HX GYN      hysterectomy    HX ORTHOPAEDIC       LEFT KNEE ARTHROSCOPY    HX OTHER SURGICAL      lipoma removal       Personal factors and/or comorbidities impacting plan of care: dm, asthma, htn    Home Situation  Home Environment: Private residence  # Steps to Enter: 0  One/Two Story Residence: Two story, live on 1st floor  Living Alone: Yes  Support Systems: Child(jalen), Friends \ neighbors(friend stefany rose)  Patient Expects to be Discharged to[de-identified] Rehabilitation facility  Current DME Used/Available at Home: None    EXAMINATION/PRESENTATION/DECISION MAKING:   Critical Behavior:  Neurologic State: Alert  Orientation Level: Oriented X4  Cognition: Follows commands  Safety/Judgement: Fall prevention  Hearing: Auditory  Auditory Impairment: None  Hearing Aids/Status: Does not own  Skin:  L posterior lateral hip dressing shows scant drainage and is intact  Edema: moderate L thigh/knee  Range Of Motion:  AROM: Generally decreased, functional           PROM: Generally decreased, functional           Strength:    Strength: Generally decreased, functional                    Tone & Sensation:   Tone: Normal              Sensation: Intact               Coordination:  Coordination: Within functional limits    Functional Mobility:  Bed Mobility:  Rolling: Maximum assistance;Assist x2  Supine to Sit: Maximum assistance;Assist x2  Sit to Supine: Maximum assistance;Assist x2  Scooting: Maximum assistance;Assist x2  Transfers:  Sit to Stand: (deferred due to low BP)                          Balance:   Sitting: Intact  Standing: Impaired; Without support  Standing - Static: Other (comment)(unable/orthostatic)  Ambulation/Gait Training:                    Right Side Weight Bearing: Full  Left Side Weight Bearing: As tolerated                               Therapeutic Exercises: Ankle pumps, quad sets, glut sets  Aarom heel slides and hip abd.     Functional Measure:  Barthel Index:    Bathin  Bladder: 10  Bowels: 10  Groomin  Dressin  Feedin  Mobility: 0  Stairs: 0  Toilet Use: 0  Transfer (Bed to Chair and Back): 0  Total: 30/100       The Barthel ADL Index: Guidelines  1. The index should be used as a record of what a patient does, not as a record of what a patient could do. 2. The main aim is to establish degree of independence from any help, physical or verbal, however minor and for whatever reason. 3. The need for supervision renders the patient not independent. 4. A patient's performance should be established using the best available evidence. Asking the patient, friends/relatives and nurses are the usual sources, but direct observation and common sense are also important. However direct testing is not needed. 5. Usually the patient's performance over the preceding 24-48 hours is important, but occasionally longer periods will be relevant. 6. Middle categories imply that the patient supplies over 50 per cent of the effort. 7. Use of aids to be independent is allowed. Pierce Worley, Barthel, D.W. (3512). Functional evaluation: the Barthel Index. [address] [facility] (14)2. RENE Sky, Laura Brown., Sean Cloud., Tatyana [facility], [address] [place] [address] (1999). Measuring the change indisability after inpatient rehabilitation; comparison of the responsiveness of the Barthel Index and Functional [place] Measure. Journal of Neurology, Neurosurgery, and Psychiatry, 66(4), 334-526. Benita Das, N.J.A, BRADLEY Howard, & Juany Stockton MBoomA. (2004.) Assessment of post-stroke quality of life in cost-effectiveness studies: The usefulness of the Barthel Index and the EuroQoL-5D.  Quality of Life Research, 15, 150-02        Physical Therapy Evaluation Charge Determination   History Examination Presentation Decision-Making   HIGH Complexity :3+ comorbidities / personal factors will impact the outcome/ POC  HIGH Complexity : 4+ Standardized tests and measures addressing body structure, function, activity limitation and / or participation in recreation  HIGH Complexity : Unstable and unpredictable characteristics Other outcome measures Barthel  HIGH       Based on the above components, the patient evaluation is determined to be of the following complexity level: HIGH     Pain Rating:  3/10 L thigh pain at rest; 6/10 with coming to sitting on  the edge of bed; 5/10 end of session - applied ice pack    Activity Tolerance:   Poor, SpO2 stable on RA, requires frequent rest breaks, and signs and symptoms of orthostatic hypotension    After treatment patient left in no apparent distress:   Supine in bed, Heels elevated for pressure relief, Call bell within reach, Caregiver / family present, and Side rails x 3    COMMUNICATION/EDUCATION:   The patients plan of care was discussed with: Physical therapist, Occupational therapist, Registered nurse, and Rehabilitation technician. Fall prevention education was provided and the patient/caregiver indicated understanding., Patient/family have participated as able in goal setting and plan of care. , and Patient/family agree to work toward stated goals and plan of care.     Thank you for this referral.  Jay Jay , PT   Time Calculation: 31 mins

## 2021-04-30 LAB
ANION GAP SERPL CALC-SCNC: 2 MMOL/L (ref 5–15)
BUN SERPL-MCNC: 21 MG/DL (ref 6–20)
BUN/CREAT SERPL: 18 (ref 12–20)
CALCIUM SERPL-MCNC: 9.5 MG/DL (ref 8.5–10.1)
CHLORIDE SERPL-SCNC: 105 MMOL/L (ref 97–108)
CO2 SERPL-SCNC: 30 MMOL/L (ref 21–32)
CREAT SERPL-MCNC: 1.14 MG/DL (ref 0.55–1.02)
GLUCOSE BLD STRIP.AUTO-MCNC: 239 MG/DL (ref 65–100)
GLUCOSE BLD STRIP.AUTO-MCNC: 250 MG/DL (ref 65–100)
GLUCOSE BLD STRIP.AUTO-MCNC: 250 MG/DL (ref 65–100)
GLUCOSE BLD STRIP.AUTO-MCNC: 299 MG/DL (ref 65–100)
GLUCOSE SERPL-MCNC: 176 MG/DL (ref 65–100)
HGB BLD-MCNC: 9.1 G/DL (ref 11.5–16)
POTASSIUM SERPL-SCNC: 3.9 MMOL/L (ref 3.5–5.1)
SARS-COV-2, COV2: NORMAL
SERVICE CMNT-IMP: ABNORMAL
SODIUM SERPL-SCNC: 137 MMOL/L (ref 136–145)

## 2021-04-30 PROCEDURE — 97110 THERAPEUTIC EXERCISES: CPT

## 2021-04-30 PROCEDURE — 74011250637 HC RX REV CODE- 250/637: Performed by: STUDENT IN AN ORGANIZED HEALTH CARE EDUCATION/TRAINING PROGRAM

## 2021-04-30 PROCEDURE — 85018 HEMOGLOBIN: CPT

## 2021-04-30 PROCEDURE — 97530 THERAPEUTIC ACTIVITIES: CPT

## 2021-04-30 PROCEDURE — 74011250637 HC RX REV CODE- 250/637: Performed by: PHYSICIAN ASSISTANT

## 2021-04-30 PROCEDURE — 36415 COLL VENOUS BLD VENIPUNCTURE: CPT

## 2021-04-30 PROCEDURE — 74011250636 HC RX REV CODE- 250/636: Performed by: PHYSICIAN ASSISTANT

## 2021-04-30 PROCEDURE — 97116 GAIT TRAINING THERAPY: CPT

## 2021-04-30 PROCEDURE — 65660000000 HC RM CCU STEPDOWN

## 2021-04-30 PROCEDURE — 82962 GLUCOSE BLOOD TEST: CPT

## 2021-04-30 PROCEDURE — U0005 INFEC AGEN DETEC AMPLI PROBE: HCPCS

## 2021-04-30 PROCEDURE — 74011250637 HC RX REV CODE- 250/637: Performed by: NURSE PRACTITIONER

## 2021-04-30 PROCEDURE — 74011636637 HC RX REV CODE- 636/637: Performed by: PHYSICIAN ASSISTANT

## 2021-04-30 PROCEDURE — 74011636637 HC RX REV CODE- 636/637: Performed by: STUDENT IN AN ORGANIZED HEALTH CARE EDUCATION/TRAINING PROGRAM

## 2021-04-30 PROCEDURE — 97535 SELF CARE MNGMENT TRAINING: CPT

## 2021-04-30 PROCEDURE — 74011250636 HC RX REV CODE- 250/636: Performed by: NURSE PRACTITIONER

## 2021-04-30 PROCEDURE — 80048 BASIC METABOLIC PNL TOTAL CA: CPT

## 2021-04-30 RX ORDER — INSULIN LISPRO 100 [IU]/ML
8 INJECTION, SOLUTION INTRAVENOUS; SUBCUTANEOUS
Status: DISCONTINUED | OUTPATIENT
Start: 2021-04-30 | End: 2021-05-01

## 2021-04-30 RX ORDER — ONDANSETRON 2 MG/ML
4 INJECTION INTRAMUSCULAR; INTRAVENOUS
Status: DISCONTINUED | OUTPATIENT
Start: 2021-04-30 | End: 2021-05-07 | Stop reason: HOSPADM

## 2021-04-30 RX ORDER — INSULIN GLARGINE 100 [IU]/ML
20 INJECTION, SOLUTION SUBCUTANEOUS
Status: DISCONTINUED | OUTPATIENT
Start: 2021-04-30 | End: 2021-05-01

## 2021-04-30 RX ORDER — OXYCODONE HYDROCHLORIDE 5 MG/1
5 TABLET ORAL
Status: COMPLETED | OUTPATIENT
Start: 2021-04-30 | End: 2021-04-30

## 2021-04-30 RX ORDER — MORPHINE SULFATE 2 MG/ML
1 INJECTION, SOLUTION INTRAMUSCULAR; INTRAVENOUS
Status: DISCONTINUED | OUTPATIENT
Start: 2021-04-30 | End: 2021-05-07 | Stop reason: HOSPADM

## 2021-04-30 RX ORDER — METHOCARBAMOL 750 MG/1
750 TABLET, FILM COATED ORAL 4 TIMES DAILY
Status: DISCONTINUED | OUTPATIENT
Start: 2021-04-30 | End: 2021-05-07 | Stop reason: HOSPADM

## 2021-04-30 RX ORDER — LOSARTAN POTASSIUM 100 MG/1
100 TABLET ORAL DAILY
Status: DISCONTINUED | OUTPATIENT
Start: 2021-04-30 | End: 2021-05-05

## 2021-04-30 RX ORDER — OXYCODONE HYDROCHLORIDE 5 MG/1
10 TABLET ORAL
Status: DISCONTINUED | OUTPATIENT
Start: 2021-04-30 | End: 2021-05-07 | Stop reason: HOSPADM

## 2021-04-30 RX ADMIN — INSULIN LISPRO 8 UNITS: 100 INJECTION, SOLUTION INTRAVENOUS; SUBCUTANEOUS at 13:19

## 2021-04-30 RX ADMIN — METHOCARBAMOL TABLETS 750 MG: 750 TABLET, COATED ORAL at 17:29

## 2021-04-30 RX ADMIN — INSULIN LISPRO 5 UNITS: 100 INJECTION, SOLUTION INTRAVENOUS; SUBCUTANEOUS at 17:27

## 2021-04-30 RX ADMIN — ACETAMINOPHEN 650 MG: 325 TABLET ORAL at 04:07

## 2021-04-30 RX ADMIN — FENTANYL CITRATE 50 MCG: 50 INJECTION, SOLUTION INTRAMUSCULAR; INTRAVENOUS at 07:56

## 2021-04-30 RX ADMIN — Medication 10 ML: at 22:02

## 2021-04-30 RX ADMIN — FENTANYL CITRATE 50 MCG: 50 INJECTION, SOLUTION INTRAMUSCULAR; INTRAVENOUS at 09:24

## 2021-04-30 RX ADMIN — Medication 10 ML: at 13:20

## 2021-04-30 RX ADMIN — POTASSIUM CHLORIDE 20 MEQ: 1500 TABLET, EXTENDED RELEASE ORAL at 08:49

## 2021-04-30 RX ADMIN — DOCUSATE SODIUM 50MG AND SENNOSIDES 8.6MG 2 TABLET: 8.6; 5 TABLET, FILM COATED ORAL at 17:29

## 2021-04-30 RX ADMIN — LOSARTAN POTASSIUM 100 MG: 100 TABLET, FILM COATED ORAL at 17:29

## 2021-04-30 RX ADMIN — CALCIUM CARBONATE 500 MG (1,250 MG)-VITAMIN D3 200 UNIT TABLET 1 TABLET: at 08:49

## 2021-04-30 RX ADMIN — CALCIUM CARBONATE 500 MG (1,250 MG)-VITAMIN D3 200 UNIT TABLET 1 TABLET: at 17:00

## 2021-04-30 RX ADMIN — INSULIN LISPRO 8 UNITS: 100 INJECTION, SOLUTION INTRAVENOUS; SUBCUTANEOUS at 08:50

## 2021-04-30 RX ADMIN — POLYETHYLENE GLYCOL 3350 17 G: 17 POWDER, FOR SOLUTION ORAL at 17:29

## 2021-04-30 RX ADMIN — INSULIN LISPRO 5 UNITS: 100 INJECTION, SOLUTION INTRAVENOUS; SUBCUTANEOUS at 13:18

## 2021-04-30 RX ADMIN — METOPROLOL SUCCINATE 100 MG: 50 TABLET, EXTENDED RELEASE ORAL at 22:01

## 2021-04-30 RX ADMIN — OXYCODONE 10 MG: 5 TABLET ORAL at 15:55

## 2021-04-30 RX ADMIN — CALCIUM CARBONATE (ANTACID) CHEW TAB 500 MG 200 MG: 500 CHEW TAB at 08:49

## 2021-04-30 RX ADMIN — INSULIN LISPRO 8 UNITS: 100 INJECTION, SOLUTION INTRAVENOUS; SUBCUTANEOUS at 17:28

## 2021-04-30 RX ADMIN — INSULIN LISPRO 5 UNITS: 100 INJECTION, SOLUTION INTRAVENOUS; SUBCUTANEOUS at 08:50

## 2021-04-30 RX ADMIN — RIVAROXABAN 20 MG: 20 TABLET, FILM COATED ORAL at 08:49

## 2021-04-30 RX ADMIN — LABETALOL HYDROCHLORIDE 10 MG: 5 INJECTION INTRAVENOUS at 15:56

## 2021-04-30 RX ADMIN — Medication 40 MG: at 08:52

## 2021-04-30 RX ADMIN — METHOCARBAMOL TABLETS 750 MG: 750 TABLET, COATED ORAL at 13:19

## 2021-04-30 RX ADMIN — Medication 10 ML: at 22:03

## 2021-04-30 RX ADMIN — LEVOTHYROXINE SODIUM 100 MCG: 0.1 TABLET ORAL at 05:46

## 2021-04-30 RX ADMIN — ACETAMINOPHEN 650 MG: 325 TABLET ORAL at 22:05

## 2021-04-30 RX ADMIN — Medication 10 ML: at 05:45

## 2021-04-30 RX ADMIN — POLYETHYLENE GLYCOL 3350 17 G: 17 POWDER, FOR SOLUTION ORAL at 08:49

## 2021-04-30 RX ADMIN — DOCUSATE SODIUM 50MG AND SENNOSIDES 8.6MG 2 TABLET: 8.6; 5 TABLET, FILM COATED ORAL at 08:49

## 2021-04-30 RX ADMIN — INSULIN GLARGINE 20 UNITS: 100 INJECTION, SOLUTION SUBCUTANEOUS at 22:01

## 2021-04-30 RX ADMIN — CALCIUM CARBONATE (ANTACID) CHEW TAB 500 MG 200 MG: 500 CHEW TAB at 11:17

## 2021-04-30 RX ADMIN — CALCIUM CARBONATE 500 MG (1,250 MG)-VITAMIN D3 200 UNIT TABLET 1 TABLET: at 11:17

## 2021-04-30 RX ADMIN — ACETAMINOPHEN 650 MG: 325 TABLET ORAL at 15:55

## 2021-04-30 RX ADMIN — ONDANSETRON 4 MG: 2 INJECTION INTRAMUSCULAR; INTRAVENOUS at 11:03

## 2021-04-30 RX ADMIN — METHOCARBAMOL TABLETS 750 MG: 750 TABLET, COATED ORAL at 22:01

## 2021-04-30 RX ADMIN — INSULIN LISPRO 2 UNITS: 100 INJECTION, SOLUTION INTRAVENOUS; SUBCUTANEOUS at 22:02

## 2021-04-30 RX ADMIN — OXYCODONE 5 MG: 5 TABLET ORAL at 11:18

## 2021-04-30 RX ADMIN — CALCIUM CARBONATE (ANTACID) CHEW TAB 500 MG 200 MG: 500 CHEW TAB at 16:10

## 2021-04-30 NOTE — PROGRESS NOTES
Problem: Self Care Deficits Care Plan (Adult)  Goal: *Acute Goals and Plan of Care (Insert Text)  Description:   FUNCTIONAL STATUS PRIOR TO ADMISSION: Patient was independent and active without use of DME.     HOME SUPPORT: The patient lived alone with family to provide assistance. Occupational Therapy Goals  Initiated 4/29/2021  1. Patient will perform grooming sitting on EOB with supervision/set-up within 7 day(s). 2.  Patient will perform bathing standing at the sink for at least 3 minutes  with minimal assistance/contact guard assist within 7 day(s). 3.  Patient will perform lower body dressing with moderate assistance  within 7 day(s). 4.  Patient will perform toilet transfers with minimal assistance/contact guard assist within 7 day(s). 5.  Patient will perform all aspects of toileting with supervision/set-up within 7 day(s). 6.  Patient will participate in upper extremity therapeutic exercise/activities with supervision/set-up for 5 minutes within 7 day(s). 7.  Patient will utilize energy conservation techniques during functional activities with verbal cues within 7 day(s). Outcome: Progressing Towards Goal   OCCUPATIONAL THERAPY TREATMENT  Patient: Topher Baker (28 y.o. female)  Date: 4/30/2021  Diagnosis: [facility] ([facility] [S72.009A] <principal problem not specified>  Procedure(s) (LRB):  LEFT FEMORAL INTERTROCHANTERIC NAIL INSERTION (Left) 2 Days Post-Op  Precautions: Fall, DNR, WBAT  Chart, occupational therapy assessment, plan of care, and goals were reviewed. ASSESSMENT  Patient continues with skilled OT services and is progressing towards goals. Pt was anxious about moving today and was mod of  2 for rolling in bed and was max of 2 for supine to sit. She was able to assist with scooting but was mod of  to get to EOB. She her HR increased to 150 while sitting on EOb and worked with pt on deep breathing and visualization to relax her.   She was able to bring her HR down to 123. Pt was max of 2 to stand and mod of 2 to transfer to chair more to move RW and needed verbal encouragement to keep moving. Pt was left sitting up in chair and told  to only sit up for one hour and family in room and call bell  left  with pt. Current Level of Function Impacting Discharge (ADLs): max for LB ADLs and setup to min for UB ADLs     O         PLAN :  Patient continues to benefit from skilled intervention to address the above impairments. Continue treatment per established plan of care to address goals. Recommend with staff: Kajal Cody for at least one meal     Recommend next OT session: work on standing endurance     Recommendation for discharge: (in order for the patient to meet his/her long term goals)  Therapy 3 hours per day 5-7 days per week    This discharge recommendation:  Has been made in collaboration with the attending provider and/or case management    IF patient discharges home will need the following DME: tbd       SUBJECTIVE:   Patient stated I am afraid of moving and having pain .     OBJECTIVE DATA SUMMARY:   Cognitive/Behavioral Status:  Neurologic State: Alert  Orientation Level: Oriented X4  Cognition: Follows commands  Perception: Appears intact  Perseveration: No perseveration noted  Safety/Judgement: Fall prevention    Functional Mobility and Transfers for ADLs:  Bed Mobility:  Rolling: Maximum assistance;Assist x2; Moderate assistance  Supine to Sit: Maximum assistance;Assist x2; Moderate assistance    Transfers:  Sit to Stand: Maximum assistance;Assist x2  Functional Transfers  Toilet Transfer : Moderate assistance;Assist x2       Balance:  Sitting: Intact  Standing: Impaired; Without support  Standing - Static: Fair;Constant support  Standing - Dynamic : Fair;Constant support    ADL Intervention:  Feeding  Feeding Assistance: Independent    Grooming  Grooming Assistance: Set-up  Position Performed: Seated in chair  Washing Face: Independent  Washing Hands: Independent  Brushing Teeth: Independent    Upper Body Bathing  Bathing Assistance: Set-up  Position Performed: Seated in chair    Lower Body Bathing  Bathing Assistance: Maximum assistance  Perineal  : Maximum assistance  Position Performed: Supine       Toileting  Toileting Assistance: Maximum assistance  Bladder Hygiene: Maximum assistance  Bowel Hygiene: Maximum assistance    Cognitive Retraining  Safety/Judgement: Fall prevention          Pain:  Pt stated she had pain and is was an 8    Activity Tolerance:   Fair    After treatment patient left in no apparent distress:   Sitting in chair, Call bell within reach, and Caregiver / family present    COMMUNICATION/COLLABORATION:   The patients plan of care was discussed with: Physical therapist and Registered nurse.      Padmini Kovacs OT  Time Calculation: 40 mins

## 2021-04-30 NOTE — ANESTHESIA POSTPROCEDURE EVALUATION
Procedure(s):  LEFT FEMORAL INTERTROCHANTERIC NAIL INSERTION. general    Anesthesia Post Evaluation        Patient location during evaluation: PACU  Note status: Adequate. Level of consciousness: responsive to verbal stimuli and sleepy but conscious  Pain management: satisfactory to patient  Airway patency: patent  Anesthetic complications: no  Cardiovascular status: acceptable  Respiratory status: acceptable  Hydration status: acceptable  Comments: +Post-Anesthesia Evaluation and Assessment    Patient: Morena Vasquez MRN: [id number]  SSN: [id number]   YOB: 1945  Age: 64 y.o. Sex: female      Cardiovascular Function/Vital Signs    /63   Pulse (!) 104   Temp 36.9 °C (98.5 °F)   Resp 18   Ht 5' 10\" (1.778 m)   Wt 105.3 kg (232 lb 3.2 oz)   SpO2 93%   Breastfeeding No   BMI 33.32 kg/m²     Patient is status post Procedure(s):  LEFT FEMORAL INTERTROCHANTERIC NAIL INSERTION. Nausea/Vomiting: Controlled. Postoperative hydration reviewed and adequate. Pain:  Pain Scale 1: Numeric (0 - 10) (04/30/21 0500)  Pain Intensity 1: 0 (04/30/21 0500)   Managed. Neurological Status:   Neuro (WDL): Exceptions to WDL (04/28/21 1930)   At baseline. Mental Status and Level of Consciousness: Arousable. Pulmonary Status:   O2 Device: None (Room air) (04/29/21 1945)   Adequate oxygenation and airway patent. Complications related to anesthesia: None    Post-anesthesia assessment completed. No concerns. Signed By: Gia Silva MD    4/30/2021  Post anesthesia nausea and vomiting:  controlled      INITIAL Post-op Vital signs:   Vitals Value Taken Time   /79 04/28/21 1945   Temp 37.1 °C (98.7 °F) 04/28/21 1930   Pulse 107 04/28/21 1947   Resp 15 04/28/21 1947   SpO2 97 % 04/28/21 1947   Vitals shown include unvalidated device data.

## 2021-04-30 NOTE — PROGRESS NOTES
Problem: Mobility Impaired (Adult and Pediatric)  Goal: *Acute Goals and Plan of Care (Insert Text)  Description: FUNCTIONAL STATUS PRIOR TO ADMISSION: independent without devices with mobility and ADLs. HOME SUPPORT PRIOR TO ADMISSION: Lived alone with family nearby to assist as needed. Physical Therapy Goals  Initiated 4/29/2021  1. Patient will move from supine to sit and sit to supine , scoot up and down, and roll side to side in bed with minimal assistance/contact guard assist within 7 day(s). 2.  Patient will transfer from bed to chair and chair to bed with minimal assistance/contact guard assist using the least restrictive device within 7 day(s). 3.  Patient will perform sit to stand with minimal assistance/contact guard assist within 7 day(s). 4.  Patient will ambulate with minimal assistance/contact guard assist for 35 feet with the least restrictive device within 7 day(s). 5.  Patient will ascend/descend 4 stairs with 2 handrail(s) with minimal assistance/contact guard assist within 7 day(s). Outcome: Progressing Towards Goal   PHYSICAL THERAPY TREATMENT  Patient: Juana Benitez (89 y.o. female)  Date: 4/30/2021  Diagnosis: Hip fracture ([facility] [address] [S72.009A] <principal problem not specified>  Procedure(s) (LRB):  LEFT FEMORAL INTERTROCHANTERIC NAIL INSERTION (Left) 2 Days Post-Op  Precautions: Fall, DNR, WBAT  Chart, physical therapy assessment, plan of care and goals were reviewed. ASSESSMENT  Patient continues with skilled PT services and is progressing towards goals. Pt presents with decreased strength and increased pain with mobility. Pt performed bed mobility max A/mod A x2. Pt very anxious with HR increasing to 150 BPM  once sitting EOB. Pt able to calm down with deep breathing. Pt BP decreased for supine BP but stabilized in sitting. Pt performed sit to stand transfer at max A x2 with additional time.  Pt able to take steps with [facility] and to bed side chair at min A/CGA with constant cueing for sequencing. Pt performed seated exercises once up in chair. Pt with improved mobility tolerance and will continue to increased activity. Current Level of Function Impacting Discharge (mobility/balance): mobility at max a/mod A x2     Other factors to consider for discharge: pain, anxiety, BP          PLAN :  Patient continues to benefit from skilled intervention to address the above impairments. Continue treatment per established plan of care. to address goals. Recommendation for discharge: (in order for the patient to meet his/her long term goals)  To be determined: Rehab vs HHPT with supervision pending progress     This discharge recommendation:  Has been made in collaboration with the attending provider and/or case management    IF patient discharges home will need the following DME: bedside commode and rolling walker       SUBJECTIVE:   Patient stated  I'm just sore all over.     OBJECTIVE DATA SUMMARY:   Critical Behavior:  Neurologic State: Alert  Orientation Level: Oriented X4  Cognition: Follows commands  Safety/Judgement: Fall prevention  Functional Mobility Training:  Bed Mobility:  Rolling: Maximum assistance;Assist x2; Moderate assistance  Supine to Sit: Maximum assistance;Assist x2; Moderate assistance              Transfers:  Sit to Stand: Maximum assistance;Assist x2  Stand to Sit: Moderate assistance;Assist x2                             Balance:  Sitting: Intact  Standing: Impaired; Without support  Standing - Static: Fair;Constant support  Standing - Dynamic : Fair;Constant support  Ambulation/Gait Training:  Distance (ft): 4 Feet (ft)  Assistive Device: Gait belt;Walker, rolling  Ambulation - Level of Assistance: Minimal assistance;Contact guard assistance        Gait Abnormalities: Decreased step clearance; Antalgic; Step to gait        Base of Support: Narrowed     Speed/Vita: Pace decreased (<100 feet/min)  Step Length: Right shortened;Left shortened       Therapeutic Exercises:   Seated  LAQ x10  Marching x10  Hip abduction x10  Ankle pumps x20  Pain Rating:  Pt with increased pain with mobility. Activity Tolerance:   Fair, SpO2 stable on RA, requires rest breaks, and observed SOB with activity    After treatment patient left in no apparent distress:   Sitting in chair, Call bell within reach, and Caregiver / family present    COMMUNICATION/COLLABORATION:   The patients plan of care was discussed with: Occupational therapist and Registered nurse.      Rupali Clay, PTA   Time Calculation: 40 mins

## 2021-04-30 NOTE — PROGRESS NOTES
End of Shift Note    Bedside shift change report given to [facility]  (oncoming nurse) by Kaylynn Kelly (offgoing nurse). Report included the following information SBAR and Kardex    Shift worked:  [id number]     Shift summary and any significant changes:    none   Concerns for physician to address: none   Zone phone for oncoming shift:       Activity:  Activity Level: Up with Assistance  Number times ambulated in hallways past shift: 0  Number of times OOB to chair past shift: 0    Cardiac:   Cardiac Monitoring: Yes      Cardiac Rhythm: Atrial fibrillation    Access:   Current line(s): PIV     Genitourinary:   Urinary status: external catheter    Respiratory:   O2 Device: None (Room air)  Chronic home O2 use?: NO  Incentive spirometer at bedside: NO     GI:  Last Bowel Movement Date: 04/27/21  Current diet:  DIET DIABETIC CONSISTENT CARB Regular  Passing flatus: YES  Tolerating current diet: YES       Pain Management:   Patient states pain is manageable on current regimen: YES    Skin:  Leonard Score: 17  Interventions: increase time out of bed    Patient Safety:  Fall Score:  Total Score: 3  Interventions: gripper socks and pt to call before getting OOB  High Fall Risk: Yes    Length of Stay:  Expected LOS: 2d 21h  Actual LOS: [facility]

## 2021-04-30 NOTE — PROGRESS NOTES
Hospitalist Progress Note    NAME: Alva Rivers   :  1945   MRN:  [id number]       Assessment / Plan:    Left hip fracture POA s/p  Cephalomedullary nailing left hip   -Patient presented with mechanical fall and noticed to have left hip fracture  -Appreciate orthopedic consultation  -s/p  Cephalomedullary nailing left hip   -Pain control  -Postop PT OT  -Xarelto resumed  -PCR ordered for placement     Hypotension  History of hypertension  -On a lot of antihypertensives: clonidine, hydralazine, losartan, metoprolol and nifedipine. BP better today. Resume metoprolol and losartan. Continue to hold other antihypertensives for now. Watch for rebound hypertension with clonidin on hold     Hypothyroidism  -Continue levothyroxine      Chronic atrial fibrillation, RVR  -Resume metoprolol. Continue xarelto     Insulin-dependent type 2 diabetes  -Hold Metformin and continue sliding scale insulin  Add lantus 10U at night and lispro 5u TID     GERD  -Continue PPI     Code Status: Patient wishes to be DNR  Surrogate Decision Maker: She wants to designate her harryon Trevon Ewing as surrogate POA     DVT Prophylaxis: SCDs for now, resume Xarelto after surgery  GI Prophylaxis: not indicated  Disposition: Rehab vs HHPT    Baseline: Independent/ambulatory     Subjective:     Chief Complaint / Reason for Physician Visit  Discussed with RN events overnight. C/o of pain at the hip. No other complaints. Review of Systems:  Symptom Y/N Comments  Symptom Y/N Comments   Fever/Chills    Chest Pain     Poor Appetite    Edema     Cough    Abdominal Pain     Sputum    Joint Pain     SOB/GARCIA    Pruritis/Rash     Nausea/vomit    Tolerating PT/OT     Diarrhea    Tolerating Diet     Constipation    Other       Could NOT obtain due to:      Objective:     VITALS:   Last 24hrs VS reviewed since prior progress note.  Most recent are:  Patient Vitals for the past 24 hrs:   Temp Pulse Resp BP SpO2   21 1532 99.2 °F (37.3 °C) (!) 130 18 (!) 153/59 91 %   04/30/21 1151  (!) 121  (!) 121/59 94 %   04/30/21 1150  (!) 123  118/73 94 %   04/30/21 1145  (!) 150  126/66    04/30/21 1134  (!) 123  (!) 143/77    04/30/21 1119    (!) 156/82    04/30/21 0822 98.9 °F (37.2 °C) (!) 109  (!) 147/69 100 %   04/30/21 0402 98.5 °F (36.9 °C) (!) 104 18 129/63 93 %   04/30/21 0042 98.1 °F (36.7 °C) 97 18 (!) 120/57 94 %   04/29/21 2159 97.9 °F (36.6 °C) (!) 103 18 (!) 110/59 99 %   04/29/21 1945     99 %   04/29/21 1729    (!) 128/59        Intake/Output Summary (Last 24 hours) at 4/30/2021 1640  Last data filed at 4/30/2021 3408  Gross per 24 hour   Intake    Output 550 ml   Net -550 ml        I had a face to face encounter and independently examined this patient on 4/30/2021, as outlined below:  PHYSICAL EXAM:  General: WD, WN. Alert, cooperative, no acute distress    EENT:  EOMI. Anicteric sclerae. MMM  Resp:  CTA bilaterally, no wheezing or rales. No accessory muscle use  CV:  Regular  rhythm,  No edema  GI:  Soft, Non distended, Non tender. +Bowel sounds  Neurologic:  Alert and oriented X 3, normal speech,   Psych:   Good insight. Not anxious nor agitated  Skin:  No rashes. No jaundice    Reviewed most current lab test results and cultures  YES  Reviewed most current radiology test results   YES  Review and summation of old records today    NO  Reviewed patient's current orders and MAR    YES  PMH/SH reviewed - no change compared to H&P  ________________________________________________________________________  Care Plan discussed with:    Comments   Patient x    Family      RN x    Care Manager x    Consultant                        Multidiciplinary team rounds were held today with , nursing, pharmacist and clinical coordinator. Patient's plan of care was discussed; medications were reviewed and discharge planning was addressed. ________________________________________________________________________  Total NON critical care TIME:  35   Minutes    Total CRITICAL CARE TIME Spent:   Minutes non procedure based      Comments   >50% of visit spent in counseling and coordination of care x    ________________________________________________________________________  Reji العراقي MD     Procedures: see electronic medical records for all procedures/Xrays and details which were not copied into this note but were reviewed prior to creation of Plan. LABS:  I reviewed today's most current labs and imaging studies.   Pertinent labs include:  Recent Labs     04/30/21  0400 04/29/21  0403 04/28/21  0552 04/27/21 2222   WBC  --  12.8* 15.3* 17.5*   HGB 9.1* 9.9* 11.8 11.8   HCT  --  33.2* 37.5 37.5   PLT  --  193 235 239     Recent Labs     04/30/21  0400 04/29/21  0403 04/28/21  0552 04/27/21 2222 04/27/21  1947    136 135* 137 137   K 3.9 4.2 3.8 3.7 3.5    102 102 103 102   CO2 30 30 25 28 30   * 286* 334* 240* 182*   BUN 21* 18 20 20 20   CREA 1.14* 1.09* 1.07* 1.04* 1.12*   CA 9.5 9.2 9.7 9.5 10.0   ALB  --   --   --  3.4* 3.7   TBILI  --   --   --  1.1* 0.9   ALT  --   --   --  17 18   INR  --   --   --  1.1  --        Signed: Reij العراقي MD

## 2021-04-30 NOTE — PROGRESS NOTES
Orthopedic NP Progress Note  Post Op day: 2 Days Post-Op    April 30, 2021 10:03 AM     Bret Riedel    Attending Physician: Treatment Team: Attending Provider: Abel Odom MD; Consulting Provider: Abel Odom MD; Consulting Provider: Alma Jo MD; Consulting Provider: Maricarmen Melo PA-C; Consulting Provider: Angeles Solorio MD; Utilization Review: Jose Cuba RN; Care Manager: Mk Tello     Vital Signs:    Patient Vitals for the past 8 hrs:   BP Temp Pulse Resp SpO2 Weight   04/30/21 0822 (!) 147/69 98.9 °F (37.2 °C) (!) 109  100 %    04/30/21 0402 129/63 98.5 °F (36.9 °C) (!) 104 18 93 % 105.3 kg (232 lb 3.2 oz)     BMI (calculated): 33.3 (04/30/21 0402)    Intake/Output:  04/30 0701 - 04/30 1900  In: -   Out: 200 [Urine:200]  04/28 1901 - 04/30 0700  In: 700 [I.V.:700]  Out: 1050 [Urine:1050]    Pain Control:   Pain Assessment  Pain Scale 1: Numeric (0 - 10)  Pain Intensity 1: 5  Pain Onset 1: post op  Pain Location 1: Leg  Pain Orientation 1: Left  Pain Description 1: Sore  Pain Intervention(s) 1: Medication (see MAR)    LAB:    Recent Labs     04/30/21  0400 04/29/21  0403   HCT  --  33.2*   HGB 9.1* 9.9*     Lab Results   Component Value Date/Time    Sodium 137 04/30/2021 04:00 AM    Potassium 3.9 04/30/2021 04:00 AM    Chloride 105 04/30/2021 04:00 AM    CO2 30 04/30/2021 04:00 AM    Glucose 176 (H) 04/30/2021 04:00 AM    BUN 21 (H) 04/30/2021 04:00 AM    Creatinine 1.14 (H) 04/30/2021 04:00 AM    Calcium 9.5 04/30/2021 04:00 AM       Subjective:  Bret Riedel is a 64 y.o. female s/p a  Procedure(s):  LEFT FEMORAL INTERTROCHANTERIC NAIL INSERTION   Procedure(s):  LEFT FEMORAL INTERTROCHANTERIC NAIL INSERTION. + pain and nausea this am       Objective: General: alert, cooperative, no distress. .  Gastrointestinal:  Soft, non-tender. Neuro/Vascular: CNS Intact. Sensation stable. Brisk cap refill, + pulses UE/LE  Musculoskeletal:  +ROM UE/LE. Skin: Incision - clean, dry and intact. No significant erythema or swelling.     Dressing: clean, dry, and intact    Case - n  Drain -n        PT/OT:   Gait:                      Assessment:    s/p Procedure(s):  LEFT FEMORAL INTERTROCHANTERIC NAIL INSERTION    Active Problems:    Hip fracture ([address] (4/27/2021)         Plan:   -  Continue PT/OT - WBAT   -  Continue established methods of pain control - pain meds adjusted to 5-10mg of Oxy PRN   -  VTE Prophylaxes - TEDS &/or SCDs with xarelto as per medicine   -  GI Prophylaxis - nexium     Discharge To:  Rehab vs [facility]    Signed By: Hoda Escobar NP    Orthopedic Nurse Practitioner

## 2021-05-01 LAB
ANION GAP SERPL CALC-SCNC: 3 MMOL/L (ref 5–15)
BUN SERPL-MCNC: 22 MG/DL (ref 6–20)
BUN/CREAT SERPL: 25 (ref 12–20)
CALCIUM SERPL-MCNC: 9 MG/DL (ref 8.5–10.1)
CHLORIDE SERPL-SCNC: 102 MMOL/L (ref 97–108)
CO2 SERPL-SCNC: 29 MMOL/L (ref 21–32)
CREAT SERPL-MCNC: 0.89 MG/DL (ref 0.55–1.02)
ERYTHROCYTE [DISTWIDTH] IN BLOOD BY AUTOMATED COUNT: 13.9 % (ref 11.5–14.5)
GLUCOSE BLD STRIP.AUTO-MCNC: 229 MG/DL (ref 65–100)
GLUCOSE BLD STRIP.AUTO-MCNC: 247 MG/DL (ref 65–100)
GLUCOSE BLD STRIP.AUTO-MCNC: 249 MG/DL (ref 65–100)
GLUCOSE BLD STRIP.AUTO-MCNC: 258 MG/DL (ref 65–100)
GLUCOSE SERPL-MCNC: 218 MG/DL (ref 65–100)
HCT VFR BLD AUTO: 27.9 % (ref 35–47)
HGB BLD-MCNC: 8.7 G/DL (ref 11.5–16)
MCH RBC QN AUTO: 27.8 PG (ref 26–34)
MCHC RBC AUTO-ENTMCNC: 31.2 G/DL (ref 30–36.5)
MCV RBC AUTO: 89.1 FL (ref 80–99)
NRBC # BLD: 0 K/UL (ref 0–0.01)
NRBC BLD-RTO: 0 PER 100 WBC
PLATELET # BLD AUTO: 180 K/UL (ref 150–400)
PMV BLD AUTO: 10.9 FL (ref 8.9–12.9)
POTASSIUM SERPL-SCNC: 4.1 MMOL/L (ref 3.5–5.1)
RBC # BLD AUTO: 3.13 M/UL (ref 3.8–5.2)
SARS-COV-2, COV2: NOT DETECTED
SERVICE CMNT-IMP: ABNORMAL
SODIUM SERPL-SCNC: 134 MMOL/L (ref 136–145)
SPECIMEN SOURCE, FCOV2M: NORMAL
WBC # BLD AUTO: 13.1 K/UL (ref 3.6–11)

## 2021-05-01 PROCEDURE — 74011000250 HC RX REV CODE- 250: Performed by: STUDENT IN AN ORGANIZED HEALTH CARE EDUCATION/TRAINING PROGRAM

## 2021-05-01 PROCEDURE — 74011636637 HC RX REV CODE- 636/637: Performed by: PHYSICIAN ASSISTANT

## 2021-05-01 PROCEDURE — 74011250637 HC RX REV CODE- 250/637: Performed by: PHYSICIAN ASSISTANT

## 2021-05-01 PROCEDURE — 80048 BASIC METABOLIC PNL TOTAL CA: CPT

## 2021-05-01 PROCEDURE — 74011636637 HC RX REV CODE- 636/637: Performed by: STUDENT IN AN ORGANIZED HEALTH CARE EDUCATION/TRAINING PROGRAM

## 2021-05-01 PROCEDURE — 74011250637 HC RX REV CODE- 250/637: Performed by: STUDENT IN AN ORGANIZED HEALTH CARE EDUCATION/TRAINING PROGRAM

## 2021-05-01 PROCEDURE — 82962 GLUCOSE BLOOD TEST: CPT

## 2021-05-01 PROCEDURE — 74011250637 HC RX REV CODE- 250/637: Performed by: NURSE PRACTITIONER

## 2021-05-01 PROCEDURE — 85027 COMPLETE CBC AUTOMATED: CPT

## 2021-05-01 PROCEDURE — 36415 COLL VENOUS BLD VENIPUNCTURE: CPT

## 2021-05-01 PROCEDURE — 97530 THERAPEUTIC ACTIVITIES: CPT | Performed by: PHYSICAL THERAPIST

## 2021-05-01 PROCEDURE — 65660000000 HC RM CCU STEPDOWN

## 2021-05-01 RX ORDER — DILTIAZEM HYDROCHLORIDE 5 MG/ML
10 INJECTION INTRAVENOUS ONCE
Status: COMPLETED | OUTPATIENT
Start: 2021-05-01 | End: 2021-05-01

## 2021-05-01 RX ORDER — INSULIN GLARGINE 100 [IU]/ML
30 INJECTION, SOLUTION SUBCUTANEOUS
Status: DISCONTINUED | OUTPATIENT
Start: 2021-05-01 | End: 2021-05-02

## 2021-05-01 RX ORDER — INSULIN LISPRO 100 [IU]/ML
10 INJECTION, SOLUTION INTRAVENOUS; SUBCUTANEOUS
Status: DISCONTINUED | OUTPATIENT
Start: 2021-05-01 | End: 2021-05-02

## 2021-05-01 RX ORDER — METOPROLOL TARTRATE 5 MG/5ML
5 INJECTION INTRAVENOUS
Status: DISCONTINUED | OUTPATIENT
Start: 2021-05-01 | End: 2021-05-07 | Stop reason: HOSPADM

## 2021-05-01 RX ADMIN — INSULIN LISPRO 5 UNITS: 100 INJECTION, SOLUTION INTRAVENOUS; SUBCUTANEOUS at 08:15

## 2021-05-01 RX ADMIN — POTASSIUM CHLORIDE 20 MEQ: 1500 TABLET, EXTENDED RELEASE ORAL at 08:16

## 2021-05-01 RX ADMIN — OXYCODONE 10 MG: 5 TABLET ORAL at 08:17

## 2021-05-01 RX ADMIN — INSULIN LISPRO 8 UNITS: 100 INJECTION, SOLUTION INTRAVENOUS; SUBCUTANEOUS at 08:14

## 2021-05-01 RX ADMIN — ACETAMINOPHEN 650 MG: 325 TABLET ORAL at 23:08

## 2021-05-01 RX ADMIN — ACETAMINOPHEN 650 MG: 325 TABLET ORAL at 10:06

## 2021-05-01 RX ADMIN — LOSARTAN POTASSIUM 100 MG: 100 TABLET, FILM COATED ORAL at 08:16

## 2021-05-01 RX ADMIN — METHOCARBAMOL TABLETS 750 MG: 750 TABLET, COATED ORAL at 17:11

## 2021-05-01 RX ADMIN — INSULIN LISPRO 3 UNITS: 100 INJECTION, SOLUTION INTRAVENOUS; SUBCUTANEOUS at 17:10

## 2021-05-01 RX ADMIN — ACETAMINOPHEN 650 MG: 325 TABLET ORAL at 06:21

## 2021-05-01 RX ADMIN — METOPROLOL SUCCINATE 100 MG: 50 TABLET, EXTENDED RELEASE ORAL at 23:08

## 2021-05-01 RX ADMIN — Medication 10 ML: at 12:27

## 2021-05-01 RX ADMIN — Medication 40 MG: at 08:26

## 2021-05-01 RX ADMIN — OXYCODONE 5 MG: 5 TABLET ORAL at 12:27

## 2021-05-01 RX ADMIN — Medication 10 ML: at 23:09

## 2021-05-01 RX ADMIN — RIVAROXABAN 20 MG: 20 TABLET, FILM COATED ORAL at 08:16

## 2021-05-01 RX ADMIN — INSULIN LISPRO 2 UNITS: 100 INJECTION, SOLUTION INTRAVENOUS; SUBCUTANEOUS at 22:03

## 2021-05-01 RX ADMIN — INSULIN LISPRO 10 UNITS: 100 INJECTION, SOLUTION INTRAVENOUS; SUBCUTANEOUS at 17:10

## 2021-05-01 RX ADMIN — LEVOTHYROXINE SODIUM 100 MCG: 0.1 TABLET ORAL at 06:21

## 2021-05-01 RX ADMIN — NIFEDIPINE 60 MG: 30 TABLET, EXTENDED RELEASE ORAL at 10:06

## 2021-05-01 RX ADMIN — CALCIUM CARBONATE (ANTACID) CHEW TAB 500 MG 200 MG: 500 CHEW TAB at 08:00

## 2021-05-01 RX ADMIN — CALCIUM CARBONATE 500 MG (1,250 MG)-VITAMIN D3 200 UNIT TABLET 1 TABLET: at 12:26

## 2021-05-01 RX ADMIN — INSULIN GLARGINE 30 UNITS: 100 INJECTION, SOLUTION SUBCUTANEOUS at 22:03

## 2021-05-01 RX ADMIN — METHOCARBAMOL TABLETS 750 MG: 750 TABLET, COATED ORAL at 12:26

## 2021-05-01 RX ADMIN — Medication 10 ML: at 06:25

## 2021-05-01 RX ADMIN — CALCIUM CARBONATE (ANTACID) CHEW TAB 500 MG 200 MG: 500 CHEW TAB at 12:26

## 2021-05-01 RX ADMIN — CALCIUM CARBONATE 500 MG (1,250 MG)-VITAMIN D3 200 UNIT TABLET 1 TABLET: at 08:16

## 2021-05-01 RX ADMIN — METHOCARBAMOL TABLETS 750 MG: 750 TABLET, COATED ORAL at 22:03

## 2021-05-01 RX ADMIN — DILTIAZEM HYDROCHLORIDE 10 MG: 5 INJECTION INTRAVENOUS at 17:06

## 2021-05-01 RX ADMIN — INSULIN LISPRO 10 UNITS: 100 INJECTION, SOLUTION INTRAVENOUS; SUBCUTANEOUS at 12:26

## 2021-05-01 RX ADMIN — DOCUSATE SODIUM 50MG AND SENNOSIDES 8.6MG 2 TABLET: 8.6; 5 TABLET, FILM COATED ORAL at 17:11

## 2021-05-01 RX ADMIN — ACETAMINOPHEN 650 MG: 325 TABLET ORAL at 17:10

## 2021-05-01 RX ADMIN — POLYETHYLENE GLYCOL 3350 17 G: 17 POWDER, FOR SOLUTION ORAL at 08:16

## 2021-05-01 RX ADMIN — METOPROLOL TARTRATE 5 MG: 1 INJECTION, SOLUTION INTRAVENOUS at 12:27

## 2021-05-01 RX ADMIN — DOCUSATE SODIUM 50MG AND SENNOSIDES 8.6MG 2 TABLET: 8.6; 5 TABLET, FILM COATED ORAL at 08:16

## 2021-05-01 RX ADMIN — INSULIN LISPRO 3 UNITS: 100 INJECTION, SOLUTION INTRAVENOUS; SUBCUTANEOUS at 12:27

## 2021-05-01 RX ADMIN — METOPROLOL TARTRATE 5 MG: 1 INJECTION, SOLUTION INTRAVENOUS at 21:11

## 2021-05-01 RX ADMIN — CALCIUM CARBONATE 500 MG (1,250 MG)-VITAMIN D3 200 UNIT TABLET 1 TABLET: at 17:10

## 2021-05-01 RX ADMIN — METHOCARBAMOL TABLETS 750 MG: 750 TABLET, COATED ORAL at 08:16

## 2021-05-01 RX ADMIN — CALCIUM CARBONATE (ANTACID) CHEW TAB 500 MG 200 MG: 500 CHEW TAB at 17:10

## 2021-05-01 NOTE — ROUTINE PROCESS
1215 Pt HR went upto 160's while working with PT , at rest 130's , informed dr Morgan Wright, she said she will put order for IV metoprolol PRN.     1545 Pt HR again go upto 150's while using Bedpan and down to 120's while resting, informed Dr Morgan Wright, no new order received asked to continue metoprolol iv prn every 6 hr.    1650 pt Hr is sustaining on 140's at rest, informed  and got order for Cardizem 10 mg iv onetime

## 2021-05-01 NOTE — PROGRESS NOTES
Problem: Mobility Impaired (Adult and Pediatric)  Goal: *Acute Goals and Plan of Care (Insert Text)  Description: FUNCTIONAL STATUS PRIOR TO ADMISSION: independent without devices with mobility and ADLs. HOME SUPPORT PRIOR TO ADMISSION: Lived alone with family nearby to assist as needed. Physical Therapy Goals  Initiated 4/29/2021  1. Patient will move from supine to sit and sit to supine , scoot up and down, and roll side to side in bed with minimal assistance/contact guard assist within 7 day(s). 2.  Patient will transfer from bed to chair and chair to bed with minimal assistance/contact guard assist using the least restrictive device within 7 day(s). 3.  Patient will perform sit to stand with minimal assistance/contact guard assist within 7 day(s). 4.  Patient will ambulate with minimal assistance/contact guard assist for 35 feet with the least restrictive device within 7 day(s). 5.  Patient will ascend/descend 4 stairs with 2 handrail(s) with minimal assistance/contact guard assist within 7 day(s). Outcome: Progressing Towards Goal  Note:   PHYSICAL THERAPY TREATMENT  Patient: Edel Moeller (55 y.o. female)  Date: 5/1/2021  Diagnosis: Hip fracture ([address] Rebekah Chacon <principal problem not specified>  Procedure(s) (LRB):  LEFT FEMORAL INTERTROCHANTERIC NAIL INSERTION (Left) 3 Days Post-Op  Precautions: Fall, DNR, WBAT  Chart, physical therapy assessment, plan of care and goals were reviewed. ASSESSMENT  Patient continues with skilled PT services and is slowly progressing towards goals. Patient supine in bed, agreeable to mobility. Assisted with left LE exercises. Able to come to sit with mod assist x 2. Good sitting balance EOB. Sit to stand with mod assist x 2. Fair standing balance. Patient able to take a few steps before RN informs of . Bed moved and chair brought behind patient. HR to 132 after 1 minute of rest.  Up in chair at end of session with delvin present.   Recommend SNF at discharge. Current Level of Function Impacting Discharge (mobility/balance): mod/max assist x 2    Other factors to consider for discharge: pin control         PLAN :  Patient continues to benefit from skilled intervention to address the above impairments. Continue treatment per established plan of care. to address goals. Recommendation for discharge: (in order for the patient to meet his/her long term goals)  Therapy up to 5 days/week in SNF setting    This discharge recommendation:  Has been made in collaboration with the attending provider and/or case management    IF patient discharges home will need the following DME: to be determined (TBD)       SUBJECTIVE:   Patient stated I don't know.     OBJECTIVE DATA SUMMARY:   Critical Behavior:  Neurologic State: Alert, Appropriate for age  Orientation Level: Oriented X4  Cognition: Follows commands  Safety/Judgement: Fall prevention  Functional Mobility Training:  Bed Mobility:  Rolling: Moderate assistance;Assist x2  Supine to Sit: Moderate assistance;Assist x2     Scooting: Maximum assistance;Assist x1        Transfers:  Sit to Stand: Moderate assistance;Assist x2  Stand to Sit: Moderate assistance;Assist x2                             Balance:  Sitting: Intact  Standing: Impaired  Standing - Static: Constant support; Fair  Standing - Dynamic : Constant support; Fair  Ambulation/Gait Training:  Distance (ft): 3 Feet (ft)  Assistive Device: Walker, rolling;Gait belt  Ambulation - Level of Assistance: Minimal assistance        Gait Abnormalities: Antalgic;Decreased step clearance  Right Side Weight Bearing: Full  Left Side Weight Bearing: As tolerated        Speed/Vita: Slow  Step Length: Left shortened;Right shortened                                Therapeutic Exercises:    Ankle pumps, heel slides, hip abd/adduction, 8 reps with mod/max assist  Pain Rating:  No number given    Activity Tolerance:   Fair    After treatment patient left in no apparent distress:   Sitting in chair, Call bell within reach, and Caregiver / family present    COMMUNICATION/COLLABORATION:   The patients plan of care was discussed with: Registered nurse.      Yonas Randle, PT   Time Calculation: 15 mins

## 2021-05-01 NOTE — PROGRESS NOTES
Po hip fracture. Pain well managed  Working with pt  hgb 8.7    Patient Vitals for the past 24 hrs:   Temp Pulse Resp BP SpO2   05/01/21 0750 98.2 °F (36.8 °C) (!) 114 18 (!) 147/91 93 %   05/01/21 0304 98.5 °F (36.9 °C) (!) 123 18 (!) 154/72 92 %   04/30/21 2340 98.8 °F (37.1 °C) (!) 124 18 (!) 160/82 92 %   04/30/21 2159 98.2 °F (36.8 °C) (!) 128 18 (!) 155/57 90 %   04/30/21 2124  (!) 122      04/30/21 2000 98.8 °F (37.1 °C) (!) 132 18 128/64 90 %   04/30/21 1532 99.2 °F (37.3 °C) (!) 130 18 (!) 153/59 91 %   04/30/21 1151  (!) 121  (!) 121/59 94 %   04/30/21 1150  (!) 123  118/73 94 %   04/30/21 1145  (!) 150  126/66    04/30/21 1134  (!) 123  (!) 143/77    04/30/21 1119    (!) 156/82      Dressing clean and dry  Musculoskeletal: Jose Miguel's sign negative in bilateral lower extremities. Calves soft, supple, non-tender upon palpation or with passive stretch.      oob with pt  Dc plan home v hh  Resume xarelto for dvt

## 2021-05-01 NOTE — PROGRESS NOTES
End of Shift Note    Bedside shift change report given to Jess 1827 (oncoming nurse) by Ignacia Lihgt (offgoing nurse). Report included the following information SBAR, Kardex, Intake/Output, MAR, Recent Results, Med Rec Status and Cardiac Rhythm A-fib    Shift worked:  [id number]     Shift summary and any significant changes:     Patient had a good night, medicated for pain, chg bath completed by PCT this AM.      Concerns for physician to address:  HR remains elevated     Zone phone for oncoming shift:   [phone]       Activity:  Activity Level: Up with Assistance  Number times ambulated in hallways past shift: 0  Number of times OOB to chair past shift: 0    Cardiac:   Cardiac Monitoring: Yes      Cardiac Rhythm: Atrial fibrillation    Access:   Current line(s): PIV     Genitourinary:   Urinary status: voiding and external catheter    Respiratory:   O2 Device: None (Room air)  Chronic home O2 use?: NO  Incentive spirometer at bedside: YES     GI:  Last Bowel Movement Date: 04/27/21  Current diet:  DIET DIABETIC CONSISTENT CARB Regular  Passing flatus: YES  Tolerating current diet: YES       Pain Management:   Patient states pain is manageable on current regimen: YES    Skin:  Leonard Score: 17  Interventions: increase time out of bed and foam dressing    Patient Safety:  Fall Score:  Total Score: 4  Interventions: bed/chair alarm, assistive device (walker, cane, etc), gripper socks and pt to call before getting OOB  High Fall Risk: Yes    Length of Stay:  Expected LOS: 3d 7h  Actual LOS: 4      Lori M [facility]

## 2021-05-01 NOTE — PROGRESS NOTES
Hospitalist Progress Note    NAME: Crys Christensen   :  1945   MRN:  [id number]       Assessment / Plan:    Left hip fracture POA s/p  Cephalomedullary nailing left hip   -Patient presented with mechanical fall and noticed to have left hip fracture  -Appreciate orthopedic consultation  -s/p  Cephalomedullary nailing left hip   -Pain control  -Continue PT OT, rec rehav vs HHPT  -Continue Xarelto   -COVID PCR ordered for placement, pending     Hypotension - resolved  History of hypertension  -On a lot of antihypertensives: clonidine, hydralazine, losartan, metoprolol and nifedipine. BP better today. Resume antihypertensives as tolerated.     Hypothyroidism  -Continue levothyroxine      Chronic atrial fibrillation, RVR  -Continue metoprolol. Continue xarelto     Insulin-dependent type 2 diabetes  -Hold Metformin and continue sliding scale insulin  -Increase lantus to 30U and lispro to 10 U TID w/ meal     GERD  -Continue PPI     Code Status: Patient wishes to be DNR  Surrogate Decision Maker: She wants to designate her stefany Maurisioshahriaranthony Milli as surrogate POA     DVT Prophylaxis: SCDs for now, resume Xarelto after surgery  GI Prophylaxis: not indicated  Disposition: Rehab vs HHPT    Baseline: Independent/ambulatory     Subjective:     Chief Complaint / Reason for Physician Visit  Discussed with RN events overnight. Left hip pain well controlled when at rest. \"I haven been up yet so I don't know if it is going to hurt\"    Review of Systems:  Symptom Y/N Comments  Symptom Y/N Comments   Fever/Chills    Chest Pain     Poor Appetite    Edema     Cough    Abdominal Pain     Sputum    Joint Pain     SOB/GARCIA    Pruritis/Rash     Nausea/vomit    Tolerating PT/OT     Diarrhea    Tolerating Diet     Constipation    Other       Could NOT obtain due to:      Objective:     VITALS:   Last 24hrs VS reviewed since prior progress note.  Most recent are:  Patient Vitals for the past 24 hrs:   Temp Pulse Resp BP SpO2 05/01/21 0750 98.2 °F (36.8 °C) (!) 114 18 (!) 147/91 93 %   05/01/21 0304 98.5 °F (36.9 °C) (!) 123 18 (!) 154/72 92 %   04/30/21 2340 98.8 °F (37.1 °C) (!) 124 18 (!) 160/82 92 %   04/30/21 2159 98.2 °F (36.8 °C) (!) 128 18 (!) 155/57 90 %   04/30/21 2124  (!) 122      04/30/21 2000 98.8 °F (37.1 °C) (!) 132 18 128/64 90 %   04/30/21 1532 99.2 °F (37.3 °C) (!) 130 18 (!) 153/59 91 %   04/30/21 1151  (!) 121  (!) 121/59 94 %   04/30/21 1150  (!) 123  118/73 94 %   04/30/21 1145  (!) 150  126/66    04/30/21 1134  (!) 123  (!) 143/77    04/30/21 1119    (!) 156/82        Intake/Output Summary (Last 24 hours) at 5/1/2021 0846  Last data filed at 5/1/2021 0531  Gross per 24 hour   Intake    Output 650 ml   Net -650 ml        I had a face to face encounter and independently examined this patient on 5/1/2021, as outlined below:  PHYSICAL EXAM:  General: WD, WN. Alert, cooperative, no acute distress    EENT:  EOMI. Anicteric sclerae. MMM  Resp:  CTA bilaterally, no wheezing or rales. No accessory muscle use  CV:  Regular  rhythm,  No edema  GI:  Soft, Non distended, Non tender. +Bowel sounds  Neurologic:  Alert and oriented X 3, normal speech,   Psych:   Good insight. Not anxious nor agitated  Skin:  No rashes. No jaundice    Reviewed most current lab test results and cultures  YES  Reviewed most current radiology test results   YES  Review and summation of old records today    NO  Reviewed patient's current orders and MAR    YES  PMH/SH reviewed - no change compared to H&P  ________________________________________________________________________  Care Plan discussed with:    Comments   Patient x    Family      RN x    Care Manager x    Consultant                        Multidiciplinary team rounds were held today with , nursing, pharmacist and clinical coordinator. Patient's plan of care was discussed; medications were reviewed and discharge planning was addressed. ________________________________________________________________________  Total NON critical care TIME:  35   Minutes    Total CRITICAL CARE TIME Spent:   Minutes non procedure based      Comments   >50% of visit spent in counseling and coordination of care x    ________________________________________________________________________  Lissy Belcher MD     Procedures: see electronic medical records for all procedures/Xrays and details which were not copied into this note but were reviewed prior to creation of Plan. LABS:  I reviewed today's most current labs and imaging studies.   Pertinent labs include:  Recent Labs     05/01/21 0346 04/30/21  0400 04/29/21  0403   WBC 13.1*  --  12.8*   HGB 8.7* 9.1* 9.9*   HCT 27.9*  --  33.2*     --  193     Recent Labs     05/01/21 0346 04/30/21  0400 04/29/21  0403   * 137 136   K 4.1 3.9 4.2    105 102   CO2 29 30 30   * 176* 286*   BUN 22* 21* 18   CREA 0.89 1.14* 1.09*   CA 9.0 9.5 9.2       Signed: Lissy Belcher MD

## 2021-05-02 LAB
ANION GAP SERPL CALC-SCNC: 4 MMOL/L (ref 5–15)
BUN SERPL-MCNC: 23 MG/DL (ref 6–20)
BUN/CREAT SERPL: 25 (ref 12–20)
CALCIUM SERPL-MCNC: 9.4 MG/DL (ref 8.5–10.1)
CHLORIDE SERPL-SCNC: 103 MMOL/L (ref 97–108)
CO2 SERPL-SCNC: 30 MMOL/L (ref 21–32)
CREAT SERPL-MCNC: 0.93 MG/DL (ref 0.55–1.02)
EST. AVERAGE GLUCOSE BLD GHB EST-MCNC: 183 MG/DL
GLUCOSE BLD STRIP.AUTO-MCNC: 212 MG/DL (ref 65–100)
GLUCOSE BLD STRIP.AUTO-MCNC: 213 MG/DL (ref 65–100)
GLUCOSE BLD STRIP.AUTO-MCNC: 240 MG/DL (ref 65–100)
GLUCOSE BLD STRIP.AUTO-MCNC: 285 MG/DL (ref 65–100)
GLUCOSE SERPL-MCNC: 227 MG/DL (ref 65–100)
HBA1C MFR BLD: 8 % (ref 4–5.6)
POTASSIUM SERPL-SCNC: 4.4 MMOL/L (ref 3.5–5.1)
SERVICE CMNT-IMP: ABNORMAL
SODIUM SERPL-SCNC: 137 MMOL/L (ref 136–145)

## 2021-05-02 PROCEDURE — 74011250637 HC RX REV CODE- 250/637: Performed by: NURSE PRACTITIONER

## 2021-05-02 PROCEDURE — 74011250637 HC RX REV CODE- 250/637: Performed by: PHYSICIAN ASSISTANT

## 2021-05-02 PROCEDURE — 80048 BASIC METABOLIC PNL TOTAL CA: CPT

## 2021-05-02 PROCEDURE — 74011250637 HC RX REV CODE- 250/637: Performed by: STUDENT IN AN ORGANIZED HEALTH CARE EDUCATION/TRAINING PROGRAM

## 2021-05-02 PROCEDURE — 97110 THERAPEUTIC EXERCISES: CPT | Performed by: PHYSICAL THERAPIST

## 2021-05-02 PROCEDURE — 83036 HEMOGLOBIN GLYCOSYLATED A1C: CPT

## 2021-05-02 PROCEDURE — 36415 COLL VENOUS BLD VENIPUNCTURE: CPT

## 2021-05-02 PROCEDURE — 94760 N-INVAS EAR/PLS OXIMETRY 1: CPT

## 2021-05-02 PROCEDURE — 82962 GLUCOSE BLOOD TEST: CPT

## 2021-05-02 PROCEDURE — 74011636637 HC RX REV CODE- 636/637: Performed by: PHYSICIAN ASSISTANT

## 2021-05-02 PROCEDURE — 97530 THERAPEUTIC ACTIVITIES: CPT | Performed by: PHYSICAL THERAPIST

## 2021-05-02 PROCEDURE — 77030038269 HC DRN EXT URIN PURWCK BARD -A

## 2021-05-02 PROCEDURE — 74011636637 HC RX REV CODE- 636/637: Performed by: STUDENT IN AN ORGANIZED HEALTH CARE EDUCATION/TRAINING PROGRAM

## 2021-05-02 PROCEDURE — 65660000000 HC RM CCU STEPDOWN

## 2021-05-02 RX ORDER — METOPROLOL TARTRATE 50 MG/1
100 TABLET ORAL 2 TIMES DAILY
Status: DISCONTINUED | OUTPATIENT
Start: 2021-05-02 | End: 2021-05-07 | Stop reason: HOSPADM

## 2021-05-02 RX ORDER — INSULIN LISPRO 100 [IU]/ML
12 INJECTION, SOLUTION INTRAVENOUS; SUBCUTANEOUS
Status: DISCONTINUED | OUTPATIENT
Start: 2021-05-02 | End: 2021-05-07 | Stop reason: HOSPADM

## 2021-05-02 RX ORDER — INSULIN GLARGINE 100 [IU]/ML
37 INJECTION, SOLUTION SUBCUTANEOUS
Status: DISCONTINUED | OUTPATIENT
Start: 2021-05-02 | End: 2021-05-07 | Stop reason: HOSPADM

## 2021-05-02 RX ORDER — METOPROLOL SUCCINATE 50 MG/1
200 TABLET, EXTENDED RELEASE ORAL
Status: DISCONTINUED | OUTPATIENT
Start: 2021-05-02 | End: 2021-05-02

## 2021-05-02 RX ADMIN — CALCIUM CARBONATE 500 MG (1,250 MG)-VITAMIN D3 200 UNIT TABLET 1 TABLET: at 08:05

## 2021-05-02 RX ADMIN — INSULIN LISPRO 3 UNITS: 100 INJECTION, SOLUTION INTRAVENOUS; SUBCUTANEOUS at 12:23

## 2021-05-02 RX ADMIN — LOSARTAN POTASSIUM 100 MG: 100 TABLET, FILM COATED ORAL at 08:05

## 2021-05-02 RX ADMIN — INSULIN LISPRO 3 UNITS: 100 INJECTION, SOLUTION INTRAVENOUS; SUBCUTANEOUS at 17:07

## 2021-05-02 RX ADMIN — METHOCARBAMOL TABLETS 750 MG: 750 TABLET, COATED ORAL at 08:05

## 2021-05-02 RX ADMIN — ACETAMINOPHEN 650 MG: 325 TABLET ORAL at 17:06

## 2021-05-02 RX ADMIN — METHOCARBAMOL TABLETS 750 MG: 750 TABLET, COATED ORAL at 22:10

## 2021-05-02 RX ADMIN — CALCIUM CARBONATE (ANTACID) CHEW TAB 500 MG 200 MG: 500 CHEW TAB at 12:22

## 2021-05-02 RX ADMIN — CALCIUM CARBONATE 500 MG (1,250 MG)-VITAMIN D3 200 UNIT TABLET 1 TABLET: at 12:22

## 2021-05-02 RX ADMIN — INSULIN LISPRO 2 UNITS: 100 INJECTION, SOLUTION INTRAVENOUS; SUBCUTANEOUS at 22:10

## 2021-05-02 RX ADMIN — CALCIUM CARBONATE (ANTACID) CHEW TAB 500 MG 200 MG: 500 CHEW TAB at 08:05

## 2021-05-02 RX ADMIN — Medication 10 ML: at 22:12

## 2021-05-02 RX ADMIN — INSULIN LISPRO 12 UNITS: 100 INJECTION, SOLUTION INTRAVENOUS; SUBCUTANEOUS at 12:20

## 2021-05-02 RX ADMIN — ACETAMINOPHEN 650 MG: 325 TABLET ORAL at 09:14

## 2021-05-02 RX ADMIN — CALCIUM CARBONATE (ANTACID) CHEW TAB 500 MG 200 MG: 500 CHEW TAB at 17:06

## 2021-05-02 RX ADMIN — LEVOTHYROXINE SODIUM 100 MCG: 0.1 TABLET ORAL at 06:31

## 2021-05-02 RX ADMIN — NIFEDIPINE 60 MG: 30 TABLET, EXTENDED RELEASE ORAL at 08:05

## 2021-05-02 RX ADMIN — OXYCODONE 5 MG: 5 TABLET ORAL at 09:14

## 2021-05-02 RX ADMIN — CLONIDINE HYDROCHLORIDE 0.1 MG: 0.1 TABLET ORAL at 17:06

## 2021-05-02 RX ADMIN — METOPROLOL TARTRATE 100 MG: 50 TABLET, FILM COATED ORAL at 09:14

## 2021-05-02 RX ADMIN — RIVAROXABAN 20 MG: 20 TABLET, FILM COATED ORAL at 08:06

## 2021-05-02 RX ADMIN — POTASSIUM CHLORIDE 20 MEQ: 1500 TABLET, EXTENDED RELEASE ORAL at 08:05

## 2021-05-02 RX ADMIN — METHOCARBAMOL TABLETS 750 MG: 750 TABLET, COATED ORAL at 12:22

## 2021-05-02 RX ADMIN — INSULIN GLARGINE 37 UNITS: 100 INJECTION, SOLUTION SUBCUTANEOUS at 22:09

## 2021-05-02 RX ADMIN — ACETAMINOPHEN 650 MG: 325 TABLET ORAL at 22:10

## 2021-05-02 RX ADMIN — METOPROLOL TARTRATE 100 MG: 50 TABLET, FILM COATED ORAL at 17:06

## 2021-05-02 RX ADMIN — CALCIUM CARBONATE 500 MG (1,250 MG)-VITAMIN D3 200 UNIT TABLET 1 TABLET: at 17:06

## 2021-05-02 RX ADMIN — Medication 10 ML: at 12:23

## 2021-05-02 RX ADMIN — LABETALOL HYDROCHLORIDE 10 MG: 5 INJECTION INTRAVENOUS at 03:56

## 2021-05-02 RX ADMIN — INSULIN LISPRO 5 UNITS: 100 INJECTION, SOLUTION INTRAVENOUS; SUBCUTANEOUS at 08:05

## 2021-05-02 RX ADMIN — METHOCARBAMOL TABLETS 750 MG: 750 TABLET, COATED ORAL at 17:06

## 2021-05-02 RX ADMIN — INSULIN LISPRO 12 UNITS: 100 INJECTION, SOLUTION INTRAVENOUS; SUBCUTANEOUS at 17:06

## 2021-05-02 RX ADMIN — INSULIN LISPRO 10 UNITS: 100 INJECTION, SOLUTION INTRAVENOUS; SUBCUTANEOUS at 08:04

## 2021-05-02 NOTE — PROGRESS NOTES
ORTHO - Progress Note  Post Op day: 4 Days Post-Op    Artie Raya     [id number]  female    64 y.o.    1945    Admit date:2021  Date of Surgery:2021   Procedures:Procedure(s):LEFT FEMORAL INTERTROCHANTERIC NAIL INSERTION  Surgeon:Surgeon(s) and Role:    * Cande Navas MD - Primary        SUBJECTIVE:     Artie Raya is a 64 y.o. female resting in the bed. Patient has complaints of appropriate post-op pain, tolerating PO pain medications. Denies F/C, nausea, vomiting, dizziness, lightheadedness, chest pain, or shortness of breath. States---- it really hurts when I stand on it, I was not prepared for that kind of pain!!!  Pt's son at bedside  OBJECTIVE:       Physical Exam:  General: alert, cooperative, no distress. Gastrointestinal:  non-distended . Cardiovascular: equal pulses in the lower extremities,  Brisk cap refill in all distal extremities   Genitourinary: Voiding independently   Respiratory: No respiratory distress   Neurological:Neurovascular exam within normal limits. Senstion intact: LE bilat. Motor: + DF/PF/EHL. Musculoskeletal: Jose Miguel's sign negative in bilateral lower extremities. Calves soft, supple, non-tender upon palpation or with passive stretch. Dressing/Wound: drainage noted throughout dressing(not saturated). No significant erythema or swelling.   No obvious hematoma  Vital Signs:         Patient Vitals for the past 8 hrs:   BP Temp Pulse Resp SpO2   21 1528 (!) 163/82 98 °F (36.7 °C) (!) 115 16 93 %   21 1125 (!) 152/84 98.5 °F (36.9 °C) (!) 102 16 95 %   21 0804 (!) 160/93 98.7 °F (37.1 °C) (!) 118 18 93 %                                          Temp (24hrs), Av.1 °F (36.7 °C), Min:97.7 °F (36.5 °C), Max:98.7 °F (37.1 °C)    Date 21 0700 - 21 0659   Shift 3077-2119 [id number] 24 Hour Total   INTAKE   Shift Total(mL/kg)       OUTPUT   Urine(mL/kg/hr) 300(0.4)   300   Shift Total(mL/kg) 300(2.8)   300(2.8) Weight (kg) 105.3 105.3 105.3 105.3     Labs:        Recent Labs     05/01/21  0346   HCT 27.9*   HGB 8.7*     PT/OT:        Gait  Base of Support: Narrowed  Speed/Vita: Slow  Step Length: Left shortened, Right shortened  Gait Abnormalities: Antalgic, Decreased step clearance  Ambulation - Level of Assistance: Minimal assistance  Distance (ft): 3 Feet (ft)  Assistive Device: Walker, rolling, Gait belt     ASSESSMENT / PLAN:   Active Problems:    Hip fracture ([address] (4/27/2021)       -  No acute orthopedic concerns at this time  -  Continue PT/OT WBAT  -  DVT prophylaxis- xarelto  -  DC planning - SNF      Signed By: Wilfredo Stinson PA-C

## 2021-05-02 NOTE — ROUTINE PROCESS
End of Shift Note    Bedside shift change report given to Evan Jiménez (oncoming nurse) by Lo Gamble RN (offgoing nurse). Report included the following information SBAR, Kardex, Intake/Output, MAR and Accordion    Shift worked:  7-7     Shift summary and any significant changes:     None     Concerns for physician to address:  None     Zone phone for oncoming shift:   [phone]       Activity:  Activity Level: Up with Assistance  Number times ambulated in hallways past shift: 0  Number of times OOB to chair past shift: 0    Cardiac:   Cardiac Monitoring: Yes      Cardiac Rhythm: Atrial fibrillation    Access:   Current line(s): PIV     Genitourinary:   Urinary status: voiding    Respiratory:   O2 Device: None (Room air)  Chronic home O2 use?: NO  Incentive spirometer at bedside: NO     GI:  Last Bowel Movement Date: 05/01/21  Current diet:  DIET DIABETIC CONSISTENT CARB Regular  Passing flatus: YES  Tolerating current diet: YES       Pain Management:   Patient states pain is manageable on current regimen: YES    Skin:  Leonard Score: 17  Interventions: increase time out of bed    Patient Safety:  Fall Score:  Total Score: 4  Interventions: assistive device (walker, cane, etc), gripper socks and pt to call before getting OOB  High Fall Risk: Yes    Length of Stay:  Expected LOS: 3d 7h  Actual LOS: 651 Marilu Sanford, RN

## 2021-05-02 NOTE — PROGRESS NOTES
Hospitalist Progress Note    NAME: Crys Christensen   :  1945   MRN:  [id number]       Assessment / Plan:    Left hip fracture POA s/p  Cephalomedullary nailing left hip   -Patient presented with mechanical fall and noticed to have left hip fracture  -Appreciate orthopedic consultation  -s/p  Cephalomedullary nailing left hip   -Pain control  -Continue PT OT, rec SNF  -Continue Xarelto   -COVID PCR ordered for placement, negative     Hypotension - resolved  History of hypertension  -On a lot of antihypertensives: clonidine, hydralazine, losartan, metoprolol and nifedipine. BP running hhigh now. Continue antihypertensives. Resume clonidine. Continue to hold hydralazine for now. Chronic atrial fibrillation, RVR  -On toprol 100mg/day. Change to lopressor 100mg BID for better rate control. . Continue xarelto. Check TSH.     Hypothyroidism  -Continue levothyroxine         Insulin-dependent type 2 diabetes  -Hold Metformin and continue sliding scale insulin  -Increase lantus to 37U and lispro to 12 U TID w/ meal     GERD  -Continue PPI     Anticipated d/c: 21    Code Status: Patient wishes to be DNR  Surrogate Decision Maker: She wants to designate her stefany Morley as surrogate POA     DVT Prophylaxis: SCDs for now, resume Xarelto after surgery  GI Prophylaxis: not indicated  Disposition: SNF    Baseline: Independent/ambulatory     Subjective:     Chief Complaint / Reason for Physician Visit  Discussed with RN events overnight. Pain at the hip joint is fairly controlled. Mainly pain starts when she moves. Heart rate better controlled this morning.     Review of Systems:  Symptom Y/N Comments  Symptom Y/N Comments   Fever/Chills    Chest Pain     Poor Appetite    Edema     Cough    Abdominal Pain     Sputum    Joint Pain     SOB/GARCIA    Pruritis/Rash     Nausea/vomit    Tolerating PT/OT     Diarrhea    Tolerating Diet     Constipation    Other       Could NOT obtain due to:      Objective: VITALS:   Last 24hrs VS reviewed since prior progress note. Most recent are:  Patient Vitals for the past 24 hrs:   Temp Pulse Resp BP SpO2   05/02/21 0804 98.7 °F (37.1 °C) (!) 118 18 (!) 160/93 93 %   05/02/21 0341 97.7 °F (36.5 °C) (!) 115 18 (!) 177/81 92 %   05/01/21 2338 98 °F (36.7 °C) (!) 125 18 (!) 158/83 91 %   05/01/21 2308  (!) 127  (!) 157/81    05/01/21 2111  (!) 137  (!) 144/73    05/01/21 1948 97.8 °F (36.6 °C) (!) 128 18 (!) 147/70 90 %   05/01/21 1712  (!) 122  (!) 162/98    05/01/21 1706  (!) 150  (!) 176/82    05/01/21 1515 97.3 °F (36.3 °C) (!) 123 18 138/77 92 %   05/01/21 1227  (!) 138  129/76    05/01/21 1224    129/76    05/01/21 1124 98.1 °F (36.7 °C) (!) 123 18 (!) 142/73 92 %       Intake/Output Summary (Last 24 hours) at 5/2/2021 0857  Last data filed at 5/1/2021 1125  Gross per 24 hour   Intake    Output 250 ml   Net -250 ml        I had a face to face encounter and independently examined this patient on 5/2/2021, as outlined below:  PHYSICAL EXAM:  General: WD, WN. Alert, cooperative, no acute distress    EENT:  EOMI. Anicteric sclerae. MMM  Resp:  CTA bilaterally, no wheezing or rales. No accessory muscle use  CV:  Regular  rhythm,  No edema  GI:  Soft, Non distended, Non tender. +Bowel sounds  Neurologic:  Alert and oriented X 3, normal speech,   Psych:   Good insight. Not anxious nor agitated  Skin:  No rashes.   No jaundice    Reviewed most current lab test results and cultures  YES  Reviewed most current radiology test results   YES  Review and summation of old records today    NO  Reviewed patient's current orders and MAR    YES  PMH/SH reviewed - no change compared to H&P  ________________________________________________________________________  Care Plan discussed with:    Comments   Patient x    Family      RN x    Care Manager x    Consultant                        Multidiciplinary team rounds were held today with , nursing, pharmacist and clinical coordinator. Patient's plan of care was discussed; medications were reviewed and discharge planning was addressed. ________________________________________________________________________  Total NON critical care TIME:  35   Minutes    Total CRITICAL CARE TIME Spent:   Minutes non procedure based      Comments   >50% of visit spent in counseling and coordination of care x    ________________________________________________________________________  Reji العراقي MD     Procedures: see electronic medical records for all procedures/Xrays and details which were not copied into this note but were reviewed prior to creation of Plan. LABS:  I reviewed today's most current labs and imaging studies.   Pertinent labs include:  Recent Labs     05/01/21  0346 04/30/21  0400   WBC 13.1*  --    HGB 8.7* 9.1*   HCT 27.9*  --      --      Recent Labs     05/02/21  0318 05/01/21  0346 04/30/21  0400    134* 137   K 4.4 4.1 3.9    102 105   CO2 30 29 30   * 218* 176*   BUN 23* 22* 21*   CREA 0.93 0.89 1.14*   CA 9.4 9.0 9.5       Signed: Reji العراقي MD

## 2021-05-02 NOTE — PROGRESS NOTES
Problem: Mobility Impaired (Adult and Pediatric)  Goal: *Acute Goals and Plan of Care (Insert Text)  Description: FUNCTIONAL STATUS PRIOR TO ADMISSION: independent without devices with mobility and ADLs. HOME SUPPORT PRIOR TO ADMISSION: Lived alone with family nearby to assist as needed. Physical Therapy Goals  Initiated 4/29/2021  1. Patient will move from supine to sit and sit to supine , scoot up and down, and roll side to side in bed with minimal assistance/contact guard assist within 7 day(s). 2.  Patient will transfer from bed to chair and chair to bed with minimal assistance/contact guard assist using the least restrictive device within 7 day(s). 3.  Patient will perform sit to stand with minimal assistance/contact guard assist within 7 day(s). 4.  Patient will ambulate with minimal assistance/contact guard assist for 35 feet with the least restrictive device within 7 day(s). 5.  Patient will ascend/descend 4 stairs with 2 handrail(s) with minimal assistance/contact guard assist within 7 day(s). Outcome: Not Met   PHYSICAL THERAPY TREATMENT  Patient: Vero Ward (81 y.o. female)  Date: 5/2/2021  Diagnosis: Hip fracture ([address] Venkatjean pierre Rossnirav <principal problem not specified>  Procedure(s) (LRB):  LEFT FEMORAL INTERTROCHANTERIC NAIL INSERTION (Left) 4 Days Post-Op  Precautions: Fall, DNR, WBAT  Chart, physical therapy assessment, plan of care and goals were reviewed. ASSESSMENT  Patient continues with skilled PT services and is slowly progressing towards goals. Patient with increased pain and swelling in L LE but agreeable to participate in therapy. Patient requiring mod/max A for bed mobility. Unable to safely assess transfers and ambulation today. Patient performing LE exercises requiring min to mod A to complete exercises on LLE.    Continue to recommend SNF following discharge    Current Level of Function Impacting Discharge (mobility/balance): mod/max A for bed mobility           PLAN :  Patient continues to benefit from skilled intervention to address the above impairments. Continue treatment per established plan of care. to address goals. Recommendation for discharge: (in order for the patient to meet his/her long term goals)  Therapy up to 5 days/week in SNF setting    This discharge recommendation:  Has been made in collaboration with the attending provider and/or case management    IF patient discharges home will need the following DME: to be determined (TBD)       SUBJECTIVE:   Patient stated I'm going to keep at it to get better.     OBJECTIVE DATA SUMMARY:   Critical Behavior:  Neurologic State: Alert, Appropriate for age  Orientation Level: Oriented X4  Cognition: Follows commands  Safety/Judgement: Fall prevention  Functional Mobility Training:  Bed Mobility:     Supine to Sit: Moderate assistance;Maximum assistance;Assist x1     Scooting: Maximum assistance        Transfers:      Not nish to safely assess today                             Balance:  Sitting: Impaired  Sitting - Static: Good (unsupported)  Sitting - Dynamic: Not tested      Activity Tolerance:   Fair    After treatment patient left in no apparent distress:   Supine in bed and Call bell within reach    COMMUNICATION/COLLABORATION:   The patients plan of care was discussed with: Registered nurse.      Wood Webb PT   Time Calculation: 33 mins

## 2021-05-03 LAB
GLUCOSE BLD STRIP.AUTO-MCNC: 173 MG/DL (ref 65–100)
GLUCOSE BLD STRIP.AUTO-MCNC: 189 MG/DL (ref 65–100)
GLUCOSE BLD STRIP.AUTO-MCNC: 191 MG/DL (ref 65–100)
GLUCOSE BLD STRIP.AUTO-MCNC: 216 MG/DL (ref 65–100)
SARS-COV-2, COV2: NORMAL
SERVICE CMNT-IMP: ABNORMAL
TSH SERPL DL<=0.05 MIU/L-ACNC: 0.45 UIU/ML (ref 0.36–3.74)

## 2021-05-03 PROCEDURE — 97530 THERAPEUTIC ACTIVITIES: CPT

## 2021-05-03 PROCEDURE — 74011250637 HC RX REV CODE- 250/637: Performed by: PHYSICIAN ASSISTANT

## 2021-05-03 PROCEDURE — 74011636637 HC RX REV CODE- 636/637: Performed by: PHYSICIAN ASSISTANT

## 2021-05-03 PROCEDURE — 97110 THERAPEUTIC EXERCISES: CPT

## 2021-05-03 PROCEDURE — 84443 ASSAY THYROID STIM HORMONE: CPT

## 2021-05-03 PROCEDURE — 82962 GLUCOSE BLOOD TEST: CPT

## 2021-05-03 PROCEDURE — 36415 COLL VENOUS BLD VENIPUNCTURE: CPT

## 2021-05-03 PROCEDURE — 97110 THERAPEUTIC EXERCISES: CPT | Performed by: OCCUPATIONAL THERAPIST

## 2021-05-03 PROCEDURE — 74011636637 HC RX REV CODE- 636/637: Performed by: STUDENT IN AN ORGANIZED HEALTH CARE EDUCATION/TRAINING PROGRAM

## 2021-05-03 PROCEDURE — U0005 INFEC AGEN DETEC AMPLI PROBE: HCPCS

## 2021-05-03 PROCEDURE — 94760 N-INVAS EAR/PLS OXIMETRY 1: CPT

## 2021-05-03 PROCEDURE — 97530 THERAPEUTIC ACTIVITIES: CPT | Performed by: OCCUPATIONAL THERAPIST

## 2021-05-03 PROCEDURE — 74011250637 HC RX REV CODE- 250/637: Performed by: STUDENT IN AN ORGANIZED HEALTH CARE EDUCATION/TRAINING PROGRAM

## 2021-05-03 PROCEDURE — 74011250637 HC RX REV CODE- 250/637: Performed by: NURSE PRACTITIONER

## 2021-05-03 PROCEDURE — 74011250637 HC RX REV CODE- 250/637: Performed by: INTERNAL MEDICINE

## 2021-05-03 PROCEDURE — 77030038269 HC DRN EXT URIN PURWCK BARD -A

## 2021-05-03 PROCEDURE — 65660000000 HC RM CCU STEPDOWN

## 2021-05-03 RX ORDER — BALSAM PERU/CASTOR OIL
OINTMENT (GRAM) TOPICAL 2 TIMES DAILY
Status: DISCONTINUED | OUTPATIENT
Start: 2021-05-03 | End: 2021-05-07 | Stop reason: HOSPADM

## 2021-05-03 RX ORDER — CLONIDINE HYDROCHLORIDE 0.1 MG/1
0.2 TABLET ORAL 2 TIMES DAILY
Status: DISCONTINUED | OUTPATIENT
Start: 2021-05-03 | End: 2021-05-07 | Stop reason: HOSPADM

## 2021-05-03 RX ADMIN — OXYCODONE 5 MG: 5 TABLET ORAL at 12:20

## 2021-05-03 RX ADMIN — INSULIN LISPRO 12 UNITS: 100 INJECTION, SOLUTION INTRAVENOUS; SUBCUTANEOUS at 08:29

## 2021-05-03 RX ADMIN — METHOCARBAMOL TABLETS 750 MG: 750 TABLET, COATED ORAL at 12:13

## 2021-05-03 RX ADMIN — CLONIDINE HYDROCHLORIDE 0.2 MG: 0.1 TABLET ORAL at 17:27

## 2021-05-03 RX ADMIN — ACETAMINOPHEN 650 MG: 325 TABLET ORAL at 22:16

## 2021-05-03 RX ADMIN — OXYCODONE 5 MG: 5 TABLET ORAL at 08:30

## 2021-05-03 RX ADMIN — CALCIUM CARBONATE 500 MG (1,250 MG)-VITAMIN D3 200 UNIT TABLET 1 TABLET: at 17:26

## 2021-05-03 RX ADMIN — CASTOR OIL AND BALSAM, PERU: 788; 87 OINTMENT TOPICAL at 19:17

## 2021-05-03 RX ADMIN — METOPROLOL TARTRATE 100 MG: 50 TABLET, FILM COATED ORAL at 08:29

## 2021-05-03 RX ADMIN — INSULIN LISPRO 2 UNITS: 100 INJECTION, SOLUTION INTRAVENOUS; SUBCUTANEOUS at 08:29

## 2021-05-03 RX ADMIN — Medication 10 ML: at 06:03

## 2021-05-03 RX ADMIN — METHOCARBAMOL TABLETS 750 MG: 750 TABLET, COATED ORAL at 22:16

## 2021-05-03 RX ADMIN — ACETAMINOPHEN 650 MG: 325 TABLET ORAL at 03:14

## 2021-05-03 RX ADMIN — INSULIN LISPRO 3 UNITS: 100 INJECTION, SOLUTION INTRAVENOUS; SUBCUTANEOUS at 17:24

## 2021-05-03 RX ADMIN — LOSARTAN POTASSIUM 100 MG: 100 TABLET, FILM COATED ORAL at 08:30

## 2021-05-03 RX ADMIN — NIFEDIPINE 60 MG: 30 TABLET, EXTENDED RELEASE ORAL at 08:30

## 2021-05-03 RX ADMIN — LEVOTHYROXINE SODIUM 100 MCG: 0.1 TABLET ORAL at 06:02

## 2021-05-03 RX ADMIN — RIVAROXABAN 20 MG: 20 TABLET, FILM COATED ORAL at 08:30

## 2021-05-03 RX ADMIN — INSULIN LISPRO 12 UNITS: 100 INJECTION, SOLUTION INTRAVENOUS; SUBCUTANEOUS at 12:13

## 2021-05-03 RX ADMIN — CLONIDINE HYDROCHLORIDE 0.1 MG: 0.1 TABLET ORAL at 08:30

## 2021-05-03 RX ADMIN — CALCIUM CARBONATE 500 MG (1,250 MG)-VITAMIN D3 200 UNIT TABLET 1 TABLET: at 08:30

## 2021-05-03 RX ADMIN — ACETAMINOPHEN 650 MG: 325 TABLET ORAL at 17:26

## 2021-05-03 RX ADMIN — INSULIN LISPRO 2 UNITS: 100 INJECTION, SOLUTION INTRAVENOUS; SUBCUTANEOUS at 12:14

## 2021-05-03 RX ADMIN — Medication 10 ML: at 22:18

## 2021-05-03 RX ADMIN — OXYCODONE 5 MG: 5 TABLET ORAL at 03:13

## 2021-05-03 RX ADMIN — INSULIN GLARGINE 37 UNITS: 100 INJECTION, SOLUTION SUBCUTANEOUS at 22:16

## 2021-05-03 RX ADMIN — ACETAMINOPHEN 650 MG: 325 TABLET ORAL at 12:13

## 2021-05-03 RX ADMIN — CALCIUM CARBONATE (ANTACID) CHEW TAB 500 MG 200 MG: 500 CHEW TAB at 08:30

## 2021-05-03 RX ADMIN — HYDRALAZINE HYDROCHLORIDE 25 MG: 25 TABLET, FILM COATED ORAL at 17:26

## 2021-05-03 RX ADMIN — METHOCARBAMOL TABLETS 750 MG: 750 TABLET, COATED ORAL at 08:30

## 2021-05-03 RX ADMIN — METHOCARBAMOL TABLETS 750 MG: 750 TABLET, COATED ORAL at 17:26

## 2021-05-03 RX ADMIN — METOPROLOL TARTRATE 100 MG: 50 TABLET, FILM COATED ORAL at 17:26

## 2021-05-03 RX ADMIN — CALCIUM CARBONATE 500 MG (1,250 MG)-VITAMIN D3 200 UNIT TABLET 1 TABLET: at 12:13

## 2021-05-03 RX ADMIN — INSULIN LISPRO 12 UNITS: 100 INJECTION, SOLUTION INTRAVENOUS; SUBCUTANEOUS at 17:25

## 2021-05-03 RX ADMIN — Medication 10 ML: at 22:17

## 2021-05-03 RX ADMIN — CALCIUM CARBONATE (ANTACID) CHEW TAB 500 MG 200 MG: 500 CHEW TAB at 17:27

## 2021-05-03 RX ADMIN — POTASSIUM CHLORIDE 20 MEQ: 1500 TABLET, EXTENDED RELEASE ORAL at 08:30

## 2021-05-03 RX ADMIN — Medication 40 MG: at 08:41

## 2021-05-03 RX ADMIN — CALCIUM CARBONATE (ANTACID) CHEW TAB 500 MG 200 MG: 500 CHEW TAB at 12:13

## 2021-05-03 NOTE — PROGRESS NOTES
Transition of Care Plan:  RUR: 19%  Disposition: SNF- referral sent to [facility] and [facility]   Follow up appointments: ortho  DME needed: n/a  Transportation at Discharge: Sumeet Pizano or means to access home: n/a      IM Medicare letter: to be provided at time of d/c  Caregiver Contact: son Gloria Hayes [phone]  Discharge Caregiver contacted prior to discharge? Yes     CM made room visit with patient and son at bedside. Pt and son have decided for patient to go to SNF prior to returning home. Patient requested first choice [facility] and second choice [facility]. FOC singed and placed on bedside chart. Referral sent to both facilities and waiting for response.      Dick Dean, [facility], [address] [facility]

## 2021-05-03 NOTE — PROGRESS NOTES
Spiritual Care Assessment/Progress Note  [facility]      NAME: Edel Moeller      MRN: [id number]  AGE: 64 y.o.  SEX: female  Amish Affiliation: [place] [facility]   Language: English     5/3/2021           Spiritual Assessment begun in [facility] through conversation with:         []Patient        [] Family    [] Friend(s)        Reason for Consult: Initial/Spiritual assessment, patient floor     Spiritual beliefs: (Please include comment if needed)     [x] Identifies with a clairta tradition:         [] Supported by a clarita community:            [] Claims no spiritual orientation:           [] Seeking spiritual identity:                [] Adheres to an individual form of spirituality:           [] Not able to assess:                           Identified resources for coping:      [x] Prayer                               [] Music                  [] Guided Imagery     [x] Family/friends                 [] Pet visits     [] Devotional reading                         [] Unknown     [] Other:                                               Interventions offered during this visit: (See comments for more details)    Patient Interventions: Affirmation of emotions/emotional suffering, Catharsis/review of pertinent events in supportive environment, Initial/Spiritual assessment, patient floor, Prayer (assurance of)     Family/Friend(s): Initial Assessment, Prayer (assurance of)     Plan of Care:     [x] Support spiritual and/or cultural needs    [] Support AMD and/or advance care planning process      [] Support grieving process   [] Coordinate Rites and/or Rituals    [] Coordination with community clergy   [] No spiritual needs identified at this time   [] Detailed Plan of Care below (See Comments)  [] Make referral to Music Therapy  [] Make referral to Pet Therapy     [] Make referral to Addiction services  [] Make referral to [facility]  [] Make referral to Spiritual Care Partner  [] No future visits requested        [x] Follow up upon further referrals     Comments: Visited Ms Carmen Ambrosio in room  for initial spiritual assessment. Ms Carmen Ambrosio was sitting up in a chair; she was smiling and appeared in good spirits. Patient's son, Marta Russell, and two physical therapists were present at time of visit. Provided pastoral presence and active listening as Ms Carmen Ambrosio shared that she was doing pretty good. Said that she would be glad when she could go home. Ms Carmen Ambrosio shared that her greatest concern was to be able to walk again. Acknowledged her feelings and concerns and offered words of support. Ms Carmen Ambrosio said she would greatly appreciate  keeping her in prayer. Patient and son expressed appreciation for visit. Assured them of ongoing  availability for support. : . Karthikeyan hCan.  Soham Borges; [facility], to contact [phone] [address] call: [phone]

## 2021-05-03 NOTE — PROGRESS NOTES
Problem: Self Care Deficits Care Plan (Adult)  Goal: *Acute Goals and Plan of Care (Insert Text)  Description:   FUNCTIONAL STATUS PRIOR TO ADMISSION: Patient was independent and active without use of DME.     HOME SUPPORT: The patient lived alone with family to provide assistance. Occupational Therapy Goals  Initiated 4/29/2021  1. Patient will perform grooming sitting on EOB with supervision/set-up within 7 day(s). 2.  Patient will perform bathing standing at the sink for at least 3 minutes  with minimal assistance/contact guard assist within 7 day(s). 3.  Patient will perform lower body dressing with moderate assistance  within 7 day(s). 4.  Patient will perform toilet transfers with minimal assistance/contact guard assist within 7 day(s). 5.  Patient will perform all aspects of toileting with supervision/set-up within 7 day(s). 6.  Patient will participate in upper extremity therapeutic exercise/activities with supervision/set-up for 5 minutes within 7 day(s). 7.  Patient will utilize energy conservation techniques during functional activities with verbal cues within 7 day(s). Outcome: Progressing Towards Goal   OCCUPATIONAL THERAPY TREATMENT  Patient: Joon Fernandez (64 y.o. female)  Date: 5/3/2021  Diagnosis: Hip fracture ([facility] [address] Carmine Camp <principal problem not specified>  Procedure(s) (LRB):  LEFT FEMORAL INTERTROCHANTERIC NAIL INSERTION (Left) 5 Days Post-Op  Precautions: Fall, DNR, WBAT  Chart, occupational therapy assessment, plan of care, and goals were reviewed. ASSESSMENT  Patient continues with skilled OT services and is progressing towards goals. Pt continues to have elevated HR in the 120's overall this session. Had pt focusing on deep breathing with mobility attempts to prevent pt from holding her breath. She was orthostatic this session and did report that she felt dizzy. This improved over time.   Pt was unable to reach towards LE this session due to pain and would benefit from AE training. Min/moderate assist needed to mobilize forward short distance with RW to bedside chair. Decreased standing tolerance and ability to effectively take steps due to pain and LE weakness. Continue to recommend SNF for rehab at discharge. Vitals:    05/03/21 1041 05/03/21 1045 05/03/21 1048 05/03/21 1050   BP: 117/72 98/67 110/75 127/78   BP 1 Location: Left upper arm Left upper arm Left upper arm Left upper arm   BP Patient Position: Sitting Sitting Sitting Sitting   Pulse: (!) 118 98 (!) 108 (!) 120   Resp:       Temp:       SpO2:       Weight:       Height:           Current Level of Function Impacting Discharge (ADLs):   Bed Mobility:  Rolling: Minimum assistance  Supine to Sit: Moderate assistance;Assist x2; Additional time  Scooting: Contact guard assistance; Additional time    Transfers:  Sit to Stand: Minimum assistance; Moderate assistance; Additional time;Assist x2     Bed to Chair: Minimum assistance; Additional time;Assist x2(with RW)    Other factors to consider for discharge: elevated HR and orthostatic       PLAN :  Patient continues to benefit from skilled intervention to address the above impairments. Continue treatment per established plan of care to address goals. Recommend with staff: OOB to chair for meals and to [facility] for toileting    Recommend next OT session: AE training    Recommendation for discharge: (in order for the patient to meet his/her long term goals)  Therapy up to 5 days/week in SNF setting    This discharge recommendation:  Has been made in collaboration with the attending provider and/or case management    IF patient discharges home will need the following DME: needs rehab       SUBJECTIVE:   Patient stated I feel a little dizzy.     OBJECTIVE DATA SUMMARY:   Cognitive/Behavioral Status:  Neurologic State: Alert  Orientation Level: Oriented X4  Cognition: Appropriate decision making; Appropriate for age attention/concentration; Appropriate safety awareness  Perception: Appears intact  Perseveration: No perseveration noted  Safety/Judgement: Awareness of environment; Fall prevention    Functional Mobility and Transfers for ADLs:  Bed Mobility:  Rolling: Minimum assistance  Supine to Sit: Moderate assistance;Assist x2; Additional time  Scooting: Contact guard assistance; Additional time    Transfers:  Sit to Stand: Minimum assistance; Moderate assistance; Additional time;Assist x2     Bed to Chair: Minimum assistance; Additional time;Assist x2(with RW)    Balance:  Sitting - Static: Good (unsupported)  Sitting - Dynamic: Good (unsupported)  Standing: Impaired  Standing - Static: Constant support; Fair  Standing - Dynamic : Constant support; Fair    ADL Intervention:     Cognitive Retraining  Safety/Judgement: Awareness of environment; Fall prevention    Therapeutic Exercises:   Seated at bedside chair:  Pulling on arm rest of chair and activating abdominal muscles to come to seated without back support 10 reps  Seated at bedside chair without back support reaching over head 10 reps        Activity Tolerance:   Fair and signs and symptoms of orthostatic hypotension    After treatment patient left in no apparent distress:   Sitting in chair, Call bell within reach and Caregiver / family present    COMMUNICATION/COLLABORATION:   The patients plan of care was discussed with: Physical therapy assistant, Registered nurse and patient and her son.      Aren Pastrana OTR/L  Time Calculation: 25 mins

## 2021-05-03 NOTE — PROGRESS NOTES
Bedside and Verbal shift change report given to Malena (oncoming nurse) by Gideon Wright (offgoing nurse). Report included the following information SBAR, Kardex, Intake/Output, MAR, Recent Results, Med Rec Status and Cardiac Rhythm A-fib.

## 2021-05-03 NOTE — PROGRESS NOTES
Patient turned during shift change and new siddhartha placed. Bottom is pink but blanching. There are 2 small open areas to right buttock that appear to be r/t moisture (blanching all around and in wound beds). Cream ordered. Turn team ordered. Visualized with Dante Mensah RN.

## 2021-05-03 NOTE — PROGRESS NOTES
05/03/21 1035 05/03/21 1041 05/03/21 1045   Vital Signs   Pulse (Heart Rate) (!) 121 (!) 118 98   Heart Rate Source Monitor Monitor  --    BP (!) 134/95 117/72 98/67   MAP (Calculated) 108 87 77   BP 1 Location Left upper arm Left upper arm Left upper arm   BP 1 Method Automatic Automatic Automatic   BP Patient Position Sitting Sitting Sitting      05/03/21 1048 05/03/21 1050   Vital Signs   Pulse (Heart Rate) (!) 108 (!) 120   Heart Rate Source  --   --    /75 127/78   MAP (Calculated) 87 94   BP 1 Location Left upper arm Left upper arm   BP 1 Method Automatic Automatic   BP Patient Position Sitting Sitting

## 2021-05-03 NOTE — PROGRESS NOTES
End of Shift Note    Bedside shift change report given to NORRIS Santos (oncoming nurse) by Raman Waite RN (offgoing nurse). Report included the following information SBAR, Kardex, MAR, Recent Results and Cardiac Rhythm A fib    Shift worked:  7-3p     Shift summary and any significant changes:     Remains in Afib - controlled except when working with PT. Even better controlled after having large BM and gas pain subsiding. Concerns for physician to address:       Zone phone for oncoming shift:          Activity:  Activity Level: Up with Assistance  Number times ambulated in hallways past shift: 0  Number of times OOB to chair past shift: 1    Cardiac:   Cardiac Monitoring: Yes      Cardiac Rhythm: Atrial Fib, A Fib RVR    Access:   Current line(s): PIV     Genitourinary:   Urinary status: voiding    Respiratory:   O2 Device: None (Room air)  Chronic home O2 use?: NO  Incentive spirometer at bedside: YES     GI:  Last Bowel Movement Date: 05/03/21(very large)  Current diet:  DIET DIABETIC CONSISTENT CARB Regular  Passing flatus: YES  Tolerating current diet: YES       Pain Management:   Patient states pain is manageable on current regimen: YES    Skin:  Leonard Score: 17  Interventions: turn team, float heels, increase time out of bed, PT/OT consult and internal/external urinary devices    Patient Safety:  Fall Score:  Total Score: 4  Interventions: bed/chair alarm, assistive device (walker, cane, etc), gripper socks, pt to call before getting OOB, stay with me (per policy) and gait belt  High Fall Risk: Yes    Length of Stay:  Expected LOS: 3d 7h  Actual LOS: 6      Raman Waite RN

## 2021-05-03 NOTE — PROGRESS NOTES
Problem: Mobility Impaired (Adult and Pediatric)  Goal: *Acute Goals and Plan of Care (Insert Text)  Description: FUNCTIONAL STATUS PRIOR TO ADMISSION: independent without devices with mobility and ADLs. HOME SUPPORT PRIOR TO ADMISSION: Lived alone with family nearby to assist as needed. Physical Therapy Goals  Initiated 4/29/2021  1. Patient will move from supine to sit and sit to supine , scoot up and down, and roll side to side in bed with minimal assistance/contact guard assist within 7 day(s). 2.  Patient will transfer from bed to chair and chair to bed with minimal assistance/contact guard assist using the least restrictive device within 7 day(s). 3.  Patient will perform sit to stand with minimal assistance/contact guard assist within 7 day(s). 4.  Patient will ambulate with minimal assistance/contact guard assist for 35 feet with the least restrictive device within 7 day(s). 5.  Patient will ascend/descend 4 stairs with 2 handrail(s) with minimal assistance/contact guard assist within 7 day(s). Outcome: Progressing Towards Goal   PHYSICAL THERAPY TREATMENT  Patient: Rafal Mansfield (63 y.o. female)  Date: 5/3/2021  Diagnosis: Hip fracture ([facility] [address] Amanda Hudson <principal problem not specified>  Procedure(s) (LRB):  LEFT FEMORAL INTERTROCHANTERIC NAIL INSERTION (Left) 5 Days Post-Op  Precautions: Fall, DNR, WBAT  Chart, physical therapy assessment, plan of care and goals were reviewed. ASSESSMENT  Patient continues with skilled PT services and is progressing towards goals. Pt presents with decreased strength and increased pain with mobility. Pt anxious with mobility with HR increased starting at 121 BPM at rest. Pt performed bed mobility at mod A x 2 with additional time. Pt reported feeing dizzy initially with time. Pt performed sit to stand transfer at mod A/min A x2 with additional time. Pt able to take a few steps with RW at min A x2 with additional time.  Pt requiring cueing for sequencing. Pts BP initially decreased once sitting in chair but stabilize in sitting. Pt performed seated exercises. Pts HR maintaining 126-131 BPM.        Current Level of Function Impacting Discharge (mobility/balance): bed mobility at mod A x2, sit to stand transfer at mod A/min A x2, ambulation at min A x2 with RW      Other factors to consider for discharge: fearful of falling, decreased strength and endurance, Elevated HR. PLAN :  Patient continues to benefit from skilled intervention to address the above impairments. Continue treatment per established plan of care. to address goals. Recommendation for discharge: (in order for the patient to meet his/her long term goals)  Therapy up to 5 days/week in SNF setting    This discharge recommendation:  Has been made in collaboration with the attending provider and/or case management    IF patient discharges home will need the following DME: rolling walker       SUBJECTIVE:   Patient stated  It feels good sitting up.     OBJECTIVE DATA SUMMARY:   Critical Behavior:  Neurologic State: Alert  Orientation Level: Oriented X4  Cognition: Appropriate decision making, Appropriate for age attention/concentration, Appropriate safety awareness  Safety/Judgement: Awareness of environment, Fall prevention  Functional Mobility Training:  Bed Mobility:  Rolling: Minimum assistance  Supine to Sit: Moderate assistance;Assist x2; Additional time     Scooting: Contact guard assistance; Additional time        Transfers:  Sit to Stand: Minimum assistance; Moderate assistance; Additional time;Assist x2  Stand to Sit: Minimum assistance        Bed to Chair: Minimum assistance; Additional time;Assist x2(with RW)                    Balance:  Sitting - Static: Good (unsupported)  Sitting - Dynamic: Good (unsupported)  Standing: Impaired  Standing - Static: Constant support; Fair  Standing - Dynamic : Constant support; Fair  Ambulation/Gait Training:  Distance (ft): 3 Feet (ft)  Assistive Device: Gait belt;Walker, rolling  Ambulation - Level of Assistance: Minimal assistance;Assist x2; Additional time        Gait Abnormalities: Decreased step clearance;Shuffling gait        Base of Support: Narrowed     Speed/Vita: Slow;Shuffled  Step Length: Right shortened;Left shortened          Therapeutic Exercises:   Seated  LAQ x10  Marching x5  Hip abduction x8  Ankle pumps x10  Pain Rating:  Pt reported with increased pain with mobility. Activity Tolerance:   Fair, requires rest breaks, and signs and symptoms of orthostatic hypotension    After treatment patient left in no apparent distress:   Sitting in chair, Call bell within reach, and Caregiver / family present    COMMUNICATION/COLLABORATION:   The patients plan of care was discussed with: Registered nurse.      Jose Baeza PTA   Time Calculation: 30 mins

## 2021-05-03 NOTE — PROGRESS NOTES
Po hip fracture. Still having pain but slowly improving  Working with pt  hgb  Stable    Patient Vitals for the past 24 hrs:   Temp Pulse Resp BP SpO2   05/03/21 1116 98.1 °F (36.7 °C) (!) 110 16 106/72 94 %   05/03/21 1050  (!) 120  127/78    05/03/21 1048  (!) 108  110/75    05/03/21 1045  98  98/67    05/03/21 1041  (!) 118  117/72    05/03/21 1035  (!) 121  (!) 134/95    05/03/21 1032  (!) 118      05/03/21 0809 98 °F (36.7 °C) (!) 109 16 (!) 153/98 98 %   05/03/21 0305 98 °F (36.7 °C) (!) 112 16 (!) 158/89 98 %   05/03/21 0024 98.1 °F (36.7 °C) (!) 117 16 (!) 162/91 94 %   05/02/21 1946 99.4 °F (37.4 °C) (!) 102 16 (!) 148/68 94 %   05/02/21 1528 98 °F (36.7 °C) (!) 115 16 (!) 163/82 93 %     Dressing with some dry blood  Musculoskeletal: Jose Miguel's sign negative in bilateral lower extremities. Calves soft, supple, non-tender upon palpation or with passive stretch.      oob with pt  WBAT  Dc planning

## 2021-05-03 NOTE — PROGRESS NOTES
Hospitalist Progress Note    NAME: Alva Rivers   :  1945   MRN:  [id number]       Assessment / Plan:    Left hip fracture POA s/p  Cephalomedullary nailing left hip   -Patient presented with mechanical fall and noticed to have left hip fracture  -Appreciate orthopedic consultation  -s/p  Cephalomedullary nailing left hip   -Pain control  -Continue PT OT, rec SNF  -Continue Xarelto   -COVID PCR ordered for placement, negative       Hypertension  -Continue home antihypertensives: clonidine, hydralazine, losartan, metoprolol and nifedipine. Chronic atrial fibrillation, RVR  -On toprol 100mg/day. Changed to lopressor 100mg BID for better rate control. .   -TSH wnl  -Continue xarelto. -Patient of Dr Cherylene Danish, will consult him.     Hypothyroidism  -Continue levothyroxine         Insulin-dependent type 2 diabetes  -Hold Metformin and continue sliding scale insulin  -ICotninue lantus 37U and lispro 12 U TID w/ meal     GERD  -Continue PPI     Anticipated d/c: 21    Code Status: Patient wishes to be DNR  Surrogate Decision Maker: She wants to designate her harryon Trevon Ewing as surrogate POA     DVT Prophylaxis: SCDs for now, resume Xarelto after surgery  GI Prophylaxis: not indicated  Disposition: SNF    Baseline: Independent/ambulatory     Subjective:     Chief Complaint / Reason for Physician Visit  Discussed with RN events overnight. No acute events overnight  Pain is fairly controlled  HR has been an issue. Review of Systems:  Symptom Y/N Comments  Symptom Y/N Comments   Fever/Chills    Chest Pain     Poor Appetite    Edema     Cough    Abdominal Pain     Sputum    Joint Pain     SOB/GARCIA    Pruritis/Rash     Nausea/vomit    Tolerating PT/OT     Diarrhea    Tolerating Diet     Constipation    Other       Could NOT obtain due to:      Objective:     VITALS:   Last 24hrs VS reviewed since prior progress note.  Most recent are:  Patient Vitals for the past 24 hrs:   Temp Pulse Resp BP SpO2 05/03/21 1512 97.9 °F (36.6 °C) (!) 102 16 (!) 146/68 96 %   05/03/21 1116 98.1 °F (36.7 °C) (!) 110 16 106/72 94 %   05/03/21 1050  (!) 120  127/78    05/03/21 1048  (!) 108  110/75    05/03/21 1045  98  98/67    05/03/21 1041  (!) 118  117/72    05/03/21 1035  (!) 121  (!) 134/95    05/03/21 1032  (!) 118      05/03/21 0809 98 °F (36.7 °C) (!) 109 16 (!) 153/98 98 %   05/03/21 0305 98 °F (36.7 °C) (!) 112 16 (!) 158/89 98 %   05/03/21 0024 98.1 °F (36.7 °C) (!) 117 16 (!) 162/91 94 %   05/02/21 1946 99.4 °F (37.4 °C) (!) 102 16 (!) 148/68 94 %       Intake/Output Summary (Last 24 hours) at 5/3/2021 1634  Last data filed at 5/3/2021 0390  Gross per 24 hour   Intake    Output 400 ml   Net -400 ml        I had a face to face encounter and independently examined this patient on 5/3/2021, as outlined below:  PHYSICAL EXAM:  General: WD, WN. Alert, cooperative, no acute distress    EENT:  EOMI. Anicteric sclerae. MMM  Resp:  CTA bilaterally, no wheezing or rales. No accessory muscle use  CV:  Regular  rhythm,  No edema  GI:  Soft, Non distended, Non tender. +Bowel sounds  Neurologic:  Alert and oriented X 3, normal speech,   Psych:   Good insight. Not anxious nor agitated  Skin:  No rashes. No jaundice    Reviewed most current lab test results and cultures  YES  Reviewed most current radiology test results   YES  Review and summation of old records today    NO  Reviewed patient's current orders and MAR    YES  PMH/SH reviewed - no change compared to H&P  ________________________________________________________________________  Care Plan discussed with:    Comments   Patient x    Family      RN x    Care Manager x    Consultant                        Multidiciplinary team rounds were held today with , nursing, pharmacist and clinical coordinator. Patient's plan of care was discussed; medications were reviewed and discharge planning was addressed. ________________________________________________________________________  Total NON critical care TIME:  35   Minutes    Total CRITICAL CARE TIME Spent:   Minutes non procedure based      Comments   >50% of visit spent in counseling and coordination of care x    ________________________________________________________________________  Janie Khan MD     Procedures: see electronic medical records for all procedures/Xrays and details which were not copied into this note but were reviewed prior to creation of Plan. LABS:  I reviewed today's most current labs and imaging studies.   Pertinent labs include:  Recent Labs     05/01/21  0346   WBC 13.1*   HGB 8.7*   HCT 27.9*        Recent Labs     05/02/21  0318 05/01/21  0346    134*   K 4.4 4.1    102   CO2 30 29   * 218*   BUN 23* 22*   CREA 0.93 0.89   CA 9.4 9.0       Signed: Janie Khan MD

## 2021-05-04 LAB
ANION GAP SERPL CALC-SCNC: 3 MMOL/L (ref 5–15)
BASOPHILS # BLD: 0.1 K/UL (ref 0–0.1)
BASOPHILS NFR BLD: 1 % (ref 0–1)
BUN SERPL-MCNC: 20 MG/DL (ref 6–20)
BUN/CREAT SERPL: 19 (ref 12–20)
CALCIUM SERPL-MCNC: 9.5 MG/DL (ref 8.5–10.1)
CHLORIDE SERPL-SCNC: 104 MMOL/L (ref 97–108)
CO2 SERPL-SCNC: 31 MMOL/L (ref 21–32)
CREAT SERPL-MCNC: 1.04 MG/DL (ref 0.55–1.02)
DIFFERENTIAL METHOD BLD: ABNORMAL
EOSINOPHIL # BLD: 0.5 K/UL (ref 0–0.4)
EOSINOPHIL NFR BLD: 5 % (ref 0–7)
ERYTHROCYTE [DISTWIDTH] IN BLOOD BY AUTOMATED COUNT: 14.1 % (ref 11.5–14.5)
GLUCOSE BLD STRIP.AUTO-MCNC: 140 MG/DL (ref 65–100)
GLUCOSE BLD STRIP.AUTO-MCNC: 166 MG/DL (ref 65–100)
GLUCOSE BLD STRIP.AUTO-MCNC: 242 MG/DL (ref 65–100)
GLUCOSE BLD STRIP.AUTO-MCNC: 85 MG/DL (ref 65–100)
GLUCOSE SERPL-MCNC: 112 MG/DL (ref 65–100)
HCT VFR BLD AUTO: 29.1 % (ref 35–47)
HGB BLD-MCNC: 8.8 G/DL (ref 11.5–16)
IMM GRANULOCYTES # BLD AUTO: 0.2 K/UL (ref 0–0.04)
IMM GRANULOCYTES NFR BLD AUTO: 1 % (ref 0–0.5)
LYMPHOCYTES # BLD: 3.4 K/UL (ref 0.8–3.5)
LYMPHOCYTES NFR BLD: 30 % (ref 12–49)
MCH RBC QN AUTO: 27.2 PG (ref 26–34)
MCHC RBC AUTO-ENTMCNC: 30.2 G/DL (ref 30–36.5)
MCV RBC AUTO: 89.8 FL (ref 80–99)
MONOCYTES # BLD: 1 K/UL (ref 0–1)
MONOCYTES NFR BLD: 9 % (ref 5–13)
NEUTS SEG # BLD: 6.2 K/UL (ref 1.8–8)
NEUTS SEG NFR BLD: 54 % (ref 32–75)
NRBC # BLD: 0.03 K/UL (ref 0–0.01)
NRBC BLD-RTO: 0.3 PER 100 WBC
PLATELET # BLD AUTO: 316 K/UL (ref 150–400)
PMV BLD AUTO: 10.4 FL (ref 8.9–12.9)
POTASSIUM SERPL-SCNC: 3.7 MMOL/L (ref 3.5–5.1)
RBC # BLD AUTO: 3.24 M/UL (ref 3.8–5.2)
SARS-COV-2, XPLCVT: NOT DETECTED
SERVICE CMNT-IMP: ABNORMAL
SERVICE CMNT-IMP: NORMAL
SODIUM SERPL-SCNC: 138 MMOL/L (ref 136–145)
SOURCE, COVRS: NORMAL
WBC # BLD AUTO: 11.4 K/UL (ref 3.6–11)

## 2021-05-04 PROCEDURE — 97110 THERAPEUTIC EXERCISES: CPT

## 2021-05-04 PROCEDURE — 77030038269 HC DRN EXT URIN PURWCK BARD -A

## 2021-05-04 PROCEDURE — 97116 GAIT TRAINING THERAPY: CPT

## 2021-05-04 PROCEDURE — 74011636637 HC RX REV CODE- 636/637: Performed by: STUDENT IN AN ORGANIZED HEALTH CARE EDUCATION/TRAINING PROGRAM

## 2021-05-04 PROCEDURE — 85025 COMPLETE CBC W/AUTO DIFF WBC: CPT

## 2021-05-04 PROCEDURE — 74011250637 HC RX REV CODE- 250/637: Performed by: PHYSICIAN ASSISTANT

## 2021-05-04 PROCEDURE — 74011250637 HC RX REV CODE- 250/637: Performed by: STUDENT IN AN ORGANIZED HEALTH CARE EDUCATION/TRAINING PROGRAM

## 2021-05-04 PROCEDURE — 97535 SELF CARE MNGMENT TRAINING: CPT

## 2021-05-04 PROCEDURE — 94760 N-INVAS EAR/PLS OXIMETRY 1: CPT

## 2021-05-04 PROCEDURE — 80048 BASIC METABOLIC PNL TOTAL CA: CPT

## 2021-05-04 PROCEDURE — 36415 COLL VENOUS BLD VENIPUNCTURE: CPT

## 2021-05-04 PROCEDURE — 74011250637 HC RX REV CODE- 250/637: Performed by: INTERNAL MEDICINE

## 2021-05-04 PROCEDURE — 74011250637 HC RX REV CODE- 250/637: Performed by: NURSE PRACTITIONER

## 2021-05-04 PROCEDURE — 74011636637 HC RX REV CODE- 636/637: Performed by: PHYSICIAN ASSISTANT

## 2021-05-04 PROCEDURE — 82962 GLUCOSE BLOOD TEST: CPT

## 2021-05-04 PROCEDURE — 65660000000 HC RM CCU STEPDOWN

## 2021-05-04 RX ORDER — DIGOXIN 125 MCG
0.25 TABLET ORAL DAILY
Status: DISCONTINUED | OUTPATIENT
Start: 2021-05-04 | End: 2021-05-07 | Stop reason: HOSPADM

## 2021-05-04 RX ORDER — DILTIAZEM HYDROCHLORIDE 30 MG/1
30 TABLET, FILM COATED ORAL
Status: DISCONTINUED | OUTPATIENT
Start: 2021-05-04 | End: 2021-05-04

## 2021-05-04 RX ADMIN — POTASSIUM CHLORIDE 20 MEQ: 1500 TABLET, EXTENDED RELEASE ORAL at 08:32

## 2021-05-04 RX ADMIN — INSULIN LISPRO 3 UNITS: 100 INJECTION, SOLUTION INTRAVENOUS; SUBCUTANEOUS at 17:47

## 2021-05-04 RX ADMIN — METOPROLOL TARTRATE 100 MG: 50 TABLET, FILM COATED ORAL at 08:31

## 2021-05-04 RX ADMIN — CALCIUM CARBONATE (ANTACID) CHEW TAB 500 MG 200 MG: 500 CHEW TAB at 12:18

## 2021-05-04 RX ADMIN — Medication 10 ML: at 17:44

## 2021-05-04 RX ADMIN — METHOCARBAMOL TABLETS 750 MG: 750 TABLET, COATED ORAL at 17:46

## 2021-05-04 RX ADMIN — ACETAMINOPHEN 650 MG: 325 TABLET ORAL at 05:14

## 2021-05-04 RX ADMIN — OXYCODONE 5 MG: 5 TABLET ORAL at 05:14

## 2021-05-04 RX ADMIN — METHOCARBAMOL TABLETS 750 MG: 750 TABLET, COATED ORAL at 22:19

## 2021-05-04 RX ADMIN — INSULIN LISPRO 12 UNITS: 100 INJECTION, SOLUTION INTRAVENOUS; SUBCUTANEOUS at 17:46

## 2021-05-04 RX ADMIN — Medication 10 ML: at 17:45

## 2021-05-04 RX ADMIN — CALCIUM CARBONATE 500 MG (1,250 MG)-VITAMIN D3 200 UNIT TABLET 1 TABLET: at 12:18

## 2021-05-04 RX ADMIN — ACETAMINOPHEN 650 MG: 325 TABLET ORAL at 22:19

## 2021-05-04 RX ADMIN — INSULIN LISPRO 2 UNITS: 100 INJECTION, SOLUTION INTRAVENOUS; SUBCUTANEOUS at 08:30

## 2021-05-04 RX ADMIN — INSULIN LISPRO 12 UNITS: 100 INJECTION, SOLUTION INTRAVENOUS; SUBCUTANEOUS at 08:30

## 2021-05-04 RX ADMIN — CLONIDINE HYDROCHLORIDE 0.2 MG: 0.1 TABLET ORAL at 08:31

## 2021-05-04 RX ADMIN — CLONIDINE HYDROCHLORIDE 0.2 MG: 0.1 TABLET ORAL at 17:45

## 2021-05-04 RX ADMIN — INSULIN GLARGINE 37 UNITS: 100 INJECTION, SOLUTION SUBCUTANEOUS at 22:19

## 2021-05-04 RX ADMIN — LEVOTHYROXINE SODIUM 100 MCG: 0.1 TABLET ORAL at 05:14

## 2021-05-04 RX ADMIN — RIVAROXABAN 20 MG: 20 TABLET, FILM COATED ORAL at 08:31

## 2021-05-04 RX ADMIN — CASTOR OIL AND BALSAM, PERU: 788; 87 OINTMENT TOPICAL at 08:28

## 2021-05-04 RX ADMIN — METHOCARBAMOL TABLETS 750 MG: 750 TABLET, COATED ORAL at 12:18

## 2021-05-04 RX ADMIN — Medication 10 ML: at 05:17

## 2021-05-04 RX ADMIN — CASTOR OIL AND BALSAM, PERU: 788; 87 OINTMENT TOPICAL at 17:47

## 2021-05-04 RX ADMIN — HYDRALAZINE HYDROCHLORIDE 25 MG: 25 TABLET, FILM COATED ORAL at 17:47

## 2021-05-04 RX ADMIN — CALCIUM CARBONATE 500 MG (1,250 MG)-VITAMIN D3 200 UNIT TABLET 1 TABLET: at 17:45

## 2021-05-04 RX ADMIN — HYDRALAZINE HYDROCHLORIDE 25 MG: 25 TABLET, FILM COATED ORAL at 08:32

## 2021-05-04 RX ADMIN — ACETAMINOPHEN 650 MG: 325 TABLET ORAL at 12:18

## 2021-05-04 RX ADMIN — NIFEDIPINE 60 MG: 30 TABLET, EXTENDED RELEASE ORAL at 08:31

## 2021-05-04 RX ADMIN — Medication 40 MG: at 08:28

## 2021-05-04 RX ADMIN — CALCIUM CARBONATE (ANTACID) CHEW TAB 500 MG 200 MG: 500 CHEW TAB at 08:32

## 2021-05-04 RX ADMIN — CALCIUM CARBONATE (ANTACID) CHEW TAB 500 MG 200 MG: 500 CHEW TAB at 17:45

## 2021-05-04 RX ADMIN — Medication 10 ML: at 05:16

## 2021-05-04 RX ADMIN — METOPROLOL TARTRATE 100 MG: 50 TABLET, FILM COATED ORAL at 17:46

## 2021-05-04 RX ADMIN — DIGOXIN 0.25 MG: 125 TABLET ORAL at 12:44

## 2021-05-04 RX ADMIN — Medication 5 ML: at 22:21

## 2021-05-04 RX ADMIN — CALCIUM CARBONATE 500 MG (1,250 MG)-VITAMIN D3 200 UNIT TABLET 1 TABLET: at 08:31

## 2021-05-04 RX ADMIN — METHOCARBAMOL TABLETS 750 MG: 750 TABLET, COATED ORAL at 08:31

## 2021-05-04 RX ADMIN — LOSARTAN POTASSIUM 100 MG: 100 TABLET, FILM COATED ORAL at 08:31

## 2021-05-04 RX ADMIN — ACETAMINOPHEN 650 MG: 325 TABLET ORAL at 17:45

## 2021-05-04 NOTE — PROGRESS NOTES
Problem: Self Care Deficits Care Plan (Adult)  Goal: *Acute Goals and Plan of Care (Insert Text)  Description:   FUNCTIONAL STATUS PRIOR TO ADMISSION: Patient was independent and active without use of DME.     HOME SUPPORT: The patient lived alone with family to provide assistance. Occupational Therapy Goals  Initiated 4/29/2021  1. Patient will perform grooming sitting on EOB with supervision/set-up within 7 day(s). 2.  Patient will perform bathing standing at the sink for at least 3 minutes  with minimal assistance/contact guard assist within 7 day(s). 3.  Patient will perform lower body dressing with moderate assistance  within 7 day(s). 4.  Patient will perform toilet transfers with minimal assistance/contact guard assist within 7 day(s). 5.  Patient will perform all aspects of toileting with supervision/set-up within 7 day(s). 6.  Patient will participate in upper extremity therapeutic exercise/activities with supervision/set-up for 5 minutes within 7 day(s). 7.  Patient will utilize energy conservation techniques during functional activities with verbal cues within 7 day(s). Outcome: Progressing Towards Goal   OCCUPATIONAL THERAPY TREATMENT  Patient: Arielle Lucas (29 y.o. female)  Date: 5/4/2021  [facility] [S72.009A] <principal problem not specified>  Procedure(s) (LRB):  LEFT FEMORAL INTERTROCHANTERIC NAIL INSERTION (Left) 6 Days Post-Op  Precautions: Fall, DNR, WBAT  Chart, occupational therapy assessment, plan of care, and goals were reviewed. ASSESSMENT  Patient continues with skilled OT services and is progressing towards goals. Pt remains most limited d/t LLE pain and decreased core/BUE/BLE strength. Pt was received supine on arrival and agreeable to EOB session only d/t fatigue after sitting in chair majority of day. Pt agreeable to long handled AE trial with excellent return demo, requiring Setup A with seated aspects and Max-Total A with standing aspects.  Would benefit from continued practice using reacher to don LB clothing items as well as BSC/toilet transfer trials given Pt has continued to use bed pan for toileting needs. Continue to recommend SNF rehab once medically cleared. Current Level of Function Impacting Discharge (ADLs): Up to Max A     Other factors to consider for discharge: Independent PLOF, Fall PTA         PLAN :  Patient continues to benefit from skilled intervention to address the above impairments. Continue treatment per established plan of care to address goals. Recommend with staff: x2 assist for [facility] transfers. OOB to chair all meals. Active ADL engagement. Recommend next OT session: Continue POC. Toileting. Issue long handled AE kit. Recommendation for discharge: (in order for the patient to meet his/her long term goals)  Therapy up to 5 days/week in SNF setting    This discharge recommendation:  Has been made in collaboration with the attending provider and/or case management    IF patient discharges home will need the following DME: Defer to rehab       SUBJECTIVE:   Patient stated I need one of those reacher things. \"    OBJECTIVE DATA SUMMARY:       Functional Mobility and Transfers for ADLs:  Bed Mobility:  Rolling: Minimum assistance  Supine to Sit: Moderate assistance;Bed Modified(heavy assist with BLEs, HOB elevated)  Sit to Supine: Maximum assistance  Scooting: Minimum assistance;Contact guard assistance; Additional time    Transfers:  Sit to Stand: Moderate assistance;Assist x2     Bed to Chair: Minimum assistance;Contact guard assistance; Additional time    Balance:  Sitting: Impaired  Sitting - Static: Good (unsupported)  Sitting - Dynamic: Good (unsupported)  Standing: Impaired; With support  Standing - Static: Fair;Good;Constant support  Standing - Dynamic : Fair;Constant support    ADL Intervention:     Educated and provided visual demo of long handled AE to maximize indep with LB ADL.  Challenged Pts ability to perform return demo on use of reacher to don slip on shoes. Instructed Pt to use reacher for clothing item retrieval especially from ground level. Instructed Pt to continue requesting staff assist to mobilize to [facility] vs use of bed pan to maximize endurance and strength required for functional transfers and OOB functional task performance. Upper Body [address]: Minimum  assistance(with tie at neck d/t c/o L wrist p! )    Lower Body Dressing Assistance  Socks: Set-up(to doff only- doesn't wear socks at baseline)  Slip on Shoes Without Back: Set-up  Position Performed: Seated edge of bed  Adaptive Equipment Used: Reacher;Dressing stick    Patient recalled and demonstrated avoiding extreme planes of movement with Left LE during ADLs and functional mobility with verbal/visual cues. Bathing: Patient instructed when bathing to not submerge wound in water, stand to shower or sponge bathe, cover wound with plastic and tape to ensure no water reaches bandage/wound without cues. Patient indicated understanding. Dressing joint: Patient instructed and demonstrated to don/doff Left LE first/last verbal cues. Patient instructed and demonstrated to don all clothing while sitting prior to standing, doff all clothing to knees while standing, then sit to doff clothing off from knees to feet in order to facilitate fall prevention, pain management. Pain:  General c/o L hip pain yet did not rate and did not interfere with session. Activity Tolerance:   Good    After treatment patient left in no apparent distress:   Supine in bed, Call bell within reach, Bed / chair alarm activated, Caregiver / family present, and Side rails x 3    COMMUNICATION/COLLABORATION:   The patients plan of care was discussed with: Registered nurse and Patient .      Raven Salazar, OTR/L  Time Calculation: 23 mins

## 2021-05-04 NOTE — PROGRESS NOTES
Transition of Care Plan:  RUR: 19%  Disposition: SNF- [facility]  Follow up appointments: ortho  DME needed: n/a  Transportation at Discharge: [place] or means to access home: n/a      IM Medicare letter: to be provided at time of d/c  Caregiver Contact: shola Elmore [phone]  Discharge Caregiver contacted prior to discharge?  Yes     3:25pm  D/c pending on cardiology clearance. CM has arranged [facility] transport for patient tomorrow at 12:00pm just to have patient on the books for transport in the event she clears medically tomorrow morning. CM will continue to follow to assist with d/c needs. 1:10pm  CM made room visit with patient and informed her that [facility]. Patient excited and still confirms [facility] is her first choice. Pt inquired about d/c date, CM informed her that it is pending cardiology clearance but hopefully tomorrow if medically stable. 12:20pm  [facility] accepted patient. CM has not received answer from [facility]. CM contacted [facility] and spoke with [facility] in admissions who confirmed they can accept pt. CM informed patient that patient is anticipated to d/c within 24-48 hours. [facility] confirmed they have bed availability. CM will follow up with [facility] once d/c is confirmed.      Sarai Moses, [id number]

## 2021-05-04 NOTE — PROGRESS NOTES
End of Shift Note    Bedside shift change report given to Kate Kelly RN (oncoming nurse) by Matt Mukherjee (offgoing nurse). Report included the following information SBAR, Kardex, OR Summary, Procedure Summary, MAR and Recent Results    Shift worked:  Day     Shift summary and any significant changes:     Patient up to chair with therapy. Having multiple bowel movements holding stool softeners. Patient eating and doing well. Waiting for placement. COVID negative. HR better controled cardiology saw her. Was orthostatic with therapy. Concerns for physician to address:  discharge     Zone phone for oncoming shift:   [phone]       Activity:  Activity Level: Up with Assistance  Number times ambulated in hallways past shift: 0  Number of times OOB to chair past shift: 1    Cardiac:   Cardiac Monitoring: Yes      Cardiac Rhythm: Atrial Fib    Access:   Current line(s): PIV     Genitourinary:   Urinary status: voiding and external catheter    Respiratory:   O2 Device: None (Room air)  Chronic home O2 use?: NO  Incentive spirometer at bedside: NO     GI:  Last Bowel Movement Date: 05/04/21  Current diet:  DIET DIABETIC CONSISTENT CARB Regular  DIET NUTRITIONAL SUPPLEMENTS Breakfast, Dinner; Glucerna Shake  Passing flatus: YES  Tolerating current diet: YES       Pain Management:   Patient states pain is manageable on current regimen: YES    Skin:  Leonard Score: 17  Interventions: increase time out of bed, PT/OT consult and internal/external urinary devices    Patient Safety:  Fall Score:  Total Score: 4  Interventions: assistive device (walker, cane, etc), gripper socks, pt to call before getting OOB and stay with me (per policy)  High Fall Risk: Yes    Length of Stay:  Expected LOS: 4d 7h  Actual LOS: [place]

## 2021-05-04 NOTE — PROGRESS NOTES
Bedside shift change report given to Karen frazier RN (oncoming nurse) by Delaney Bowen RN (offgoing nurse). Report included the following information SBAR, Kardex, Procedure Summary, Intake/Output, MAR, Accordion, Recent Results, Med Rec Status and Cardiac Rhythm afib.

## 2021-05-04 NOTE — PROGRESS NOTES
Comprehensive Nutrition Assessment    Type and Reason for Visit: Initial, RD nutrition re-screen/LOS    Nutrition Recommendations/Plan:   Continue CCD  Add Glucerna s/p surgery  Please document % meals and supplements consumed in flowsheet I/O's under intake     Nutrition Assessment:      Chart reviewed for LOS. Pt admitted with hip fracture, s/p surgery. Hx DM2, GERD, essential HTN, hypothyroidism. Fair appetite documented. Pt eating shrimp brought in by family at visit. RD discussed increased protein needs s/p surgery. Pt agreeable to Glucerna. Possible d/c tomorrow. Patient Vitals for the past 168 hrs:   % Diet Eaten   05/03/21 0913 26 - 50%   04/30/21 0848 51 - 75%     Wt Readings from Last 5 Encounters:   04/30/21 105.3 kg (232 lb 3.2 oz)   05/08/19 106.6 kg (235 lb)   03/02/19 107 kg (235 lb 14.3 oz)   02/13/19 106.6 kg (235 lb)   10/10/18 105.7 kg (233 lb 0.4 oz)   ]    Estimated Daily Nutrient Needs:  Energy (kcal): 1847 kcal (BMR x 1. 3AF - 250); Weight Used for Energy Requirements: Current  Protein (g): 84-105g  (0.8-1g/kg); Weight Used for Protein Requirements: Current  Fluid (ml/day): 1800mL; Method Used for Fluid Requirements: 1 ml/kcal      Nutrition Related Findings:  Labs: A1c 8.0(5/2), BG . Meds: tums, oscal, lantus, humalog, synthroid, oxycodone, klorcon. BM 5/3. Wounds:    Surgical incision       Current Nutrition Therapies:  DIET DIABETIC CONSISTENT CARB Regular    Anthropometric Measures:  · Height:  5' 10\" (177.8 cm)  · Current Body Wt:  105.3 kg (232 lb 2.3 oz)    · Ideal Body Wt:  150 lbs:  154.8 %   · BMI Category:  Obese class 1 (BMI 30.0-34. 9)       Nutrition Diagnosis:   · Increased nutrient needs related to (increased protein needs for wound healing) as evidenced by (s/p hip surgery)    Nutrition Interventions:   Food and/or Nutrient Delivery: Continue current diet, Start oral nutrition supplement  Nutrition Education and Counseling: No recommendations at this time  Coordination of Nutrition Care: Continue to monitor while inpatient    Goals:  PO intake >50% meals and ONS next 5-7 days       Nutrition Monitoring and Evaluation:   Behavioral-Environmental Outcomes: None identified  Food/Nutrient Intake Outcomes: Food and nutrient intake, Supplement intake  Physical Signs/Symptoms Outcomes: Biochemical data, Weight, Skin, GI status    Discharge Planning:    Continue current diet, Continue oral nutrition supplement     Electronically signed by Tucker Padron RD on 5/4/2021 at 4:07 PM    Contact: [phone]

## 2021-05-04 NOTE — PROGRESS NOTES
Hospitalist Progress Note    NAME: Marcha Bamberger   :  1945   MRN:  [id number]       Assessment / Plan:    Essential hypertension POA  -Continue clonidine, hydralazine, losartan, metoprolol  - change nifedepine for diltiazem for better rate control    Permanent atrial fibrillation with RVR  -On toprol 100mg/day. Changed to lopressor 100mg BID for better rate control. .   -TSH wnl  -Continue xarelto. - Dr Claire Saini following, Discharge once better rate controlled, likely in AM  - Digoxin added  - Add cardizem for procardia     Hypothyroidism  -Continue levothyroxine    Left hip fracture POA s/p nailing left hip   -Patient presented with mechanical fall and noticed to have left hip fracture  -s/p  Cephalomedullary nailing left hip   -Pain control  -Continue PT OT, rec SNF  -Continue Xarelto   -COVID PCR ordered for placement, negative  - Hopefully discharge in AM to SNF    Insulin-dependent type 2 diabetes  -Hold Metformin and continue sliding scale insulin  -Continue lantus 37U and lispro 12 U TID w/ meal  -, 216, 191, 140, 85     GERD  -Continue PPI     Anticipated d/c: 21    Code Status: DNR  Surrogate Decision Maker: Shelby Sanches as surrogate      DVT Prophylaxis: SCDs for now, Xarelto after surgery  GI Prophylaxis: not indicated  Disposition: [facility]    Baseline: Independent/ambulatory     Subjective:     Chief Complaint / Reason for Physician Visit f/u left hip fracture  Discussed with RN events overnight.    \"my hip still hurts when I move it\"  Remains in atrial fib with RVR, HRs in 120s when I saw    Review of Systems:  Symptom Y/N Comments  Symptom Y/N Comments   Fever/Chills n   Chest Pain n    Poor Appetite    Edema     Cough n   Abdominal Pain n    Sputum    Joint Pain     SOB/GARCIA n   Pruritis/Rash     Nausea/vomit n   Tolerating PT/OT     Diarrhea n   Tolerating Diet y    Constipation    Other       Could NOT obtain due to:      Objective:     VITALS:   Last 24hrs VS reviewed since prior progress note. Most recent are:  Patient Vitals for the past 24 hrs:   Temp Pulse Resp BP SpO2   05/04/21 0721 98.1 °F (36.7 °C) (!) 109 18 (!) 152/100 98 %   05/04/21 0321 97.8 °F (36.6 °C) (!) 111 16 (!) 158/95 94 %   05/03/21 2321 97.7 °F (36.5 °C) (!) 107 16 (!) 148/92 98 %   05/03/21 1934 98 °F (36.7 °C) (!) 117 16 (!) 141/86 97 %   05/03/21 1512 97.9 °F (36.6 °C) (!) 102 16 (!) 146/68 96 %       Intake/Output Summary (Last 24 hours) at 5/4/2021 1131  Last data filed at 5/4/2021 0402  Gross per 24 hour   Intake    Output 400 ml   Net -400 ml        I had a face to face encounter and independently examined this patient on 5/4/2021, as outlined below:  PHYSICAL EXAM:  General: WD, WN. Alert, cooperative, no acute distress    EENT:  Anicteric sclerae. MMM  Resp:  CTA bilaterally, no wheezing or rales. No accessory muscle use  CV:  Tachycardiac, irregular  rhythm,  No edema  GI:  Soft, Non distended, Non tender. +Bowel sounds  Neurologic:  Alert and oriented X 3, normal speech,   Psych:   Good insight. Not anxious nor agitated  Skin:  No rashes. No jaundice    Reviewed most current lab test results and cultures  YES  Reviewed most current radiology test results   YES  Review and summation of old records today    NO  Reviewed patient's current orders and MAR    YES  PMH/SH reviewed - no change compared to H&P  ________________________________________________________________________  Care Plan discussed with:    Comments   Patient x    Family      RN x    Care Manager x    Consultant                        Multidiciplinary team rounds were held today with , nursing, pharmacist and clinical coordinator. Patient's plan of care was discussed; medications were reviewed and discharge planning was addressed.      ________________________________________________________________________  Total NON critical care TIME:  35   Minutes    Total CRITICAL CARE TIME Spent:   Minutes non procedure based      Comments   >50% of visit spent in counseling and coordination of care x    ________________________________________________________________________  Daniel Hammond MD     Procedures: see electronic medical records for all procedures/Xrays and details which were not copied into this note but were reviewed prior to creation of Plan. LABS:  I reviewed today's most current labs and imaging studies.   Pertinent labs include:  Recent Labs     05/04/21 0353   WBC 11.4*   HGB 8.8*   HCT 29.1*        Recent Labs     05/04/21 0353 05/02/21  0318    137   K 3.7 4.4    103   CO2 31 30   * 227*   BUN 20 23*   CREA 1.04* 0.93   CA 9.5 9.4       Signed: Daniel Hammond MD

## 2021-05-04 NOTE — PROGRESS NOTES
Resting comfortably. GEN:  NAD.  AOx3   ABD:  S/NT/ND   LLE:  Dressing C/D/I , 5/5 motor, Calf nttp (Bilat), Sensation grossly intact to light touch throughout, 1+ dp/pt pulses, foot perfused    Patient Vitals for the past 24 hrs:   Temp Pulse Resp BP SpO2   05/04/21 0721 98.1 °F (36.7 °C) (!) 109 18 (!) 152/100 98 %   05/04/21 0321 97.8 °F (36.6 °C) (!) 111 16 (!) 158/95 94 %   05/03/21 2321 97.7 °F (36.5 °C) (!) 107 16 (!) 148/92 98 %   05/03/21 1934 98 °F (36.7 °C) (!) 117 16 (!) 141/86 97 %   05/03/21 1512 97.9 °F (36.6 °C) (!) 102 16 (!) 146/68 96 %   05/03/21 1116 98.1 °F (36.7 °C) (!) 110 16 106/72 94 %   05/03/21 1050  (!) 120  127/78    05/03/21 1048  (!) 108  110/75    05/03/21 1045  98  98/67    05/03/21 1041  (!) 118  117/72    05/03/21 1035  (!) 121  (!) 134/95    05/03/21 1032  (!) 118          Current Facility-Administered Medications   Medication Dose Route Frequency    digoxin (LANOXIN) tablet 0.25 mg  0.25 mg Oral DAILY    cloNIDine HCL (CATAPRES) tablet 0.2 mg  0.2 mg Oral BID    balsam peru-castor oiL (VENELEX) ointment   Topical BID    insulin lispro (HUMALOG) injection 12 Units  12 Units SubCUTAneous TIDAC    insulin glargine (LANTUS) injection 37 Units  37 Units SubCUTAneous QHS    metoprolol tartrate (LOPRESSOR) tablet 100 mg  100 mg Oral BID    metoprolol (LOPRESSOR) injection 5 mg  5 mg IntraVENous Q6H PRN    morphine injection 1 mg  1 mg IntraVENous Q4H PRN    oxyCODONE IR (ROXICODONE) tablet 10 mg  10 mg Oral Q4H PRN    methocarbamoL (ROBAXIN) tablet 750 mg  750 mg Oral QID    ondansetron (ZOFRAN) injection 4 mg  4 mg IntraVENous Q6H PRN    losartan (COZAAR) tablet 100 mg  100 mg Oral DAILY    esomeprazole (NEXIUM) capsule 40 mg (Patient Supplied)  40 mg Oral DAILY    rivaroxaban (XARELTO) tablet 20 mg  20 mg Oral DAILY    calcium carbonate (TUMS) chewable tablet 200 mg [elemental]  200 mg Oral TID WITH MEALS    traMADoL (ULTRAM) tablet 50 mg  50 mg Oral Q6H PRN    hydrOXYzine HCL (ATARAX) tablet 10 mg  10 mg Oral Q8H PRN    naloxone (NARCAN) injection 0.4 mg  0.4 mg IntraVENous PRN    ondansetron (ZOFRAN ODT) tablet 4 mg  4 mg Oral Q4H PRN    calcium-vitamin D (OS-JOSEPH +D3) 500 mg-200 unit per tablet 1 Tab  1 Tab Oral TID WITH MEALS    polyethylene glycol (MIRALAX) packet 17 g  17 g Oral DAILY    bisacodyL (DULCOLAX) suppository 10 mg  10 mg Rectal DAILY PRN    sodium chloride (NS) flush 5-40 mL  5-40 mL IntraVENous Q8H    sodium chloride (NS) flush 5-40 mL  5-40 mL IntraVENous PRN    acetaminophen (TYLENOL) tablet 650 mg  650 mg Oral Q6H    oxyCODONE IR (ROXICODONE) tablet 5 mg  5 mg Oral Q4H PRN    senna-docusate (PERICOLACE) 8.6-50 mg per tablet 2 Tab  2 Tab Oral BID    albuterol (PROVENTIL VENTOLIN) nebulizer solution 2.5 mg  2.5 mg Nebulization Q4H PRN    hydrALAZINE (APRESOLINE) tablet 25 mg  25 mg Oral BID    levothyroxine (SYNTHROID) tablet 100 mcg  100 mcg Oral 6am    [Held by provider] metFORMIN (GLUCOPHAGE) tablet 1,000 mg  1,000 mg Oral BID WITH MEALS    NIFEdipine ER (PROCARDIA XL) tablet 60 mg  60 mg Oral DAILY    potassium chloride (K-DUR, KLOR-CON) SR tablet 20 mEq  20 mEq Oral DAILY    sodium chloride (NS) flush 5-40 mL  5-40 mL IntraVENous Q8H    sodium chloride (NS) flush 5-40 mL  5-40 mL IntraVENous PRN    acetaminophen (TYLENOL) tablet 650 mg  650 mg Oral Q6H PRN    Or    acetaminophen (TYLENOL) suppository 650 mg  650 mg Rectal Q6H PRN    polyethylene glycol (MIRALAX) packet 17 g  17 g Oral DAILY PRN    promethazine (PHENERGAN) tablet 12.5 mg  12.5 mg Oral Q6H PRN    insulin lispro (HUMALOG) injection   SubCUTAneous AC&HS    glucose chewable tablet 16 g  4 Tab Oral PRN    dextrose (D50W) injection syrg 12.5-25 g  12.5-25 g IntraVENous PRN    glucagon (GLUCAGEN) injection 1 mg  1 mg IntraMUSCular PRN       Lab Results   Component Value Date/Time    HGB 8.8 (L) 05/04/2021 03:53 AM    INR 1.1 04/27/2021 10:22 PM Lab Results   Component Value Date/Time    Sodium 138 05/04/2021 03:53 AM    Potassium 3.7 05/04/2021 03:53 AM    Chloride 104 05/04/2021 03:53 AM    CO2 31 05/04/2021 03:53 AM    BUN 20 05/04/2021 03:53 AM    Creatinine 1.04 (H) 05/04/2021 03:53 AM    Calcium 9.5 05/04/2021 03:53 AM    Magnesium 1.8 02/28/2018 02:59 AM            64 y.o. female s/p left hip IM nailing s/p hip fracture . Doing well. Precautions: None  ABX: Complete 24 hours perioperative abx  PATHWAY: Straight cath per protocol  DVT Prophylaxis: Home Xeralto  Weight Bearing: WBAT LLE   Pain Control:  Tylenol,  tramadol every 6 hours, oxy 5 mg for breakthrough pain  Disposition: Dispo once medically stable and cleared by PT, HHPT; Follow up with Dr Charity Sanders; staples to be removed 2 weeks post op.

## 2021-05-04 NOTE — PROGRESS NOTES
Problem: Mobility Impaired (Adult and Pediatric)  Goal: *Acute Goals and Plan of Care (Insert Text)  Description: FUNCTIONAL STATUS PRIOR TO ADMISSION: independent without devices with mobility and ADLs. HOME SUPPORT PRIOR TO ADMISSION: Lived alone with family nearby to assist as needed. Physical Therapy Goals  Initiated 4/29/2021  1. Patient will move from supine to sit and sit to supine , scoot up and down, and roll side to side in bed with minimal assistance/contact guard assist within 7 day(s). 2.  Patient will transfer from bed to chair and chair to bed with minimal assistance/contact guard assist using the least restrictive device within 7 day(s). 3.  Patient will perform sit to stand with minimal assistance/contact guard assist within 7 day(s). 4.  Patient will ambulate with minimal assistance/contact guard assist for 35 feet with the least restrictive device within 7 day(s). 5.  Patient will ascend/descend 4 stairs with 2 handrail(s) with minimal assistance/contact guard assist within 7 day(s). Outcome: Progressing Towards Goal   PHYSICAL THERAPY TREATMENT  Patient: Rafal Mansfield (34 y.o. female)  Date: 5/4/2021  Diagnosis: Hip fracture ([facility] [address] [S72.009A] <principal problem not specified>  Procedure(s) (LRB):  LEFT FEMORAL INTERTROCHANTERIC NAIL INSERTION (Left) 6 Days Post-Op  Precautions: Fall, DNR, WBAT  Chart, physical therapy assessment, plan of care and goals were reviewed. ASSESSMENT  Patient continues with skilled PT services and is progressing towards goals. Pt presents with decreased strength and increased pain with mobility. Pts HR better controlled starting at 98 BPM at rest. Pt performed supine exercises with assistance. Pt preformed bed mobility at mod A/min A x1. Pt with improved bed mobility. Pt performed sit to stand transfer at mod A x2 with rocking for momentum. Pt ambulated 7ft with RW at min A/CGA. Pt requiring constant cueing for sequencing.  Pts BP still decreasing initially once sitting up but stabilized with time in sitting. BP with position changes. 157/86 Sitting EOB  140/87 Sitting after ambulation   130/78 Sitting after exercises  113/58 Sitting   128/70 Sitting with increased time. Current Level of Function Impacting Discharge (mobility/balance): bed mobility at mod A/min A with additional time, sit to stand transfer at mod A x2, ambulation at min/CGA    Other factors to consider for discharge: pain, decreased strength and endurance. PLAN :  Patient continues to benefit from skilled intervention to address the above impairments. Continue treatment per established plan of care. to address goals. Recommendation for discharge: (in order for the patient to meet his/her long term goals)  Therapy up to 5 days/week in SNF setting    This discharge recommendation:  Has been made in collaboration with the attending provider and/or case management    IF patient discharges home will need the following DME: rolling walker       SUBJECTIVE:   Patient stated  I think its going to be better today.     OBJECTIVE DATA SUMMARY:   Critical Behavior:  Neurologic State: Alert  Orientation Level: Oriented X4  Cognition: Appropriate decision making  Safety/Judgement: Awareness of environment, Fall prevention  Functional Mobility Training:  Bed Mobility:  Rolling: Minimum assistance  Supine to Sit: Moderate assistance;Minimum assistance;Assist x1     Scooting: Minimum assistance;Contact guard assistance; Additional time        Transfers:  Sit to Stand: Moderate assistance;Assist x2  Stand to Sit: Contact guard assistance        Bed to Chair: Minimum assistance;Contact guard assistance; Additional time                    Balance:  Sitting: Impaired  Sitting - Static: Good (unsupported)  Sitting - Dynamic: Good (unsupported)  Standing: Impaired; With support  Standing - Static: Fair;Good;Constant support  Standing - Dynamic : Fair;Constant support  Ambulation/Gait Training:  Distance (ft): 7 Feet (ft)  Assistive Device: Gait belt;Walker, rolling  Ambulation - Level of Assistance: Minimal assistance;Contact guard assistance; Additional time        Gait Abnormalities: Decreased step clearance; Step to gait; Antalgic        Base of Support: Narrowed  Stance: Left decreased  Speed/Vita: Slow;Shuffled  Step Length: Right shortened;Left shortened           Therapeutic Exercises:       EXERCISE   Sets   Reps   Active Active Assist   Passive Self ROM   Comments   Ankle Pumps 1 10 [x] [] [] []    Quad Sets/Glut Sets 1 5 [x] [] [] []    Hamstring Sets   [] [] [] []    Short Arc Quads   [] [] [] []    Heel Slides 1 12 [x] [] [] []    Straight Leg Raises   [] [] [] []    Hip abd/add 1 10 [x] [] [] []    Long Arc Quads 1 10 [x] [] [] []    Marching   [] [] [] []       [] [] [] []       Pain Rating:  Pt reported increase pain with mobility. Activity Tolerance:   Fair, requires rest breaks, and signs and symptoms of orthostatic hypotension    After treatment patient left in no apparent distress:   Sitting in chair and Call bell within reach    COMMUNICATION/COLLABORATION:   The patients plan of care was discussed with: Registered nurse.      Shilpi Capellan PTA   Time Calculation: 31 mins

## 2021-05-04 NOTE — CONSULTS
[facility]    Name:  Edmond Ramachandran  MR#:  [id number]  :  1945  ACCOUNT #:  [de-identified]  DATE OF SERVICE:  2021    HISTORY OF PRESENT ILLNESS:  The patient is a 68-year-old lady who states that her feet got tangled up and she tripped and she has a fractured hip. She has had left hip fracture with subsequent internal fixation repair. The patient has been on Xarelto for prevention of pulmonary thromboembolism. She has significant hypertension. She has atrial fibrillation with controlled ventricular response previously. She has had no difficulty with chest pain or shortness of breath. She complains of pain in her hip. PAST MEDICAL HISTORY:  She has multifactorial anemia with iron deficiency, anemia of chronic disease, and she has been followed by Dr. Linda Pimentel.  She has been hypertensive. MEDICATIONS:  Her medications are believed to be the following:  Xarelto 20 daily; metoprolol 100 mg daily; clonidine 0.1 twice daily; furosemide 40, two as needed; levothyroxine 88 mcg; Humalog; Nexium; ProAir; metformin 1000 mg twice daily; losartan/hydrochlorothiazide 100 mg/25 daily; hydralazine 25 t.i.d.; KCl; and Mag-Ox. SOCIAL HISTORY:  She is single. Tobacco/alcohol, none. REVIEW OF SYSTEMS:  She had been well prior to her episode. She is a hairdresser. She is from [place], [place]. No bowel or bladder dysfunction. No headaches, no syncope. She states she did not faint. She has been diabetic. She is followed by Dr. Allyson Hart and Debbie Clayton. No genitourinary or GI complaints. She has difficulty with her joints. No polyuria. PHYSICAL EXAMINATION:  GENERAL:  She is alert and oriented. No facial asymmetry. VITAL SIGNS:  Blood pressure has been high. Her heart rate has been in the one-teens to 120. NECK:  No neck vein distention. Carotids are full. LUNGS:  Clear.   HEART:  Irregular rhythm with varying S1 rate, 115.  ABDOMEN:  Nontender. EXTREMITIES:  No edema. SKIN:  Warm and dry. EKG:  Atrial fibrillation with rapid ventricular response. PROBLEMS:  1.  Status post left hip fracture. 2.  Atrial fibrillation with rapid ventricular response. 3.  Hypertension. 4.  Diabetes mellitus. 5.  Anemia of chronic disease. DISCUSSION AND RECOMMENDATION:  This lady needs better rate control. I have increased her clonidine to better control her heart rate as well as her blood pressure. At this point, she is in no distress, but her rate needs to be better controlled prior to participating in rehab. She has restarted her Xarelto appropriately. Thank you very much for asking me to see this pleasant lady.       Yony Munguia MD      [id number]  D:  05/03/2021 17:51  T:  05/03/2021 23:37  JOB #:  [id number]  CC:  Dr Yanni Aquino MD Connie Lyell, PA-C

## 2021-05-04 NOTE — PROGRESS NOTES
Cardiology Progress Note  5/4/2021 9:08 AM  Admit Date: 4/27/2021  Admit Diagnosis/CC: Hip fracture ([facility] [address] [S72.009A]  Subjective:   Patient reports:  Chest Pain:  [x] none,  consistent with [] non-cardiac  [] atypical  []  anginal chest pain             [x] none now    []  on-going  Dyspnea: [x] none  [] at rest  [] with exertion  [] improved  [] unchanged [] worsening  PND:       [x] none  [] overnight   [] current  Orthopnea: [x] none  [] improved  [] unchanged  [] worsening  Presyncope: [x] none  [] improved  [] unchanged  [] worsening  Ambulated in hallway without symptoms  [] Yes  Ambulated in room without symptoms  [x] Yes  ROS(2+other systems)   Hematuria: [] Yes  [x] No.   Dysuria: [] Yes  [x] No                                           Cough:       [] Yes  [x] No.   Sputum: [] Yes  [x] No                                            Hematochezia: [] Yes  [x] No.   Melena: [] Yes  [x] No                                            No change in family and social history from H&P/Consult note.     Current Facility-Administered Medications   Medication Dose Route Frequency    cloNIDine HCL (CATAPRES) tablet 0.2 mg  0.2 mg Oral BID    balsam peru-castor oiL (VENELEX) ointment   Topical BID    insulin lispro (HUMALOG) injection 12 Units  12 Units SubCUTAneous TIDAC    insulin glargine (LANTUS) injection 37 Units  37 Units SubCUTAneous QHS    metoprolol tartrate (LOPRESSOR) tablet 100 mg  100 mg Oral BID    metoprolol (LOPRESSOR) injection 5 mg  5 mg IntraVENous Q6H PRN    morphine injection 1 mg  1 mg IntraVENous Q4H PRN    oxyCODONE IR (ROXICODONE) tablet 10 mg  10 mg Oral Q4H PRN    methocarbamoL (ROBAXIN) tablet 750 mg  750 mg Oral QID    ondansetron (ZOFRAN) injection 4 mg  4 mg IntraVENous Q6H PRN    losartan (COZAAR) tablet 100 mg  100 mg Oral DAILY    esomeprazole (NEXIUM) capsule 40 mg (Patient Supplied)  40 mg Oral DAILY    rivaroxaban (XARELTO) tablet 20 mg  20 mg Oral DAILY    calcium carbonate (TUMS) chewable tablet 200 mg [elemental]  200 mg Oral TID WITH MEALS    traMADoL (ULTRAM) tablet 50 mg  50 mg Oral Q6H PRN    hydrOXYzine HCL (ATARAX) tablet 10 mg  10 mg Oral Q8H PRN    naloxone (NARCAN) injection 0.4 mg  0.4 mg IntraVENous PRN    ondansetron (ZOFRAN ODT) tablet 4 mg  4 mg Oral Q4H PRN    calcium-vitamin D (OS-JOSEPH +D3) 500 mg-200 unit per tablet 1 Tab  1 Tab Oral TID WITH MEALS    polyethylene glycol (MIRALAX) packet 17 g  17 g Oral DAILY    bisacodyL (DULCOLAX) suppository 10 mg  10 mg Rectal DAILY PRN    sodium chloride (NS) flush 5-40 mL  5-40 mL IntraVENous Q8H    sodium chloride (NS) flush 5-40 mL  5-40 mL IntraVENous PRN    acetaminophen (TYLENOL) tablet 650 mg  650 mg Oral Q6H    oxyCODONE IR (ROXICODONE) tablet 5 mg  5 mg Oral Q4H PRN    senna-docusate (PERICOLACE) 8.6-50 mg per tablet 2 Tab  2 Tab Oral BID    albuterol (PROVENTIL VENTOLIN) nebulizer solution 2.5 mg  2.5 mg Nebulization Q4H PRN    hydrALAZINE (APRESOLINE) tablet 25 mg  25 mg Oral BID    levothyroxine (SYNTHROID) tablet 100 mcg  100 mcg Oral 6am    [Held by provider] metFORMIN (GLUCOPHAGE) tablet 1,000 mg  1,000 mg Oral BID WITH MEALS    NIFEdipine ER (PROCARDIA XL) tablet 60 mg  60 mg Oral DAILY    potassium chloride (K-DUR, KLOR-CON) SR tablet 20 mEq  20 mEq Oral DAILY    sodium chloride (NS) flush 5-40 mL  5-40 mL IntraVENous Q8H    sodium chloride (NS) flush 5-40 mL  5-40 mL IntraVENous PRN    acetaminophen (TYLENOL) tablet 650 mg  650 mg Oral Q6H PRN    Or    acetaminophen (TYLENOL) suppository 650 mg  650 mg Rectal Q6H PRN    polyethylene glycol (MIRALAX) packet 17 g  17 g Oral DAILY PRN    promethazine (PHENERGAN) tablet 12.5 mg  12.5 mg Oral Q6H PRN    insulin lispro (HUMALOG) injection   SubCUTAneous AC&HS    glucose chewable tablet 16 g  4 Tab Oral PRN    dextrose (D50W) injection syrg 12.5-25 g  12.5-25 g IntraVENous PRN    glucagon (GLUCAGEN) injection 1 mg  1 mg IntraMUSCular PRN       Objective:    Physical Exam:  Last VS:   Visit Vitals  BP (!) 152/100   Pulse (!) 109   Temp 98.1 °F (36.7 °C)   Resp 18   Ht 5' 10\" (1.778 m)   Wt 105.3 kg (232 lb 3.2 oz)   SpO2 98%   Breastfeeding No   BMI 33.32 kg/m²    Temp (24hrs), Av.9 °F (36.6 °C), Min:97.7 °F (36.5 °C), Max:98.1 °F (36.7 °C)    24 hr VS reviewed. General Appearance:   [x] well developed, well nourished,  [x] NAD. [] agitated   [] lethargic but arousable  [] obtunded   ENT, Palate:    [x] WNL   [] dry palate/MM        Respiratory:    [x] CTA bilateral  [] rales  [] rhonchi  [] normal resp effort      [] similar to yesterday   [] worse    [] improved   Cardiovascular:   [x] RRR   [] Irregular rate and rhythm   [x] Normal S1, S2   [x] No gallop or rub. [] no murmur   [x] no new murmur  [] murmur c/w:   [x] no edema  RLE: []1+ []2+ [] 3+;  LLE: []1+ []2+ []3+      [] edema similar to yesterday   [] worse    [] improved   [x] normal JVP   [] Elevated JVP    [x] JVP similar to yesterday   [] worse    [] improved   [x] carotid upstroke unchanged   [x] abd aorta not palpated   [x] no stigmata of peripheral emboli   GI:    [x] abd soft, non-distented,bowel sounds present, no                     organomegaly appreciated   Skin:  Neuro:    Cath Site:  [x] warm and dry [] cold extremities   [x] A/O x 3, grossly non-focal      [] intact w/o hematoma or bruit; distal pulse unchanged.               Data Review:   Labs:    Recent Results (from the past 24 hour(s))   GLUCOSE, POC    Collection Time: 21 11:25 AM   Result Value Ref Range    Glucose (POC) 189 (H) 65 - 100 mg/dL    Performed by Clarence Bryant (PCT)    GLUCOSE, POC    Collection Time: 21  3:32 PM   Result Value Ref Range    Glucose (POC) 216 (H) 65 - 100 mg/dL    Performed by Clarence Bryant (PCT)    SARS-COV-2    Collection Time: 21  5:35 PM   Result Value Ref Range    SARS-CoV-2 Please find results under separate order GLUCOSE, POC    Collection Time: 05/03/21  9:05 PM   Result Value Ref Range    Glucose (POC) 191 (H) 65 - 100 mg/dL    Performed by Lou Gustafson PCT    CBC WITH AUTOMATED DIFF    Collection Time: 05/04/21  3:53 AM   Result Value Ref Range    WBC 11.4 (H) 3.6 - 11.0 K/uL    RBC 3.24 (L) 3.80 - 5.20 M/uL    HGB 8.8 (L) 11.5 - 16.0 g/dL    HCT 29.1 (L) 35.0 - 47.0 %    MCV 89.8 80.0 - 99.0 FL    MCH 27.2 26.0 - 34.0 PG    MCHC 30.2 30.0 - 36.5 g/dL    RDW 14.1 11.5 - 14.5 %    PLATELET 858 477 - 931 K/uL    MPV 10.4 8.9 - 12.9 FL    NRBC 0.3 (H) 0  WBC    ABSOLUTE NRBC 0.03 (H) 0.00 - 0.01 K/uL    NEUTROPHILS 54 32 - 75 %    LYMPHOCYTES 30 12 - 49 %    MONOCYTES 9 5 - 13 %    EOSINOPHILS 5 0 - 7 %    BASOPHILS 1 0 - 1 %    IMMATURE GRANULOCYTES 1 (H) 0.0 - 0.5 %    ABS. NEUTROPHILS 6.2 1.8 - 8.0 K/UL    ABS. LYMPHOCYTES 3.4 0.8 - 3.5 K/UL    ABS. MONOCYTES 1.0 0.0 - 1.0 K/UL    ABS. EOSINOPHILS 0.5 (H) 0.0 - 0.4 K/UL    ABS. BASOPHILS 0.1 0.0 - 0.1 K/UL    ABS. IMM.  GRANS. 0.2 (H) 0.00 - 0.04 K/UL    DF AUTOMATED     METABOLIC PANEL, BASIC    Collection Time: 05/04/21  3:53 AM   Result Value Ref Range    Sodium 138 136 - 145 mmol/L    Potassium 3.7 3.5 - 5.1 mmol/L    Chloride 104 97 - 108 mmol/L    CO2 31 21 - 32 mmol/L    Anion gap 3 (L) 5 - 15 mmol/L    Glucose 112 (H) 65 - 100 mg/dL    BUN 20 6 - 20 MG/DL    Creatinine 1.04 (H) 0.55 - 1.02 MG/DL    BUN/Creatinine ratio 19 12 - 20      GFR est AA >60 >60 ml/min/1.73m2    GFR est non-AA 51 (L) >60 ml/min/1.73m2    Calcium 9.5 8.5 - 10.1 MG/DL   GLUCOSE, POC    Collection Time: 05/04/21  6:50 AM   Result Value Ref Range    Glucose (POC) 140 (H) 65 - 100 mg/dL    Performed by Lou Gustafson PCT        Provided Telemetry: [] sinus  [x] chronic afib   [] paroxysmal afib  [] NSVT      Assessment:     Active Problems:    Hip fracture ([address] (4/27/2021)        Plan:     Atrial Fibrillation - Flutter  unchanged   Continue current meds      For all other plans, see orders. [x] High complexity decision making was performed  Will add Dig. RAte still up.

## 2021-05-05 LAB
GLUCOSE BLD STRIP.AUTO-MCNC: 111 MG/DL (ref 65–100)
GLUCOSE BLD STRIP.AUTO-MCNC: 130 MG/DL (ref 65–100)
GLUCOSE BLD STRIP.AUTO-MCNC: 91 MG/DL (ref 65–100)
GLUCOSE BLD STRIP.AUTO-MCNC: 99 MG/DL (ref 65–100)
SERVICE CMNT-IMP: ABNORMAL
SERVICE CMNT-IMP: ABNORMAL
SERVICE CMNT-IMP: NORMAL
SERVICE CMNT-IMP: NORMAL

## 2021-05-05 PROCEDURE — 94762 N-INVAS EAR/PLS OXIMTRY CONT: CPT

## 2021-05-05 PROCEDURE — 74011250637 HC RX REV CODE- 250/637: Performed by: PHYSICIAN ASSISTANT

## 2021-05-05 PROCEDURE — 65660000000 HC RM CCU STEPDOWN

## 2021-05-05 PROCEDURE — 74011250637 HC RX REV CODE- 250/637: Performed by: NURSE PRACTITIONER

## 2021-05-05 PROCEDURE — 74011250637 HC RX REV CODE- 250/637: Performed by: INTERNAL MEDICINE

## 2021-05-05 PROCEDURE — 74011250637 HC RX REV CODE- 250/637: Performed by: STUDENT IN AN ORGANIZED HEALTH CARE EDUCATION/TRAINING PROGRAM

## 2021-05-05 PROCEDURE — 97110 THERAPEUTIC EXERCISES: CPT

## 2021-05-05 PROCEDURE — 97530 THERAPEUTIC ACTIVITIES: CPT

## 2021-05-05 PROCEDURE — 97116 GAIT TRAINING THERAPY: CPT

## 2021-05-05 PROCEDURE — 74011636637 HC RX REV CODE- 636/637: Performed by: STUDENT IN AN ORGANIZED HEALTH CARE EDUCATION/TRAINING PROGRAM

## 2021-05-05 PROCEDURE — 82962 GLUCOSE BLOOD TEST: CPT

## 2021-05-05 RX ORDER — LOSARTAN POTASSIUM 50 MG/1
50 TABLET ORAL DAILY
Status: DISCONTINUED | OUTPATIENT
Start: 2021-05-06 | End: 2021-05-07 | Stop reason: HOSPADM

## 2021-05-05 RX ADMIN — Medication 10 ML: at 13:39

## 2021-05-05 RX ADMIN — HYDRALAZINE HYDROCHLORIDE 25 MG: 25 TABLET, FILM COATED ORAL at 09:00

## 2021-05-05 RX ADMIN — METOPROLOL TARTRATE 100 MG: 50 TABLET, FILM COATED ORAL at 09:00

## 2021-05-05 RX ADMIN — Medication 10 ML: at 12:37

## 2021-05-05 RX ADMIN — Medication 5 ML: at 22:55

## 2021-05-05 RX ADMIN — CASTOR OIL AND BALSAM, PERU: 788; 87 OINTMENT TOPICAL at 17:58

## 2021-05-05 RX ADMIN — CALCIUM CARBONATE 500 MG (1,250 MG)-VITAMIN D3 200 UNIT TABLET 1 TABLET: at 17:58

## 2021-05-05 RX ADMIN — POTASSIUM CHLORIDE 20 MEQ: 1500 TABLET, EXTENDED RELEASE ORAL at 09:00

## 2021-05-05 RX ADMIN — RIVAROXABAN 20 MG: 20 TABLET, FILM COATED ORAL at 09:01

## 2021-05-05 RX ADMIN — DIGOXIN 0.25 MG: 125 TABLET ORAL at 09:00

## 2021-05-05 RX ADMIN — CALCIUM CARBONATE (ANTACID) CHEW TAB 500 MG 200 MG: 500 CHEW TAB at 09:00

## 2021-05-05 RX ADMIN — CLONIDINE HYDROCHLORIDE 0.2 MG: 0.1 TABLET ORAL at 09:00

## 2021-05-05 RX ADMIN — METOPROLOL TARTRATE 100 MG: 50 TABLET, FILM COATED ORAL at 17:58

## 2021-05-05 RX ADMIN — INSULIN GLARGINE 37 UNITS: 100 INJECTION, SOLUTION SUBCUTANEOUS at 22:55

## 2021-05-05 RX ADMIN — METHOCARBAMOL TABLETS 750 MG: 750 TABLET, COATED ORAL at 09:00

## 2021-05-05 RX ADMIN — LEVOTHYROXINE SODIUM 100 MCG: 0.1 TABLET ORAL at 06:22

## 2021-05-05 RX ADMIN — CLONIDINE HYDROCHLORIDE 0.2 MG: 0.1 TABLET ORAL at 17:57

## 2021-05-05 RX ADMIN — METHOCARBAMOL TABLETS 750 MG: 750 TABLET, COATED ORAL at 22:55

## 2021-05-05 RX ADMIN — ACETAMINOPHEN 650 MG: 325 TABLET ORAL at 17:58

## 2021-05-05 RX ADMIN — INSULIN LISPRO 12 UNITS: 100 INJECTION, SOLUTION INTRAVENOUS; SUBCUTANEOUS at 12:32

## 2021-05-05 RX ADMIN — Medication 5 ML: at 06:23

## 2021-05-05 RX ADMIN — CASTOR OIL AND BALSAM, PERU: 788; 87 OINTMENT TOPICAL at 09:00

## 2021-05-05 RX ADMIN — INSULIN LISPRO 12 UNITS: 100 INJECTION, SOLUTION INTRAVENOUS; SUBCUTANEOUS at 17:58

## 2021-05-05 RX ADMIN — LOSARTAN POTASSIUM 100 MG: 100 TABLET, FILM COATED ORAL at 09:00

## 2021-05-05 RX ADMIN — CALCIUM CARBONATE 500 MG (1,250 MG)-VITAMIN D3 200 UNIT TABLET 1 TABLET: at 09:00

## 2021-05-05 RX ADMIN — Medication 40 MG: at 09:00

## 2021-05-05 RX ADMIN — ACETAMINOPHEN 650 MG: 325 TABLET ORAL at 12:35

## 2021-05-05 RX ADMIN — ACETAMINOPHEN 650 MG: 325 TABLET ORAL at 22:55

## 2021-05-05 RX ADMIN — ACETAMINOPHEN 650 MG: 325 TABLET ORAL at 06:22

## 2021-05-05 RX ADMIN — CALCIUM CARBONATE 500 MG (1,250 MG)-VITAMIN D3 200 UNIT TABLET 1 TABLET: at 12:31

## 2021-05-05 RX ADMIN — METHOCARBAMOL TABLETS 750 MG: 750 TABLET, COATED ORAL at 12:32

## 2021-05-05 RX ADMIN — INSULIN LISPRO 12 UNITS: 100 INJECTION, SOLUTION INTRAVENOUS; SUBCUTANEOUS at 09:01

## 2021-05-05 RX ADMIN — METHOCARBAMOL TABLETS 750 MG: 750 TABLET, COATED ORAL at 17:58

## 2021-05-05 NOTE — PROGRESS NOTES
Transition of Care Plan:  RUR: 19%  Disposition: SNF- [facility]  Follow up appointments: ortho  DME needed: n/a  Transportation at [facility] or means to access home: n/a      IM Medicare letter: to be provided at time of d/c  Caregiver Contact: shola Ledezma [phone]  Discharge Caregiver contacted prior to discharge? Yes     11:10am   has cancelled 12:00pm transport as d/c is undetermined. Patient has been placed on will call.     9:50am  CM sent perfect serve message to attending inquiring if patient will d/c today.      Yuly Aguilar, [address] [facility], [address]

## 2021-05-05 NOTE — PROGRESS NOTES
End of Shift Note    Bedside shift change report given to Christiane Robbins, RN (oncoming nurse) by Ilya Landon (offgoing nurse). Report included the following information SBAR, Kardex, OR Summary, Procedure Summary, Intake/Output, MAR and Recent Results    Shift worked:  Day     Shift summary and any significant changes:     Patient worked with therapy was orthostatic. Medication was tweaked in order to help blood pressure tomorrow. Patient waiting for this to be resolved so they can go to placement. Patient voiding and having multiple BMs holding stool softeners. Has not asked for any pain medications. Dressing with breakthrough drainage that is yellow in color. Concerns for physician to address:  discharge and BP     Zone phone for oncoming shift:   [phone]       Activity:  Activity Level: Up with Assistance  Number times ambulated in hallways past shift: 0  Number of times OOB to chair past shift: 1    Cardiac:   Cardiac Monitoring: Yes      Cardiac Rhythm: Atrial Fib    Access:   Current line(s): PIV     Genitourinary:   Urinary status: voiding and external catheter    Respiratory:   O2 Device: None (Room air)  Chronic home O2 use?: NO  Incentive spirometer at bedside: YES     GI:  Last Bowel Movement Date: 05/05/21  Current diet:  DIET DIABETIC CONSISTENT CARB Regular  DIET NUTRITIONAL SUPPLEMENTS Breakfast, Dinner; Glucerna Shake  Passing flatus: YES  Tolerating current diet: YES       Pain Management:   Patient states pain is manageable on current regimen: YES    Skin:  Leonard Score: 17  Interventions: float heels, increase time out of bed and internal/external urinary devices    Patient Safety:  Fall Score:  Total Score: 4  Interventions: assistive device (walker, cane, etc), gripper socks, pt to call before getting OOB and stay with me (per policy)  High Fall Risk: Yes    Length of Stay:  Expected LOS: 4d 7h  Actual LOS: [address]

## 2021-05-05 NOTE — PROGRESS NOTES
Attempted to see pt for OT services. Pt was ringing for assist upon arrival and was requesting the bedpan. Pt reported that she did not feel that she would be able to wait long enough to get to [facility] without a accident. Assisted pt to bed pan and pt was able to roll in bed with min assist.  Nursing was aware that pt was on [facility] and pt had call bell in reach. Continue to recommend SNF for rehab at discharge.

## 2021-05-05 NOTE — PROGRESS NOTES
Let Dr. Lexa Santizo know about patient being orthostatic during therapy in case there needs to be medication changes.

## 2021-05-05 NOTE — PROGRESS NOTES
Problem: Mobility Impaired (Adult and Pediatric)  Goal: *Acute Goals and Plan of Care (Insert Text)  Description: FUNCTIONAL STATUS PRIOR TO ADMISSION: independent without devices with mobility and ADLs. HOME SUPPORT PRIOR TO ADMISSION: Lived alone with family nearby to assist as needed. Physical Therapy Goals  Initiated 4/29/2021  1. Patient will move from supine to sit and sit to supine , scoot up and down, and roll side to side in bed with minimal assistance/contact guard assist within 7 day(s). 2.  Patient will transfer from bed to chair and chair to bed with minimal assistance/contact guard assist using the least restrictive device within 7 day(s). 3.  Patient will perform sit to stand with minimal assistance/contact guard assist within 7 day(s). 4.  Patient will ambulate with minimal assistance/contact guard assist for 35 feet with the least restrictive device within 7 day(s). 5.  Patient will ascend/descend 4 stairs with 2 handrail(s) with minimal assistance/contact guard assist within 7 day(s). Outcome: Progressing Towards Goal   PHYSICAL THERAPY TREATMENT  Patient: Tierra Sexton (30 y.o. female)  Date: 5/5/2021  Diagnosis: Hip fracture ([facility] [address] [S72.009A] <principal problem not specified>  Procedure(s) (LRB):  LEFT FEMORAL INTERTROCHANTERIC NAIL INSERTION (Left) 7 Days Post-Op  Precautions: Fall, DNR, WBAT  Chart, physical therapy assessment, plan of care and goals were reviewed. ASSESSMENT  Patient continues with skilled PT services and is progressing towards goals. Pt presents with decreased strength and endurance. Pt still orthostatic limiting ability to sit up after session. Pt performed bed mobility at mod A/min A x2 with cueing for sequencing. Pt performed sit to s stand transfer at max A /mod A x2. Pt requiring education to sitting anteriorly to perform a successful stand and to avoid kem posteriorly. Pt improved with reps. Pt ambulated 10ft with RW at [facility].  Pt requiring cueing for sequencing. Pts step length improved with time. Pt unable to sit up in bedside chair due to BP decreasing. Pts BP has usually stabilized in sitting after time but unable to stabilize today as it kept decreasing. Pts HR staying 105-113 BPM through session     BP with activity. 140/68 Sitting EOB  125/70 Sitting after ambulation  111/53 Sitting after 3 mins  112/53 Sitting after exercises   105/51 Sitting at rest  131/54 Supine       Current Level of Function Impacting Discharge (mobility/balance): bed mobility at mod A/min A x2, sit to stand transfer at max A/mod A x2, ambulation at [facility] with RW     Other factors to consider for discharge: orthostatic BP          PLAN :  Patient continues to benefit from skilled intervention to address the above impairments. Continue treatment per established plan of care. to address goals. Recommendation for discharge: (in order for the patient to meet his/her long term goals)  Therapy up to 5 days/week in SNF setting    This discharge recommendation:  Has been made in collaboration with the attending provider and/or case management    IF patient discharges home will need the following DME: rolling walker       SUBJECTIVE:   Patient stated  I really want to sit up .     OBJECTIVE DATA SUMMARY:   Critical Behavior:  Neurologic State: Alert, Appropriate for age  Orientation Level: Oriented X4  Cognition: Follows commands, Appropriate safety awareness, Appropriate decision making, Appropriate for age attention/concentration  Safety/Judgement: Awareness of environment, Fall prevention  Functional Mobility Training:  Bed Mobility:     Supine to Sit: Minimum assistance; Moderate assistance  Sit to Supine: Moderate assistance  Scooting: Minimum assistance;Contact guard assistance        Transfers:  Sit to Stand: Maximum assistance; Moderate assistance;Assist x2  Stand to Sit: Contact guard assistance        Bed to Chair: Contact guard assistance Balance:  Sitting: Impaired  Sitting - Static: Good (unsupported)  Sitting - Dynamic: Good (unsupported)  Standing: Impaired; With support  Standing - Static: Fair;Constant support  Standing - Dynamic : Fair;Constant support  Ambulation/Gait Training:  Distance (ft): 10 Feet (ft)  Assistive Device: Gait belt;Walker, rolling  Ambulation - Level of Assistance: Contact guard assistance; Additional time        Gait Abnormalities: Decreased step clearance; Antalgic        Base of Support: Narrowed  Stance: Left decreased  Speed/Vita: Slow;Shuffled  Step Length: Right shortened;Left shortened           Therapeutic Exercises:   Seated  LAQ x10  Ankle pumps x10    Shoulder Flexion x15  Trunk flexion and extension x10   Pain Rating:  Pt with no complaints of pain     Activity Tolerance:   Fair, requires rest breaks, and signs and symptoms of orthostatic hypotension    After treatment patient left in no apparent distress:   Supine in bed, Call bell within reach, and Side rails x 3    COMMUNICATION/COLLABORATION:   The patients plan of care was discussed with: Registered nurse.      Jose Baeza PTA   Time Calculation: 39 mins

## 2021-05-05 NOTE — PROGRESS NOTES
Hospitalist Progress Note    NAME: Smitha Cortés   :  1945   MRN:  [id number]     Anticipated discharge: 2021 to SNF    Barrier: waiting for bed, needs BP meds adjusted today due to orthostasis    Assessment / Plan:    Essential hypertension POA  Orthostatic hypotension today  -Continue clonidine, metoprolol  - hold Ca channel blocker  - hold hydralazine  - reduce losartan by 50%  - if BP improved, plan d/c SNF in AM  - Spoke with son at bedside    Permanent atrial fibrillation with RVR  -On toprol 100mg/day. Changed to lopressor 100mg BID for better rate control. .   -TSH wnl  -Continue xarelto. - Dr Leon Rose following, Discharge once better rate controlled, likely in AM  - Digoxin added  - HR improved     Hypothyroidism  -Continue levothyroxine  - TSH 0.45    Left hip fracture POA s/p nailing left hip   -Patient presented with mechanical fall and noticed to have left hip fracture  -s/p  Cephalomedullary nailing left hip   -Pain control  -Continue PT OT, rec SNF  -Continue Xarelto   -COVID PCR ordered for placement, negative  - Hopefully discharge in AM to SNF    Insulin-dependent type 2 diabetes  -Hold Metformin and continue sliding scale insulin  -Continue lantus 37U and lispro 12 U TID w/ meal  -BS 85, 242, 166, 130, 111     GERD  -Continue PPI      Code Status: DNR  Surrogate Decision Maker: Dawna Lesches as surrogate      DVT Prophylaxis: SCDs for now, Xarelto after surgery  GI Prophylaxis: not indicated  Disposition: SNF    Baseline: Independent/ambulatory     Subjective:     Chief Complaint / Reason for Physician Visit f/u left hip fracture  Discussed with RN events overnight.    \"I felt lightheaded\"  Remains in atrial fib with RVR, HRs in 120s when I saw    Review of Systems:  Symptom Y/N Comments  Symptom Y/N Comments   Fever/Chills n   Chest Pain n    Poor Appetite    Edema     Cough n   Abdominal Pain n    Sputum    Joint Pain     SOB/GARCIA n   Pruritis/Rash     Nausea/vomit n Tolerating PT/OT     Diarrhea n   Tolerating Diet y    Constipation    Other       Could NOT obtain due to:      Objective:     VITALS:   Last 24hrs VS reviewed since prior progress note. Most recent are:  Patient Vitals for the past 24 hrs:   Temp Pulse Resp BP SpO2   05/05/21 1550 98.4 °F (36.9 °C) 93 18 (!) 149/88 99 %   05/05/21 1126 98.2 °F (36.8 °C) 84 18 108/60 96 %   05/05/21 0743 97.8 °F (36.6 °C) (!) 103 18 (!) 143/91 95 %   05/05/21 0307 98 °F (36.7 °C) 95 18 (!) 167/86 100 %   05/05/21 0003 98.2 °F (36.8 °C) 85 18 131/69 95 %   05/04/21 2006 98.2 °F (36.8 °C) 79 18 113/68 96 %   05/04/21 1608 98.5 °F (36.9 °C) (!) 102 18 136/63 100 %       Intake/Output Summary (Last 24 hours) at 5/5/2021 1606  Last data filed at 5/5/2021 0620  Gross per 24 hour   Intake    Output 600 ml   Net -600 ml        I had a face to face encounter and independently examined this patient on 5/5/2021, as outlined below:  PHYSICAL EXAM:  General: WD, WN. Alert, cooperative, no acute distress    EENT:  Anicteric sclerae. MMM  Resp:  CTA bilaterally, no wheezing or rales. No accessory muscle use  CV:  Tachycardiac, irregular  rhythm,  No edema  GI:  Soft, Non distended, Non tender. +Bowel sounds  Neurologic:  Alert and oriented X 3, normal speech,   Psych:   Good insight. Not anxious nor agitated  Skin:  No rashes. No jaundice    Reviewed most current lab test results and cultures  YES  Reviewed most current radiology test results   YES  Review and summation of old records today    NO  Reviewed patient's current orders and MAR    YES  PMH/SH reviewed - no change compared to H&P  ________________________________________________________________________  Care Plan discussed with:    Comments   Patient x    Family      RN x    Care Manager x    Consultant                        Multidiciplinary team rounds were held today with , nursing, pharmacist and clinical coordinator.   Patient's plan of care was discussed; medications were reviewed and discharge planning was addressed. ________________________________________________________________________  Total NON critical care TIME:  35   Minutes    Total CRITICAL CARE TIME Spent:   Minutes non procedure based      Comments   >50% of visit spent in counseling and coordination of care x    ________________________________________________________________________  Anabella Bowman MD     Procedures: see electronic medical records for all procedures/Xrays and details which were not copied into this note but were reviewed prior to creation of Plan. LABS:  I reviewed today's most current labs and imaging studies.   Pertinent labs include:  Recent Labs     05/04/21 0353   WBC 11.4*   HGB 8.8*   HCT 29.1*        Recent Labs     05/04/21 0353      K 3.7      CO2 31   *   BUN 20   CREA 1.04*   CA 9.5       Signed: Anabella Bowman MD

## 2021-05-06 LAB
GLUCOSE BLD STRIP.AUTO-MCNC: 105 MG/DL (ref 65–100)
GLUCOSE BLD STRIP.AUTO-MCNC: 156 MG/DL (ref 65–100)
GLUCOSE BLD STRIP.AUTO-MCNC: 178 MG/DL (ref 65–100)
GLUCOSE BLD STRIP.AUTO-MCNC: 92 MG/DL (ref 65–100)
SERVICE CMNT-IMP: ABNORMAL
SERVICE CMNT-IMP: NORMAL

## 2021-05-06 PROCEDURE — 77010033678 HC OXYGEN DAILY

## 2021-05-06 PROCEDURE — 74011250637 HC RX REV CODE- 250/637: Performed by: NURSE PRACTITIONER

## 2021-05-06 PROCEDURE — 97116 GAIT TRAINING THERAPY: CPT

## 2021-05-06 PROCEDURE — 97110 THERAPEUTIC EXERCISES: CPT

## 2021-05-06 PROCEDURE — 74011250637 HC RX REV CODE- 250/637: Performed by: PHYSICIAN ASSISTANT

## 2021-05-06 PROCEDURE — 74011250637 HC RX REV CODE- 250/637: Performed by: INTERNAL MEDICINE

## 2021-05-06 PROCEDURE — 74011636637 HC RX REV CODE- 636/637: Performed by: STUDENT IN AN ORGANIZED HEALTH CARE EDUCATION/TRAINING PROGRAM

## 2021-05-06 PROCEDURE — 82962 GLUCOSE BLOOD TEST: CPT

## 2021-05-06 PROCEDURE — 94760 N-INVAS EAR/PLS OXIMETRY 1: CPT

## 2021-05-06 PROCEDURE — 74011000250 HC RX REV CODE- 250: Performed by: STUDENT IN AN ORGANIZED HEALTH CARE EDUCATION/TRAINING PROGRAM

## 2021-05-06 PROCEDURE — 74011636637 HC RX REV CODE- 636/637: Performed by: PHYSICIAN ASSISTANT

## 2021-05-06 PROCEDURE — 65660000000 HC RM CCU STEPDOWN

## 2021-05-06 PROCEDURE — 74011250637 HC RX REV CODE- 250/637: Performed by: STUDENT IN AN ORGANIZED HEALTH CARE EDUCATION/TRAINING PROGRAM

## 2021-05-06 RX ADMIN — METOPROLOL TARTRATE 100 MG: 50 TABLET, FILM COATED ORAL at 17:38

## 2021-05-06 RX ADMIN — Medication 5 ML: at 21:23

## 2021-05-06 RX ADMIN — CALCIUM CARBONATE (ANTACID) CHEW TAB 500 MG 200 MG: 500 CHEW TAB at 12:33

## 2021-05-06 RX ADMIN — METHOCARBAMOL TABLETS 750 MG: 750 TABLET, COATED ORAL at 17:38

## 2021-05-06 RX ADMIN — INSULIN LISPRO 12 UNITS: 100 INJECTION, SOLUTION INTRAVENOUS; SUBCUTANEOUS at 08:53

## 2021-05-06 RX ADMIN — CALCIUM CARBONATE (ANTACID) CHEW TAB 500 MG 200 MG: 500 CHEW TAB at 17:38

## 2021-05-06 RX ADMIN — INSULIN GLARGINE 37 UNITS: 100 INJECTION, SOLUTION SUBCUTANEOUS at 21:23

## 2021-05-06 RX ADMIN — METHOCARBAMOL TABLETS 750 MG: 750 TABLET, COATED ORAL at 08:50

## 2021-05-06 RX ADMIN — INSULIN LISPRO 2 UNITS: 100 INJECTION, SOLUTION INTRAVENOUS; SUBCUTANEOUS at 12:33

## 2021-05-06 RX ADMIN — Medication 40 MG: at 09:02

## 2021-05-06 RX ADMIN — METOPROLOL TARTRATE 5 MG: 1 INJECTION, SOLUTION INTRAVENOUS at 08:54

## 2021-05-06 RX ADMIN — OXYCODONE 10 MG: 5 TABLET ORAL at 08:51

## 2021-05-06 RX ADMIN — LOSARTAN POTASSIUM 50 MG: 50 TABLET, FILM COATED ORAL at 08:51

## 2021-05-06 RX ADMIN — CALCIUM CARBONATE 500 MG (1,250 MG)-VITAMIN D3 200 UNIT TABLET 1 TABLET: at 12:33

## 2021-05-06 RX ADMIN — DIGOXIN 0.25 MG: 125 TABLET ORAL at 08:50

## 2021-05-06 RX ADMIN — ACETAMINOPHEN 650 MG: 325 TABLET ORAL at 21:22

## 2021-05-06 RX ADMIN — ACETAMINOPHEN 650 MG: 325 TABLET ORAL at 05:39

## 2021-05-06 RX ADMIN — Medication 10 ML: at 14:16

## 2021-05-06 RX ADMIN — CLONIDINE HYDROCHLORIDE 0.2 MG: 0.1 TABLET ORAL at 08:49

## 2021-05-06 RX ADMIN — OXYCODONE 10 MG: 5 TABLET ORAL at 14:15

## 2021-05-06 RX ADMIN — CALCIUM CARBONATE 500 MG (1,250 MG)-VITAMIN D3 200 UNIT TABLET 1 TABLET: at 17:38

## 2021-05-06 RX ADMIN — METHOCARBAMOL TABLETS 750 MG: 750 TABLET, COATED ORAL at 21:22

## 2021-05-06 RX ADMIN — ACETAMINOPHEN 650 MG: 325 TABLET ORAL at 17:38

## 2021-05-06 RX ADMIN — CASTOR OIL AND BALSAM, PERU: 788; 87 OINTMENT TOPICAL at 08:52

## 2021-05-06 RX ADMIN — METOPROLOL TARTRATE 100 MG: 50 TABLET, FILM COATED ORAL at 08:51

## 2021-05-06 RX ADMIN — CALCIUM CARBONATE 500 MG (1,250 MG)-VITAMIN D3 200 UNIT TABLET 1 TABLET: at 08:50

## 2021-05-06 RX ADMIN — LEVOTHYROXINE SODIUM 100 MCG: 0.1 TABLET ORAL at 05:39

## 2021-05-06 RX ADMIN — INSULIN LISPRO 12 UNITS: 100 INJECTION, SOLUTION INTRAVENOUS; SUBCUTANEOUS at 12:33

## 2021-05-06 RX ADMIN — RIVAROXABAN 20 MG: 20 TABLET, FILM COATED ORAL at 08:51

## 2021-05-06 RX ADMIN — POTASSIUM CHLORIDE 20 MEQ: 1500 TABLET, EXTENDED RELEASE ORAL at 08:51

## 2021-05-06 RX ADMIN — Medication 5 ML: at 05:39

## 2021-05-06 RX ADMIN — CLONIDINE HYDROCHLORIDE 0.2 MG: 0.1 TABLET ORAL at 17:38

## 2021-05-06 RX ADMIN — ACETAMINOPHEN 650 MG: 325 TABLET ORAL at 12:32

## 2021-05-06 RX ADMIN — METHOCARBAMOL TABLETS 750 MG: 750 TABLET, COATED ORAL at 12:33

## 2021-05-06 RX ADMIN — CASTOR OIL AND BALSAM, PERU: 788; 87 OINTMENT TOPICAL at 17:39

## 2021-05-06 RX ADMIN — INSULIN LISPRO 12 UNITS: 100 INJECTION, SOLUTION INTRAVENOUS; SUBCUTANEOUS at 17:38

## 2021-05-06 NOTE — PROGRESS NOTES
Problem: Mobility Impaired (Adult and Pediatric)  Goal: *Acute Goals and Plan of Care (Insert Text)  Description: FUNCTIONAL STATUS PRIOR TO ADMISSION: independent without devices with mobility and ADLs. HOME SUPPORT PRIOR TO ADMISSION: Lived alone with family nearby to assist as needed. Physical Therapy Goals  Revised 5/6/2021  1. Patient will move from supine to sit and sit to supine , scoot up and down, and roll side to side in bed with supervision/set-up within 7 day(s). 2.  Patient will transfer from bed to chair and chair to bed with standby assistance using the least restrictive device within 7 day(s). 3.  Patient will perform sit to stand with supervision/set-up within 7 day(s). 4.  Patient will ambulate with standby assistance 125 feet with the least restrictive device within 7 day(s). Physical Therapy Goals  Initiated 4/29/2021  1. Patient will move from supine to sit and sit to supine , scoot up and down, and roll side to side in bed with minimal assistance/contact guard assist within 7 day(s). Not met 5/6/21.  2.  Patient will transfer from bed to chair and chair to bed with minimal assistance/contact guard assist using the least restrictive device within 7 day(s). Met 5/6/21.  3.  Patient will perform sit to stand with minimal assistance/contact guard assist within 7 day(s). Not met 5/6/21.  4.  Patient will ambulate with minimal assistance/contact guard assist for 35 feet with the least restrictive device within 7 day(s). Met 5/6/21.  5.  Patient will ascend/descend 4 stairs with 2 handrail(s) with minimal assistance/contact guard assist within 7 day(s).  Not met 5/6/21.    5/6/2021 1730 by Anupama Sebastian PT  Outcome: slowly Progressing Towards Goal   PHYSICAL THERAPY TREATMENT: WEEKLY REASSESSMENT  Patient: Joon Fernandez (9 y.o. female)  Date: 5/6/2021  Primary Diagnosis: [facility] [S72.009A]  Procedure(s) (LRB):  LEFT FEMORAL INTERTROCHANTERIC NAIL INSERTION (Left) 8 Days Post-Op   Precautions:  Fall, DNR, WBAT      ASSESSMENT  Patient continues with skilled PT services and is slowly progressing towards goals. Continues to be limited by orthostatic hypotension, generalized weakness, decreased balance in standing, pain with moving or WB onto LLE, decreased activity tolerance and decreased mobility skills far below her active and independent PLOF without devices. Gait tolerance increased to 65 feet in her room using RW and cg/min a x 1. Patient's progression toward goals since last assessment: updated goals for the next week based on lack of progress this week. Physical Therapy Goals  Initiated 4/29/2021  1. Patient will move from supine to sit and sit to supine , scoot up and down, and roll side to side in bed with minimal assistance/contact guard assist within 7 day(s). Not met 5/6/21.  2.  Patient will transfer from bed to chair and chair to bed with minimal assistance/contact guard assist using the least restrictive device within 7 day(s). Met 5/6/21.  3.  Patient will perform sit to stand with minimal assistance/contact guard assist within 7 day(s). Not met 5/6/21.  4.  Patient will ambulate with minimal assistance/contact guard assist for 35 feet with the least restrictive device within 7 day(s). Met 5/6/21.  5.  Patient will ascend/descend 4 stairs with 2 handrail(s) with minimal assistance/contact guard assist within 7 day(s). Not met 5/6/21. Current Level of Function Impacting Discharge (mobility/balance): min a supine to sit; min/mod a x 2 sit to standing; min/cg assist ambulation with RW and bed to chair transfers. Functional Outcome Measure: The patient scored  on the 55/100 outcome measure which is indicative of 45% functional impairment. Other factors to consider for discharge: asthma, independent PLOF; good home support, but lives alone         PLAN :  Goals have been updated based on progression since last assessment.   Patient continues to benefit from skilled intervention to address the above impairments. Recommendations and Planned Interventions: bed mobility training, transfer training, gait training, therapeutic exercises, modalities, edema management/control, patient and family training/education, and therapeutic activities      Frequency/Duration: Patient will be followed by physical therapy:  6 times a week to address goals. Recommendation for discharge: (in order for the patient to meet his/her long term goals)  Therapy up to 5 days/week in SNF setting    This discharge recommendation:  Has been made in collaboration with the attending provider and/or case management    IF patient discharges home will need the following DME: bedside commode and rolling walker         SUBJECTIVE:   Patient stated I feel a bit better this afternoon.     OBJECTIVE DATA SUMMARY:   HISTORY:    Past Medical History:   Diagnosis Date    A-fib ([facility] [address]     Asthma     Diabetes ([facility] [address]     GERD (gastroesophageal reflux disease)     Hypertension     Other ill-defined conditions(799.89)     hypothyroid     Past Surgical History:   Procedure Laterality Date    HX GYN      hysterectomy    HX ORTHOPAEDIC       LEFT KNEE ARTHROSCOPY    HX OTHER SURGICAL      lipoma removal       Personal factors and/or comorbidities impacting plan of care: obesity, dm, asthma    Home Situation  Home Environment: Private residence  # Steps to Enter: 0  One/Two Story Residence: Two story, live on 1st floor  Living Alone: Yes  Support Systems: Child(jalen), Friends \ neighbors(friend stefany rose)  Patient Expects to be Discharged to[de-identified] Rehabilitation facility  Current DME Used/Available at Home: None    EXAMINATION/PRESENTATION/DECISION MAKING:   Critical Behavior:  Neurologic State: Alert  Orientation Level: Oriented X4  Cognition: Appropriate decision making, Appropriate for age attention/concentration, Appropriate safety awareness, Follows commands  Safety/Judgement: Awareness of environment, Fall prevention, Good awareness of safety precautions  Hearing: Auditory  Auditory Impairment: None  Hearing Aids/Status: Does not own  Skin:  L hip dressing CDI  Edema: moderate L thigh/knee  Range Of Motion:  AROM: Generally decreased, functional           PROM: Generally decreased, functional           Strength:    Strength: Generally decreased, functional                    Tone & Sensation:   Tone: Normal              Sensation: Intact               Coordination:  Coordination: Within functional limits  Vision:   Acuity: Within Defined Limits  Corrective Lenses: Glasses  Functional Mobility:  Bed Mobility:  Rolling: Minimum assistance  Supine to Sit: Minimum assistance     Scooting: Stand-by assistance  Transfers:  Sit to Stand: Moderate assistance;Minimum assistance;Assist x2  Stand to Sit: Minimum assistance        Bed to Chair: Contact guard assistance; Adaptive equipment(RW)              Balance:   Sitting: Intact  Sitting - Static: Good (unsupported)  Sitting - Dynamic: Good (unsupported)  Standing: Impaired  Standing - Static: Good;Constant support  Standing - Dynamic : Fair;Constant support  Ambulation/Gait Training:  Distance (ft): 65 Feet (ft)  Assistive Device: Gait belt;Walker, rolling  Ambulation - Level of Assistance: Contact guard assistance;Minimal assistance        Gait Abnormalities: Decreased step clearance; Antalgic; Step to gait  Right Side Weight Bearing: Full  Left Side Weight Bearing: As tolerated  Base of Support: Widened  Stance: Left increased  Speed/Vita: Pace decreased (<100 feet/min)  Step Length: Right shortened;Left shortened                    Therapeutic Exercises: Ankle pumps, quad sets, glut sets, aarom: heel slides, hip abd    Functional Measure:  Barthel Index:    Bathin  Bladder: 10  Bowels: 10  Groomin  Dressin  Feeding: 10  Mobility: 5  Stairs: 0  Toilet Use: 5  Transfer (Bed to Chair and Back): 5  Total: 55/100       The Barthel ADL Index: Guidelines  1.  The index should be used as a record of what a patient does, not as a record of what a patient could do. 2. The main aim is to establish degree of independence from any help, physical or verbal, however minor and for whatever reason. 3. The need for supervision renders the patient not independent. 4. A patient's performance should be established using the best available evidence. Asking the patient, friends/relatives and nurses are the usual sources, but direct observation and common sense are also important. However direct testing is not needed. 5. Usually the patient's performance over the preceding 24-48 hours is important, but occasionally longer periods will be relevant. 6. Middle categories imply that the patient supplies over 50 per cent of the effort. 7. Use of aids to be independent is allowed. Destinee Guzman., Barthel, D.W. (5728). Functional evaluation: the Barthel Index. [address] [place] [employer] (14)2. Diane Foster jaky RENE Luz, Gian Strong., Yvette Patella., [place], [address] [place] [address] (). Measuring the change indisability after inpatient rehabilitation; comparison of the responsiveness of the Barthel Index and Functional [place] Measure. Journal of Neurology, Neurosurgery, and Psychiatry, 66(4), 109-072. Leidy Copeland, N.J.A, BRADLEY Howard, & Jayde Ibanez, MBoomA. (2004.) Assessment of post-stroke quality of life in cost-effectiveness studies: The usefulness of the Barthel Index and the EuroQoL-5D. Quality of Life Research, 13, 427-28       Pain Ratin/10 L thigh/knee    Activity Tolerance:   Fair, SpO2 stable on RA, and requires rest breaks    After treatment patient left in no apparent distress:   Sitting on side of bed, Call bell within reach, and Caregiver / family present    COMMUNICATION/EDUCATION:   The patients plan of care was discussed with: Registered nurse.      Fall prevention education was provided and the patient/caregiver indicated understanding., Patient/family have participated as able in goal setting and plan of care. , and Patient/family agree to work toward stated goals and plan of care.     Thank you for this referral.  Renetta Fu, PT   Time Calculation: 25 mins

## 2021-05-06 NOTE — PROGRESS NOTES
End of Shift Note    Bedside shift change report given to Kate Kelly (oncoming nurse) by Tanna Carreon RN (offgoing nurse). Report included the following information SBAR and Kardex    Shift worked:  day shift     Shift summary and any significant changes:     low BP and high BP     Concerns for physician to address:  above     Zone phone for oncoming shift:   [phone]       Activity:  Activity Level: Up with Assistance  Number times ambulated in hallways past shift: 0  Number of times OOB to chair past shift: 2    Cardiac:   Cardiac Monitoring: Yes      Cardiac Rhythm: Atrial Fib    Access:   Current line(s): PIV     Genitourinary:   Urinary status: voiding and external catheter    Respiratory:   O2 Device: None (Room air)  Chronic home O2 use?: NO  Incentive spirometer at bedside: NO     GI:  Last Bowel Movement Date: 05/06/21  Current diet:  DIET DIABETIC CONSISTENT CARB Regular  DIET NUTRITIONAL SUPPLEMENTS Breakfast, Dinner; Glucerna Shake  Passing flatus: YES  Tolerating current diet: YES       Pain Management:   Patient states pain is manageable on current regimen: YES    Skin:  Leonard Score: 17  Interventions: turn team, float heels, increase time out of bed, PT/OT consult, limit briefs and internal/external urinary devices    Patient Safety:  Fall Score:  Total Score: 4  Interventions: assistive device (walker, cane, etc), gripper socks, pt to call before getting OOB and gait belt  High Fall Risk: Yes    Length of Stay:  Expected LOS: 4d 7h  Actual LOS: Paige Bansal RN

## 2021-05-06 NOTE — PROGRESS NOTES
Occupational Therapy  Attempted to see patient for OT treatment, but RN indicates she just went back to bed due to low BP. Will defer for now but continue to follow.

## 2021-05-06 NOTE — PROGRESS NOTES
Cardiology Progress Note  5/6/2021 7:34 AM  Admit Date: 4/27/2021  Admit Diagnosis/CC: Hip fracture ([address] [S72.009A]  Subjective:   Patient reports:  Chest Pain:  [x] none,  consistent with [] non-cardiac  [] atypical  []  anginal chest pain             [x] none now    []  on-going  Dyspnea: [x] none  [] at rest  [] with exertion  [] improved  [] unchanged [] worsening  PND:       [x] none  [] overnight   [] current  Orthopnea: [x] none  [] improved  [] unchanged  [] worsening  Presyncope: [x] none  [] improved  [] unchanged  [] worsening  Ambulated in hallway without symptoms  [] Yes  Ambulated in room without symptoms  [x] Yes  ROS(2+other systems)   Hematuria: [] Yes  [x] No.   Dysuria: [] Yes  [x] No                                           Cough:       [] Yes  [x] No.   Sputum: [] Yes  [x] No                                            Hematochezia: [] Yes  [x] No.   Melena: [] Yes  [x] No                                            No change in family and social history from H&P/Consult note.     Current Facility-Administered Medications   Medication Dose Route Frequency    losartan (COZAAR) tablet 50 mg  50 mg Oral DAILY    digoxin (LANOXIN) tablet 0.25 mg  0.25 mg Oral DAILY    cloNIDine HCL (CATAPRES) tablet 0.2 mg  0.2 mg Oral BID    balsam peru-castor oiL (VENELEX) ointment   Topical BID    insulin lispro (HUMALOG) injection 12 Units  12 Units SubCUTAneous TIDAC    insulin glargine (LANTUS) injection 37 Units  37 Units SubCUTAneous QHS    metoprolol tartrate (LOPRESSOR) tablet 100 mg  100 mg Oral BID    metoprolol (LOPRESSOR) injection 5 mg  5 mg IntraVENous Q6H PRN    morphine injection 1 mg  1 mg IntraVENous Q4H PRN    oxyCODONE IR (ROXICODONE) tablet 10 mg  10 mg Oral Q4H PRN    methocarbamoL (ROBAXIN) tablet 750 mg  750 mg Oral QID    ondansetron (ZOFRAN) injection 4 mg  4 mg IntraVENous Q6H PRN    esomeprazole (NEXIUM) capsule 40 mg (Patient Supplied)  40 mg Oral DAILY    rivaroxaban (XARELTO) tablet 20 mg  20 mg Oral DAILY    calcium carbonate (TUMS) chewable tablet 200 mg [elemental]  200 mg Oral TID WITH MEALS    traMADoL (ULTRAM) tablet 50 mg  50 mg Oral Q6H PRN    hydrOXYzine HCL (ATARAX) tablet 10 mg  10 mg Oral Q8H PRN    naloxone (NARCAN) injection 0.4 mg  0.4 mg IntraVENous PRN    ondansetron (ZOFRAN ODT) tablet 4 mg  4 mg Oral Q4H PRN    calcium-vitamin D (OS-JOSEPH +D3) 500 mg-200 unit per tablet 1 Tab  1 Tab Oral TID WITH MEALS    polyethylene glycol (MIRALAX) packet 17 g  17 g Oral DAILY    bisacodyL (DULCOLAX) suppository 10 mg  10 mg Rectal DAILY PRN    sodium chloride (NS) flush 5-40 mL  5-40 mL IntraVENous Q8H    sodium chloride (NS) flush 5-40 mL  5-40 mL IntraVENous PRN    acetaminophen (TYLENOL) tablet 650 mg  650 mg Oral Q6H    oxyCODONE IR (ROXICODONE) tablet 5 mg  5 mg Oral Q4H PRN    senna-docusate (PERICOLACE) 8.6-50 mg per tablet 2 Tab  2 Tab Oral BID    albuterol (PROVENTIL VENTOLIN) nebulizer solution 2.5 mg  2.5 mg Nebulization Q4H PRN    [Held by provider] hydrALAZINE (APRESOLINE) tablet 25 mg  25 mg Oral BID    levothyroxine (SYNTHROID) tablet 100 mcg  100 mcg Oral 6am    [Held by provider] metFORMIN (GLUCOPHAGE) tablet 1,000 mg  1,000 mg Oral BID WITH MEALS    potassium chloride (K-DUR, KLOR-CON) SR tablet 20 mEq  20 mEq Oral DAILY    sodium chloride (NS) flush 5-40 mL  5-40 mL IntraVENous Q8H    sodium chloride (NS) flush 5-40 mL  5-40 mL IntraVENous PRN    acetaminophen (TYLENOL) tablet 650 mg  650 mg Oral Q6H PRN    Or    acetaminophen (TYLENOL) suppository 650 mg  650 mg Rectal Q6H PRN    polyethylene glycol (MIRALAX) packet 17 g  17 g Oral DAILY PRN    promethazine (PHENERGAN) tablet 12.5 mg  12.5 mg Oral Q6H PRN    insulin lispro (HUMALOG) injection   SubCUTAneous AC&HS    glucose chewable tablet 16 g  4 Tab Oral PRN    dextrose (D50W) injection syrg 12.5-25 g  12.5-25 g IntraVENous PRN    glucagon (GLUCAGEN) injection 1 mg  1 mg IntraMUSCular PRN       Objective:    Physical Exam:  Last VS:   Visit Vitals  BP (!) 110/58 (BP 1 Location: Left upper arm, BP Patient Position: At rest)   Pulse 73   Temp 97.7 °F (36.5 °C)   Resp 16   Ht 5' 10\" (1.778 m)   Wt 105.3 kg (232 lb 3.2 oz)   SpO2 96%   Breastfeeding No   BMI 33.32 kg/m²    Temp (24hrs), Av °F (36.7 °C), Min:97.7 °F (36.5 °C), Max:98.4 °F (36.9 °C)    24 hr VS reviewed. General Appearance:   [x] well developed, well nourished,  [x] NAD. [] agitated   [] lethargic but arousable  [] obtunded   ENT, Palate:    [x] WNL   [] dry palate/MM        Respiratory:    [x] CTA bilateral  [] rales  [] rhonchi  [] normal resp effort      [] similar to yesterday   [] worse    [] improved   Cardiovascular:   [x] RRR   [] Irregular rate and rhythm   [x] Normal S1, S2   [x] No gallop or rub. [] no murmur   [x] no new murmur  [] murmur c/w:   [x] no edema  RLE: []1+ []2+ [] 3+;  LLE: []1+ []2+ []3+      [] edema similar to yesterday   [] worse    [] improved   [x] normal JVP   [] Elevated JVP    [x] JVP similar to yesterday   [] worse    [] improved   [x] carotid upstroke unchanged   [x] abd aorta not palpated   [x] no stigmata of peripheral emboli   GI:    [x] abd soft, non-distented,bowel sounds present, no                     organomegaly appreciated   Skin:  Neuro:    Cath Site:  [x] warm and dry [] cold extremities   [x] A/O x 3, grossly non-focal      [] intact w/o hematoma or bruit; distal pulse unchanged.               Data Review:   Labs:    Recent Results (from the past 24 hour(s))   GLUCOSE, POC    Collection Time: 21 11:26 AM   Result Value Ref Range    Glucose (POC) 111 (H) 65 - 100 mg/dL    Performed by Josey Darby, POC    Collection Time: 21  5:09 PM   Result Value Ref Range    Glucose (POC) 99 65 - 100 mg/dL    Performed by Giancarlo LUGO, POC    Collection Time: 21  9:35 PM   Result Value Ref Range    Glucose (POC) 91 65 - 100 mg/dL Performed by Elbert Serna [facility]        Provided Telemetry: [] sinus  [x] chronic afib   [] paroxysmal afib  [] NSVT      Assessment:     Active Problems:    Hip fracture ([address] (4/27/2021)        Plan:     Atrial Fibrillation - Flutter  improved   Continue current meds      For all other plans, see orders. [x] High complexity decision making was performed  HR is better controlled. She had BP drop yesterday. Doing well this am. D/W Dr. Paris Matthews agree with plans.

## 2021-05-06 NOTE — PROGRESS NOTES
Dr Rebeka Palencia made aware of high blood pressures this morning. Orthostatic bp obtained. Patient sat up in chair this morning. Recheck of BP showed 80/50s while in the chair, repositioned to the bed, blood pressure is 144/82.  No new orders at this time

## 2021-05-06 NOTE — PROGRESS NOTES
Hospitalist Progress Note    NAME: Bela Huertas   :  1945   MRN:  [id number]     Anticipated discharge: 2021 to SNF    Barrier: waiting for bed, needs BP meds adjusted today due to orthostasis    Assessment / Plan:    Essential hypertension POA  Orthostatic hypotension  and today  -Continue clonidine, metoprolol  - hold Ca channel blocker  - hold hydralazine  - SBP still dropped sitting up to 85, will hold hydralazine  - if BP improved, plan d/c SNF in AM  - Spoke with son at bedside    Permanent atrial fibrillation with RVR  -On toprol 100mg/day. Changed to lopressor 100mg BID for better rate control  -TSH wnl  -Continue xarelto. - Dr Donna Posada following, HR better  - Digoxin added     Hypothyroidism  -Continue levothyroxine  - TSH 0.45    Left hip fracture POA s/p nailing left hip   -Patient presented with mechanical fall and noticed to have left hip fracture  -s/p  Cephalomedullary nailing left hip   -Pain control  -Continue PT OT, rec SNF  -Continue Xarelto   -COVID PCR ordered for placement, negative  - D/C SNF in AM    Insulin-dependent type 2 diabetes  -Hold Metformin and continue sliding scale insulin  -Continue lantus 37U and lispro 12 U TID w/ meal  -. 99, 91, 105, 178     GERD  -Continue PPI    Code Status: DNR  Surrogate Decision Maker: Yamilet Jones as surrogate      DVT Prophylaxis: SCDs for now, Xarelto after surgery  GI Prophylaxis: not indicated  Disposition: SNF    Baseline: Independent/ambulatory     Subjective:     Chief Complaint / Reason for Physician Visit f/u left hip fracture  Discussed with RN events overnight.    \"No Complaints\"  HR control improved  SBP dropped sitting up to 80s, further reduced BP meds  Spoke with son in room    Review of Systems:  Symptom Y/N Comments  Symptom Y/N Comments   Fever/Chills n   Chest Pain n    Poor Appetite    Edema     Cough n   Abdominal Pain n    Sputum    Joint Pain     SOB/GARCIA n   Pruritis/Rash     Nausea/vomit n Tolerating PT/OT     Diarrhea n   Tolerating Diet y    Constipation    Other       Could NOT obtain due to:      Objective:     VITALS:   Last 24hrs VS reviewed since prior progress note. Most recent are:  Patient Vitals for the past 24 hrs:   Temp Pulse Resp BP SpO2   05/06/21 1138 97.4 °F (36.3 °C) 98 16 (!) 82/56 98 %   05/06/21 0945    (!) 152/67    05/06/21 0818 98 °F (36.7 °C) (!) 112 16 (!) 172/79 100 %   05/05/21 2004 97.7 °F (36.5 °C) 73 16 (!) 110/58 96 %       Intake/Output Summary (Last 24 hours) at 5/6/2021 1552  Last data filed at 5/6/2021 0538  Gross per 24 hour   Intake    Output 900 ml   Net -900 ml        I had a face to face encounter and independently examined this patient on 5/6/2021, as outlined below:  PHYSICAL EXAM:  General: WD, WN. Alert, cooperative, no acute distress    EENT:  Anicteric sclerae. MMM  Resp:  CTA bilaterally, no wheezing or rales. No accessory muscle use  CV:  Irregular  rhythm,  No edema  GI:  Soft, Non distended, Non tender. +Bowel sounds  Neurologic:  Alert and oriented X 3, normal speech,   Psych:   Good insight. Not anxious nor agitated  Skin:  No rashes. No jaundice    Reviewed most current lab test results and cultures  YES  Reviewed most current radiology test results   YES  Review and summation of old records today    NO  Reviewed patient's current orders and MAR    YES  PMH/SH reviewed - no change compared to H&P  ________________________________________________________________________  Care Plan discussed with:    Comments   Patient x    Family      RN x    Care Manager x    Consultant  x Dr Estevez Standing last night                     Multidiciplinary team rounds were held today with , nursing, pharmacist and clinical coordinator. Patient's plan of care was discussed; medications were reviewed and discharge planning was addressed.      ________________________________________________________________________      Comments   >50% of visit spent in counseling and coordination of care x    ________________________________________________________________________  Angie Palencia MD     Procedures: see electronic medical records for all procedures/Xrays and details which were not copied into this note but were reviewed prior to creation of Plan. LABS:  I reviewed today's most current labs and imaging studies.   Pertinent labs include:  Recent Labs     05/04/21 0353   WBC 11.4*   HGB 8.8*   HCT 29.1*        Recent Labs     05/04/21 0353      K 3.7      CO2 31   *   BUN 20   CREA 1.04*   CA 9.5       Signed: Angie Palencia MD

## 2021-05-06 NOTE — PROGRESS NOTES
Transition of Care Plan:  RUR: 19%  Disposition: SNF- [facility]  Follow up appointments: ortho  DME needed: n/a  Transportation at [address] or means to access home: n/a      IM Medicare letter: to be provided at time of d/c  Caregiver Contact: shola Sumner [phone]  Discharge Caregiver contacted prior to discharge? Yes     3:30pm  CM received return phone call from [facility] who confirmed they can accept patient tomorrow if medically stable. Per admissions coordinator, patient will not need another COVID test if d/c tomorrow. 1:30pm  Per MD patient to possibly d/c tomorrow. CM still waiting on return phone call from [facility]. 11:40am  MD inquired if patient has a SNF bed today. CM contacted [facility] and left a message for the admissions coordinator and waiting for response.      Sarai Ramos, [address]

## 2021-05-07 VITALS
DIASTOLIC BLOOD PRESSURE: 67 MMHG | WEIGHT: 232.2 LBS | TEMPERATURE: 97.8 F | SYSTOLIC BLOOD PRESSURE: 149 MMHG | OXYGEN SATURATION: 98 % | HEART RATE: 93 BPM | HEIGHT: 70 IN | RESPIRATION RATE: 16 BRPM | BODY MASS INDEX: 33.24 KG/M2

## 2021-05-07 LAB
ALBUMIN SERPL-MCNC: 2.3 G/DL (ref 3.5–5)
ALBUMIN/GLOB SERPL: 0.6 {RATIO} (ref 1.1–2.2)
ALP SERPL-CCNC: 100 U/L (ref 45–117)
ALT SERPL-CCNC: 14 U/L (ref 12–78)
ANION GAP SERPL CALC-SCNC: 6 MMOL/L (ref 5–15)
AST SERPL-CCNC: 15 U/L (ref 15–37)
BASOPHILS # BLD: 0.1 K/UL (ref 0–0.1)
BASOPHILS NFR BLD: 1 % (ref 0–1)
BILIRUB SERPL-MCNC: 0.7 MG/DL (ref 0.2–1)
BUN SERPL-MCNC: 22 MG/DL (ref 6–20)
BUN/CREAT SERPL: 23 (ref 12–20)
CALCIUM SERPL-MCNC: 10 MG/DL (ref 8.5–10.1)
CHLORIDE SERPL-SCNC: 106 MMOL/L (ref 97–108)
CO2 SERPL-SCNC: 27 MMOL/L (ref 21–32)
CREAT SERPL-MCNC: 0.96 MG/DL (ref 0.55–1.02)
DIFFERENTIAL METHOD BLD: ABNORMAL
EOSINOPHIL # BLD: 0.4 K/UL (ref 0–0.4)
EOSINOPHIL NFR BLD: 4 % (ref 0–7)
ERYTHROCYTE [DISTWIDTH] IN BLOOD BY AUTOMATED COUNT: 14.9 % (ref 11.5–14.5)
GLOBULIN SER CALC-MCNC: 3.7 G/DL (ref 2–4)
GLUCOSE BLD STRIP.AUTO-MCNC: 94 MG/DL (ref 65–100)
GLUCOSE BLD STRIP.AUTO-MCNC: 98 MG/DL (ref 65–100)
GLUCOSE SERPL-MCNC: 82 MG/DL (ref 65–100)
HCT VFR BLD AUTO: 30.3 % (ref 35–47)
HGB BLD-MCNC: 9.1 G/DL (ref 11.5–16)
IMM GRANULOCYTES # BLD AUTO: 0.2 K/UL (ref 0–0.04)
IMM GRANULOCYTES NFR BLD AUTO: 2 % (ref 0–0.5)
LYMPHOCYTES # BLD: 3.1 K/UL (ref 0.8–3.5)
LYMPHOCYTES NFR BLD: 26 % (ref 12–49)
MCH RBC QN AUTO: 27.2 PG (ref 26–34)
MCHC RBC AUTO-ENTMCNC: 30 G/DL (ref 30–36.5)
MCV RBC AUTO: 90.4 FL (ref 80–99)
MONOCYTES # BLD: 1 K/UL (ref 0–1)
MONOCYTES NFR BLD: 8 % (ref 5–13)
NEUTS SEG # BLD: 7.2 K/UL (ref 1.8–8)
NEUTS SEG NFR BLD: 59 % (ref 32–75)
NRBC # BLD: 0 K/UL (ref 0–0.01)
NRBC BLD-RTO: 0 PER 100 WBC
PLATELET # BLD AUTO: 431 K/UL (ref 150–400)
PMV BLD AUTO: 10 FL (ref 8.9–12.9)
POTASSIUM SERPL-SCNC: 4 MMOL/L (ref 3.5–5.1)
PROCALCITONIN SERPL-MCNC: <0.05 NG/ML
PROT SERPL-MCNC: 6 G/DL (ref 6.4–8.2)
RBC # BLD AUTO: 3.35 M/UL (ref 3.8–5.2)
SERVICE CMNT-IMP: NORMAL
SERVICE CMNT-IMP: NORMAL
SODIUM SERPL-SCNC: 139 MMOL/L (ref 136–145)
TROPONIN I SERPL-MCNC: <0.05 NG/ML
WBC # BLD AUTO: 11.9 K/UL (ref 3.6–11)

## 2021-05-07 PROCEDURE — 97530 THERAPEUTIC ACTIVITIES: CPT

## 2021-05-07 PROCEDURE — 74011250637 HC RX REV CODE- 250/637: Performed by: STUDENT IN AN ORGANIZED HEALTH CARE EDUCATION/TRAINING PROGRAM

## 2021-05-07 PROCEDURE — 74011250637 HC RX REV CODE- 250/637: Performed by: PHYSICIAN ASSISTANT

## 2021-05-07 PROCEDURE — 74011636637 HC RX REV CODE- 636/637: Performed by: STUDENT IN AN ORGANIZED HEALTH CARE EDUCATION/TRAINING PROGRAM

## 2021-05-07 PROCEDURE — 84145 PROCALCITONIN (PCT): CPT

## 2021-05-07 PROCEDURE — 74011250637 HC RX REV CODE- 250/637: Performed by: NURSE PRACTITIONER

## 2021-05-07 PROCEDURE — 80053 COMPREHEN METABOLIC PANEL: CPT

## 2021-05-07 PROCEDURE — 74011250637 HC RX REV CODE- 250/637: Performed by: INTERNAL MEDICINE

## 2021-05-07 PROCEDURE — 84484 ASSAY OF TROPONIN QUANT: CPT

## 2021-05-07 PROCEDURE — 97535 SELF CARE MNGMENT TRAINING: CPT | Performed by: OCCUPATIONAL THERAPIST

## 2021-05-07 PROCEDURE — 82962 GLUCOSE BLOOD TEST: CPT

## 2021-05-07 PROCEDURE — 97116 GAIT TRAINING THERAPY: CPT

## 2021-05-07 PROCEDURE — 94760 N-INVAS EAR/PLS OXIMETRY 1: CPT

## 2021-05-07 PROCEDURE — 36415 COLL VENOUS BLD VENIPUNCTURE: CPT

## 2021-05-07 PROCEDURE — 85025 COMPLETE CBC W/AUTO DIFF WBC: CPT

## 2021-05-07 RX ORDER — CLONIDINE HYDROCHLORIDE 0.2 MG/1
0.2 TABLET ORAL 2 TIMES DAILY
Qty: 60 TAB | Refills: 3 | Status: SHIPPED
Start: 2021-05-07

## 2021-05-07 RX ORDER — DIGOXIN 125 MCG
0.25 TABLET ORAL DAILY
Qty: 30 TAB | Refills: 2 | Status: SHIPPED
Start: 2021-05-08

## 2021-05-07 RX ORDER — OXYCODONE HYDROCHLORIDE 5 MG/1
5 TABLET ORAL
Status: DISCONTINUED | OUTPATIENT
Start: 2021-05-07 | End: 2021-05-07 | Stop reason: HOSPADM

## 2021-05-07 RX ORDER — OXYCODONE HYDROCHLORIDE 5 MG/1
5 TABLET ORAL
Qty: 10 TAB | Refills: 0 | Status: SHIPPED | OUTPATIENT
Start: 2021-05-07 | End: 2021-05-10

## 2021-05-07 RX ORDER — METOPROLOL TARTRATE 100 MG/1
100 TABLET ORAL 2 TIMES DAILY
Qty: 60 TAB | Refills: 5 | Status: SHIPPED
Start: 2021-05-07

## 2021-05-07 RX ORDER — INSULIN LISPRO 100 [IU]/ML
10 INJECTION, SOLUTION INTRAVENOUS; SUBCUTANEOUS
Qty: 1 VIAL | Refills: 3 | Status: SHIPPED
Start: 2021-05-07 | End: 2021-05-07 | Stop reason: SDUPTHER

## 2021-05-07 RX ORDER — POLYETHYLENE GLYCOL 3350 17 G/17G
17 POWDER, FOR SOLUTION ORAL DAILY
Qty: 14 PACKET | Refills: 0 | Status: SHIPPED | OUTPATIENT
Start: 2021-05-08 | End: 2021-05-22

## 2021-05-07 RX ORDER — INSULIN GLARGINE 100 [IU]/ML
37 INJECTION, SOLUTION SUBCUTANEOUS
Qty: 1 VIAL | Refills: 2 | Status: SHIPPED | OUTPATIENT
Start: 2021-05-07 | End: 2021-05-07 | Stop reason: SDUPTHER

## 2021-05-07 RX ORDER — FERROUS SULFATE, DRIED 160(50) MG
1 TABLET, EXTENDED RELEASE ORAL
Qty: 90 TAB | Refills: 3 | Status: SHIPPED
Start: 2021-05-07

## 2021-05-07 RX ORDER — INSULIN LISPRO 100 [IU]/ML
8 INJECTION, SOLUTION INTRAVENOUS; SUBCUTANEOUS
Qty: 1 VIAL | Refills: 3 | Status: SHIPPED
Start: 2021-05-07

## 2021-05-07 RX ORDER — AMOXICILLIN 250 MG
2 CAPSULE ORAL 2 TIMES DAILY
Qty: 56 TAB | Refills: 0 | Status: SHIPPED
Start: 2021-05-07 | End: 2021-05-21

## 2021-05-07 RX ORDER — ACETAMINOPHEN 325 MG/1
650 TABLET ORAL
Qty: 30 TAB | Refills: 4 | Status: SHIPPED
Start: 2021-05-07

## 2021-05-07 RX ORDER — INSULIN GLARGINE 100 [IU]/ML
30 INJECTION, SOLUTION SUBCUTANEOUS
Qty: 1 VIAL | Refills: 2 | Status: SHIPPED
Start: 2021-05-07

## 2021-05-07 RX ADMIN — POTASSIUM CHLORIDE 20 MEQ: 1500 TABLET, EXTENDED RELEASE ORAL at 08:30

## 2021-05-07 RX ADMIN — ACETAMINOPHEN 650 MG: 325 TABLET ORAL at 11:45

## 2021-05-07 RX ADMIN — CASTOR OIL AND BALSAM, PERU: 788; 87 OINTMENT TOPICAL at 08:33

## 2021-05-07 RX ADMIN — INSULIN LISPRO 12 UNITS: 100 INJECTION, SOLUTION INTRAVENOUS; SUBCUTANEOUS at 08:31

## 2021-05-07 RX ADMIN — CALCIUM CARBONATE (ANTACID) CHEW TAB 500 MG 200 MG: 500 CHEW TAB at 11:46

## 2021-05-07 RX ADMIN — ACETAMINOPHEN 650 MG: 325 TABLET ORAL at 06:21

## 2021-05-07 RX ADMIN — Medication 5 ML: at 06:21

## 2021-05-07 RX ADMIN — DIGOXIN 0.25 MG: 125 TABLET ORAL at 08:30

## 2021-05-07 RX ADMIN — CLONIDINE HYDROCHLORIDE 0.2 MG: 0.1 TABLET ORAL at 08:30

## 2021-05-07 RX ADMIN — OXYCODONE 10 MG: 5 TABLET ORAL at 08:31

## 2021-05-07 RX ADMIN — RIVAROXABAN 20 MG: 20 TABLET, FILM COATED ORAL at 08:30

## 2021-05-07 RX ADMIN — CALCIUM CARBONATE 500 MG (1,250 MG)-VITAMIN D3 200 UNIT TABLET 1 TABLET: at 11:46

## 2021-05-07 RX ADMIN — CALCIUM CARBONATE 500 MG (1,250 MG)-VITAMIN D3 200 UNIT TABLET 1 TABLET: at 08:30

## 2021-05-07 RX ADMIN — OXYCODONE 10 MG: 5 TABLET ORAL at 13:21

## 2021-05-07 RX ADMIN — METHOCARBAMOL TABLETS 750 MG: 750 TABLET, COATED ORAL at 08:31

## 2021-05-07 RX ADMIN — CALCIUM CARBONATE (ANTACID) CHEW TAB 500 MG 200 MG: 500 CHEW TAB at 08:30

## 2021-05-07 RX ADMIN — LEVOTHYROXINE SODIUM 100 MCG: 0.1 TABLET ORAL at 06:21

## 2021-05-07 RX ADMIN — METHOCARBAMOL TABLETS 750 MG: 750 TABLET, COATED ORAL at 13:00

## 2021-05-07 RX ADMIN — INSULIN LISPRO 12 UNITS: 100 INJECTION, SOLUTION INTRAVENOUS; SUBCUTANEOUS at 11:46

## 2021-05-07 RX ADMIN — METOPROLOL TARTRATE 100 MG: 50 TABLET, FILM COATED ORAL at 08:30

## 2021-05-07 RX ADMIN — Medication 10 ML: at 13:21

## 2021-05-07 RX ADMIN — Medication 40 MG: at 08:32

## 2021-05-07 NOTE — PROGRESS NOTES
Transition of Care Plan:  RUR: 20%  Disposition: SNF- [facility]  Follow up appointments: ortho  DME needed: n/a  Transportation at [address] [facility] @ 4:00pm  [employer] or means to access home: n/a      IM Medicare letter: to be provided at time of d/c  Caregiver Contact: shola Mera [phone]  Discharge Caregiver contacted prior to discharge? Yes     Call report [phone]  [facility] @ 4:00pm    1:15pm  [facility] can transport patient today at 4:00pm.  has notified SNF, patient, son, and RN. Medicare pt has received, reviewed, and signed 2nd  letter informing them of their right to appeal the discharge. Signed copy has been placed on pt bedside chart. Patient and son at bedside agree with d/c plan and time. 12:45pm  CM received return phone call from [facility] admissions coordinator who confirmed they can accept patient today at any time.  has requested [facility] transport for 3:00pm and waiting on confirmation. 12:30pm  MD informed CM that patient is ready for d/c today. CM promptly contacted [facility] and left a message for admissions requesting a return phone call.      Care Management Interventions  PCP Verified by CM: Yes(last seen fall 2020)  Mode of Transport at Discharge: BLS  Transition of Care Consult (CM Consult): Discharge Planning, SNF  Discharge Durable Medical Equipment: No  Physical Therapy Consult: Yes  Occupational Therapy Consult: Yes  Speech Therapy Consult: No  Current Support Network: Lives Alone, Family Lives [place], Own Home(pt lives alone in a 2 story home with 0 max)  Confirm Follow Up Transport: Family  The Plan for Transition of Care is Related to the Following Treatment Goals : SNF  The Patient and/or Patient Representative was Provided with a Choice of Provider and Agrees with the Discharge Plan?: Yes  Freedom of Choice List was Provided with Basic Dialogue that Supports the Patient's Individualized Plan of Care/Goals, Treatment Preferences and Shares the Quality Data Associated with the Providers?: Yes  Discharge Location  Discharge Placement: Skilled nursing facility    Sarai Daniels, [address] [facility] [place] [facility]\""

## 2021-05-07 NOTE — PROGRESS NOTES
Transition of Care Plan to SNF/Rehab    SNF/Rehab Transition:  Patient has been accepted to [facility] and meets criteria for admission. Patient will transported by [facility] [place] and expected to leave at 4:00pm.    Communication to Patient/Family:  Met with patient and son (identified care giver) and they are agreeable to the transition plan. Communication to SNF/Rehab:  Bedside RN, Felisha Rothman, has been notified to update the transition plan to the facility and call report (phone number [phone]). Discharge information has been updated on the AVS.     Discharge instructions to be fax'd to facility at [facility] # [phone]). Nursing Please include all hard scripts for controlled substances, med rec and dc summary, and AVS in packet. Reviewed and confirmed with facility, [facility], can manage the patient care needs for the following:     Dionicio Worthington with (X) only those applicable:    Medication:  [x]  Medications will be available at the facility  []  IV Antibiotics   [x]  Controlled Substance - hard copy to be sent with patient   []  Weekly Labs   Documents:  [x] Hard RX  [x] MAR  [x] Kardex  [x] AVS  [x]Transfer Summary  [x]Discharge   Equipment:  []  CPAP/BiPAP  []  Wound Vacuum  [x]  Case or Urinary Device  []  PICC/Central Line  []  Nebulizer  []  Ventilator   Treatment:  []Isolation (for MRSA, VRE, etc.)  []Surgical Drain Management   []Tracheostomy Care  []Dressing Changes  []Dialysis with transportation and chair time. []PEG Care  []Oxygen  []Daily Weights for Heart Failure   Dietary:  [x]Any diet limitations diabetic   []Tube Feedings   []Total Parenteral Management (TPN)   Eligible for Medicaid Long Term Services and Supports  Yes:  [] Eligible for medical assistance or will become eligible within 180 days and UAI completed. [] Provider/Patient and/or support system has requested screening. [] UAI copy provided to patient or responsible party.   [] UAI unavailable at discharge will send once processed to SNF provider. [] UAI unavailable at discharged mailed to patient  No:   [] Private pay and is not financially eligible for Medicaid within the next 180 days. [] Reside out-of-state.   [] A residents of a state owned/operated facility that is licensed  by [facility] or [facility]  [] Enrollment in [facility] hospice services  [] [address] [place] [address] citizen  [x] Patient /Family declines to have screening completed or provide financial information for screening     Financial Resources:  Medicaid    [] Initiated and application pending   [] Full coverage     Advanced Care Plan:  []Surrogate Decision Maker of Care  []POA  [x]Communicated Code Status DNR   Other     Sarai Mclean, [facility]

## 2021-05-07 NOTE — DISCHARGE INSTRUCTIONS
Discharge Instructions: How to [facility]  Surgery: Hip Fracture Repair  Surgeon:   Jono Rocha MD  Surgery Date:  4/28/2021     To relieve pain:   Use ice/gel packs.  Put the ice pack directly over the wound, or anywhere you are hurting or swollen.  To control pain and swelling, keep ice on regularly, especially after physical activity.  The packs should stay cold for 3-4 hours. When it is not cold anymore, rotate with the packs in the freezer.  Elevate your leg. This will also keep swelling down.  Rest for at least 20 minutes between activity or exercises.  To keep track of your pain medications, write down what you take and when you take it.  The last dose of pain medication you got in the hospital was:     Medication    Dose    Date & Time           Choose your medications based on the pain scale below:     To keep your pain under control, take Tylenol every 6 hours for 14 days - even if you feel like you dont need it.  For mild to moderate pain (1-6 on pain scale), take one pain pill every 3-4 hours as needed.  For severe pain (7-10 on pain scale), take two pain pills every 3-4 hours as needed.  To prevent nausea, take your pain medications with food. Pain Scale                 As your pain lessens:     Slowly start taking less pain medication. You may do this by waiting longer between doses or by taking smaller doses.  Stop using the pain medications as soon as you no longer need it, usually in 2-3 weeks.  Xarelto-- resume per hospital regimen                                                            OPSITE (Honeycomb dressing)     Keep your clear, waterproof dressing in place for 5-7 days after your leave the hospital.      If you are still having drainage, you will need to change your dressing in 5-7 days.   You will be given one extra dressing to use at home.     If there is no more drainage from the wound, you may leave it open to the air. OPSITE DRESSING INSTRUCTIONS                                   DO NOTs:   Do not rub your surgical wound   Do not put lotion or oils on your wound.  Do not take a tub bath or go swimming until your doctor says it is ok.  You may shower with this dressing over your wound.  After showering pat the dressing dry.  If you have staples a home health nurse will remove them in about 10 days.  To increase and promote healing:     Stop Smoking (or at least cut back on       Smoking).  Eat a well-balanced diet (high in protein       and vitamin C).  If you have a poor appetite, drink Ensure, Glucerna, or CarnationInstant Breakfast for the next 30 days.  If you are diabetic, you should control your blood         sugars to prevent infection and help your wound         to heal.       To prevent constipation, stay active & drink plenty of fluid.  While using pain medications, you should also take stool softeners and laxatives, such as Pericolace and Miralax.  If you are having too many bowel movements, then you may need  to stop taking the laxatives.  You should have a bowel movement 3-4 days after surgery and then at least every other day while on pain medication.  To improve your recovery, you must be active!  WEIGHT BEARING   As Tolerated = You can put as much weight on your leg as you can tolerate while walking.          THERAPY   If you go to a rehab facility, physical therapy (PT) will need to work with you daily, and sometimes twice a day to teach you how to:     Get in and out of the bed   Walk (gait training) and climb stairs   Do exercises and gain strength   Use a walker, crutches or cane     You may also need to have occupational therapy (OT) work with you to help you practice:     Getting on and off the toilet   Self-care (brushing teeth, eating, bathing, etc)     If you go directly home, home health physical therapy will come the day after you leave the hospital.  They will visit about 4 times over the next couple of weeks to teach you how to get out of bed, to safely walk in your home, and to do your exercises.  NO DRIVING until your surgeon tells you it is ok.  You can return to work when cleared by a physician.  Please call your physician immediately if you have:     Constant bleeding from your wound.  Increasing redness or swelling around your wound (Some warmth, bruising and swelling is normal).  Change in wound drainage (increase in amount, color, or bad smell).  Change in mental status (unusual behavior)     Temperature over 101.5 degrees Fahrenheit     Increased pain, swelling, or redness in the calf (back of your lower leg), thigh, ankle or foot.  Emergency: CALL 911 if you have:   Shortness of breath   Chest pain when you cough or taking a deep breath     Please call your surgeons office at [address] [place] [address] for a follow up appointment.  You should call as soon as you get home or the next day after discharge. Ask to make an appointment in 2 weeks.                        HOSPITALIST DISCHARGE INSTRUCTIONS    NAME: Alva Rivers   :  1945   MRN:  [id number]     Date/Time:  2021 3:11 PM    ADMIT DATE: 2021   DISCHARGE DATE: 2021     Attending Physician: Chris Robert MD    DISCHARGE DIAGNOSIS:  Essential hypertension POA now with orthostatic hypotension  Orthostatic hypotension  and today  -Continue clonidine, metoprolol(changed to BID for rate control)  - BP dropping post op into 80s with symptoms upon getting up  - held Ca channel blocker procardia 60 mg/day, losartan 100 mg/day  - held hydralazine 25 mg twice per day  - BP improved lying and standing, plan d/c SNF  - Will need to watch BP and add back meds as needed     Permanent atrial fibrillation with RVR  -On toprol 100mg/day. Changed to lopressor 100mg BID for better rate control  - TSH wnl  -Continue xarelto. - Dr Frank Hand following, rate controlled at discharge  - Digoxin added     Hypothyroidism  -Continue levothyroxine  - TSH 0.45     Left hip fracture POA s/p nailing left hip 4/28  -Patient presented with mechanical fall and noticed to have left hip fracture  -s/p  Cephalomedullary nailing left hip 4/28  -Pain control  -Continue PT OT, rec SNF  -Continue Xarelto   -COVID PCR ordered for placement, negative  - D/C SNF      Insulin-dependent type 2 diabetes  -Hold Metformin and continue sliding scale insulin  -Lantus 37U and lispro 12 U TID w/ meal in house  - Sugars in 90s, reduced lantus to 30 units and reduced lispro to 8 units  -. 99, 91, 105, 178      Medications: Per above medication reconciliation. Pain Management: per above medications    Recommended diet: Diabetic Diet    Recommended activity: Activity as tolerated    Wound care: See surgical/procedure care instructions    Indwelling devices:  None    Supplemental Oxygen: None    Required Lab work: Per SNF routine    Glucose management:  Accucheck ACHS with sliding scale per SNF protocol    Code status: DNR        Outside physician follow up:     Follow-up Information     Follow up With Specialties Details Why Contact Info    [address] Sil[address] Peters) [facility], [address]   [address] [address] [facility]    Angela Lopez MD Family Medicine Schedule an appointment as soon as possible for a visit in 1 week see 1 week after discharge from [facility] [address]  [phone]      Scotty Maloney MD Cardiology Schedule an appointment as soon as possible for a visit in 2 weeks follow up atrial fib [address] [address] [phone]      Jewels Titus MD Orthopedic Surgery Schedule an appointment as soon as possible for a visit in 2 weeks orthopedics follow up [address] [facility]  [phone]                 Skilled nursing facility/ SNF MD responsible for above on discharge. Information obtained by :  I understand that if any problems occur once I am at home I am to contact my physician. I understand and acknowledge receipt of the instructions indicated above.                                                                                                                                            Physician's or R.N.'s Signature                                                                  Date/Time                                                                                                                                              Patient or Repres

## 2021-05-07 NOTE — PROGRESS NOTES
Cardiology Progress Note  5/7/2021 9:13 AM  Admit Date: 4/27/2021  Admit Diagnosis/CC: Hip fracture ([address] [S72.009A]  Subjective:   Patient reports:  Chest Pain:  [x] none,  consistent with [] non-cardiac  [] atypical  []  anginal chest pain             [x] none now    []  on-going  Dyspnea: [x] none  [] at rest  [] with exertion  [] improved  [] unchanged [] worsening  PND:       [x] none  [] overnight   [] current  Orthopnea: [x] none  [] improved  [] unchanged  [] worsening  Presyncope: [x] none  [] improved  [] unchanged  [] worsening  Ambulated in hallway without symptoms  [] Yes  Ambulated in room without symptoms  [x] Yes  ROS(2+other systems)   Hematuria: [] Yes  [x] No.   Dysuria: [] Yes  [x] No                                           Cough:       [] Yes  [x] No.   Sputum: [] Yes  [x] No                                            Hematochezia: [] Yes  [x] No.   Melena: [] Yes  [x] No                                            No change in family and social history from H&P/Consult note.     Current Facility-Administered Medications   Medication Dose Route Frequency    [Held by provider] losartan (COZAAR) tablet 50 mg  50 mg Oral DAILY    digoxin (LANOXIN) tablet 0.25 mg  0.25 mg Oral DAILY    cloNIDine HCL (CATAPRES) tablet 0.2 mg  0.2 mg Oral BID    balsam peru-castor oiL (VENELEX) ointment   Topical BID    insulin lispro (HUMALOG) injection 12 Units  12 Units SubCUTAneous TIDAC    insulin glargine (LANTUS) injection 37 Units  37 Units SubCUTAneous QHS    metoprolol tartrate (LOPRESSOR) tablet 100 mg  100 mg Oral BID    metoprolol (LOPRESSOR) injection 5 mg  5 mg IntraVENous Q6H PRN    morphine injection 1 mg  1 mg IntraVENous Q4H PRN    oxyCODONE IR (ROXICODONE) tablet 10 mg  10 mg Oral Q4H PRN    methocarbamoL (ROBAXIN) tablet 750 mg  750 mg Oral QID    ondansetron (ZOFRAN) injection 4 mg  4 mg IntraVENous Q6H PRN    esomeprazole (NEXIUM) capsule 40 mg (Patient Supplied)  40 mg Oral DAILY  rivaroxaban (XARELTO) tablet 20 mg  20 mg Oral DAILY    calcium carbonate (TUMS) chewable tablet 200 mg [elemental]  200 mg Oral TID WITH MEALS    traMADoL (ULTRAM) tablet 50 mg  50 mg Oral Q6H PRN    hydrOXYzine HCL (ATARAX) tablet 10 mg  10 mg Oral Q8H PRN    naloxone (NARCAN) injection 0.4 mg  0.4 mg IntraVENous PRN    ondansetron (ZOFRAN ODT) tablet 4 mg  4 mg Oral Q4H PRN    calcium-vitamin D (OS-JOSEPH +D3) 500 mg-200 unit per tablet 1 Tab  1 Tab Oral TID WITH MEALS    polyethylene glycol (MIRALAX) packet 17 g  17 g Oral DAILY    bisacodyL (DULCOLAX) suppository 10 mg  10 mg Rectal DAILY PRN    sodium chloride (NS) flush 5-40 mL  5-40 mL IntraVENous Q8H    sodium chloride (NS) flush 5-40 mL  5-40 mL IntraVENous PRN    acetaminophen (TYLENOL) tablet 650 mg  650 mg Oral Q6H    oxyCODONE IR (ROXICODONE) tablet 5 mg  5 mg Oral Q4H PRN    senna-docusate (PERICOLACE) 8.6-50 mg per tablet 2 Tab  2 Tab Oral BID    albuterol (PROVENTIL VENTOLIN) nebulizer solution 2.5 mg  2.5 mg Nebulization Q4H PRN    [Held by provider] hydrALAZINE (APRESOLINE) tablet 25 mg  25 mg Oral BID    levothyroxine (SYNTHROID) tablet 100 mcg  100 mcg Oral 6am    [Held by provider] metFORMIN (GLUCOPHAGE) tablet 1,000 mg  1,000 mg Oral BID WITH MEALS    potassium chloride (K-DUR, KLOR-CON) SR tablet 20 mEq  20 mEq Oral DAILY    sodium chloride (NS) flush 5-40 mL  5-40 mL IntraVENous Q8H    sodium chloride (NS) flush 5-40 mL  5-40 mL IntraVENous PRN    acetaminophen (TYLENOL) tablet 650 mg  650 mg Oral Q6H PRN    Or    acetaminophen (TYLENOL) suppository 650 mg  650 mg Rectal Q6H PRN    polyethylene glycol (MIRALAX) packet 17 g  17 g Oral DAILY PRN    promethazine (PHENERGAN) tablet 12.5 mg  12.5 mg Oral Q6H PRN    insulin lispro (HUMALOG) injection   SubCUTAneous AC&HS    glucose chewable tablet 16 g  4 Tab Oral PRN    dextrose (D50W) injection syrg 12.5-25 g  12.5-25 g IntraVENous PRN    glucagon (GLUCAGEN) injection 1 mg  1 mg IntraMUSCular PRN       Objective:    Physical Exam:  Last VS:   Visit Vitals  BP (!) 174/62   Pulse 88   Temp 98.1 °F (36.7 °C)   Resp 16   Ht 5' 10\" (1.778 m)   Wt 105.3 kg (232 lb 3.2 oz)   SpO2 96%   Breastfeeding No   BMI 33.32 kg/m²    Temp (24hrs), Av.9 °F (36.6 °C), Min:97.4 °F (36.3 °C), Max:98.3 °F (36.8 °C)    24 hr VS reviewed. General Appearance:   [x] well developed, well nourished,  [x] NAD. [] agitated   [] lethargic but arousable  [] obtunded   ENT, Palate:    [x] WNL   [] dry palate/MM        Respiratory:    [x] CTA bilateral  [] rales  [] rhonchi  [] normal resp effort      [] similar to yesterday   [] worse    [] improved   Cardiovascular:   [x] RRR   [] Irregular rate and rhythm   [x] Normal S1, S2   [x] No gallop or rub. [] no murmur   [x] no new murmur  [] murmur c/w:   [x] no edema  RLE: []1+ []2+ [] 3+;  LLE: []1+ []2+ []3+      [] edema similar to yesterday   [] worse    [] improved   [x] normal JVP   [] Elevated JVP    [x] JVP similar to yesterday   [] worse    [] improved   [x] carotid upstroke unchanged   [x] abd aorta not palpated   [x] no stigmata of peripheral emboli   GI:    [x] abd soft, non-distented,bowel sounds present, no                     organomegaly appreciated   Skin:  Neuro:    Cath Site:  [x] warm and dry [] cold extremities   [x] A/O x 3, grossly non-focal      [] intact w/o hematoma or bruit; distal pulse unchanged.               Data Review:   Labs:    Recent Results (from the past 24 hour(s))   GLUCOSE, POC    Collection Time: 21 11:24 AM   Result Value Ref Range    Glucose (POC) 178 (H) 65 - 100 mg/dL    Performed by Feroz Lieu, POC    Collection Time: 21  5:08 PM   Result Value Ref Range    Glucose (POC) 92 65 - 100 mg/dL    Performed by Hipolito Hollis    GLUCOSE, POC    Collection Time: 21  9:33 PM   Result Value Ref Range    Glucose (POC) 156 (H) 65 - 100 mg/dL    Performed by Mignon LOPEZ CBC WITH AUTOMATED DIFF    Collection Time: 05/07/21  6:24 AM   Result Value Ref Range    WBC 11.9 (H) 3.6 - 11.0 K/uL    RBC 3.35 (L) 3.80 - 5.20 M/uL    HGB 9.1 (L) 11.5 - 16.0 g/dL    HCT 30.3 (L) 35.0 - 47.0 %    MCV 90.4 80.0 - 99.0 FL    MCH 27.2 26.0 - 34.0 PG    MCHC 30.0 30.0 - 36.5 g/dL    RDW 14.9 (H) 11.5 - 14.5 %    PLATELET 169 (H) 399 - 400 K/uL    MPV 10.0 8.9 - 12.9 FL    NRBC 0.0 0  WBC    ABSOLUTE NRBC 0.00 0.00 - 0.01 K/uL    NEUTROPHILS 59 32 - 75 %    LYMPHOCYTES 26 12 - 49 %    MONOCYTES 8 5 - 13 %    EOSINOPHILS 4 0 - 7 %    BASOPHILS 1 0 - 1 %    IMMATURE GRANULOCYTES 2 (H) 0.0 - 0.5 %    ABS. NEUTROPHILS 7.2 1.8 - 8.0 K/UL    ABS. LYMPHOCYTES 3.1 0.8 - 3.5 K/UL    ABS. MONOCYTES 1.0 0.0 - 1.0 K/UL    ABS. EOSINOPHILS 0.4 0.0 - 0.4 K/UL    ABS. BASOPHILS 0.1 0.0 - 0.1 K/UL    ABS. IMM. GRANS. 0.2 (H) 0.00 - 0.04 K/UL    DF AUTOMATED     METABOLIC PANEL, COMPREHENSIVE    Collection Time: 05/07/21  6:24 AM   Result Value Ref Range    Sodium 139 136 - 145 mmol/L    Potassium 4.0 3.5 - 5.1 mmol/L    Chloride 106 97 - 108 mmol/L    CO2 27 21 - 32 mmol/L    Anion gap 6 5 - 15 mmol/L    Glucose 82 65 - 100 mg/dL    BUN 22 (H) 6 - 20 MG/DL    Creatinine 0.96 0.55 - 1.02 MG/DL    BUN/Creatinine ratio 23 (H) 12 - 20      GFR est AA >60 >60 ml/min/1.73m2    GFR est non-AA 56 (L) >60 ml/min/1.73m2    Calcium 10.0 8.5 - 10.1 MG/DL    Bilirubin, total 0.7 0.2 - 1.0 MG/DL    ALT (SGPT) 14 12 - 78 U/L    AST (SGOT) 15 15 - 37 U/L    Alk.  phosphatase 100 45 - 117 U/L    Protein, total 6.0 (L) 6.4 - 8.2 g/dL    Albumin 2.3 (L) 3.5 - 5.0 g/dL    Globulin 3.7 2.0 - 4.0 g/dL    A-G Ratio 0.6 (L) 1.1 - 2.2     PROCALCITONIN    Collection Time: 05/07/21  6:24 AM   Result Value Ref Range    Procalcitonin <0.05 ng/mL   TROPONIN I    Collection Time: 05/07/21  6:24 AM   Result Value Ref Range    Troponin-I, Qt. <0.05 <0.05 ng/mL   GLUCOSE, POC    Collection Time: 05/07/21  7:01 AM   Result Value Ref Range Glucose (POC) 94 65 - 100 mg/dL    Performed by Kourtney Li PCT        Provided Telemetry: [x] sinus  [x] chronic afib   [] paroxysmal afib  [] NSVT      Assessment:     Active Problems:    Hip fracture ([address] (4/27/2021)        Plan:     Atrial Fibrillation - Flutter  unchanged   Continue current meds      For all other plans, see orders. [x] High complexity decision making was performed RAte is controlled. Agree with plans.

## 2021-05-07 NOTE — PROGRESS NOTES
Problem: Self Care Deficits Care Plan (Adult)  Goal: *Acute Goals and Plan of Care (Insert Text)  Description:   FUNCTIONAL STATUS PRIOR TO ADMISSION: Patient was independent and active without use of DME.     HOME SUPPORT: The patient lived alone with family to provide assistance. Occupational Therapy Goals  7 day re-eval 5/7/2021  Initiated 4/29/2021  1. Patient will perform grooming sitting on EOB with supervision/set-up within 7 day(s). Met upgrade to standing  2. Patient will perform bathing standing at the sink for at least 3 minutes  with minimal assistance/contact guard assist within 7 day(s). Continue   3. Patient will perform lower body dressing with moderate assistance  within 7 day(s). continue  4. Patient will perform toilet transfers with minimal assistance/contact guard assist within 7 day(s). Continue   5. Patient will perform all aspects of toileting with supervision/set-up within 7 day(s). continue  6. Patient will participate in upper extremity therapeutic exercise/activities with supervision/set-up for 5 minutes within 7 day(s). continue  7. Patient will utilize energy conservation techniques during functional activities with verbal cues within 7 day(s). continue            Outcome: Progressing Towards Goal   OCCUPATIONAL THERAPY RE-EVALUATION  Patient: Bryson Yuan (1 y.o. female)  Date: 5/7/2021  Diagnosis: Hip fracture ([address] [facility] <principal problem not specified>  Procedure(s) (LRB):  LEFT FEMORAL INTERTROCHANTERIC NAIL INSERTION (Left) 9 Days Post-Op  Precautions: Fall, DNR, WBAT  Chart, occupational therapy assessment, plan of care, and goals were reviewed. ASSESSMENT  Based on the objective data described below, pt is making steady gains with activity tolerance, balance and ADLS. She was not orthostatic this session and HR was stable. Pt was able to mobilize to the bathroom with RW and increased time with CGA.   Moderate assist needed for toileting overall and moderate assist to achieve sit to stand from standard toilet with right grab bar. Pts progress has been limited due to medical condition but this has now been corrected. One goal was met from previous eval and pt is making gains each session. Continue to recommend SNF for rehab at discharge. Vitals:    05/07/21 1142 05/07/21 1145 05/07/21 1148 05/07/21 1201   BP: (!) 168/65 (!) 154/77 (!) 157/69 (!) 149/67   BP 1 Location: Left upper arm Left upper arm Left upper arm Left upper arm   BP Patient Position: Sitting Standing Sitting  Comment: on toilet after mobilizing to bathroom Sitting   Pulse:    93   Resp:       Temp:       SpO2:       Weight:       Height:           Current Level of Function Impacting Discharge (ADLs):   Bed Mobility:  Supine to Sit: Minimum assistance(with bed rail)  Scooting: Contact guard assistance    Transfers:  Sit to Stand: Moderate assistance;Minimum assistance; Additional time;Assist x2  Functional Transfers  Toilet Transfer : Moderate assistance; Additional time;Assist x2(sit to stand and min assist stand to sit)  Bed to Chair: Contact guard assistance(with RW)      ADL Assessment:  Feeding: Independent(once provided)    Oral Facial Hygiene/Grooming: Minimum assistance;Contact guard assistance(standing)    Bathing: Moderate assistance    Upper Body Dressing: Setup    Lower Body Dressing: Maximum assistance    Toileting: Moderate assistance    Other factors to consider for discharge: needs rehab         PLAN :  Recommendations and Planned Interventions: self care training, functional mobility training, therapeutic exercise, balance training, patient education, home safety training and family training/education    Frequency/Duration: Patient will be followed by occupational therapy 4 times a week to address goals.     Recommend with staff:OOB to chair and to bathroom for toileting    Recommend next OT session: LB dressing    Recommendation for discharge: (in order for the patient to meet his/her long term goals)  Therapy up to 5 days/week in SNF setting    This discharge recommendation:  Has been made in collaboration with the attending provider and/or case management    Equipment recommendations for successful discharge (if) home: need rehab       SUBJECTIVE:   Patient stated I want to get to that bathroom.     OBJECTIVE DATA SUMMARY:   Hospital course since last seen and reason for reevaluation: Progress has been limited by orthostasis and a-fib both of which are now controlled    Cognitive/Behavioral Status:  Neurologic State: Alert  Orientation Level: Oriented X4  Cognition: Appropriate decision making; Appropriate for age attention/concentration; Appropriate safety awareness  Perception: Appears intact  Perseveration: No perseveration noted  Safety/Judgement: Awareness of environment; Fall prevention;Home safety    Hearing: Auditory  Auditory Impairment: None  Hearing Aids/Status: Does not own    Vision/Perceptual:                                     Range of Motion:    AROM: Generally decreased, functional  PROM: Generally decreased, functional                      Strength:    Strength: Generally decreased, functional                Coordination:  Coordination: Within functional limits  Fine Motor Skills-Upper: Left Intact; Right Intact    Gross Motor Skills-Upper: Left Intact; Right Intact    Tone & Sensation:    Tone: Normal  Sensation: Intact                        Functional Mobility and Transfers for ADLs:  Bed Mobility:  Supine to Sit: Minimum assistance(with bed rail)  Scooting: Contact guard assistance    Transfers:  Sit to Stand: Moderate assistance;Minimum assistance; Additional time;Assist x2  Functional Transfers  Toilet Transfer : Moderate assistance; Additional time;Assist x2(sit to stand and min assist stand to sit)  Bed to Chair: Contact guard assistance(with RW)    Balance:  Sitting - Static: Good (unsupported)  Sitting - Dynamic: Good (unsupported)  Standing: Intact  Standing - Static: Constant support;Good  Standing - Dynamic : Fair    ADL Assessment:  Feeding: Independent(once provided)    Oral Facial Hygiene/Grooming: Minimum assistance;Contact guard assistance(standing)    Bathing: Moderate assistance    Upper Body Dressing: Setup    Lower Body Dressing: Maximum assistance    Toileting: Moderate assistance                ADL Intervention and task modifications:       Grooming  Washing Hands: Set-up(seated)                        Toileting  Bowel Hygiene: Moderate assistance(standing)  Clothing Management: Moderate assistance    Cognitive Retraining  Safety/Judgement: Awareness of environment; Fall prevention;Home safety      Functional Measure:  Barthel Index:    Bathin  Bladder: 10  Bowels: 10  Groomin  Dressin  Feeding: 10  Mobility: 5  Stairs: 0  Toilet Use: 5  Transfer (Bed to Chair and Back): 5  Total: 55/100        The Barthel ADL Index: Guidelines  1. The index should be used as a record of what a patient does, not as a record of what a patient could do. 2. The main aim is to establish degree of independence from any help, physical or verbal, however minor and for whatever reason. 3. The need for supervision renders the patient not independent. 4. A patient's performance should be established using the best available evidence. Asking the patient, friends/relatives and nurses are the usual sources, but direct observation and common sense are also important. However direct testing is not needed. 5. Usually the patient's performance over the preceding 24-48 hours is important, but occasionally longer periods will be relevant. 6. Middle categories imply that the patient supplies over 50 per cent of the effort. 7. Use of aids to be independent is allowed. Yesi Boateng., Barthel, D.W. (2987). Functional evaluation: the Barthel Index. [facility] (14)2. RENE Jung, Abilio Castañeda., Laura Anne., [place], [address] ().  Measuring the change indisability after inpatient rehabilitation; comparison of the responsiveness of the Barthel Index and Functional [place] Measure. Journal of Neurology, Neurosurgery, and Psychiatry, 66(4), 870-399. AMINA Khan, BRADLEY Howard, & Emperatriz Leigh M.A. (2004.) Assessment of post-stroke quality of life in cost-effectiveness studies: The usefulness of the Barthel Index and the EuroQoL-5D. Quality of Life Research, 15, 194-73     Activity Tolerance:   Fair    After treatment patient left in no apparent distress:   Sitting in chair, Call bell within reach and Caregiver / family present    COMMUNICATION/COLLABORATION:   The patients plan of care was discussed with: Physical therapy assistant, Registered nurse and patient.      SHANNA Oneill/L  Time Calculation: 24 mins

## 2021-05-07 NOTE — DISCHARGE SUMMARY
Hospitalist Discharge Note    NAME: Nikki Fernandez   :  1945   MRN:  [id number]     Admit date: 2021    Discharge date: 21    PCP: Ela Syed MD    Discharge Diagnoses:    Left hip fracture POA s/p nailing left hip     Essential hypertension POA    Orthostatic hypotension 5/5 and 5/6 post-op, resolved with reduced BP meds    Permanent atrial fibrillation with RVR, now rate controlled     Hypothyroidism    Type 2 diabetes on chronic insulin, uncontrolled HgBa1c 8.0     GERD    Code Status: DNR      Discharge Medications:  Current Discharge Medication List      START taking these medications    Details   Acetaminophen (TYLENOL) 325 mg tablet Take 2 Tabs by mouth every six (6) hours as needed for Pain. Qty: 30 Tab, Refills: 4      calcium-vitamin D (OS-JOSEPH +D3) 500 mg-200 unit per tablet Take 1 Tab by mouth three (3) times daily (with meals). Qty: 90 Tab, Refills: 3      digoxin (LANOXIN) 0.125 mg tablet Take 2 Tabs by mouth daily. Qty: 30 Tab, Refills: 2      metoprolol tartrate (LOPRESSOR) 100 mg IR tablet Take 1 Tab by mouth two (2) times a day. Qty: 60 Tab, Refills: 5      senna-docusate (PERICOLACE) 8.6-50 mg per tablet Take 2 Tabs by mouth two (2) times a day for 14 days. Qty: 56 Tab, Refills: 0      polyethylene glycol (MIRALAX) 17 gram packet Take 1 Packet by mouth daily for 14 days. Qty: 14 Packet, Refills: 0      oxyCODONE IR (ROXICODONE) 5 mg immediate release tablet Take 1 Tab by mouth every six (6) hours as needed (For moderate pain) for up to 3 days. Max Daily Amount: 20 mg.  Qty: 10 Tab, Refills: 0    Associated Diagnoses: Closed displaced intertrochanteric fracture of left femur, initial encounter ([facility])      insulin glargine (LANTUS) 100 unit/mL injection 30 Units by SubCUTAneous route nightly(thirty).   Qty: 1 Vial, Refills: 2            CONTINUE these medications which have CHANGED    Details   cloNIDine HCL (CATAPRES) 0.2 mg tablet Take 1 Tab by mouth two (2) times a day.  Qty: 60 Tab, Refills: 3      insulin lispro (HUMALOG) 100 unit/mL injection 8 Units by SubCUTAneous route Before breakfast, lunch, and dinner. Qty: 1 Vial, Refills: 3         CONTINUE these medications which have NOT CHANGED    Details   rivaroxaban (XARELTO) 20 mg tab tablet Take 20 mg by mouth daily. potassium chloride (KLOR-CON M20) 20 mEq tablet Take 20 mEq by mouth daily. albuterol (PROVENTIL VENTOLIN) 2.5 mg /3 mL (0.083 %) nebulizer solution 3 mL by Nebulization route every four (4) hours as needed for Wheezing. Qty: 24 Each, Refills: 0      albuterol (PROVENTIL HFA, VENTOLIN HFA, PROAIR HFA) 90 mcg/actuation inhaler Take 2 Puffs by inhalation every four (4) hours as needed for Wheezing. Qty: 1 Inhaler, Refills: 0      levothyroxine (SYNTHROID) 100 mcg tablet Take 100 mcg by mouth Daily (before breakfast). furosemide (LASIX) 40 mg tablet Take 40 mg by mouth daily as needed (lower extremity swelling). esomeprazole (NEXIUM) 40 mg capsule Take 40 mg by mouth daily. STOP taking these medications       losartan (COZAAR) 100 mg tablet Comments:   Reason for Stopping:         hydrALAZINE (APRESOLINE) 25 mg tablet Comments:   Reason for Stopping:         insulin NPH (NOVOLIN N, HUMULIN N) 100 unit/mL injection Comments:   Reason for Stopping:         metoprolol succinate (TOPROL-XL) 200 mg XL tablet Comments:   Reason for Stopping:         metFORMIN (GLUCOPHAGE) 1,000 mg tablet Comments:   Reason for Stopping:         NIFEdipine ER (PROCARDIA XL) 60 mg ER tablet Comments:   Reason for Stopping:         Compressor, For Nebulizer jen Comments:   Reason for Stopping:                Follow-up Information     Follow up With Specialties Details Why Contact Info    [address] Madelaine Flor) [place], [address]   [address] [address] [address] Mo Roberts MD Family Medicine Schedule an appointment as soon as possible for a visit in 1 week see 1 week after discharge from [facility] [address]  [phone]      Alka Hernandez MD Cardiology Schedule an appointment as soon as possible for a visit in 2 weeks follow up rosemarie RAMIREZ [phone]      Lucy Treviño MD Orthopedic Surgery Schedule an appointment as soon as possible for a visit in 2 weeks orthopedics follow up [address]             Time spent on discharge:   I spent greater than 30 minutes on discharge, seeing and examining the patient, reconciling home meds and new meds, coordinating care with case management, doing the discharge papers and the D/C summary    Discharge disposition:     Discharge Condition: Stable    Summary of admission H+P(copied from Dr Linda Delatorre Note):     CHIEF COMPLAINT: Left hip pain     HISTORY OF PRESENT ILLNESS:     Luisana Sarmiento is a 66 y.o.  female relation, hypertension, GERD, hypothyroidism and type 2 diabetes who presents with status post fall. Patient reports she was trying to change birdfeeders in her lawn today when she stumbled on a rock and fell resulting in left hip pain she denies head or losing consciousness. Denies any chest pain, palpitations, nausea, vomiting, diarrhea, urine urgency frequency dysuria, heat or cold intolerance  On evaluation in the ED she was noted to have left hip fracture. Her exercise capacity is more than 4 METS     We were asked to admit for work up and evaluation of the above problems.           Past Medical History:   Diagnosis Date    Asthma      Diabetes ([facility] [address]      GERD (gastroesophageal reflux disease)      Hypertension      Other ill-defined conditions(799.89)       hypothyroid      Admit CXR read by radiology FINDINGS:   A single frontal view of the chest at 2100 hours shows mid inspiratory  effort with crowded lung markings in both lung bases. Lysbeth Carrel The heart, mediastinum  and pulmonary vasculature are stable . The bony thorax is unremarkable for age. IMPRESSION  No acute cardiopulmonary disease radiographically. Poor inspiration. Admit left hip X-rays read by radiology FINDINGS:   Proximal left femoral fracture is comminuted and both  intertrochanteric and subtrochanteric. 2.5 cm proximal migration of the lesser  trochanter. No underlying bone lesion. No evidence of intra-articular extension. Mild bilateral hip osteoarthritis. Left knee osteoarthritis is partially imaged. Iliac bones and sacrum are obscured by body habitus. IMPRESSION  Intertrochanteric and subtrochanteric comminuted proximal left femoral fracture. No underlying bone lesion. Left wrist X-rays read by radiology FINDINGS:   Three views of the left wrist demonstrate no fracture or other acute  osseous or articular abnormality. The soft tissues are within normal limits. Generalized degenerative change is moderately severe, with joint space  narrowing, articular sclerosis and hypertrophic bone formation. The scapholunate  space is widened, most likely on a chronic degenerative basis. .  IMPRESSION  No acute bony abnormality.       Hospital course:       Left hip fracture POA s/p nailing left hip 4/28  -Patient presented with mechanical fall and noticed to have left hip fracture  -s/p  Cephalomedullary nailing left hip 4/28 by Dr Ross Romero from [address]  -Pain control  -Continue PT OT, rec SNF  -Continue Xarelto for DVT prophylaxis, takes chronically for atrial fib  -COVID PCR ordered for placement, negative  - D/C SNF    Essential hypertension POA  Orthostatic hypotension 5/5 and 5/6, resolved with reduced BP meds  - Baseline clonidine 0.1 mg BID, toprol  mg/day, losartan 100 mg/day            Hydralazine 25 mg twice per day, procardia 60 mg daily  -Continue clonidine, metoprolol(changed to BID for rate control)  - post op, BP dropped to 80s and symptomatic getting up        Held several BP meds and improved at discharge        hold Ca channel blocker, hydralazine, losartan for now  - will need to monitor BP after d/c, add back meds as needed  - follow up Dr Trinity Skinner in 2 weeks    Permanent atrial fibrillation with RVR  -On toprol 200mg/day. Changed to lopressor 100mg BID for better rate control  - RVR post op, resolved by discharge  -TSH normal at 0.45  - Continue xarelto. - Dr Trinity Skinner following  - Digoxin added for additional rate control     Hypothyroidism  -Continue levothyroxine  - TSH 0.45    Type 2 diabetes on chronic insulin, uncontrolled HgBa1c 8.0  -Hold Metformin and continue sliding scale insulin  -Lantus 37U and lispro 12 U TID w/ meal in hospital  -. 99, 91, 105, 178, 92, 156, 94, 94 past 36 hours  - Reduce to lantus 30 units and lispro pre meal 8 units at discharge     GERD  -Continue PPI    Code Status: DNR  Surrogate Decision Maker: Edd Malone as surrogate      DVT Prophylaxis: SCDs for now, Xarelto after surgery  GI Prophylaxis: not indicated  [facility]    Baseline: Independent/ambulatory     Subjective:     Chief Complaint / Reason for Physician Visit f/u left hip fracture  Discussed with RN events overnight. \"No Complaints\"  HR control improved, not orthostatic today, main complaint is hip pain with getting up  Spoke with son in room    Review of Systems:  Symptom Y/N Comments  Symptom Y/N Comments   Fever/Chills n   Chest Pain n    Poor Appetite    Edema     Cough n   Abdominal Pain n    Sputum    Joint Pain     SOB/GARCIA n   Pruritis/Rash     Nausea/vomit n   Tolerating PT/OT     Diarrhea n   Tolerating Diet y    Constipation    Other       Could NOT obtain due to:      Objective:     VITALS:   Last 24hrs VS reviewed since prior progress note.  Most recent are:  Patient Vitals for the past 24 hrs:   Temp Pulse Resp BP SpO2   05/07/21 1201  93  (!) 149/67    05/07/21 1148    (!) 157/69    05/07/21 1145    (!) 154/77  05/07/21 1142    (!) 168/65    05/07/21 1119 97.8 °F (36.6 °C) 72 16 125/71 98 %   05/07/21 0745 98.1 °F (36.7 °C) 88 16 (!) 174/62 96 %   05/07/21 0348 97.5 °F (36.4 °C) 70 16 (!) 155/75 98 %   05/06/21 2349 97.9 °F (36.6 °C) 78 16 (!) 129/53 98 %   05/06/21 2052 98.3 °F (36.8 °C) 72 16 121/62 96 %   05/06/21 1602 97.9 °F (36.6 °C) 85 18  100 %       Intake/Output Summary (Last 24 hours) at 5/7/2021 1526  Last data filed at 5/7/2021 1321  Gross per 24 hour   Intake 240 ml   Output 1000 ml   Net -760 ml        I had a face to face encounter and independently examined this patient on 5/7/2021, as outlined below:  PHYSICAL EXAM:  General: WD, WN. Alert, cooperative, no acute distress    EENT:  Anicteric sclerae. MMM  Resp:  CTA bilaterally, no wheezing or rales. No accessory muscle use  CV:  Irregular  rhythm,  No edema  GI:  Soft, Non distended, Non tender. +Bowel sounds  Neurologic:  Alert and oriented X 3, normal speech,   Psych:   Good insight. Not anxious nor agitated  Skin:  No rashes. No jaundice    Reviewed most current lab test results and cultures  YES  Reviewed most current radiology test results   YES  Review and summation of old records today    NO  Reviewed patient's current orders and MAR    YES  PMH/SH reviewed - no change compared to H&P  ________________________________________________________________________  Care Plan discussed with:    Comments   Patient x    Family  x Son   RN x    Care Manager x    Consultant                        Multidiciplinary team rounds were held today with , nursing, pharmacist and clinical coordinator. Patient's plan of care was discussed; medications were reviewed and discharge planning was addressed.      ________________________________________________________________________      Comments   >50% of visit spent in counseling and coordination of care x    ________________________________________________________________________  Ezella Kotyk Jane Ervin MD     Procedures: see electronic medical records for all procedures/Xrays and details which were not copied into this note but were reviewed prior to creation of Plan. LABS:  I reviewed today's most current labs and imaging studies.   Pertinent labs include:  Recent Labs     05/07/21  0624   WBC 11.9*   HGB 9.1*   HCT 30.3*   *     Recent Labs     05/07/21  0624      K 4.0      CO2 27   GLU 82   BUN 22*   CREA 0.96   CA 10.0   ALB 2.3*   TBILI 0.7   ALT 14       Signed: Angie Palencia MD

## 2021-05-07 NOTE — ROUTINE PROCESS
TRANSFER - OUT REPORT: 
 
Verbal report given to [facility][note 3]) on Tierra Sexton  being transferred to [facility] (unit) for routine progression of care Report consisted of patients Situation, Background, Assessment and  
Recommendations(SBAR). Information from the following report(s) SBAR, Kardex, Intake/Output, MAR and Accordion was reviewed with the receiving nurse. Opportunity for questions and clarification was provided. Body Location Override (Optional - Billing Will Still Be Based On Selected Body Map Location If Applicable): right  inferior lateral pretibial region Detail Level: Detailed Bill J-Code?: Yes Size Of Lesion In Cm (Optional): 0 Medication Injected: 5-Fluorouracil Concentration (Mg/Ml): 50.0 Total Volume (Ccs): 0.2 Administered By (Optional): yanira Render Post-Care Instructions In Note?: no Post-Care Instructions: I reviewed with the patient in detail post-care instructions. Patient is to keep the site dry overnight, and then apply bacitracin twice daily until healed. Patient may apply hydrogen peroxide soaks to remove any crusting. Consent: The risks of medication reaction, injection site pain and lesion necrosis were reviewed with the patient. Bill As A Line Item Or As Units: Line Item

## 2021-05-07 NOTE — PROGRESS NOTES
Problem: Mobility Impaired (Adult and Pediatric)  Goal: *Acute Goals and Plan of Care (Insert Text)  Description: FUNCTIONAL STATUS PRIOR TO ADMISSION: independent without devices with mobility and ADLs. HOME SUPPORT PRIOR TO ADMISSION: Lived alone with family nearby to assist as needed. Physical Therapy Goals  Revised 5/6/2021  1. Patient will move from supine to sit and sit to supine , scoot up and down, and roll side to side in bed with supervision/set-up within 7 day(s). 2.  Patient will transfer from bed to chair and chair to bed with standby assistance using the least restrictive device within 7 day(s). 3.  Patient will perform sit to stand with supervision/set-up within 7 day(s). 4.  Patient will ambulate with standby assistance 125 feet with the least restrictive device within 7 day(s). Physical Therapy Goals  Initiated 4/29/2021  1. Patient will move from supine to sit and sit to supine , scoot up and down, and roll side to side in bed with minimal assistance/contact guard assist within 7 day(s). Not met 5/6/21.  2.  Patient will transfer from bed to chair and chair to bed with minimal assistance/contact guard assist using the least restrictive device within 7 day(s). Met 5/6/21.  3.  Patient will perform sit to stand with minimal assistance/contact guard assist within 7 day(s). Not met 5/6/21.  4.  Patient will ambulate with minimal assistance/contact guard assist for 35 feet with the least restrictive device within 7 day(s). Met 5/6/21.  5.  Patient will ascend/descend 4 stairs with 2 handrail(s) with minimal assistance/contact guard assist within 7 day(s). Not met 5/6/21.       Outcome: Progressing Towards Goal   PHYSICAL THERAPY TREATMENT  Patient: Adeel Lopez (89 y.o. female)  Date: 5/7/2021  Diagnosis: [facility] fracture ([facility] Opal Roblero <principal problem not specified>  Procedure(s) (LRB):  LEFT FEMORAL INTERTROCHANTERIC NAIL INSERTION (Left) 9 Days Post-Op  Precautions: Fall, DNR, WBAT  Chart, physical therapy assessment, plan of care and goals were reviewed. ASSESSMENT  Patient continues with skilled PT services and is progressing towards goals. Pt presents with decreased strength and endurance. Pt performed bed mobility at min A with cueing for sequencing. Pt performed sit to stand transfer at mod A/min A x2 with cueing for sequencing and hand placement. Pt ambulated 8ft and 40ft with RW at [facility]. Pt requiring cueing for sequencing with RW. Pt improved with verbal cueing. Pt continue to improved each session. Pts BP stable to with mobility. Current Level of Function Impacting Discharge (mobility/balance): bed mobility at min A with bed rail, sit to stand mod A/min A x2, ambulation at [facility]     Other factors to consider for discharge: decreased strength          PLAN :  Patient continues to benefit from skilled intervention to address the above impairments. Continue treatment per established plan of care. to address goals. Recommendation for discharge: (in order for the patient to meet his/her long term goals)  Therapy up to 5 days/week in SNF setting    This discharge recommendation:  Has been made in collaboration with the attending provider and/or case management    IF patient discharges home will need the following DME: to be determined (TBD)       SUBJECTIVE:   Patient stated I hurt a little today.     OBJECTIVE DATA SUMMARY:   Critical Behavior:  Neurologic State: Alert  Orientation Level: Oriented X4  Cognition: Appropriate decision making, Appropriate for age attention/concentration, Appropriate safety awareness  Safety/Judgement: Awareness of environment, Fall prevention, Home safety  Functional Mobility Training:  Bed Mobility:     Supine to Sit: Minimum assistance(with bed rail)     Scooting: Contact guard assistance        Transfers:  Sit to Stand: Moderate assistance;Minimum assistance; Additional time;Assist x2  Stand to Sit: Moderate assistance(to decend onto toilet otherwise CGA)        Bed to Chair: Contact guard assistance(with RW)                    Balance:  Sitting - Static: Good (unsupported)  Sitting - Dynamic: Good (unsupported)  Standing: Intact  Standing - Static: Constant support;Good  Standing - Dynamic : Fair  Ambulation/Gait Training:  Distance (ft): 40 Feet (ft)  Assistive Device: Gait belt;Walker, rolling  Ambulation - Level of Assistance: Contact guard assistance        Gait Abnormalities: Decreased step clearance; Antalgic; Step to gait        Base of Support: Widened     Speed/Vita: Pace decreased (<100 feet/min)  Step Length: Right shortened;Left shortened           Pain Rating:  Pt reported pain with mobility. Activity Tolerance:   Good and requires rest breaks    After treatment patient left in no apparent distress:   Sitting in chair, Call bell within reach and Caregiver / family present    COMMUNICATION/COLLABORATION:   The patients plan of care was discussed with: Occupational therapist and Registered nurse.      Faustina Severe, PTA   Time Calculation: 23 mins

## 2021-05-27 ENCOUNTER — PATIENT OUTREACH (OUTPATIENT)
Dept: CASE MANAGEMENT | Age: 76
End: 2021-05-27

## 2021-06-03 ENCOUNTER — PATIENT OUTREACH (OUTPATIENT)
Dept: CASE MANAGEMENT | Age: 76
End: 2021-06-03

## 2021-06-03 NOTE — PROGRESS NOTES
Post Acute Facility Update     *The information contained in this note was received during a weekly care coordination call with the post acute facility*    Current Facility: [address] [place] [address] ([facility])  Anticipated Discharge Date: TBD    Facility Nursing Update: no current updates  Facility Social Work Update: no current updates  Upper Extremity Assistance: Stand by Assist - Presence and Cueing  Lower Extremity Assistance: Minimal Assistance   Bed Mobility: Minimal Assistance   Transfers: Contact Guard Assist - hands on patient for balance   Ambulation: Contact Guard Assist - hands on patient for balance   How far (in feet) is the patient ambulating?  60  Device Used: walker  Barriers to Discharge: TBD    Oni HUTCHINSON, RN  [employer]

## 2021-06-04 NOTE — PROGRESS NOTES
Post Acute Facility Update     *The information contained in this note was received during a weekly care coordination call with the post acute facility*    Current Facility: [address] [place] [address] ([facility])  Anticipated Discharge Date: D    Facility Nursing Update: N.O. Hydralazine 25 mg PO BID. Hold if SBP less than 110. Resident remains on skilled care for impaired mobility r/t left hip fracture due to fall. 1 person limited assistance required with ADLs. Supervision required with transfers. Tylenol given for left hip discomfort with effective results. Able to feed self after set up. Facility Social Work Update: No current updates   Upper Extremity Assistance: Stand by Assist - Presence and Cueing  Lower Extremity Assistance: Stand by Assist - Presence and Cueing  Bed Mobility: Contact Guard Assist - hands on patient for balance   Transfers: Contact Guard Assist - hands on patient for balance   Ambulation: Contact Guard Assist - hands on patient for balance   How far (in feet) is the patient ambulating?  100  Device Used: walker  Barriers to Discharge: WES Sloan MSLEYLA  [facility]

## 2021-06-10 ENCOUNTER — PATIENT OUTREACH (OUTPATIENT)
Dept: CASE MANAGEMENT | Age: 76
End: 2021-06-10

## 2021-06-17 ENCOUNTER — PATIENT OUTREACH (OUTPATIENT)
Dept: CASE MANAGEMENT | Age: 76
End: 2021-06-17

## 2021-06-17 NOTE — PROGRESS NOTES
Post Acute Facility Update     *The information contained in this note was received during a weekly care coordination call with the post acute facility*    Current Facility: [address] [place] [address] ([facility])  Anticipated Discharge Date: 6/18/2021    Facility Nursing Update: Resident remains on skilled care for impaired mobility r/t left hip fracture due to fall. 1 person limited assistance required with ADLs and transfers. Able to feed self. Continent of bowel and bladder. Able to make needs known. No s/s of hypo/hyperglycemia noted on this shift. Tylenol given for left hip discomfort after therapy. See therapy notes for participation. No distress noted. Facility Social Work Update: Ordering a RW, Shower chair, and leg  from [address] [place] [address]: Stand by Assist - Presence and Cueing  Lower Extremity Assistance: Stand by Assist - Presence and Cueing  Bed Mobility: Stand by Assist - Presence and Cueing  Transfers: Stand by Assist - Presence and Cueing  Ambulation: Stand by Assist - Presence and Cueing  How far (in feet) is the patient ambulating?  150  Device Used: walker  Barriers to Discharge: Patient lives alone and needs to be independent    Leo HUTCHINSON, Brandon Sweeney

## 2021-06-22 NOTE — PROGRESS NOTES
[address] [place] [address] Dr Discharge Note    *The information contained in this note was received during a weekly care coordination call with the post acute facility*      Patient was discharged from [facility] ([facility]) on 6/11/2021. Family scheduled PCP f/u appointment. PCP: MD NAYE Shaffer appointment: No future appointments. Home Health/Outpatient orders at discharge: home health care  [address]: [address] ordered at discharge: None  [address] received prior to discharge: [address] [facility] Team will sign off at this time. Medications were not reconciled and general patient assessment was not completed during this skilled nursing facility outreach.      TRAN Patricia  [facility] Team

## 2022-01-02 ENCOUNTER — HOSPITAL ENCOUNTER (EMERGENCY)
Age: 77
Discharge: HOME | End: 2022-01-02
Attending: STUDENT IN AN ORGANIZED HEALTH CARE EDUCATION/TRAINING PROGRAM
Payer: MEDICARE

## 2022-01-02 VITALS
RESPIRATION RATE: 16 BRPM | SYSTOLIC BLOOD PRESSURE: 165 MMHG | HEIGHT: 70 IN | WEIGHT: 232.37 LBS | BODY MASS INDEX: 33.27 KG/M2 | DIASTOLIC BLOOD PRESSURE: 76 MMHG | OXYGEN SATURATION: 100 % | HEART RATE: 72 BPM | TEMPERATURE: 98 F

## 2022-01-02 DIAGNOSIS — R31.9 HEMATURIA, UNSPECIFIED TYPE: Primary | ICD-10-CM

## 2022-01-02 LAB
ABO + RH BLD: NORMAL
ALBUMIN SERPL-MCNC: 3.7 G/DL (ref 3.5–5)
ALBUMIN/GLOB SERPL: 1 {RATIO} (ref 1.1–2.2)
ALP SERPL-CCNC: 114 U/L (ref 45–117)
ALT SERPL-CCNC: 22 U/L (ref 12–78)
ANION GAP SERPL CALC-SCNC: 4 MMOL/L (ref 5–15)
APPEARANCE UR: CLEAR
AST SERPL-CCNC: 18 U/L (ref 15–37)
BACTERIA URNS QL MICRO: NEGATIVE /HPF
BASOPHILS # BLD: 0 K/UL (ref 0–0.1)
BASOPHILS NFR BLD: 0 % (ref 0–1)
BILIRUB SERPL-MCNC: 0.9 MG/DL (ref 0.2–1)
BILIRUB UR QL: NEGATIVE
BLOOD GROUP ANTIBODIES SERPL: NORMAL
BUN SERPL-MCNC: 30 MG/DL (ref 6–20)
BUN/CREAT SERPL: 21 (ref 12–20)
CALCIUM SERPL-MCNC: 10.4 MG/DL (ref 8.5–10.1)
CHLORIDE SERPL-SCNC: 105 MMOL/L (ref 97–108)
CO2 SERPL-SCNC: 31 MMOL/L (ref 21–32)
COLOR UR: ABNORMAL
CREAT SERPL-MCNC: 1.4 MG/DL (ref 0.55–1.02)
DIFFERENTIAL METHOD BLD: NORMAL
EOSINOPHIL # BLD: 0.2 K/UL (ref 0–0.4)
EOSINOPHIL NFR BLD: 2 % (ref 0–7)
EPITH CASTS URNS QL MICRO: ABNORMAL /LPF
ERYTHROCYTE [DISTWIDTH] IN BLOOD BY AUTOMATED COUNT: 13.7 % (ref 11.5–14.5)
GLOBULIN SER CALC-MCNC: 3.6 G/DL (ref 2–4)
GLUCOSE SERPL-MCNC: 77 MG/DL (ref 65–100)
GLUCOSE UR STRIP.AUTO-MCNC: NEGATIVE MG/DL
HCT VFR BLD AUTO: 42.3 % (ref 35–47)
HGB BLD-MCNC: 13 G/DL (ref 11.5–16)
HGB UR QL STRIP: ABNORMAL
HYALINE CASTS URNS QL MICRO: ABNORMAL /LPF (ref 0–5)
IMM GRANULOCYTES # BLD AUTO: 0 K/UL (ref 0–0.04)
IMM GRANULOCYTES NFR BLD AUTO: 0 % (ref 0–0.5)
KETONES UR QL STRIP.AUTO: NEGATIVE MG/DL
LEUKOCYTE ESTERASE UR QL STRIP.AUTO: NEGATIVE
LYMPHOCYTES # BLD: 3.1 K/UL (ref 0.8–3.5)
LYMPHOCYTES NFR BLD: 32 % (ref 12–49)
MCH RBC QN AUTO: 27.7 PG (ref 26–34)
MCHC RBC AUTO-ENTMCNC: 30.7 G/DL (ref 30–36.5)
MCV RBC AUTO: 90 FL (ref 80–99)
MONOCYTES # BLD: 0.6 K/UL (ref 0–1)
MONOCYTES NFR BLD: 6 % (ref 5–13)
NEUTS SEG # BLD: 5.7 K/UL (ref 1.8–8)
NEUTS SEG NFR BLD: 60 % (ref 32–75)
NITRITE UR QL STRIP.AUTO: NEGATIVE
NRBC # BLD: 0 K/UL (ref 0–0.01)
NRBC BLD-RTO: 0 PER 100 WBC
PH UR STRIP: 7 [PH] (ref 5–8)
PLATELET # BLD AUTO: 249 K/UL (ref 150–400)
PMV BLD AUTO: 10.2 FL (ref 8.9–12.9)
POTASSIUM SERPL-SCNC: 3.9 MMOL/L (ref 3.5–5.1)
PROT SERPL-MCNC: 7.3 G/DL (ref 6.4–8.2)
PROT UR STRIP-MCNC: 30 MG/DL
RBC # BLD AUTO: 4.7 M/UL (ref 3.8–5.2)
RBC #/AREA URNS HPF: ABNORMAL /HPF (ref 0–5)
SODIUM SERPL-SCNC: 140 MMOL/L (ref 136–145)
SP GR UR REFRACTOMETRY: 1.02 (ref 1–1.03)
SPECIMEN EXP DATE BLD: NORMAL
UA: UC IF INDICATED,UAUC: ABNORMAL
UROBILINOGEN UR QL STRIP.AUTO: 0.2 EU/DL (ref 0.2–1)
WBC # BLD AUTO: 9.5 K/UL (ref 3.6–11)
WBC URNS QL MICRO: ABNORMAL /HPF (ref 0–4)

## 2022-01-02 PROCEDURE — 99282 EMERGENCY DEPT VISIT SF MDM: CPT

## 2022-01-02 PROCEDURE — 36415 COLL VENOUS BLD VENIPUNCTURE: CPT

## 2022-01-02 PROCEDURE — 80053 COMPREHEN METABOLIC PANEL: CPT

## 2022-01-02 PROCEDURE — 81001 URINALYSIS AUTO W/SCOPE: CPT

## 2022-01-02 PROCEDURE — 86900 BLOOD TYPING SEROLOGIC ABO: CPT

## 2022-01-02 PROCEDURE — 85025 COMPLETE CBC W/AUTO DIFF WBC: CPT

## 2022-01-02 NOTE — ED PROVIDER NOTES
EMERGENCY DEPARTMENT HISTORY AND PHYSICAL EXAM      Date: 1/2/2022  Patient Name: Vandana Khalil    History of Presenting Illness     Chief Complaint   Patient presents with    Vaginal Bleeding     vaginal bleeding twice this week; pt was standing in the shower; told she needed to see a specialist doctor by SALBADOR MEYERS; says her iron is low -gets infusions ; had a hysterctomy in '78; left the ovaries         HPI: Vandana Khalil, 64 y.o. female presents to the ED with cc of bleeding. She initially thought this might be vaginal bleeding. She first noticed that on Friday, she was in the shower when she noticed some bright red blood. This resolved. She again saw that today, bright red blood from her groin region in the shower. No clots. She denies any pain with urination, frequency or urgency, no black or bloody stools. Did notice some bright red blood when she wiped after urinating. She reports some chronic left hip soreness after a fracture remotely, however no new abdominal pain, no nausea or vomiting, no chest pain or shortness of breath. She does take Xarelto. She is status post hysterectomy. There are no other complaints, changes, or physical findings at this time. PCP: Nadya Jacques MD    No current facility-administered medications on file prior to encounter. Current Outpatient Medications on File Prior to Encounter   Medication Sig Dispense Refill    acetaminophen (TYLENOL) 325 mg tablet Take 2 Tabs by mouth every six (6) hours as needed for Pain. 30 Tab 4    calcium-vitamin D (OS-JOSEPH +D3) 500 mg-200 unit per tablet Take 1 Tab by mouth three (3) times daily (with meals). 90 Tab 3    digoxin (LANOXIN) 0.125 mg tablet Take 2 Tabs by mouth daily. 30 Tab 2    metoprolol tartrate (LOPRESSOR) 100 mg IR tablet Take 1 Tab by mouth two (2) times a day. 60 Tab 5    cloNIDine HCL (CATAPRES) 0.2 mg tablet Take 1 Tab by mouth two (2) times a day.  60 Tab 3    insulin lispro (HUMALOG) 100 unit/mL injection 8 Units by SubCUTAneous route Before breakfast, lunch, and dinner. 1 Vial 3    insulin glargine (LANTUS) 100 unit/mL injection 30 Units by SubCUTAneous route nightly. 1 Vial 2    rivaroxaban (XARELTO) 20 mg tab tablet Take 20 mg by mouth daily.  potassium chloride (KLOR-CON M20) 20 mEq tablet Take 20 mEq by mouth daily.  albuterol (PROVENTIL VENTOLIN) 2.5 mg /3 mL (0.083 %) nebulizer solution 3 mL by Nebulization route every four (4) hours as needed for Wheezing. 24 Each 0    albuterol (PROVENTIL HFA, VENTOLIN HFA, PROAIR HFA) 90 mcg/actuation inhaler Take 2 Puffs by inhalation every four (4) hours as needed for Wheezing. 1 Inhaler 0    levothyroxine (SYNTHROID) 100 mcg tablet Take 100 mcg by mouth Daily (before breakfast).  furosemide (LASIX) 40 mg tablet Take 40 mg by mouth daily as needed (lower extremity swelling).  esomeprazole (NEXIUM) 40 mg capsule Take 40 mg by mouth daily. Past History     Past Medical History:  Past Medical History:   Diagnosis Date    A-fib ([facility]     Asthma     Diabetes ([facility]     GERD (gastroesophageal reflux disease)     Hypertension     Other ill-defined conditions(799.89)     hypothyroid       Past Surgical History:  Past Surgical History:   Procedure Laterality Date    HX GYN      hysterectomy    HX ORTHOPAEDIC       LEFT KNEE ARTHROSCOPY    HX OTHER SURGICAL      lipoma removal       Family History:  Family History   Problem Relation Age of Onset    No Known Problems Mother     No Known Problems Father        Social History:  Social History     Tobacco Use    Smoking status: Never Smoker    Smokeless tobacco: Never Used   Substance Use Topics    Alcohol use: No    Drug use: No       Allergies:   Allergies   Allergen Reactions    Pcn [Penicillins] Hives         Review of Systems   no fever  No ear pain  No eye pain  no shortness of breath  no chest pain  no abdominal pain  No urinary frequency  no leg pain  No rash  No lymphadenopathy  No weight loss    Physical Exam   Physical Exam  Constitutional:       General: She is not in acute distress. Appearance: She is not toxic-appearing. HENT:      Head: Normocephalic. Eyes:      Extraocular Movements: Extraocular movements intact. Cardiovascular:      Rate and Rhythm: Normal rate and regular rhythm. Pulmonary:      Effort: Pulmonary effort is normal.      Breath sounds: Normal breath sounds. Abdominal:      Palpations: Abdomen is soft. Tenderness: There is no abdominal tenderness. Genitourinary:     Comments: There are a couple scattered telangiectasias over the labia minora on the right, no active bleeding. There is no blood in the vaginal vault. On rectal, there is no black or bloody stool. Musculoskeletal:         General: No tenderness or deformity. Cervical back: Neck supple. Skin:     General: Skin is warm and dry. Neurological:      General: No focal deficit present. Mental Status: She is alert. Psychiatric:         Mood and Affect: Mood normal.         Diagnostic Study Results     Labs -     Recent Results (from the past 24 hour(s))   CBC WITH AUTOMATED DIFF    Collection Time: 01/02/22 12:22 PM   Result Value Ref Range    WBC 9.5 3.6 - 11.0 K/uL    RBC 4.70 3.80 - 5.20 M/uL    HGB 13.0 11.5 - 16.0 g/dL    HCT 42.3 35.0 - 47.0 %    MCV 90.0 80.0 - 99.0 FL    MCH 27.7 26.0 - 34.0 PG    MCHC 30.7 30.0 - 36.5 g/dL    RDW 13.7 11.5 - 14.5 %    PLATELET 594 991 - 599 K/uL    MPV 10.2 8.9 - 12.9 FL    NRBC 0.0 0  WBC    ABSOLUTE NRBC 0.00 0.00 - 0.01 K/uL    NEUTROPHILS 60 32 - 75 %    LYMPHOCYTES 32 12 - 49 %    MONOCYTES 6 5 - 13 %    EOSINOPHILS 2 0 - 7 %    BASOPHILS 0 0 - 1 %    IMMATURE GRANULOCYTES 0 0.0 - 0.5 %    ABS. NEUTROPHILS 5.7 1.8 - 8.0 K/UL    ABS. LYMPHOCYTES 3.1 0.8 - 3.5 K/UL    ABS. MONOCYTES 0.6 0.0 - 1.0 K/UL    ABS. EOSINOPHILS 0.2 0.0 - 0.4 K/UL    ABS. BASOPHILS 0.0 0.0 - 0.1 K/UL    ABS. IMM.  GRANS. 0.0 0.00 - 0.04 K/UL    DF AUTOMATED     METABOLIC PANEL, COMPREHENSIVE    Collection Time: 01/02/22 12:22 PM   Result Value Ref Range    Sodium 140 136 - 145 mmol/L    Potassium 3.9 3.5 - 5.1 mmol/L    Chloride 105 97 - 108 mmol/L    CO2 31 21 - 32 mmol/L    Anion gap 4 (L) 5 - 15 mmol/L    Glucose 77 65 - 100 mg/dL    BUN 30 (H) 6 - 20 MG/DL    Creatinine 1.40 (H) 0.55 - 1.02 MG/DL    BUN/Creatinine ratio 21 (H) 12 - 20      GFR est AA 44 (L) >60 ml/min/1.73m2    GFR est non-AA 36 (L) >60 ml/min/1.73m2    Calcium 10.4 (H) 8.5 - 10.1 MG/DL    Bilirubin, total 0.9 0.2 - 1.0 MG/DL    ALT (SGPT) 22 12 - 78 U/L    AST (SGOT) 18 15 - 37 U/L    Alk. phosphatase 114 45 - 117 U/L    Protein, total 7.3 6.4 - 8.2 g/dL    Albumin 3.7 3.5 - 5.0 g/dL    Globulin 3.6 2.0 - 4.0 g/dL    A-G Ratio 1.0 (L) 1.1 - 2.2     URINALYSIS W/ REFLEX CULTURE    Collection Time: 01/02/22 12:22 PM    Specimen: Urine   Result Value Ref Range    Color YELLOW/STRAW      Appearance CLEAR CLEAR      Specific gravity 1.022 1.003 - 1.030      pH (UA) 7.0 5.0 - 8.0      Protein 30 (A) NEG mg/dL    Glucose Negative NEG mg/dL    Ketone Negative NEG mg/dL    Bilirubin Negative NEG      Blood MODERATE (A) NEG      Urobilinogen 0.2 0.2 - 1.0 EU/dL    Nitrites Negative NEG      Leukocyte Esterase Negative NEG      WBC 0-4 0 - 4 /hpf    RBC 5-10 0 - 5 /hpf    Epithelial cells FEW FEW /lpf    Bacteria Negative NEG /hpf    UA:UC IF INDICATED CULTURE NOT INDICATED BY UA RESULT CNI      Hyaline cast 0-2 0 - 5 /lpf   TYPE & SCREEN    Collection Time: 01/02/22 12:22 PM   Result Value Ref Range    Crossmatch Expiration 01/05/2022,2359     ABO/Rh(D) O POSITIVE     Antibody screen NEG        Radiologic Studies -   No orders to display     CT Results  (Last 48 hours)    None        CXR Results  (Last 48 hours)    None            Medical Decision Making   I am the first provider for this patient.     I reviewed the vital signs, available nursing notes, past medical history, past surgical history, family history and social history. Vital Signs-Reviewed the patient's vital signs. Patient Vitals for the past 24 hrs:   Temp Pulse Resp BP SpO2   01/02/22 1216 98 °F (36.7 °C) 72 16 (!) 165/76 100 %         Provider Notes (Medical Decision Making):   68-year-old female presenting with bleeding. Unclear if this is vaginal or hematuria. No rectal bleeding on my exam.  She does take Xarelto, will assess for any significant blood loss, however vitals are unremarkable and she denies any other systemic symptoms. Differential includes cystitis, hematuria. On pelvic exam, there is no blood in the vaginal vault, she is status post hysterectomy, I explained that this is unlikely that this is vaginal bleeding. This may be bleeding from the labia, or potentially hematuria. ED Course:     Initial assessment performed. The patients presenting problems have been discussed, and they are in agreement with the care plan formulated and outlined with them. I have encouraged them to ask questions as they arise throughout their visit. CBC negative for leukocytosis or anemia, basic metabolic panel with no worrisome electrolyte abnormalities UA does show blood but not suggestive of UTI. On reevaluation, patient is resting comfortably. There has been no bleeding here. Patient is counseled on supportive care and return precautions. Will return to the ED for any worsening bleeding, any lightheadedness, shortness of breath, or any new or worrisome symptoms. Will followup with primary care doctor within 5 days. Critical Care Time:         Disposition:  Home    PLAN:  1. Discharge Medication List as of 1/2/2022  2:48 PM        2.    Follow-up Information     Follow up With Specialties Details Why Contact Info    Joesph Bahena MD Family Medicine Go to  Your scheduled appointment 1 [facility],[address]  [phone]      [facility] EMERGENCY DEPT Emergency Medicine  As needed, If symptoms worsen [address] [place] [address]  [phone]        Return to ED if worse     Diagnosis     Clinical Impression: Acute hematuria versus labial bleeding

## 2022-01-02 NOTE — ED NOTES
I have reviewed discharge instructions with the patient. The patient verbalized understanding.  Alert and stable to walk at discharge

## 2022-01-04 ENCOUNTER — TRANSCRIBE ORDER (OUTPATIENT)
Dept: SCHEDULING | Age: 77
End: 2022-01-04

## 2022-01-04 DIAGNOSIS — R31.0 GROSS HEMATURIA: Primary | ICD-10-CM

## 2022-01-05 ENCOUNTER — HOSPITAL ENCOUNTER (OUTPATIENT)
Dept: CT IMAGING | Age: 77
Discharge: HOME | End: 2022-01-05
Attending: UROLOGY
Payer: MEDICARE

## 2022-01-05 DIAGNOSIS — R31.0 GROSS HEMATURIA: ICD-10-CM

## 2022-01-05 PROCEDURE — 74011000636 HC RX REV CODE- 636: Performed by: UROLOGY

## 2022-01-05 PROCEDURE — 74178 CT ABD&PLV WO CNTR FLWD CNTR: CPT

## 2022-01-05 RX ADMIN — IOPAMIDOL 100 ML: 755 INJECTION, SOLUTION INTRAVENOUS at 15:44

## 2022-03-18 PROBLEM — E11.9 DM (DIABETES MELLITUS) (HCC): Status: ACTIVE | Noted: 2018-02-27

## 2022-03-19 PROBLEM — I10 HTN (HYPERTENSION): Status: ACTIVE | Noted: 2018-02-27

## 2022-03-19 PROBLEM — S72.009A HIP FRACTURE (HCC): Status: ACTIVE | Noted: 2021-04-27

## 2022-03-19 PROBLEM — K21.9 GERD (GASTROESOPHAGEAL REFLUX DISEASE): Status: ACTIVE | Noted: 2018-02-27

## 2022-03-19 PROBLEM — I21.4 NSTEMI, INITIAL EPISODE OF CARE (HCC): Status: ACTIVE | Noted: 2018-02-28

## 2022-03-19 PROBLEM — J45.909 ASTHMA: Status: ACTIVE | Noted: 2018-02-27

## 2022-03-20 PROBLEM — I21.4 NSTEMI (NON-ST ELEVATED MYOCARDIAL INFARCTION) (HCC): Status: ACTIVE | Noted: 2018-02-27

## 2022-04-19 ENCOUNTER — OFFICE VISIT (OUTPATIENT)
Dept: ORTHOPEDIC SURGERY | Age: 77
End: 2022-04-19
Payer: MEDICARE

## 2022-04-19 VITALS — WEIGHT: 230 LBS | HEIGHT: 70 IN | BODY MASS INDEX: 32.93 KG/M2

## 2022-04-19 DIAGNOSIS — Z98.890 STATUS POST HIP SURGERY: Primary | ICD-10-CM

## 2022-04-19 PROCEDURE — G8417 CALC BMI ABV UP PARAM F/U: HCPCS | Performed by: ORTHOPAEDIC SURGERY

## 2022-04-19 PROCEDURE — G8427 DOCREV CUR MEDS BY ELIG CLIN: HCPCS | Performed by: ORTHOPAEDIC SURGERY

## 2022-04-19 PROCEDURE — G8756 NO BP MEASURE DOC: HCPCS | Performed by: ORTHOPAEDIC SURGERY

## 2022-04-19 PROCEDURE — 1101F PT FALLS ASSESS-DOCD LE1/YR: CPT | Performed by: ORTHOPAEDIC SURGERY

## 2022-04-19 PROCEDURE — G8432 DEP SCR NOT DOC, RNG: HCPCS | Performed by: ORTHOPAEDIC SURGERY

## 2022-04-19 PROCEDURE — G8400 PT W/DXA NO RESULTS DOC: HCPCS | Performed by: ORTHOPAEDIC SURGERY

## 2022-04-19 PROCEDURE — 1090F PRES/ABSN URINE INCON ASSESS: CPT | Performed by: ORTHOPAEDIC SURGERY

## 2022-04-19 PROCEDURE — 99213 OFFICE O/P EST LOW 20 MIN: CPT | Performed by: ORTHOPAEDIC SURGERY

## 2022-04-19 PROCEDURE — G8536 NO DOC ELDER MAL SCRN: HCPCS | Performed by: ORTHOPAEDIC SURGERY

## 2022-04-19 RX ORDER — HYDRALAZINE HYDROCHLORIDE 25 MG/1
25 TABLET, FILM COATED ORAL 3 TIMES DAILY
COMMUNITY
Start: 2022-04-11

## 2022-04-19 RX ORDER — DILTIAZEM HYDROCHLORIDE 240 MG/1
240 CAPSULE, COATED, EXTENDED RELEASE ORAL DAILY
COMMUNITY
Start: 2022-04-05

## 2022-04-19 RX ORDER — METFORMIN HYDROCHLORIDE 500 MG/1
TABLET ORAL 2 TIMES DAILY WITH MEALS
COMMUNITY

## 2022-04-19 RX ORDER — LOSARTAN POTASSIUM 100 MG/1
TABLET ORAL
COMMUNITY

## 2022-04-19 RX ORDER — TORSEMIDE 20 MG/1
40 TABLET ORAL 2 TIMES DAILY
COMMUNITY
Start: 2022-03-07

## 2022-04-19 RX ORDER — METOPROLOL SUCCINATE 100 MG/1
100 TABLET, EXTENDED RELEASE ORAL DAILY
COMMUNITY
Start: 2022-04-05 | End: 2022-04-19

## 2022-04-19 NOTE — PROGRESS NOTES
Bijal Paris (: 1945) is a 64 y.o. female patient, here for evaluation of the following chief complaint(s):  Surgical Follow-up (left hip follow up)       ASSESSMENT/PLAN:  Below is the assessment and plan developed based on review of pertinent history, physical exam, labs, studies, and medications. 28-year-old female comes in today for follow-up. She is status post IM nailing of the left hip 1 year ago. Postoperatively she is doing well. She has minimal to no pain in regards to her hip. Ambulating with a walker getting out and doing some gardening. Overall she is happy. Main complaint today is ongoing pain and discomfort in her bilateral knees. She has known significant osteoarthritic changes of both knees. She would like to repeat gel injections. Unfortunately we do not have any in the office today, plans to follow-up in 2 weeks for repeat injections. 1. Status post hip surgery  -     XR HIP LT W OR WO PELV 2-3 VWS; Future      Encounter Diagnosis   Name Primary?  Status post hip surgery Yes        No follow-ups on file. SUBJECTIVE/OBJECTIVE:  Bijal Paris (: 1945) is a 64 y.o. female who presents today for the following:  Chief Complaint   Patient presents with    Surgical Follow-up     left hip follow up       28-year-old female comes in today for follow-up of her left hip. She is status post IM nailing secondary to a fracture 1 year ago. Postoperatively she is doing well. She has minimal to no pain in regards to her left hip. She is ambulating well with a walker. Main complaint today is bilateral knee pain. She has known significant osteoarthritic changes of both knees. She does have a history of diabetes and kidney disease. She is unable to do steroid injection. Unable to take NSAIDs. She has tried a gel injection in the past and done well. She would like to try this again.     IMAGING:  XR Results (most recent):  Results from Appointment encounter on 04/19/22    XR HIP LT W OR WO PELV 2-3 VWS    Narrative  3 views left hip x-rays ordered personally reviewed. Patient status post IM nailing for fracture. Good interval healing noted. Good overall alignment. No failure cut out noted. Allergies   Allergen Reactions    Pcn [Penicillins] Hives       Current Outpatient Medications   Medication Sig    dilTIAZem ER (CARDIZEM CD) 240 mg capsule Take 240 mg by mouth daily.  hydrALAZINE (APRESOLINE) 25 mg tablet Take 25 mg by mouth three (3) times daily.  torsemide (DEMADEX) 20 mg tablet Take 40 mg by mouth two (2) times a day.  losartan (COZAAR) 100 mg tablet 1 tablet    insulin NPH (NOVOLIN N, HUMULIN N) 100 unit/mL injection by SubCUTAneous route.  metFORMIN (GLUCOPHAGE) 500 mg tablet Take  by mouth two (2) times daily (with meals).  digoxin (LANOXIN) 0.125 mg tablet Take 2 Tabs by mouth daily.  metoprolol tartrate (LOPRESSOR) 100 mg IR tablet Take 1 Tab by mouth two (2) times a day.  cloNIDine HCL (CATAPRES) 0.2 mg tablet Take 1 Tab by mouth two (2) times a day.  insulin lispro (HUMALOG) 100 unit/mL injection 8 Units by SubCUTAneous route Before breakfast, lunch, and dinner.  rivaroxaban (XARELTO) 20 mg tab tablet Take 20 mg by mouth daily.  potassium chloride (KLOR-CON M20) 20 mEq tablet Take 20 mEq by mouth daily.  albuterol (PROVENTIL HFA, VENTOLIN HFA, PROAIR HFA) 90 mcg/actuation inhaler Take 2 Puffs by inhalation every four (4) hours as needed for Wheezing.  levothyroxine (SYNTHROID) 100 mcg tablet Take 100 mcg by mouth Daily (before breakfast).  esomeprazole (NEXIUM) 40 mg capsule Take 40 mg by mouth daily.  acetaminophen (TYLENOL) 325 mg tablet Take 2 Tabs by mouth every six (6) hours as needed for Pain. (Patient not taking: Reported on 4/19/2022)    calcium-vitamin D (OS-JOSEPH +D3) 500 mg-200 unit per tablet Take 1 Tab by mouth three (3) times daily (with meals).  (Patient not taking: Reported on 4/19/2022)  insulin glargine (LANTUS) 100 unit/mL injection 30 Units by SubCUTAneous route nightly. (Patient not taking: Reported on 4/19/2022)    albuterol (PROVENTIL VENTOLIN) 2.5 mg /3 mL (0.083 %) nebulizer solution 3 mL by Nebulization route every four (4) hours as needed for Wheezing. (Patient not taking: Reported on 4/19/2022)    furosemide (LASIX) 40 mg tablet Take 40 mg by mouth daily as needed (lower extremity swelling). (Patient not taking: Reported on 4/19/2022)     No current facility-administered medications for this visit. Past Medical History:   Diagnosis Date    [facility]fib ([facility] [address]     Asthma     Diabetes ([facility] [address] [facility]     GERD (gastroesophageal reflux disease)     Hypertension     Other ill-defined conditions(799.89)     hypothyroid        Past Surgical History:   Procedure Laterality Date    HX GYN      hysterectomy    HX ORTHOPAEDIC       LEFT KNEE ARTHROSCOPY    HX OTHER SURGICAL      lipoma removal       Family History   Problem Relation Age of Onset    No Known Problems Mother     No Known Problems Father         Social History     Tobacco Use    Smoking status: Never Smoker    Smokeless tobacco: Never Used   Substance Use Topics    Alcohol use: No        All systems reviewed x 12 and were negative with the exception of None      No flowsheet data found. Vitals:  Ht 5' 10\" (1.778 m)   Wt 230 lb (104.3 kg)   BMI 33.00 kg/m²    Body mass index is 33 kg/m². Physical Exam    General: NAD    Cardiac: Extremities well perfused. Respiratory: Nonlabored breathing. LLE: Incision well-healed. Minimal numbness over the LFCN. Mild antalgic gait, baseline. Negative stinchfield. No pain with gentle internal and external rotation. .  Motor strength is grossly intact. Skin warm well perfused. Capillary refill <2 sec. Alem Gibson M.D. was available for immediate consultation as the supervising physician. An electronic signature was used to authenticate this note.   -- Coral Arnett Judie Guillen PA-C

## 2022-09-21 ENCOUNTER — OFFICE VISIT (OUTPATIENT)
Dept: ORTHOPEDIC SURGERY | Age: 77
End: 2022-09-21
Payer: MEDICARE

## 2022-09-21 VITALS — WEIGHT: 239 LBS | HEIGHT: 70 IN | BODY MASS INDEX: 34.22 KG/M2

## 2022-09-21 DIAGNOSIS — M54.50 MIDLINE LOW BACK PAIN WITHOUT SCIATICA, UNSPECIFIED CHRONICITY: Primary | ICD-10-CM

## 2022-09-21 PROCEDURE — 1123F ACP DISCUSS/DSCN MKR DOCD: CPT | Performed by: STUDENT IN AN ORGANIZED HEALTH CARE EDUCATION/TRAINING PROGRAM

## 2022-09-21 PROCEDURE — 99204 OFFICE O/P NEW MOD 45 MIN: CPT | Performed by: STUDENT IN AN ORGANIZED HEALTH CARE EDUCATION/TRAINING PROGRAM

## 2022-09-21 RX ORDER — MELOXICAM 15 MG/1
15 TABLET ORAL DAILY
Qty: 14 TABLET | Refills: 1 | Status: SHIPPED | OUTPATIENT
Start: 2022-09-21 | End: 2022-11-03

## 2022-09-21 NOTE — PROGRESS NOTES
Evans Lowe (: 1945) is a 76 y.o. female here for evaluation of the following chief complaint(s):  Back Pain       ASSESSMENT/PLAN:  Below is the assessment and plan developed based on review of pertinent history, physical exam, labs, studies, and medications. 1. Midline low back pain without sciatica, unspecified chronicity  -     XR SPINE LUMB MIN 4 V; Future  -     REFERRAL TO PHYSICAL THERAPY      Return in about 6 weeks (around 2022) for Follow up after PT. I will also prescribe a 2-week course of meloxicam 15 mg once daily. Red flag symptoms discussed with the patient. Patient is to present to the emergency department if any of these symptoms occur. Patient verbalized understanding and agrees to proceed with the aforementioned plan. Thank you for allowing me to participate in the care of this patient. SUBJECTIVE/OBJECTIVE:    HPI    Patient is a pleasant 76 y.o. F who presents with chronic low back pain. No recent antecedent trauma; she did have a remote trauma approximately 2 years ago where she fell backwards off of a ladder and subsequently broke her left hip and leg which required open reduction internal fixation by Dr. Ty Coronado. She has healed well from these injuries but has persistent fairly debilitating low back pain and now ambulates with a walker at baseline. She denies decreased ambulatory capacity and states that she can walk long distances without significant back or leg pain. She does endorse some gait instability, this is part of the reason that she uses a walker. Her pain is better with standing and walking and worse with sitting and lying down. 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 She is still fairly active despite her age. Upper extremity dexterity/fine motor issues: Denies. Therapies (Rx/PT/Injections): No recent spine physical therapy or prescription anti-inflammatory medications.   No history of spine injections or spine surgery. Chief Complaint   Patient presents with    Back Pain     Current Outpatient Medications   Medication Sig    meloxicam (MOBIC) 15 mg tablet Take 1 Tablet by mouth daily. dilTIAZem ER (CARDIZEM CD) 240 mg capsule Take 240 mg by mouth daily. hydrALAZINE (APRESOLINE) 25 mg tablet Take 25 mg by mouth three (3) times daily. torsemide (DEMADEX) 20 mg tablet Take 40 mg by mouth two (2) times a day. losartan (COZAAR) 100 mg tablet 1 tablet    insulin NPH (NOVOLIN N, HUMULIN N) 100 unit/mL injection by SubCUTAneous route. metFORMIN (GLUCOPHAGE) 500 mg tablet Take  by mouth two (2) times daily (with meals). acetaminophen (TYLENOL) 325 mg tablet Take 2 Tabs by mouth every six (6) hours as needed for Pain. (Patient not taking: Reported on 4/19/2022)    calcium-vitamin D (OS-JOSEPH +D3) 500 mg-200 unit per tablet Take 1 Tab by mouth three (3) times daily (with meals). (Patient not taking: Reported on 4/19/2022)    digoxin (LANOXIN) 0.125 mg tablet Take 2 Tabs by mouth daily. metoprolol tartrate (LOPRESSOR) 100 mg IR tablet Take 1 Tab by mouth two (2) times a day. cloNIDine HCL (CATAPRES) 0.2 mg tablet Take 1 Tab by mouth two (2) times a day. insulin lispro (HUMALOG) 100 unit/mL injection 8 Units by SubCUTAneous route Before breakfast, lunch, and dinner. insulin glargine (LANTUS) 100 unit/mL injection 30 Units by SubCUTAneous route nightly. (Patient not taking: Reported on 4/19/2022)    rivaroxaban (XARELTO) 20 mg tab tablet Take 20 mg by mouth daily. potassium chloride (KLOR-CON M20) 20 mEq tablet Take 20 mEq by mouth daily. albuterol (PROVENTIL VENTOLIN) 2.5 mg /3 mL (0.083 %) nebulizer solution 3 mL by Nebulization route every four (4) hours as needed for Wheezing. (Patient not taking: Reported on 4/19/2022)    albuterol (PROVENTIL HFA, VENTOLIN HFA, PROAIR HFA) 90 mcg/actuation inhaler Take 2 Puffs by inhalation every four (4) hours as needed for Wheezing.     levothyroxine (SYNTHROID) 100 mcg tablet Take 100 mcg by mouth Daily (before breakfast). furosemide (LASIX) 40 mg tablet Take 40 mg by mouth daily as needed (lower extremity swelling). (Patient not taking: Reported on 4/19/2022)    esomeprazole (NEXIUM) 40 mg capsule Take 40 mg by mouth daily. No current facility-administered medications for this visit.        Past Medical History:   Diagnosis Date    A-fib ([facility] [address]     Asthma     [facility]     GERD (gastroesophageal reflux disease)     Hypertension     Other ill-defined conditions(799.89)     hypothyroid     Past Surgical History:   Procedure Laterality Date    HX GYN      hysterectomy    HX ORTHOPAEDIC       LEFT KNEE ARTHROSCOPY    HX OTHER SURGICAL      lipoma removal     Family History   Problem Relation Age of Onset    No Known Problems Mother     No Known Problems Father      Social History     Tobacco Use    Smoking status: Never    Smokeless tobacco: Never   Substance Use Topics    Alcohol use: No    Drug use: No      Social History     Tobacco Use   Smoking Status Never   Smokeless Tobacco Never     Social History     Substance and Sexual Activity   Alcohol Use No       Review of Systems  Red flag symptoms: None  Bowel/Bladder/Saddle Anesthesia: Denies  Weakness/Sensory Disturbance: Denies  Ambulation/Fall History: Ambulates with a walker, no recent fall history  Constitutional Symptoms (F/C/NS/WL): Denies    Ht 5' 10\" (1.778 m)   Wt 239 lb (108.4 kg)   BMI 34.29 kg/m²      Physical Exam    GENERAL:  AAOx3, appears stated age, no distress  Body habitus: Overweight    LOWER EXTREMITIES:  Gait: Positive sagittal balance/forward flexed gait, mildly antalgic gait, difficulty with heel toe gait, unable to perform tandem gait  Motor: 5/5 in all myotomes L3-S1 bilaterally  Sensory: Intact to light touch in all dermatomes L4-S1 bilaterally  Reflexes: Absent but symmetric L4 and S1 bilaterally  Pathological reflexes: No sustained clonus, downgoing Babinski bilaterally Special tests: Negative seated SLR bilaterally    UPPER EXTREMITIES:  Grossly motor and sensory intact, no pathological reflexes    NECK/BACK:  Integumentary: Normal  Palpation/ROM: Mild low back tenderness to palpation; decreased range of motion in all planes secondary to pain and stiffness      IMAGING:    XR Results (most recent):  Results from Appointment encounter on 09/21/22    XR SPINE LUMB MIN 4 V    Narrative  4 view lumbar spine including flexion-extension with multilevel disc degeneration and grade 1 anterolisthesis at L4-L5, no gross instability on dynamic films. Age indeterminant compression fracture at L2 with approximately 50% height collapse and minimal wedging. Multilevel severe posterior facet arthrosis. No coronal deformity. Limited views of the pelvis with moderate SI joint arthrosis bilaterally. Evidence of left hip intertrochanteric nail. An electronic signature was used to authenticate this note.   -- Charles Mckeon, DO

## 2022-10-10 ENCOUNTER — OFFICE VISIT (OUTPATIENT)
Dept: ORTHOPEDIC SURGERY | Age: 77
End: 2022-10-10
Payer: MEDICARE

## 2022-10-10 DIAGNOSIS — M54.50 MIDLINE LOW BACK PAIN WITHOUT SCIATICA, UNSPECIFIED CHRONICITY: Primary | ICD-10-CM

## 2022-10-10 DIAGNOSIS — R26.2 DIFFICULTY WALKING: ICD-10-CM

## 2022-10-10 PROCEDURE — 97162 PT EVAL MOD COMPLEX 30 MIN: CPT | Performed by: PHYSICAL THERAPIST

## 2022-10-10 PROCEDURE — 97110 THERAPEUTIC EXERCISES: CPT | Performed by: PHYSICAL THERAPIST

## 2022-10-10 NOTE — PROGRESS NOTES
Patient Name: Tyler Garcia  [id number]  : 1945  [x]  Patient  Verified  Payor: Favian Lombardo / Plan: [place] MED[place] / Product Type: Medicare /    Total Treatment Time (min): 55 min  1:1 Treatment Time ( [address] only): 55 min  Referring provider: Charlie Velazquez  1. Midline low back pain without sciatica, unspecified chronicity  2. Difficulty walking      Subjective:    Patient is a 76 y.o. female referred to physical therapy by Charlie Velazquez DO for low back pain. Pt has a two year h/o back pain after falling from a ladder and needing an ORIF of her left hip. Pt ambulates w/walker 2nd to hip surgery. Patient states her back pain is worse with lying down and sitting and has gotten worse over the past month. She rates her pain as 3/10 at least and 9/10 at worst. Pt she has difficulty bending over and lifting. Past medical history and medication list can otherwise be reviewed per the EHR. Objective:  SHERRIE:76  AROM lumbar  FF:10 degrees  Ext:0 degrees  RSB:15 degrees  LSB:15 degrees  TTP over B lumbar paraspinals, B QL    Ex: 25 min  Treatment today to include instruction in a home exercise program as well as providing patient with written and visual handouts. PT Exercise Log         EXERCISE 10/10/2022   Glut sets 20x   Lower trunk rotation 20x   Single knee to chest 10x   Seated QL stretch 3x15\"                                                                    Man:     NMR:        All questions were addressed. Assessment:    Patient presents with increased pain and decreased ROM, strength, and mobility consistent with low back pain     Long Term Goals. 6 weeks  Patient will report centralized LBP to be consisitently less than or equal to 1/10 with all home/work/community/recreational ADL activity. Patient will report zero radicular pain with home/work/community/recreational ADL activity.    Pt. Will return to premorbid activities such as sitting for work and lying down w/o pain.    Short Term Goals. 2 visits. Patient will demonstrate independence with HEP. Pt. Will sleep through the night without waking 2nd pain    Plan:  Plan of care: Physical therapy consisted of frequency of 1/week for the next 6 weeks. Physical therapy will consist of therapeutic exercise, modalities, patient education, neuromuscular reeducation, manual therapy, therapeutic activity, dry needling, and instruction in home exercise program as appropriate. Eval  Ex: 25  Man:   NMR:       The referring physician has reviewed and approved this evaluation and plan of care as noted by the electronic signature attached to note.     Clay Tyler, MSPT, DPT

## 2022-10-17 ENCOUNTER — OFFICE VISIT (OUTPATIENT)
Dept: ORTHOPEDIC SURGERY | Age: 77
End: 2022-10-17
Payer: MEDICARE

## 2022-10-17 DIAGNOSIS — R26.2 DIFFICULTY WALKING: ICD-10-CM

## 2022-10-17 DIAGNOSIS — M54.50 MIDLINE LOW BACK PAIN WITHOUT SCIATICA, UNSPECIFIED CHRONICITY: Primary | ICD-10-CM

## 2022-10-17 PROCEDURE — 97110 THERAPEUTIC EXERCISES: CPT | Performed by: PHYSICAL THERAPIST

## 2022-10-17 PROCEDURE — 97140 MANUAL THERAPY 1/> REGIONS: CPT | Performed by: PHYSICAL THERAPIST

## 2022-10-17 NOTE — PROGRESS NOTES
Patient Name: Marycarmen Sparks  ZCJX:  : 1945  [x]  Patient  Verified  Payor: Kirstin Monsivais / Plan: [place] MED[place]ARE PART [place] & [place] / Product Type: Medicare /    Total Treatment Time (min): 30 min  1:1 Treatment Time ( [address] only): 30min  Referring provider: Feliz Brooke DO  1. Midline low back pain without sciatica, unspecified chronicity  2. Difficulty walking      SUBJECTIVE  Maybe a little better, but still pretty sore. OBJECTIVE  AROM: Fingtertips to shins w/lumbar forward flexion  PROM:   TTP over left glut medius, left QL  Modality:   []  E-Stim: type _ x _ min     []att   []unatt   []w/US   []w/ice   []w/heat  []  Ultrasound: []cont   []pulse    _ W/cm2 x _  min   []1MHz   []3MHz  []  Ice pack _  Post       [] Hot pack _  Pre       []  Other:    Man: 10 min MFR to left glut medius, left QL and lumbar parspinals        NMR:  min  Neuromuscular reeducation/proprioceptive training listed in exercise below. Ex: 20 min  Therapeutic exercise/strength/endurance completed here in clinic today per the exercise log. PT Exercise Log         EXERCISE 10/17/2022   Glut sets 20x   LTR 20x   Sk to chest 10x   QL stretch 3x10\"   Hip abd 2x10   Bent knee fall out 2x10   PPT 20x   bridging 10x                                                      ASSESSMENT  [x]  See Plan of Care  [x]  Patient will continue to benefit from skilled therapy to address remaining functional deficits:   Patient has decreased c/o pain. Tolerating exercise. Continues to be TTP over left QL. Pt needs vc to ambulate w/more erect posture. Patient limited by left hip pain from her previous surgery. PLAN  Continue with current plan of care and progress as appropriate towards functional goals.   [x]  Upgrade activities as tolerated     [x]  Continue plan of care  []  Discharge due to:_  [] Other:_       Monica Stevens, PT  10/17/2022    1:20 PM

## 2022-10-24 ENCOUNTER — OFFICE VISIT (OUTPATIENT)
Dept: ORTHOPEDIC SURGERY | Age: 77
End: 2022-10-24
Payer: MEDICARE

## 2022-10-24 DIAGNOSIS — R26.2 DIFFICULTY WALKING: ICD-10-CM

## 2022-10-24 DIAGNOSIS — M54.50 MIDLINE LOW BACK PAIN WITHOUT SCIATICA, UNSPECIFIED CHRONICITY: Primary | ICD-10-CM

## 2022-10-24 PROCEDURE — 97140 MANUAL THERAPY 1/> REGIONS: CPT | Performed by: PHYSICAL THERAPIST

## 2022-10-24 PROCEDURE — 97110 THERAPEUTIC EXERCISES: CPT | Performed by: PHYSICAL THERAPIST

## 2022-10-24 NOTE — PROGRESS NOTES
Patient Name: Keara Kerns  BTMD:  : 1945  [x]  Patient  Verified  Payor: Saurabh Webb / Plan: [place] MEDICARE PART A & B / Product Type: Medicare /    Total Treatment Time (min): 35 min  1:1 Treatment Time ( [address] only): 35 min  Referring provider: Aurora Carranza DO  1. Midline low back pain without sciatica, unspecified chronicity  2. Difficulty walking      SUBJECTIVE  I think I'm moving a little better. Not quite as sore. OBJECTIVE  AROM:   PROM:   TTP over left glut medius, left QL  Modality:   []  E-Stim: type _ x _ min     []att   []unatt   []w/US   []w/ice   []w/heat  []  Ultrasound: []cont   []pulse    _ W/cm2 x _  min   []1MHz   []3MHz  []  Ice pack _  Post       [] Hot pack _  Pre       []  Other:    Man: 10 min MFR to left glut medius, left QL and lumbar parspinals        NMR:  min  Neuromuscular reeducation/proprioceptive training listed in exercise below. Ex: 20 min  Therapeutic exercise/strength/endurance completed here in clinic today per the exercise log. PT Exercise Log         EXERCISE 10/24/2022   Glut sets 20x   LTR 20x   Sk to chest 10x   QL stretch 3x10\"   Hip abd 2x10   Bent knee fall out 2x10   PPT 20x   bridging 10x   Sit to stands 15x                                                  ASSESSMENT  [x]  See Plan of Care  [x]  Patient will continue to benefit from skilled therapy to address remaining functional deficits:   Patient tolerating exercise w/no increase in symptoms. Ambulating w/decreased lumbar forward flexion. Continues to be TTP over left QL and bilateral lumbar paraspinals. Slowly progressing. Patient fatigues easily w/exercise. PLAN  Continue with current plan of care and progress as appropriate towards functional goals.   [x]  Upgrade activities as tolerated     [x]  Continue plan of care  []  Discharge due to:_  [] Other:_       Le Price, PT  10/24/2022    1:20 PM

## 2022-10-31 ENCOUNTER — OFFICE VISIT (OUTPATIENT)
Dept: ORTHOPEDIC SURGERY | Age: 77
End: 2022-10-31
Payer: MEDICARE

## 2022-10-31 DIAGNOSIS — M54.50 MIDLINE LOW BACK PAIN WITHOUT SCIATICA, UNSPECIFIED CHRONICITY: Primary | ICD-10-CM

## 2022-10-31 DIAGNOSIS — R26.2 DIFFICULTY WALKING: ICD-10-CM

## 2022-10-31 PROCEDURE — 97110 THERAPEUTIC EXERCISES: CPT | Performed by: PHYSICAL THERAPIST

## 2022-10-31 PROCEDURE — 97140 MANUAL THERAPY 1/> REGIONS: CPT | Performed by: PHYSICAL THERAPIST

## 2022-10-31 NOTE — PROGRESS NOTES
Patient Name: Macey Israel  AETV:  : 1945  [x]  Patient  Verified  Payor: Brandon Velazquez / Plan: [place] MEDICARE PART A & B / Product Type: Medicare /    Total Treatment Time (min): 35 min  1:1 Treatment Time ( [address] only): 35 min  Referring provider: Steve Carter DO  1. Midline low back pain without sciatica, unspecified chronicity  2. Difficulty walking      SUBJECTIVE  Still sore. My left side feels about the same. OBJECTIVE  AROM:   PROM:   TTP over left glut medius, left QL  Modality:   []  E-Stim: type _ x _ min     []att   []unatt   []w/US   []w/ice   []w/heat  []  Ultrasound: []cont   []pulse    _ W/cm2 x _  min   []1MHz   []3MHz  []  Ice pack _  Post       [] Hot pack _  Pre       []  Other:    Man: 15 min MFR to left glut medius, left QL and lumbar parspinals        NMR:  min  Neuromuscular reeducation/proprioceptive training listed in exercise below. Ex: 20 min  Therapeutic exercise/strength/endurance completed here in clinic today per the exercise log. PT Exercise Log         EXERCISE 10/31/2022   Glut sets 20x   LTR 20x   Sk to chest 10x   QL stretch 3x10\"   Hip abd 2x10   Bent knee fall out 2x10   PPT 20x   bridging 10x   Sit to stands 15x   Supine clamshells 20x gr                                              ASSESSMENT  [x]  See Plan of Care  [x]  Patient will continue to benefit from skilled therapy to address remaining functional deficits:   Patient has poor bed mobility. C/o continued LBP especially in left glut. Patient c/o cramping in hamstrings w/exercise. Slowly progressing. Has continued weakness in left hip s/p surgery. PLAN  Continue with current plan of care and progress as appropriate towards functional goals.   [x]  Upgrade activities as tolerated     [x]  Continue plan of care  []  Discharge due to:_  [] Other:_       Meet Odonnell, PT  10/31/2022    1:20 PM

## 2022-11-03 RX ORDER — MELOXICAM 15 MG/1
TABLET ORAL
Qty: 14 TABLET | Refills: 0 | Status: SHIPPED | OUTPATIENT
Start: 2022-11-03

## 2022-11-14 ENCOUNTER — OFFICE VISIT (OUTPATIENT)
Dept: ORTHOPEDIC SURGERY | Age: 77
End: 2022-11-14
Payer: MEDICARE

## 2022-11-14 DIAGNOSIS — Z98.890 STATUS POST HIP SURGERY: ICD-10-CM

## 2022-11-14 DIAGNOSIS — R26.2 DIFFICULTY WALKING: ICD-10-CM

## 2022-11-14 DIAGNOSIS — M54.50 MIDLINE LOW BACK PAIN WITHOUT SCIATICA, UNSPECIFIED CHRONICITY: Primary | ICD-10-CM

## 2022-11-14 PROCEDURE — 97140 MANUAL THERAPY 1/> REGIONS: CPT | Performed by: PHYSICAL THERAPIST

## 2022-11-14 PROCEDURE — 97110 THERAPEUTIC EXERCISES: CPT | Performed by: PHYSICAL THERAPIST

## 2022-11-14 NOTE — PROGRESS NOTES
Patient Name: Amberly Reyna  Date:2022  : 1945  [x]  Patient  Verified  Payor: Mehnaz Parada / Plan: [place] MEDICARE PART A & B / Product Type: Medicare /    Total Treatment Time (min): 40 min  1:1 Treatment Time ( only): 35 min, 15 min manual, 15 min exercise  Referring provider: Maik Whittaker DO  1. Midline low back pain without sciatica, unspecified chronicity  2. Difficulty walking  3. Status post hip surgery      SUBJECTIVE  I've been really sick so I am sore all over. OBJECTIVE  AROM:   PROM:   TTP over left glut medius, left QL  Modality:   []  E-Stim: type _ x _ min     []att   []unatt   []w/US   []w/ice   []w/heat  []  Ultrasound: []cont   []pulse    _ W/cm2 x _  min   []1MHz   []3MHz  []  Ice pack _  Post       [] Hot pack _  Pre       []  Other:    Man: 15 min MFR to left glut medius, left QL and lumbar parspinals        NMR:  min  Neuromuscular reeducation/proprioceptive training listed in exercise below. Ex: 25 min  Therapeutic exercise/strength/endurance completed here in clinic today per the exercise log. PT Exercise Log         EXERCISE 2022   Glut sets 20x   LTR 20x   Sk to chest 10x   QL stretch 3x10\"   Hip abd 2x10   Bent knee fall out 2x10   PPT 20x   bridging 10x   Sit to stands 15x   Supine clamshells 20x gr                                              ASSESSMENT  [x]  See Plan of Care  [x]  Patient will continue to benefit from skilled therapy to address remaining functional deficits:   Increased c/o pain today. Patient tolerated exercise despite c/o pain. Slowly improving. Patient has continued weakness in core muscles and B hips. PLAN  Continue with current plan of care and progress as appropriate towards functional goals.   [x]  Upgrade activities as tolerated     [x]  Continue plan of care  []  Discharge due to:_  [] Other:_       Emily Alvarez, PT  2022    1:20 PM

## 2022-12-06 ENCOUNTER — OFFICE VISIT (OUTPATIENT)
Dept: ORTHOPEDIC SURGERY | Age: 77
End: 2022-12-06
Payer: MEDICARE

## 2022-12-06 DIAGNOSIS — M54.50 MIDLINE LOW BACK PAIN WITHOUT SCIATICA, UNSPECIFIED CHRONICITY: Primary | ICD-10-CM

## 2022-12-06 DIAGNOSIS — R26.2 DIFFICULTY WALKING: ICD-10-CM

## 2022-12-06 DIAGNOSIS — Z98.890 STATUS POST HIP SURGERY: ICD-10-CM

## 2022-12-06 PROCEDURE — 97140 MANUAL THERAPY 1/> REGIONS: CPT | Performed by: PHYSICAL THERAPIST

## 2022-12-06 PROCEDURE — 97110 THERAPEUTIC EXERCISES: CPT | Performed by: PHYSICAL THERAPIST

## 2022-12-06 NOTE — PROGRESS NOTES
Patient Name: Paul Larson  Date:2022  : 1945  [x]  Patient  Verified  Payor: Tino Lopez / Plan: [place] MEDIC[place] & B / Product Type: Medicare /    Total Treatment Time (min): 40 min  1:1 Treatment Time ( only): 35 min, 15 min manual, 20 min exercise  Referring provider: Miriam Starkey DO  1. Midline low back pain without sciatica, unspecified chronicity  2. Difficulty walking  3. Status post hip surgery      SUBJECTIVE  Feeling ok. Still stiff all over. I've been sick with fluid in my ear, too. OBJECTIVE  AROM: lumbar extension:-10 degrees  PROM:   TTP over left glut medius, left QL  Modality:   []  E-Stim: type _ x _ min     []att   []unatt   []w/US   []w/ice   []w/heat  []  Ultrasound: []cont   []pulse    _ W/cm2 x _  min   []1MHz   []3MHz  []  Ice pack _  Post       [] Hot pack _  Pre       []  Other:    Man: 15 min MFR to left glut medius, left QL and lumbar parspinals        NMR:  min  Neuromuscular reeducation/proprioceptive training listed in exercise below. Ex: 25 min  Therapeutic exercise/strength/endurance completed here in clinic today per the exercise log. PT Exercise Log         EXERCISE 2022   Glut sets 20x   LTR 20x   Sk to chest 10x   QL stretch 3x10\"   Hip abd 2x10   Bent knee fall out 2x10   PPT 20x   bridging 10x   Sit to stands 15x   Supine clamshells 20x gr                                              ASSESSMENT  [x]  See Plan of Care  [x]  Patient will continue to benefit from skilled therapy to address remaining functional deficits: Tolerating exercise. Has increased lumbar AROM. Continues to c/o pain in left hip. Patient encourage to ambulate at home 2x/day. PLAN  Continue with current plan of care and progress as appropriate towards functional goals.   [x]  Upgrade activities as tolerated     [x]  Continue plan of care  []  Discharge due to:_  [] Other:_       Ezra Cotton, PT  2022    1:20 PM

## 2023-02-28 ENCOUNTER — OFFICE VISIT (OUTPATIENT)
Dept: ORTHOPEDIC SURGERY | Age: 78
End: 2023-02-28

## 2023-02-28 VITALS — WEIGHT: 239 LBS | BODY MASS INDEX: 34.22 KG/M2 | HEIGHT: 70 IN

## 2023-02-28 DIAGNOSIS — R26.2 DIFFICULTY WALKING: ICD-10-CM

## 2023-02-28 DIAGNOSIS — Z92.29 HX OF LONG TERM USE OF BLOOD THINNERS: ICD-10-CM

## 2023-02-28 DIAGNOSIS — S81.811A LACERATION OF RIGHT LOWER EXTREMITY, INITIAL ENCOUNTER: ICD-10-CM

## 2023-02-28 DIAGNOSIS — M43.16 SPONDYLOLISTHESIS OF LUMBAR REGION: ICD-10-CM

## 2023-02-28 DIAGNOSIS — Z98.890 STATUS POST HIP SURGERY: ICD-10-CM

## 2023-02-28 DIAGNOSIS — M54.50 MIDLINE LOW BACK PAIN WITHOUT SCIATICA, UNSPECIFIED CHRONICITY: ICD-10-CM

## 2023-02-28 DIAGNOSIS — G89.29 CHRONIC MIDLINE LOW BACK PAIN WITH LEFT-SIDED SCIATICA: Primary | ICD-10-CM

## 2023-02-28 DIAGNOSIS — M54.42 CHRONIC MIDLINE LOW BACK PAIN WITH LEFT-SIDED SCIATICA: Primary | ICD-10-CM

## 2023-02-28 DIAGNOSIS — M51.36 DEGENERATIVE DISC DISEASE, LUMBAR: ICD-10-CM

## 2023-02-28 NOTE — PROGRESS NOTES
Osmel Harper (: 1945) is a 76 y.o. female here for evaluation of the following chief complaint(s):  Back Pain and Follow-up       ASSESSMENT/PLAN:  Below is the assessment and plan developed based on review of pertinent history, physical exam, labs, studies, and medications. 1. Chronic midline low back pain with left-sided sciatica  -     MRI LUMB SPINE WO CONT; Future  2. Degenerative disc disease, lumbar  3. Spondylolisthesis of lumbar region  4. Laceration of right lower extremity, initial encounter  5. Hx of long term use of blood thinners      Return in about 4 weeks (around 3/28/2023) for MRI review. We will set up home health for right lower extremity laceration. Return to clinic in 1 week for wound check. Red flag symptoms discussed with the patient. Patient is to present to the emergency department if any of these symptoms occur. Patient verbalized understanding and agrees to proceed with the aforementioned plan. Thank you for allowing me to participate in the care of this patient. SUBJECTIVE/OBJECTIVE:  HPI  Patient is a pleasant 66-year-old female who is well-known to the spine service. She is status post physical therapy for mechanical low back pain and left lower extremity anterior thigh pain. She is status post left hip now for fracture. She thinks that her residual thigh pain is secondary to the fracture surgery. She denies pain distal to the knee or paresthesias. She ambulates with a walker to help with her back pain. She has decreased ambulatory capacity. No significant recent falls. She had a recent right lower extremity injury and skin tear and has not seen anybody for this yet. Review of Systems  See above HPI and prior clinic notes for full ROS     Ht 5' 10\" (1.778 m)   Wt 239 lb (108.4 kg)   BMI 34.29 kg/m²    Physical Exam  No interval change in PE, grossly motor/sensory intact, no new neurological deficits.     Right anterior shin laceration with partial degloving of a moderate-sized 3 x 2 cm area. No active arterial bleeding. Clean wound bed. Nonadherent and compressive dressing applied. IMAGING:  None new imaging obtained today. An electronic signature was used to authenticate this note.   -- Deja Gibson, DO

## 2023-03-02 ENCOUNTER — TELEPHONE (OUTPATIENT)
Dept: ORTHOPEDIC SURGERY | Age: 78
End: 2023-03-02

## 2023-03-18 ENCOUNTER — HOSPITAL ENCOUNTER (OUTPATIENT)
Dept: MRI IMAGING | Age: 78
Discharge: HOME | End: 2023-03-18
Attending: STUDENT IN AN ORGANIZED HEALTH CARE EDUCATION/TRAINING PROGRAM

## 2023-03-18 DIAGNOSIS — G89.29 CHRONIC MIDLINE LOW BACK PAIN WITH LEFT-SIDED SCIATICA: ICD-10-CM

## 2023-03-18 DIAGNOSIS — M54.42 CHRONIC MIDLINE LOW BACK PAIN WITH LEFT-SIDED SCIATICA: ICD-10-CM

## 2025-02-24 ENCOUNTER — APPOINTMENT (OUTPATIENT)
Facility: HOSPITAL | Age: 80
End: 2025-02-24
Payer: MEDICARE

## 2025-02-24 ENCOUNTER — HOSPITAL ENCOUNTER (INPATIENT)
Facility: HOSPITAL | Age: 80
LOS: 11 days | Discharge: HOME HEALTH | End: 2025-03-07
Attending: STUDENT IN AN ORGANIZED HEALTH CARE EDUCATION/TRAINING PROGRAM | Admitting: HOSPITALIST
Payer: MEDICARE

## 2025-02-24 DIAGNOSIS — Z79.4 TYPE 2 DIABETES MELLITUS WITH OTHER CIRCULATORY COMPLICATION, WITH LONG-TERM CURRENT USE OF INSULIN (HCC): ICD-10-CM

## 2025-02-24 DIAGNOSIS — I48.91 ATRIAL FIBRILLATION, UNSPECIFIED TYPE (HCC): ICD-10-CM

## 2025-02-24 DIAGNOSIS — E11.65 TYPE 2 DIABETES MELLITUS WITH HYPERGLYCEMIA (HCC): ICD-10-CM

## 2025-02-24 DIAGNOSIS — N17.9 ACUTE KIDNEY INJURY: ICD-10-CM

## 2025-02-24 DIAGNOSIS — E11.59 TYPE 2 DIABETES MELLITUS WITH OTHER CIRCULATORY COMPLICATION, WITH LONG-TERM CURRENT USE OF INSULIN (HCC): ICD-10-CM

## 2025-02-24 DIAGNOSIS — R41.0 DISORIENTATION: Primary | ICD-10-CM

## 2025-02-24 PROBLEM — G93.40 ENCEPHALOPATHY ACUTE: Status: ACTIVE | Noted: 2025-02-24

## 2025-02-24 LAB
ALBUMIN SERPL-MCNC: 3.5 G/DL (ref 3.5–5)
ALBUMIN/GLOB SERPL: 1.1 (ref 1.1–2.2)
ALP SERPL-CCNC: 122 U/L (ref 45–117)
ALT SERPL-CCNC: 16 U/L (ref 12–78)
AMPHET UR QL SCN: NEGATIVE
ANION GAP BLD CALC-SCNC: 14 (ref 10–20)
ANION GAP SERPL CALC-SCNC: 11 MMOL/L (ref 2–12)
APPEARANCE UR: CLEAR
AST SERPL-CCNC: 14 U/L (ref 15–37)
BACTERIA URNS QL MICRO: ABNORMAL /HPF
BARBITURATES UR QL SCN: NEGATIVE
BASE EXCESS BLD CALC-SCNC: 0.1 MMOL/L
BASOPHILS # BLD: 0.05 K/UL (ref 0–0.1)
BASOPHILS NFR BLD: 0.5 % (ref 0–1)
BENZODIAZ UR QL: NEGATIVE
BILIRUB SERPL-MCNC: 1 MG/DL (ref 0.2–1)
BILIRUB UR QL: NEGATIVE
BUN SERPL-MCNC: 19 MG/DL (ref 6–20)
BUN/CREAT SERPL: 14 (ref 12–20)
CA-I BLD-MCNC: 1.28 MMOL/L (ref 1.15–1.33)
CALCIUM SERPL-MCNC: 9.8 MG/DL (ref 8.5–10.1)
CANNABINOIDS UR QL SCN: NEGATIVE
CHLORIDE BLD-SCNC: 99 MMOL/L (ref 100–111)
CHLORIDE SERPL-SCNC: 100 MMOL/L (ref 97–108)
CO2 BLD-SCNC: 24 MMOL/L (ref 22–29)
CO2 SERPL-SCNC: 24 MMOL/L (ref 21–32)
COCAINE UR QL SCN: NEGATIVE
COLOR UR: ABNORMAL
CREAT SERPL-MCNC: 1.34 MG/DL (ref 0.55–1.02)
CREAT UR-MCNC: 1 MG/DL (ref 0.6–1.3)
DIFFERENTIAL METHOD BLD: ABNORMAL
EKG ATRIAL RATE: 111 BPM
EKG DIAGNOSIS: NORMAL
EKG Q-T INTERVAL: 308 MS
EKG QRS DURATION: 78 MS
EKG QTC CALCULATION (BAZETT): 431 MS
EKG R AXIS: 17 DEGREES
EKG T AXIS: 176 DEGREES
EKG VENTRICULAR RATE: 118 BPM
EOSINOPHIL # BLD: 0.04 K/UL (ref 0–0.4)
EOSINOPHIL NFR BLD: 0.4 % (ref 0–7)
EPITH CASTS URNS QL MICRO: ABNORMAL /LPF
ERYTHROCYTE [DISTWIDTH] IN BLOOD BY AUTOMATED COUNT: 13.2 % (ref 11.5–14.5)
FLUAV RNA SPEC QL NAA+PROBE: NOT DETECTED
FLUBV RNA SPEC QL NAA+PROBE: NOT DETECTED
GLOBULIN SER CALC-MCNC: 3.3 G/DL (ref 2–4)
GLUCOSE BLD STRIP.AUTO-MCNC: 452 MG/DL (ref 74–99)
GLUCOSE SERPL-MCNC: 397 MG/DL (ref 65–100)
GLUCOSE UR STRIP.AUTO-MCNC: >1000 MG/DL
HCO3 BLDA-SCNC: 25 MMOL/L
HCT VFR BLD AUTO: 44.4 % (ref 35–47)
HGB BLD-MCNC: 14.5 G/DL (ref 11.5–16)
HGB UR QL STRIP: ABNORMAL
IMM GRANULOCYTES # BLD AUTO: 0.05 K/UL (ref 0–0.04)
IMM GRANULOCYTES NFR BLD AUTO: 0.5 % (ref 0–0.5)
KETONES UR QL STRIP.AUTO: 80 MG/DL
LACTATE BLD-SCNC: 1.59 MMOL/L (ref 0.4–2)
LEUKOCYTE ESTERASE UR QL STRIP.AUTO: NEGATIVE
LYMPHOCYTES # BLD: 2 K/UL (ref 0.8–3.5)
LYMPHOCYTES NFR BLD: 19.6 % (ref 12–49)
Lab: NORMAL
MAGNESIUM SERPL-MCNC: 1.8 MG/DL (ref 1.6–2.4)
MCH RBC QN AUTO: 27.7 PG (ref 26–34)
MCHC RBC AUTO-ENTMCNC: 32.7 G/DL (ref 30–36.5)
MCV RBC AUTO: 84.9 FL (ref 80–99)
METHADONE UR QL: NEGATIVE
MONOCYTES # BLD: 0.55 K/UL (ref 0–1)
MONOCYTES NFR BLD: 5.4 % (ref 5–13)
NEUTS SEG # BLD: 7.51 K/UL (ref 1.8–8)
NEUTS SEG NFR BLD: 73.6 % (ref 32–75)
NITRITE UR QL STRIP.AUTO: NEGATIVE
NRBC # BLD: 0 K/UL (ref 0–0.01)
NRBC BLD-RTO: 0 PER 100 WBC
NT PRO BNP: 757 PG/ML
OPIATES UR QL: NEGATIVE
PCO2 BLDV: 41.9 MMHG (ref 41–51)
PCP UR QL: NEGATIVE
PH BLDV: 7.39 (ref 7.32–7.42)
PH UR STRIP: 5 (ref 5–8)
PLATELET # BLD AUTO: 252 K/UL (ref 150–400)
PMV BLD AUTO: 11.3 FL (ref 8.9–12.9)
PO2 BLDV: 28 MMHG (ref 25–40)
POTASSIUM BLD-SCNC: 3.7 MMOL/L (ref 3.5–5.5)
POTASSIUM SERPL-SCNC: 3.7 MMOL/L (ref 3.5–5.1)
PROCALCITONIN SERPL-MCNC: 0.21 NG/ML
PROT SERPL-MCNC: 6.8 G/DL (ref 6.4–8.2)
PROT UR STRIP-MCNC: 100 MG/DL
RBC # BLD AUTO: 5.23 M/UL (ref 3.8–5.2)
RBC #/AREA URNS HPF: ABNORMAL /HPF (ref 0–5)
SAO2 % BLD: 51 % (ref 94–98)
SARS-COV-2 RNA RESP QL NAA+PROBE: NOT DETECTED
SODIUM BLD-SCNC: 137 MMOL/L (ref 136–145)
SODIUM SERPL-SCNC: 135 MMOL/L (ref 136–145)
SOURCE: NORMAL
SP GR UR REFRACTOMETRY: 1.02
SPECIMEN SITE: ABNORMAL
T3FREE SERPL-MCNC: 1.7 PG/ML (ref 2.2–4)
T4 FREE SERPL-MCNC: 1.3 NG/DL (ref 0.8–1.5)
TROPONIN I SERPL HS-MCNC: 86 NG/L (ref 0–51)
TROPONIN I SERPL HS-MCNC: 96 NG/L (ref 0–51)
TSH SERPL DL<=0.05 MIU/L-ACNC: 3.98 UIU/ML (ref 0.36–3.74)
URINE CULTURE IF INDICATED: ABNORMAL
UROBILINOGEN UR QL STRIP.AUTO: 0.2 EU/DL (ref 0.2–1)
WBC # BLD AUTO: 10.2 K/UL (ref 3.6–11)
WBC URNS QL MICRO: ABNORMAL /HPF (ref 0–4)

## 2025-02-24 PROCEDURE — 80053 COMPREHEN METABOLIC PANEL: CPT

## 2025-02-24 PROCEDURE — 83880 ASSAY OF NATRIURETIC PEPTIDE: CPT

## 2025-02-24 PROCEDURE — 80307 DRUG TEST PRSMV CHEM ANLYZR: CPT

## 2025-02-24 PROCEDURE — 96360 HYDRATION IV INFUSION INIT: CPT

## 2025-02-24 PROCEDURE — 70551 MRI BRAIN STEM W/O DYE: CPT

## 2025-02-24 PROCEDURE — 6360000002 HC RX W HCPCS

## 2025-02-24 PROCEDURE — 84443 ASSAY THYROID STIM HORMONE: CPT

## 2025-02-24 PROCEDURE — 2580000003 HC RX 258

## 2025-02-24 PROCEDURE — 70450 CT HEAD/BRAIN W/O DYE: CPT

## 2025-02-24 PROCEDURE — 96365 THER/PROPH/DIAG IV INF INIT: CPT

## 2025-02-24 PROCEDURE — 93005 ELECTROCARDIOGRAM TRACING: CPT | Performed by: STUDENT IN AN ORGANIZED HEALTH CARE EDUCATION/TRAINING PROGRAM

## 2025-02-24 PROCEDURE — 82330 ASSAY OF CALCIUM: CPT

## 2025-02-24 PROCEDURE — 71045 X-RAY EXAM CHEST 1 VIEW: CPT

## 2025-02-24 PROCEDURE — 81001 URINALYSIS AUTO W/SCOPE: CPT

## 2025-02-24 PROCEDURE — 82803 BLOOD GASES ANY COMBINATION: CPT

## 2025-02-24 PROCEDURE — 6370000000 HC RX 637 (ALT 250 FOR IP)

## 2025-02-24 PROCEDURE — 84481 FREE ASSAY (FT-3): CPT

## 2025-02-24 PROCEDURE — 83735 ASSAY OF MAGNESIUM: CPT

## 2025-02-24 PROCEDURE — 84484 ASSAY OF TROPONIN QUANT: CPT

## 2025-02-24 PROCEDURE — 1100000003 HC PRIVATE W/ TELEMETRY

## 2025-02-24 PROCEDURE — 36415 COLL VENOUS BLD VENIPUNCTURE: CPT

## 2025-02-24 PROCEDURE — 6370000000 HC RX 637 (ALT 250 FOR IP): Performed by: STUDENT IN AN ORGANIZED HEALTH CARE EDUCATION/TRAINING PROGRAM

## 2025-02-24 PROCEDURE — 87040 BLOOD CULTURE FOR BACTERIA: CPT

## 2025-02-24 PROCEDURE — 99285 EMERGENCY DEPT VISIT HI MDM: CPT

## 2025-02-24 PROCEDURE — 87636 SARSCOV2 & INF A&B AMP PRB: CPT

## 2025-02-24 PROCEDURE — 6370000000 HC RX 637 (ALT 250 FOR IP): Performed by: HOSPITALIST

## 2025-02-24 PROCEDURE — 6360000002 HC RX W HCPCS: Performed by: NURSE PRACTITIONER

## 2025-02-24 PROCEDURE — 84132 ASSAY OF SERUM POTASSIUM: CPT

## 2025-02-24 PROCEDURE — 85025 COMPLETE CBC W/AUTO DIFF WBC: CPT

## 2025-02-24 PROCEDURE — 2580000003 HC RX 258: Performed by: HOSPITALIST

## 2025-02-24 PROCEDURE — 84439 ASSAY OF FREE THYROXINE: CPT

## 2025-02-24 PROCEDURE — 84295 ASSAY OF SERUM SODIUM: CPT

## 2025-02-24 PROCEDURE — 84145 PROCALCITONIN (PCT): CPT

## 2025-02-24 PROCEDURE — 82947 ASSAY GLUCOSE BLOOD QUANT: CPT

## 2025-02-24 RX ORDER — LEVOTHYROXINE SODIUM 88 UG/1
88 TABLET ORAL DAILY
COMMUNITY

## 2025-02-24 RX ORDER — SODIUM CHLORIDE 0.9 % (FLUSH) 0.9 %
5-40 SYRINGE (ML) INJECTION EVERY 12 HOURS SCHEDULED
Status: DISCONTINUED | OUTPATIENT
Start: 2025-02-24 | End: 2025-03-07 | Stop reason: HOSPADM

## 2025-02-24 RX ORDER — LORAZEPAM 2 MG/ML
0.5 INJECTION INTRAMUSCULAR ONCE
Status: COMPLETED | OUTPATIENT
Start: 2025-02-24 | End: 2025-02-24

## 2025-02-24 RX ORDER — ONDANSETRON 4 MG/1
4 TABLET, ORALLY DISINTEGRATING ORAL EVERY 8 HOURS PRN
Status: DISCONTINUED | OUTPATIENT
Start: 2025-02-24 | End: 2025-03-07 | Stop reason: HOSPADM

## 2025-02-24 RX ORDER — METOPROLOL SUCCINATE 50 MG/1
100 TABLET, EXTENDED RELEASE ORAL DAILY
Status: DISCONTINUED | OUTPATIENT
Start: 2025-02-24 | End: 2025-03-07 | Stop reason: HOSPADM

## 2025-02-24 RX ORDER — METOPROLOL SUCCINATE 100 MG/1
100 TABLET, EXTENDED RELEASE ORAL DAILY
COMMUNITY

## 2025-02-24 RX ORDER — 0.9 % SODIUM CHLORIDE 0.9 %
500 INTRAVENOUS SOLUTION INTRAVENOUS ONCE
Status: COMPLETED | OUTPATIENT
Start: 2025-02-24 | End: 2025-02-24

## 2025-02-24 RX ORDER — ACETAMINOPHEN 650 MG/1
650 SUPPOSITORY RECTAL EVERY 6 HOURS PRN
Status: DISCONTINUED | OUTPATIENT
Start: 2025-02-24 | End: 2025-03-07 | Stop reason: HOSPADM

## 2025-02-24 RX ORDER — HYDRALAZINE HYDROCHLORIDE 25 MG/1
25 TABLET, FILM COATED ORAL EVERY 12 HOURS
Status: DISCONTINUED | OUTPATIENT
Start: 2025-02-24 | End: 2025-03-01

## 2025-02-24 RX ORDER — ACETAMINOPHEN 325 MG/1
650 TABLET ORAL EVERY 4 HOURS PRN
Status: DISCONTINUED | OUTPATIENT
Start: 2025-02-24 | End: 2025-02-24 | Stop reason: SDUPTHER

## 2025-02-24 RX ORDER — POLYETHYLENE GLYCOL 3350 17 G/17G
17 POWDER, FOR SOLUTION ORAL DAILY PRN
Status: DISCONTINUED | OUTPATIENT
Start: 2025-02-24 | End: 2025-03-07 | Stop reason: HOSPADM

## 2025-02-24 RX ORDER — INSULIN LISPRO 100 [IU]/ML
5 INJECTION, SOLUTION INTRAVENOUS; SUBCUTANEOUS ONCE
Status: COMPLETED | OUTPATIENT
Start: 2025-02-24 | End: 2025-02-24

## 2025-02-24 RX ORDER — SODIUM CHLORIDE 9 MG/ML
INJECTION, SOLUTION INTRAVENOUS CONTINUOUS
Status: ACTIVE | OUTPATIENT
Start: 2025-02-24 | End: 2025-02-25

## 2025-02-24 RX ORDER — INSULIN LISPRO 100 [IU]/ML
0-8 INJECTION, SOLUTION INTRAVENOUS; SUBCUTANEOUS
Status: DISCONTINUED | OUTPATIENT
Start: 2025-02-24 | End: 2025-03-07 | Stop reason: HOSPADM

## 2025-02-24 RX ORDER — SODIUM CHLORIDE 0.9 % (FLUSH) 0.9 %
5-40 SYRINGE (ML) INJECTION PRN
Status: DISCONTINUED | OUTPATIENT
Start: 2025-02-24 | End: 2025-03-07 | Stop reason: HOSPADM

## 2025-02-24 RX ORDER — DILTIAZEM HYDROCHLORIDE 240 MG/1
240 CAPSULE, COATED, EXTENDED RELEASE ORAL DAILY
Status: DISCONTINUED | OUTPATIENT
Start: 2025-02-24 | End: 2025-03-07 | Stop reason: HOSPADM

## 2025-02-24 RX ORDER — METFORMIN HYDROCHLORIDE 500 MG/1
1000 TABLET, EXTENDED RELEASE ORAL 2 TIMES DAILY
COMMUNITY

## 2025-02-24 RX ORDER — ONDANSETRON 2 MG/ML
4 INJECTION INTRAMUSCULAR; INTRAVENOUS EVERY 4 HOURS PRN
Status: DISCONTINUED | OUTPATIENT
Start: 2025-02-24 | End: 2025-02-24

## 2025-02-24 RX ORDER — SODIUM CHLORIDE 9 MG/ML
INJECTION, SOLUTION INTRAVENOUS PRN
Status: DISCONTINUED | OUTPATIENT
Start: 2025-02-24 | End: 2025-03-07 | Stop reason: HOSPADM

## 2025-02-24 RX ORDER — HYDRALAZINE HYDROCHLORIDE 50 MG/1
25 TABLET, FILM COATED ORAL EVERY 8 HOURS PRN
Status: DISCONTINUED | OUTPATIENT
Start: 2025-02-24 | End: 2025-02-24

## 2025-02-24 RX ORDER — ACETAMINOPHEN 325 MG/1
650 TABLET ORAL EVERY 6 HOURS PRN
Status: DISCONTINUED | OUTPATIENT
Start: 2025-02-24 | End: 2025-03-07 | Stop reason: HOSPADM

## 2025-02-24 RX ORDER — LEVOTHYROXINE SODIUM 88 UG/1
88 TABLET ORAL
Status: DISCONTINUED | OUTPATIENT
Start: 2025-02-25 | End: 2025-03-07 | Stop reason: HOSPADM

## 2025-02-24 RX ORDER — ONDANSETRON 2 MG/ML
4 INJECTION INTRAMUSCULAR; INTRAVENOUS EVERY 6 HOURS PRN
Status: DISCONTINUED | OUTPATIENT
Start: 2025-02-24 | End: 2025-03-07 | Stop reason: HOSPADM

## 2025-02-24 RX ORDER — HYDRALAZINE HYDROCHLORIDE 20 MG/ML
10 INJECTION INTRAMUSCULAR; INTRAVENOUS EVERY 6 HOURS PRN
Status: DISCONTINUED | OUTPATIENT
Start: 2025-02-24 | End: 2025-03-07 | Stop reason: HOSPADM

## 2025-02-24 RX ADMIN — CEFEPIME 1000 MG: 1 INJECTION, POWDER, FOR SOLUTION INTRAMUSCULAR; INTRAVENOUS at 10:57

## 2025-02-24 RX ADMIN — HYDRALAZINE HYDROCHLORIDE 25 MG: 50 TABLET ORAL at 20:45

## 2025-02-24 RX ADMIN — SODIUM CHLORIDE: 0.9 INJECTION, SOLUTION INTRAVENOUS at 16:32

## 2025-02-24 RX ADMIN — SODIUM CHLORIDE 500 ML: 9 INJECTION, SOLUTION INTRAVENOUS at 10:55

## 2025-02-24 RX ADMIN — LORAZEPAM 0.5 MG: 2 INJECTION INTRAMUSCULAR; INTRAVENOUS at 20:46

## 2025-02-24 RX ADMIN — METOPROLOL SUCCINATE 100 MG: 50 TABLET, FILM COATED, EXTENDED RELEASE ORAL at 10:57

## 2025-02-24 RX ADMIN — INSULIN LISPRO 5 UNITS: 100 INJECTION, SOLUTION INTRAVENOUS; SUBCUTANEOUS at 12:26

## 2025-02-24 RX ADMIN — DILTIAZEM HYDROCHLORIDE 240 MG: 240 CAPSULE, COATED, EXTENDED RELEASE ORAL at 11:40

## 2025-02-24 RX ADMIN — HYDRALAZINE HYDROCHLORIDE 25 MG: 50 TABLET ORAL at 10:57

## 2025-02-24 ASSESSMENT — PAIN SCALES - GENERAL: PAINLEVEL_OUTOF10: 0

## 2025-02-24 ASSESSMENT — LIFESTYLE VARIABLES
HOW MANY STANDARD DRINKS CONTAINING ALCOHOL DO YOU HAVE ON A TYPICAL DAY: PATIENT DOES NOT DRINK
HOW OFTEN DO YOU HAVE A DRINK CONTAINING ALCOHOL: NEVER

## 2025-02-24 NOTE — ED TRIAGE NOTES
Pt presents to ED via EMS from home and is moved off the stretcher. EMS reports pt reports she started feeling fuzzy yesterday and couldn't get her thoughts together; friend noted speech problem with jumbling her words and possibly slurred. LKW >24hrs. EMS noted afib RVR on EKG and .    Yes

## 2025-02-24 NOTE — PROGRESS NOTES
Pharmacy Medication History Note    The patient was interviewed regarding current PTA medication list, use and drug allergies; patient present in room and obtained permission from patient to discuss drug regimen with visitor(s) present. The patient was questioned regarding use of any other inhalers, topical products, over the counter medications, herbal medications, vitamin products or ophthalmic/nasal/otic medication use.     Allergy Update: Penicillins    Recommendations/Findings:   The following amendments were made to the patient's active medication list on file at [facility]:   1) Additions:   -None    2) Deletions:   -Acetaminophen  -Albuterol HFA  -Clonidine  -Digoxin  -Esomeprazole  -Lantus  -Humalog  -Humulin  -Losartan  -Meloxicam  -Potassium Chloride  -Torsemide    3) Changes:   -Furosemide 40mg daily prn --> 40mg BID  -Hydralazine 25mg TID --> 25mg BID  -Levothyroxine 100mcg daily --> 88mcg daily  -Metformin 500mg BID --> Metformin ER 1000mg BID  -Metoprolol tartrate 100mg BID --> Metoprolol ER 100mg daily    Pertinent Findings:   -Pt indicated she has insulin sliding scale at home and retrieves med from [facility], but there is no fill history and verified with her pharmacy ([facility])    Clarified PTA med list with patient, patient's daughter, and [facility]. PTA medication list was corrected to the following:     Prior to Admission Medications   Prescriptions Last Dose Informant   dilTIAZem (TIAZAC) 240 MG extended release capsule  Outside Pharmacy/PCP, Self   Sig: Take 1 capsule by mouth daily   furosemide (LASIX) 40 MG tablet  Outside Pharmacy/PCP, Self   Sig: Take 1 tablet by mouth 2 times daily   hydrALAZINE (APRESOLINE) 25 MG tablet  Outside Pharmacy/PCP, Self   Sig: Take 1 tablet by mouth 2 times daily   levothyroxine (SYNTHROID) 88 MCG tablet  Outside Pharmacy/PCP, Self   Sig: Take 1 tablet by mouth Daily   metFORMIN (GLUCOPHAGE-XR) 500 MG extended release tablet  Outside Pharmacy/PCP,  Self   Sig: Take 2 tablets by mouth 2 times daily   metoprolol succinate (TOPROL XL) 100 MG extended release tablet  Outside Pharmacy/PCP, Self   Sig: Take 1 tablet by mouth daily   rivaroxaban (XARELTO) 20 MG TABS tablet  Outside Pharmacy/PCP, Self   Sig: Take 1 tablet by mouth daily      Facility-Administered Medications: None      Thank you,  YVONNE FONTENOT [facility]

## 2025-02-24 NOTE — PROGRESS NOTES
Please complete screening and sign electronically or fax to [phone].     Telemetry order must be changed to allow the patient to leave the unit without telemetry.     Telemetry box cannot come to MRI with the patient.     Please call MRI at [phone] when this has been done.     If this patient needs monitored while off the unit, please call MRI at [phone] to coordinate a time for an RN to accompany the patient to MRI.

## 2025-02-24 NOTE — ED PROVIDER NOTES
[facility] EMERGENCY DEPARTMENT  EMERGENCY DEPARTMENT ENCOUNTER       Pt Name: Sarah Gordon  MRN: [id number]  Birthdate 1945  Date of evaluation: 2/24/2025  Provider: Savannah Suárez MD   PCP: Joellen Mayorga MD  Note Started: 10:57 AM EST 2/24/25  Attending Physician: Dr. Reji Montague    CHIEF COMPLAINT       Chief Complaint   Patient presents with    Altered Mental Status    Hyperglycemia     Pt presents to ED via EMS from home and is moved off the stretcher. EMS reports pt reports she started feeling fuzzy yesterday and couldn't get her thoughts together; friend noted speech problem with jumbling her words and possibly slurred. LKW >24hrs. EMS noted afib RVR on EKG and .         HISTORY OF PRESENT ILLNESS: 1 or more elements      History From: Patient, History limited by: Patient with some mild confusion     Sarah Gordon is a 79 y.o. female with past medical history of atrial fibrillation, on Xarelto (unclear if taking), hypertension, type 2 diabetes on metformin, hypothyroidism concern for altered status.  Patient lives alone.  Patient's friend spoke to her on the phone earlier today and was concerned that she was not speaking correctly and had some confusion so she called EMS.  Last known well is greater than 24 hours.  Patient states that yesterday she started feeling very fuzzy and felt confused, she denies any falls, denies any recent illnesses including respiratory illnesses, vomiting, diarrhea, denies any recent urinary symptoms.  Patient denies any trouble walking or numbness or weakness in any extremity.  Patient denies any chest pain, denies sensation of her heart racing, denies any trouble breathing.  Patient denies any increased leg swelling or pain.    Patient did not take her medications this morning.       Please See MDM for Additional Details of the HPI/PMH  Nursing Notes were all reviewed and agreed with or any disagreements were addressed in the HPI.     REVIEW OF SYSTEMS    Result Value Ref Range    Procalcitonin 0.21 ng/mL   Brain Natriuretic Peptide    Collection Time: 02/24/25 10:19 AM   Result Value Ref Range    NT Pro- (H) <450 PG/ML   Urine Drug Screen    Collection Time: 02/24/25 10:28 AM   Result Value Ref Range    Amphetamine, Urine Negative NEG      Barbiturates, Urine Negative NEG      Benzodiazepines, Urine Negative NEG      Cocaine, Urine Negative NEG      Methadone, Urine Negative NEG      Opiates, Urine Negative NEG      Phencyclidine, Urine Negative NEG      THC, TH-Cannabinol, Urine Negative NEG      Comments: (NOTE)    Urinalysis with Reflex to Culture    Collection Time: 02/24/25 10:28 AM    Specimen: Urine   Result Value Ref Range    Color, UA YELLOW/STRAW      Appearance CLEAR CLEAR      Specific Gravity, UA 1.025      pH, Urine 5.0 5.0 - 8.0      Protein,  (A) NEG mg/dL    Glucose, Ur >1000 (A) NEG mg/dL    Ketones, Urine 80 (A) NEG mg/dL    Bilirubin, Urine Negative NEG      Blood, Urine TRACE (A) NEG      Urobilinogen, Urine 0.2 0.2 - 1.0 EU/dL    Nitrite, Urine Negative NEG      Leukocyte Esterase, Urine Negative NEG      WBC, UA 5-10 0 - 4 /hpf    RBC, UA 0-5 0 - 5 /hpf    Epithelial Cells, UA MODERATE (A) FEW /lpf    BACTERIA, URINE 1+ (A) NEG /hpf    Urine Culture if Indicated CULTURE NOT INDICATED BY UA RESULT CNI     COVID-19 & Influenza Combo    Collection Time: 02/24/25 10:59 AM    Specimen: Nasopharyngeal   Result Value Ref Range    Source Nasopharyngeal      SARS-CoV-2, PCR Not detected NOTD      Rapid Influenza A By PCR Not detected NOTD      Rapid Influenza B By PCR Not detected NOTD         EKG: If performed, independent interpretation documented below in the MDM section     RADIOLOGY:  Non-plain film images such as CT, Ultrasound and MRI are read by the radiologist. Plain radiographic images are visualized and preliminarily interpreted by the ED Provider with the findings documented in the MDM section.     Interpretation per the

## 2025-02-24 NOTE — H&P
Hospitalist Admission Note    NAME:   Sarah Gordon   : 1945   MRN: [id number]     Date/Time: 2025 12:35 PM    Patient PCP: Joellen Mayorga MD    ______________________________________________________________________  Given the patient's current clinical presentation, I have a high level of concern for decompensation if discharged from the emergency department.  Complex decision making was performed, which includes reviewing the patient's available past medical records, laboratory results, and x-ray films.       My assessment of this patient's clinical condition and my plan of care is as follows.    Assessment / Plan:    A-fib with RVR  HTN  BP/heart rate improved after dose of home medications diltiazem and metoprolol  Admit to telemetry  Continue home diltiazem/metoprolol/hydralazine   Start back on Xarelto  Echo  Pharmacy consulted for med rec  Hold diuretics while hydrating ( lasix 40 mg daily)     Metabolic encephalopathy  Multifactorial; no signs of underlying infection at this time; non focal  + possible expressive aphasia   Head CT without acute findings  Correct underlying problems  Gentle hydration  Monitor; if not improving/ positive MRI, will need neurology consult  Will proceed with MRI ( appears to have difficulties finding words)   PT/OT/speech     DM type II  , questionable if compliant with medications  Hold p.o. metformin for now for elevated creatinine  Cover with sliding scale as needed  Follow-up hemoglobin A1c    Hypothyroidism  TSH 3.98 today, suspecting noncompliance with medication  Start back on Synthroid  TSH will need to be followed up in 4-6 weeks      Pharmacy consulted for med rec        Medical Decision Making:   I personally reviewed labs: yes   I personally reviewed imaging:  I personally reviewed EKG: yes   Toxic drug monitoring: xarelto, monitor for bleeding   Discussed case with: ED provider. After discussion I am in agreement that acuity of patient's

## 2025-02-25 ENCOUNTER — APPOINTMENT (OUTPATIENT)
Facility: HOSPITAL | Age: 80
End: 2025-02-25
Payer: MEDICARE

## 2025-02-25 PROBLEM — R41.82 ACUTE ALTERATION IN MENTAL STATUS: Status: ACTIVE | Noted: 2025-02-25

## 2025-02-25 PROBLEM — R56.9 CONVULSIONS (HCC): Status: ACTIVE | Noted: 2025-02-25

## 2025-02-25 LAB
ANION GAP SERPL CALC-SCNC: 12 MMOL/L (ref 2–12)
BUN SERPL-MCNC: 17 MG/DL (ref 6–20)
BUN/CREAT SERPL: 17 (ref 12–20)
CALCIUM SERPL-MCNC: 9.6 MG/DL (ref 8.5–10.1)
CHLORIDE SERPL-SCNC: 102 MMOL/L (ref 97–108)
CK SERPL-CCNC: 53 U/L (ref 26–192)
CO2 SERPL-SCNC: 22 MMOL/L (ref 21–32)
CREAT SERPL-MCNC: 1.01 MG/DL (ref 0.55–1.02)
ERYTHROCYTE [DISTWIDTH] IN BLOOD BY AUTOMATED COUNT: 13.1 % (ref 11.5–14.5)
FOLATE SERPL-MCNC: 14.6 NG/ML (ref 5–21)
GLUCOSE BLD STRIP.AUTO-MCNC: 147 MG/DL (ref 65–117)
GLUCOSE BLD STRIP.AUTO-MCNC: 242 MG/DL (ref 65–117)
GLUCOSE BLD STRIP.AUTO-MCNC: 271 MG/DL (ref 65–117)
GLUCOSE BLD STRIP.AUTO-MCNC: 298 MG/DL (ref 65–117)
GLUCOSE BLD STRIP.AUTO-MCNC: 341 MG/DL (ref 65–117)
GLUCOSE SERPL-MCNC: 301 MG/DL (ref 65–100)
HCT VFR BLD AUTO: 44.8 % (ref 35–47)
HGB BLD-MCNC: 14.2 G/DL (ref 11.5–16)
MCH RBC QN AUTO: 27.2 PG (ref 26–34)
MCHC RBC AUTO-ENTMCNC: 31.7 G/DL (ref 30–36.5)
MCV RBC AUTO: 85.8 FL (ref 80–99)
NRBC # BLD: 0 K/UL (ref 0–0.01)
NRBC BLD-RTO: 0 PER 100 WBC
PLATELET # BLD AUTO: 237 K/UL (ref 150–400)
PMV BLD AUTO: 11.2 FL (ref 8.9–12.9)
POTASSIUM SERPL-SCNC: 3.5 MMOL/L (ref 3.5–5.1)
PROLACTIN SERPL-MCNC: 8.9 NG/ML
RBC # BLD AUTO: 5.22 M/UL (ref 3.8–5.2)
SERVICE CMNT-IMP: ABNORMAL
SODIUM SERPL-SCNC: 136 MMOL/L (ref 136–145)
VIT B12 SERPL-MCNC: 1089 PG/ML (ref 193–986)
WBC # BLD AUTO: 11 K/UL (ref 3.6–11)

## 2025-02-25 PROCEDURE — 2500000003 HC RX 250 WO HCPCS: Performed by: HOSPITALIST

## 2025-02-25 PROCEDURE — 82746 ASSAY OF FOLIC ACID SERUM: CPT

## 2025-02-25 PROCEDURE — 6370000000 HC RX 637 (ALT 250 FOR IP): Performed by: HOSPITALIST

## 2025-02-25 PROCEDURE — 4A03X5D MEASUREMENT OF ARTERIAL FLOW, INTRACRANIAL, EXTERNAL APPROACH: ICD-10-PCS | Performed by: RADIOLOGY

## 2025-02-25 PROCEDURE — 95819 EEG AWAKE AND ASLEEP: CPT | Performed by: PSYCHIATRY & NEUROLOGY

## 2025-02-25 PROCEDURE — 97116 GAIT TRAINING THERAPY: CPT

## 2025-02-25 PROCEDURE — 97161 PT EVAL LOW COMPLEX 20 MIN: CPT

## 2025-02-25 PROCEDURE — 36415 COLL VENOUS BLD VENIPUNCTURE: CPT

## 2025-02-25 PROCEDURE — 92610 EVALUATE SWALLOWING FUNCTION: CPT

## 2025-02-25 PROCEDURE — 95816 EEG AWAKE AND DROWSY: CPT

## 2025-02-25 PROCEDURE — 70450 CT HEAD/BRAIN W/O DYE: CPT

## 2025-02-25 PROCEDURE — 2580000003 HC RX 258: Performed by: HOSPITALIST

## 2025-02-25 PROCEDURE — 6370000000 HC RX 637 (ALT 250 FOR IP): Performed by: INTERNAL MEDICINE

## 2025-02-25 PROCEDURE — 82550 ASSAY OF CK (CPK): CPT

## 2025-02-25 PROCEDURE — 6370000000 HC RX 637 (ALT 250 FOR IP): Performed by: NURSE PRACTITIONER

## 2025-02-25 PROCEDURE — 82607 VITAMIN B-12: CPT

## 2025-02-25 PROCEDURE — 80048 BASIC METABOLIC PNL TOTAL CA: CPT

## 2025-02-25 PROCEDURE — 93005 ELECTROCARDIOGRAM TRACING: CPT | Performed by: INTERNAL MEDICINE

## 2025-02-25 PROCEDURE — 6360000002 HC RX W HCPCS: Performed by: HOSPITALIST

## 2025-02-25 PROCEDURE — 97530 THERAPEUTIC ACTIVITIES: CPT

## 2025-02-25 PROCEDURE — 92522 EVALUATE SPEECH PRODUCTION: CPT

## 2025-02-25 PROCEDURE — 85027 COMPLETE CBC AUTOMATED: CPT

## 2025-02-25 PROCEDURE — 6360000002 HC RX W HCPCS: Performed by: NURSE PRACTITIONER

## 2025-02-25 PROCEDURE — 97165 OT EVAL LOW COMPLEX 30 MIN: CPT

## 2025-02-25 PROCEDURE — 1100000003 HC PRIVATE W/ TELEMETRY

## 2025-02-25 PROCEDURE — 84146 ASSAY OF PROLACTIN: CPT

## 2025-02-25 PROCEDURE — 97535 SELF CARE MNGMENT TRAINING: CPT

## 2025-02-25 PROCEDURE — 70498 CT ANGIOGRAPHY NECK: CPT

## 2025-02-25 PROCEDURE — 6360000002 HC RX W HCPCS: Performed by: INTERNAL MEDICINE

## 2025-02-25 PROCEDURE — 6360000004 HC RX CONTRAST MEDICATION: Performed by: INTERNAL MEDICINE

## 2025-02-25 PROCEDURE — 6370000000 HC RX 637 (ALT 250 FOR IP): Performed by: STUDENT IN AN ORGANIZED HEALTH CARE EDUCATION/TRAINING PROGRAM

## 2025-02-25 PROCEDURE — 82962 GLUCOSE BLOOD TEST: CPT

## 2025-02-25 PROCEDURE — 0042T CT BRAIN PERFUSION: CPT

## 2025-02-25 RX ORDER — TRAMADOL HYDROCHLORIDE 50 MG/1
25 TABLET ORAL
Status: DISCONTINUED | OUTPATIENT
Start: 2025-02-25 | End: 2025-02-25

## 2025-02-25 RX ORDER — IOPAMIDOL 755 MG/ML
100 INJECTION, SOLUTION INTRAVASCULAR
Status: COMPLETED | OUTPATIENT
Start: 2025-02-25 | End: 2025-02-25

## 2025-02-25 RX ORDER — GLUCAGON 1 MG/ML
1 KIT INJECTION PRN
Status: DISCONTINUED | OUTPATIENT
Start: 2025-02-25 | End: 2025-03-07 | Stop reason: HOSPADM

## 2025-02-25 RX ORDER — NALOXONE HYDROCHLORIDE 0.4 MG/ML
0.4 INJECTION, SOLUTION INTRAMUSCULAR; INTRAVENOUS; SUBCUTANEOUS ONCE
Status: DISCONTINUED | OUTPATIENT
Start: 2025-02-25 | End: 2025-03-07 | Stop reason: HOSPADM

## 2025-02-25 RX ORDER — HALOPERIDOL 5 MG/ML
2 INJECTION INTRAMUSCULAR ONCE
Status: COMPLETED | OUTPATIENT
Start: 2025-02-25 | End: 2025-02-25

## 2025-02-25 RX ORDER — LORAZEPAM 1 MG/1
1 TABLET ORAL ONCE
Status: COMPLETED | OUTPATIENT
Start: 2025-02-25 | End: 2025-02-27

## 2025-02-25 RX ORDER — LEVETIRACETAM 500 MG/5ML
500 INJECTION, SOLUTION, CONCENTRATE INTRAVENOUS EVERY 12 HOURS
Status: DISCONTINUED | OUTPATIENT
Start: 2025-02-25 | End: 2025-02-27

## 2025-02-25 RX ORDER — CLOPIDOGREL BISULFATE 75 MG/1
300 TABLET ORAL ONCE
Status: COMPLETED | OUTPATIENT
Start: 2025-02-25 | End: 2025-02-25

## 2025-02-25 RX ORDER — TRAZODONE HYDROCHLORIDE 50 MG/1
25 TABLET ORAL NIGHTLY
Status: DISCONTINUED | OUTPATIENT
Start: 2025-02-25 | End: 2025-03-07 | Stop reason: HOSPADM

## 2025-02-25 RX ORDER — DEXTROSE MONOHYDRATE 100 MG/ML
INJECTION, SOLUTION INTRAVENOUS CONTINUOUS PRN
Status: DISCONTINUED | OUTPATIENT
Start: 2025-02-25 | End: 2025-03-07 | Stop reason: HOSPADM

## 2025-02-25 RX ORDER — LEVETIRACETAM 500 MG/5ML
1500 INJECTION, SOLUTION, CONCENTRATE INTRAVENOUS ONCE
Status: COMPLETED | OUTPATIENT
Start: 2025-02-25 | End: 2025-02-25

## 2025-02-25 RX ADMIN — HYDRALAZINE HYDROCHLORIDE 10 MG: 20 INJECTION INTRAMUSCULAR; INTRAVENOUS at 04:37

## 2025-02-25 RX ADMIN — LEVETIRACETAM 500 MG: 100 INJECTION INTRAVENOUS at 23:30

## 2025-02-25 RX ADMIN — LEVETIRACETAM 1500 MG: 100 INJECTION INTRAVENOUS at 18:12

## 2025-02-25 RX ADMIN — HYDRALAZINE HYDROCHLORIDE 25 MG: 50 TABLET ORAL at 09:08

## 2025-02-25 RX ADMIN — SODIUM CHLORIDE: 0.9 INJECTION, SOLUTION INTRAVENOUS at 03:42

## 2025-02-25 RX ADMIN — SODIUM CHLORIDE, PRESERVATIVE FREE 10 ML: 5 INJECTION INTRAVENOUS at 23:45

## 2025-02-25 RX ADMIN — INSULIN LISPRO 6 UNITS: 100 INJECTION, SOLUTION INTRAVENOUS; SUBCUTANEOUS at 16:50

## 2025-02-25 RX ADMIN — CLOPIDOGREL BISULFATE 300 MG: 75 TABLET ORAL at 12:33

## 2025-02-25 RX ADMIN — TRAZODONE HYDROCHLORIDE 25 MG: 50 TABLET ORAL at 23:54

## 2025-02-25 RX ADMIN — INSULIN LISPRO 4 UNITS: 100 INJECTION, SOLUTION INTRAVENOUS; SUBCUTANEOUS at 07:25

## 2025-02-25 RX ADMIN — HALOPERIDOL LACTATE 2 MG: 5 INJECTION, SOLUTION INTRAMUSCULAR at 19:21

## 2025-02-25 RX ADMIN — DILTIAZEM HYDROCHLORIDE 240 MG: 240 CAPSULE, COATED, EXTENDED RELEASE ORAL at 09:08

## 2025-02-25 RX ADMIN — IOPAMIDOL 100 ML: 755 INJECTION, SOLUTION INTRAVENOUS at 11:36

## 2025-02-25 RX ADMIN — METOPROLOL SUCCINATE 100 MG: 50 TABLET, FILM COATED, EXTENDED RELEASE ORAL at 09:08

## 2025-02-25 RX ADMIN — INSULIN LISPRO 2 UNITS: 100 INJECTION, SOLUTION INTRAVENOUS; SUBCUTANEOUS at 12:32

## 2025-02-25 RX ADMIN — SODIUM CHLORIDE, PRESERVATIVE FREE 10 ML: 5 INJECTION INTRAVENOUS at 09:08

## 2025-02-25 RX ADMIN — RIVAROXABAN 20 MG: 20 TABLET, FILM COATED ORAL at 00:06

## 2025-02-25 RX ADMIN — LEVOTHYROXINE SODIUM 88 MCG: 0.09 TABLET ORAL at 07:26

## 2025-02-25 ASSESSMENT — PAIN SCALES - GENERAL
PAINLEVEL_OUTOF10: 0
PAINLEVEL_OUTOF10: 0

## 2025-02-25 NOTE — SIGNIFICANT EVENT
Anxiety     Provider approached by nursing staff per MD, to order anti-anxiety agent to undergo an MRI. Pt notes increased anxiety due to closed spaces.     Ativan 0.5mg x 1 IV     Marbella Hanna DNP, AGACNP-BC

## 2025-02-25 NOTE — PROGRESS NOTES
Physical Therapy  Referral received, chart reviewed and attempted to see patient for PT evaluation. Patient currently LILLIAN for CT. Will continue to follow.   Matthew Baldwin, PT

## 2025-02-25 NOTE — PROGRESS NOTES
Occupational Therapy     Referral received, chart reviewed and attempted to see patient for OT evaluation. Patient currently LILLIAN for CT. Will continue to follow.     Robert Post, JORDEN, OTR/L

## 2025-02-25 NOTE — SIGNIFICANT EVENT
[facility]  RAPID RESPONSE TEAM   RN NOTE       Overhead rapid response called to room # 129/01  @ 1657 for patient Sarah Gordon , MRN# [id number]      S- Reason for rapid response: change in mental status/flushing and eyes rolled back  Per primary RN:   Background: patient admitted for confusion, stroke was called earlier today. Scan was negative per note. Patient just had EEG done.      O/A- Focused Assessment:   Cardiac- afib rate controlled, sbp stable. Afebrile.  Respiratory- room air, no apparent distress. Sats above 95%  Nuero- awake, confused, moves all extremities. Pupils equal and reactive.   GI//Skin passed swallow earlier able to take po meds.   Lines/drain- pIVs  Labs/chart reviewed-  Yes.      Interventions:      Dr. Ye at bedside, orders received for the following Interventions:   - EKG- afib with rate of 80s.   - Labs  - Kepprea pending neuro approval for possible seizure   - continue glucose monitoring  - seizure precaution.    P- Outcome:   Patient stable, improving mental status. Family at bedside.     Patient Disposition:   Patient transferred to higher level of care following RRT?: No.  RRT End Time: 1715  Patient/family education provided as applicable.        Thank you for this RRT call.   Care Collaborated with primary RN, CRN, RT, and attending MD.   Please refer to the flowsheet for detailed vital signs during this event.   See MD notes for details and plan of care.     Code Status: Full Code   Attending MD: Adrian Fonseca MD   Vitals:    02/25/25 1732   BP: (!) 184/93   Pulse: 85   Resp: 18   Temp: 97.7 °F (36.5 °C)   SpO2: 98%          IRIS Schaefer, RN, CCRN, TCRN  Rapid Response Team  Ext [phone]

## 2025-02-25 NOTE — CARE COORDINATION
CM reviewing chart in attempts to complete initial CM assessment; CM heard rapid response paged overhead, CM to re-attempt at later time.      DENNIS Vasquez.  Care Manager, [facility]  [phone]/Available on [employer]

## 2025-02-25 NOTE — PLAN OF CARE
Problem: Physical Therapy - Adult  Goal: By Discharge: Performs mobility at highest level of function for planned discharge setting.  See evaluation for individualized goals.  Description: FUNCTIONAL STATUS PRIOR TO ADMISSION: Patient was modified independent using a rolling walker for functional mobility.    HOME SUPPORT PRIOR TO ADMISSION: The patient lived alone with local friends and family that can travel to provide assistance.    Physical Therapy Goals  Initiated 2/25/2025  1.  Patient will move from supine to sit and sit to supine, scoot up and down, and roll side to side in bed with modified independence within 7 day(s).    2.  Patient will perform sit to stand with modified independence within 7 day(s).  3.  Patient will transfer from bed to chair and chair to bed with modified independence using the least restrictive device within 7 day(s).  4.  Patient will ambulate with modified independence for 100 feet with the least restrictive device within 7 day(s).     Outcome: Progressing    PHYSICAL THERAPY EVALUATION    Patient: Sarah Gordon (79 y.o. female)  Date: 2/25/2025  Primary Diagnosis: Encephalopathy acute [G93.40]  Disorientation [R41.0]  Acute kidney injury [N17.9]  Atrial fibrillation, unspecified type (HCC) [I48.91]       Precautions: Restrictions/Precautions: Fall Risk                      ASSESSMENT :   DEFICITS/IMPAIRMENTS:   The patient is limited by decreased functional mobility, independence in ADLs, high-level IADLs, strength, activity tolerance, safety awareness, cognition, coordination, balance     Based on the impairments listed above patient presents below baseline for functional mobility. Patient continues to demonstrate confusion but improving per patient and her son. With increased time and intermittent cuing patient is oriented. Demonstrates delayed processing and decreased safety planning but tolerated in room mobility well. Cues for environmental and DME safety. Min A overall.  Determination   History Examination Presentation Decision-Making   LOW Complexity : Zero comorbidities / personal factors that will impact the outcome / POC LOW Complexity : 1-2 Standardized tests and measures addressing body structure, function, activity limitation and / or participation in recreation  LOW Complexity : Stable, uncomplicated  [place] Balance Test  MEDIUM   Based on the above components, the patient evaluation is determined to be of the following complexity level: Low

## 2025-02-25 NOTE — PLAN OF CARE
Problem: Occupational Therapy - Adult  Goal: By Discharge: Performs self-care activities at highest level of function for planned discharge setting.  See evaluation for individualized goals.  Description: FUNCTIONAL STATUS PRIOR TO ADMISSION:  pt states independence w/ ADLs and mod I for mobility w/ use of RW or Rollator   Receives Help From: Family, Prior Level of Assist for ADLs: Independent,  ,  ,  ,  ,  , Prior Level of Assist for Homemaking: Independent,  , Prior Level of Assist for Transfers: Independent, Active : Yes     HOME SUPPORT: Patient lived alone, has friends who check on her and son who will stay with her at time of dc.    Occupational Therapy Goals:  Initiated 2/25/2025  1.  Patient will perform grooming in stance w/ RW in front with Modified [place] within 7 day(s).  2.  Patient will perform upper body dressing with [place] within 7 day(s).  3.  Patient will perform lower body dressing with [place] within 7 day(s).  4.  Patient will perform toilet transfers with Modified [place]  within 7 day(s).  5.  Patient will perform all aspects of toileting with [place] within 7 day(s).  6.  Patient will participate in upper extremity therapeutic exercise/activities with Supervision for 5 minutes within 7 day(s).    7.  Patient will utilize energy conservation techniques during functional activities with verbal cues within 7 day(s).   Outcome: Progressing    OCCUPATIONAL THERAPY EVALUATION    Patient: Sarah Gordon (79 y.o. female)  Date: 2/25/2025  Primary Diagnosis: Encephalopathy acute [G93.40]  Disorientation [R41.0]  Acute kidney injury [N17.9]  Atrial fibrillation, unspecified type (HCC) [I48.91]         Precautions:                    ASSESSMENT :  The patient is limited by decreased independence in ADLs, high-level IADLs, strength, activity tolerance, endurance, safety awareness, coordination, balance, general weakness .  Pt admitted due to confusion while speaking w/

## 2025-02-25 NOTE — PLAN OF CARE
Problem: Chronic Conditions and Co-morbidities  Goal: Patient's chronic conditions and co-morbidity symptoms are monitored and maintained or improved  Outcome: Progressing     Problem: Discharge Planning  Goal: Discharge to home or other facility with appropriate resources  Outcome: Progressing     Problem: Pain  Goal: Verbalizes/displays adequate comfort level or baseline comfort level  Outcome: Progressing     Problem: Neurosensory - Adult  Goal: Achieves stable or improved neurological status  Outcome: Progressing  Goal: Achieves maximal functionality and self care  Outcome: Progressing

## 2025-02-25 NOTE — PROGRESS NOTES
Hospitalist Progress Note    NAME:   Sarah Gordon   : 1945   MRN: [id number]     Date/Time: 2025 5:29 PM  Patient PCP: Joellen Mayorga MD    Estimated discharge date:  Barriers:       Assessment / Plan:    Suspected seizure  -Rapid response was called for change in mental status, rolling of eyes and also flushing  -She has been having neurological symptoms this morning and stroke was suspected and MRI of the brain was ordered which did not reveal any evidence of acute stroke but showed chronic stroke and also suspected inflammatory process and MRI of the brain with contrast was requested  -I discussed with on-call neurologist who recommended Keppra.  EEG has already been done.  Seizure precautions.  Aspiration precautions.  -Will order Keppra  -MRI of the brain with contrast has already been ordered  -Will check prolactin level and also CK  -She is currently hemodynamically stable  -I spoke to family and updated them at bedside  -I also spoke to patient's primary care physician Dr. thomas and updated him on plan of care.  Care will be turned over to Dr. Thomas.          Medical Decision Making:   I personally reviewed labs: CBC, BMP  I personally reviewed imaging: MRI of the brain  I personally reviewed EKG: Telemetry  Toxic drug monitoring:   Discussed case with: Neurology regarding suspected seizures        Code Status: Full code  DVT Prophylaxis: Xarelto  GI Prophylaxis:    I have provided    48    minutes of critical care time.  During this entire length of time I was immediately available to the patient.       The reason for providing this level of medical care was due to a critical illness that impaired one or more vital organ systems, such that there was a high probability of imminent or life threatening deterioration in the patient's condition. This care involved high complexity decision making which includes reviewing the patient's past medical records, current laboratory results, and

## 2025-02-25 NOTE — PROGRESS NOTES
Hospitalist Progress Note               Daily Progress Note: 2/25/2025      Hospital Day: 2     Chief complaint:   Chief Complaint   Patient presents with    Altered Mental Status    Hyperglycemia     Pt presents to ED via EMS from home and is moved off the stretcher. EMS reports pt reports she started feeling fuzzy yesterday and couldn't get her thoughts together; friend noted speech problem with jumbling her words and possibly slurred. LKW >24hrs. EMS noted afib RVR on EKG and .         Subjective:   Hospital course to date:  79-year-old lady with history of paroxysmal atrial fibrillation, essential hypertension, diabetes, hypothyroidism brought in by EMS with reports of alteration in mentation.  She was reportedly talking on the phone with her friend when friend noticed that she was confused and not speaking right.  Presented as a stroke alert.  CT scan of the brain without contrast obtained February 24 unremarkable.  A follow-up MRI of the brain without contrast negative for acute CVA.  She was admitted to the neuro telemetry floor for close monitoring.  Also noted with atrial fibrillation with rapid ventricular response upon arrival.  Received IV Cardizem with improvement in heart rate.  She was started on Xarelto for stroke prevention.  In the morning of February 25th, family noted some speech disturbance and right-sided weakness which self resolved.  During my routine rounds at about 9:45 AM, patient was awake alert oriented x 4.  No focal deficits.  Given earlier concerns of neurological change, CT perfusion study was ordered stat.     --------  Patient is seen today for follow-up.  Reportedly had speech disturbance and weakness at about 10:40 AM.  A stroke alert was called and patient taken for CT brain without contrast and CTA head and neck        Medications reviewed  Current Facility-Administered Medications   Medication Dose Route Frequency    glucose chewable tablet 16 g  4 tablet Oral PRN

## 2025-02-25 NOTE — ED NOTES
RN to bedside, initial pt contact.  Pt medicated per MD order. Updated on plan of care.  Call bell within reach, no other needs at this time.  Family to go home for the night.

## 2025-02-25 NOTE — SIGNIFICANT EVENT
[facility]  RAPID RESPONSE TEAM  CODE STROKE NOTE      TIME CODE STROKE CALLED 1054   RRT ARRIVAL TIME TO ASSESS PATIENT 1056   PRIMARY RN Michelle SO RN   PATIENT AND ROOM NUMBER 129 Neuro   BLOOD GLUCOSE 200   BLOOD PRESSURE 171/83  HR 82 afib   LTKW 0230 am vs 4 am.    BEFAST S/SX Difficulty speaking/acute confusion   ANTICOAGULANTS Xarelto PO   NIHSS 3   MD AT BEDSIDE Yes Dr. Fonseca in the ED holding    TIME TO CT TABLE 1100   TIME TELE-NEURO CALLED 1056   TIME TELE-NEURO SEEN PATIENT 1106     BACKGROUND/HISTORY:  Patient was admitted for confusion on 2/24. CT head was done on admission, negative for stroke per report. This morning patient had an increased in aphasia and not following command. Provider saw patient and ordered stat CT head, later code stroke was activated.     ASSESSMENT/INTERVENTIONS:  On arrival, patient is on stretcher with primary RN, and transport along with family member on the way to CT. Patient awake, does not follow command, confused. CT head without contrast completed, Tele malena saw and assessed patient in the CT holding area. NIH 3, no focal difficit. Stroke work up with CTA/P done and MRI to be completed.     OUTCOME:  Patient stable, Bps in the 170s. On room air, sats 98, HR variable 80s with afib. CT/A/P negative for acute stroke per report/telenuerology.   MRI to be completed, follow stroke bundle and inpatient neurology consult.       Rapid Response Team  IRIS Schaefer, RN, CCRN, TCRN  Ext [phone]

## 2025-02-25 NOTE — PLAN OF CARE
Problem: Chronic Conditions and Co-morbidities  Goal: Patient's chronic conditions and co-morbidity symptoms are monitored and maintained or improved  2/25/2025 1838 by Briana Dangelo RN  Outcome: Progressing  2/25/2025 0536 by Aarti Davis RN  Outcome: Progressing     Problem: Discharge Planning  Goal: Discharge to home or other facility with appropriate resources  2/25/2025 1838 by Briana Dangelo RN  Outcome: Progressing  2/25/2025 0536 by Aarti Davis RN  Outcome: Progressing     Problem: Pain  Goal: Verbalizes/displays adequate comfort level or baseline comfort level  2/25/2025 1838 by Briana Dangelo RN  Outcome: Progressing  2/25/2025 0536 by Aarti Davis RN  Outcome: Progressing     Problem: Neurosensory - Adult  Goal: Achieves stable or improved neurological status  2/25/2025 1838 by Briana Dangelo RN  Outcome: Progressing  2/25/2025 0536 by Aarti Davis RN  Outcome: Progressing  Goal: Achieves maximal functionality and self care  2/25/2025 1838 by Briana Dangelo RN  Outcome: Progressing  2/25/2025 0536 by Aarti Davis RN  Outcome: Progressing     Problem: ABCDS Injury Assessment  Goal: Absence of physical injury  Outcome: Progressing     Problem: Confusion  Goal: Confusion, delirium, dementia, or psychosis is improved or at baseline  Description: INTERVENTIONS:  1. Assess for possible contributors to thought disturbance, including medications, impaired vision or hearing, underlying metabolic abnormalities, dehydration, psychiatric diagnoses, and notify attending LIP  2. [place] high risk fall precautions, as indicated  3. Provide frequent short contacts to provide reality reorientation, refocusing and direction  4. Decrease environmental stimuli, including noise as appropriate  5. Monitor and intervene to maintain adequate nutrition, hydration, elimination, sleep and activity  6. If unable to ensure safety without constant attention obtain sitter and review sitter guidelines with

## 2025-02-25 NOTE — PLAN OF CARE
Speech LAnguage Pathology EVALUATION    Patient: Sarah Gordon (79 y.o. female)  Date: 2/25/2025  Primary Diagnosis: Encephalopathy acute [G93.40]  Disorientation [R41.0]  Acute kidney injury [N17.9]  Atrial fibrillation, unspecified type (HCC) [I48.91]       Precautions:                     ASSESSMENT :  The patient presents with a functional oropharyngeal swallow. Mastication assessed to be timely and complete. Patient tolerated single and sequential sips via straw with no overt difficulty or overt clinical signs of aspiration. Patient cleared to continue Regular/Thin Liquid diet with general swallow precautions outlined below.    Note patient's encephalopathic, however MRI is negative for acute infarct. Patient presents with improving, but continued confusion and at least mild cognitive deficits, in the areas of attention, recall, and disorientation. Orientation Log (O-Log) completed on this date, with patient scoring 14/30. Note a score of 25 or better is associated with normal orientation. Patient benefited from therapeutic reorientation on this date. Recommend delirium precautions/cognitive-communication recommendations outlined below. SLP will continue to follow acutely.    Patient will benefit from skilled intervention to address the above impairments.     PLAN :  Recommendations and Planned Interventions:  Diet: Regular and thin liquids  -- Medication as tolerated  -- 1:1 assistance/supervision with feeding  -- Upright for all PO  -- Small bites/sips   -- Alternate bites/sips    Cognitive-Communication Recommendations:    --Eliminate distractions (TV off, door closed, etc.)  --Provide frequent orientation of patient to location, date, and time  -- Have date updated on whiteboard and clock visible to patient  --Keep lights on & curtains/blinds open during the day  --Allow for sleep at night with minimal and ideally no interruptions when possible        Recommend next SLP session: diet tolerance, ongoing  Occupational therapist, and Registered nurse    Patient/family have participated as able in goal setting and plan of care and Patient/family agree to work toward stated goals and plan of care    Thank you,  Nadeem Chung, SLP    SLP Individual Minutes  Time In: 1420  Time Out: 1435  Minutes: 15      Problem: SLP Adult - Impaired Communication  Goal: By Discharge: Demonstrates communication skills at highest level of function for planned discharge setting.  See evaluation for individualized goals.  Description: Speech Pathology Goals  Initiated 2/25/2025     1. Patient will tolerate least restrictive diet without signs of aspiration or decline in respiratory status within 7 days.  2. Patient will participate in continued cognitive-communication diagnostic treatment/delirium precautions within 7 days.  Outcome: Progressing

## 2025-02-26 ENCOUNTER — APPOINTMENT (OUTPATIENT)
Facility: HOSPITAL | Age: 80
End: 2025-02-26
Attending: HOSPITALIST
Payer: MEDICARE

## 2025-02-26 LAB
ANION GAP SERPL CALC-SCNC: 10 MMOL/L (ref 2–12)
BASOPHILS # BLD: 0.07 K/UL (ref 0–0.1)
BASOPHILS NFR BLD: 0.6 % (ref 0–1)
BUN SERPL-MCNC: 16 MG/DL (ref 6–20)
BUN/CREAT SERPL: 16 (ref 12–20)
CALCIUM SERPL-MCNC: 9.9 MG/DL (ref 8.5–10.1)
CHLORIDE SERPL-SCNC: 104 MMOL/L (ref 97–108)
CO2 SERPL-SCNC: 21 MMOL/L (ref 21–32)
CREAT SERPL-MCNC: 0.98 MG/DL (ref 0.55–1.02)
DIFFERENTIAL METHOD BLD: ABNORMAL
EKG DIAGNOSIS: NORMAL
EKG Q-T INTERVAL: 390 MS
EKG QRS DURATION: 82 MS
EKG QTC CALCULATION (BAZETT): 441 MS
EKG R AXIS: 12 DEGREES
EKG T AXIS: 46 DEGREES
EKG VENTRICULAR RATE: 77 BPM
EOSINOPHIL # BLD: 0.23 K/UL (ref 0–0.4)
EOSINOPHIL NFR BLD: 2 % (ref 0–7)
ERYTHROCYTE [DISTWIDTH] IN BLOOD BY AUTOMATED COUNT: 13.1 % (ref 11.5–14.5)
EST. AVERAGE GLUCOSE BLD GHB EST-MCNC: 329 MG/DL
GLUCOSE BLD STRIP.AUTO-MCNC: 279 MG/DL (ref 65–117)
GLUCOSE BLD STRIP.AUTO-MCNC: 308 MG/DL (ref 65–117)
GLUCOSE BLD STRIP.AUTO-MCNC: 318 MG/DL (ref 65–117)
GLUCOSE BLD STRIP.AUTO-MCNC: 370 MG/DL (ref 65–117)
GLUCOSE SERPL-MCNC: 236 MG/DL (ref 65–100)
HBA1C MFR BLD: 13.1 % (ref 4–5.6)
HCT VFR BLD AUTO: 45.8 % (ref 35–47)
HGB BLD-MCNC: 14.7 G/DL (ref 11.5–16)
IMM GRANULOCYTES # BLD AUTO: 0.06 K/UL (ref 0–0.04)
IMM GRANULOCYTES NFR BLD AUTO: 0.5 % (ref 0–0.5)
INR PPP: 1.1 (ref 0.9–1.1)
LYMPHOCYTES # BLD: 3.02 K/UL (ref 0.8–3.5)
LYMPHOCYTES NFR BLD: 26.8 % (ref 12–49)
MCH RBC QN AUTO: 27.4 PG (ref 26–34)
MCHC RBC AUTO-ENTMCNC: 32.1 G/DL (ref 30–36.5)
MCV RBC AUTO: 85.3 FL (ref 80–99)
MONOCYTES # BLD: 0.67 K/UL (ref 0–1)
MONOCYTES NFR BLD: 6 % (ref 5–13)
NEUTS SEG # BLD: 7.2 K/UL (ref 1.8–8)
NEUTS SEG NFR BLD: 64.1 % (ref 32–75)
NRBC # BLD: 0 K/UL (ref 0–0.01)
NRBC BLD-RTO: 0 PER 100 WBC
PLATELET # BLD AUTO: 234 K/UL (ref 150–400)
PMV BLD AUTO: 11.4 FL (ref 8.9–12.9)
POTASSIUM SERPL-SCNC: 3.6 MMOL/L (ref 3.5–5.1)
PROTHROMBIN TIME: 11.5 SEC (ref 9.2–11.2)
RBC # BLD AUTO: 5.37 M/UL (ref 3.8–5.2)
SERVICE CMNT-IMP: ABNORMAL
SODIUM SERPL-SCNC: 135 MMOL/L (ref 136–145)
WBC # BLD AUTO: 11.3 K/UL (ref 3.6–11)

## 2025-02-26 PROCEDURE — 1100000003 HC PRIVATE W/ TELEMETRY

## 2025-02-26 PROCEDURE — 82962 GLUCOSE BLOOD TEST: CPT

## 2025-02-26 PROCEDURE — 85025 COMPLETE CBC W/AUTO DIFF WBC: CPT

## 2025-02-26 PROCEDURE — 95700 EEG CONT REC W/VID EEG TECH: CPT

## 2025-02-26 PROCEDURE — 6370000000 HC RX 637 (ALT 250 FOR IP): Performed by: STUDENT IN AN ORGANIZED HEALTH CARE EDUCATION/TRAINING PROGRAM

## 2025-02-26 PROCEDURE — 80048 BASIC METABOLIC PNL TOTAL CA: CPT

## 2025-02-26 PROCEDURE — 36415 COLL VENOUS BLD VENIPUNCTURE: CPT

## 2025-02-26 PROCEDURE — 6370000000 HC RX 637 (ALT 250 FOR IP): Performed by: NURSE PRACTITIONER

## 2025-02-26 PROCEDURE — 83036 HEMOGLOBIN GLYCOSYLATED A1C: CPT

## 2025-02-26 PROCEDURE — 85610 PROTHROMBIN TIME: CPT

## 2025-02-26 PROCEDURE — 99233 SBSQ HOSP IP/OBS HIGH 50: CPT

## 2025-02-26 PROCEDURE — 2500000003 HC RX 250 WO HCPCS: Performed by: HOSPITALIST

## 2025-02-26 PROCEDURE — 6360000002 HC RX W HCPCS: Performed by: INTERNAL MEDICINE

## 2025-02-26 PROCEDURE — 6370000000 HC RX 637 (ALT 250 FOR IP): Performed by: HOSPITALIST

## 2025-02-26 RX ORDER — HALOPERIDOL 5 MG/ML
2 INJECTION INTRAMUSCULAR EVERY 6 HOURS PRN
Status: DISCONTINUED | OUTPATIENT
Start: 2025-02-26 | End: 2025-03-05

## 2025-02-26 RX ORDER — HALOPERIDOL 5 MG/ML
2 INJECTION INTRAMUSCULAR EVERY 6 HOURS PRN
Status: DISCONTINUED | OUTPATIENT
Start: 2025-02-26 | End: 2025-02-26

## 2025-02-26 RX ADMIN — METOPROLOL SUCCINATE 100 MG: 50 TABLET, FILM COATED, EXTENDED RELEASE ORAL at 10:17

## 2025-02-26 RX ADMIN — TRAZODONE HYDROCHLORIDE 25 MG: 50 TABLET ORAL at 21:39

## 2025-02-26 RX ADMIN — INSULIN LISPRO 6 UNITS: 100 INJECTION, SOLUTION INTRAVENOUS; SUBCUTANEOUS at 21:39

## 2025-02-26 RX ADMIN — LEVETIRACETAM 500 MG: 100 INJECTION INTRAVENOUS at 10:18

## 2025-02-26 RX ADMIN — HYDRALAZINE HYDROCHLORIDE 25 MG: 50 TABLET ORAL at 21:39

## 2025-02-26 RX ADMIN — DILTIAZEM HYDROCHLORIDE 240 MG: 240 CAPSULE, COATED, EXTENDED RELEASE ORAL at 10:17

## 2025-02-26 RX ADMIN — RIVAROXABAN 20 MG: 20 TABLET, FILM COATED ORAL at 17:08

## 2025-02-26 RX ADMIN — INSULIN LISPRO 8 UNITS: 100 INJECTION, SOLUTION INTRAVENOUS; SUBCUTANEOUS at 17:08

## 2025-02-26 RX ADMIN — HYDRALAZINE HYDROCHLORIDE 25 MG: 50 TABLET ORAL at 10:17

## 2025-02-26 RX ADMIN — LEVOTHYROXINE SODIUM 88 MCG: 0.09 TABLET ORAL at 10:17

## 2025-02-26 RX ADMIN — SODIUM CHLORIDE, PRESERVATIVE FREE 10 ML: 5 INJECTION INTRAVENOUS at 21:40

## 2025-02-26 RX ADMIN — ACETAMINOPHEN 650 MG: 325 TABLET ORAL at 22:07

## 2025-02-26 RX ADMIN — ACETAMINOPHEN 650 MG: 650 SUPPOSITORY RECTAL at 09:05

## 2025-02-26 RX ADMIN — LEVETIRACETAM 500 MG: 100 INJECTION INTRAVENOUS at 21:00

## 2025-02-26 RX ADMIN — SODIUM CHLORIDE, PRESERVATIVE FREE 10 ML: 5 INJECTION INTRAVENOUS at 10:18

## 2025-02-26 RX ADMIN — INSULIN LISPRO 6 UNITS: 100 INJECTION, SOLUTION INTRAVENOUS; SUBCUTANEOUS at 11:18

## 2025-02-26 ASSESSMENT — PAIN SCALES - GENERAL
PAINLEVEL_OUTOF10: 6
PAINLEVEL_OUTOF10: 5
PAINLEVEL_OUTOF10: 0
PAINLEVEL_OUTOF10: 6

## 2025-02-26 ASSESSMENT — PAIN DESCRIPTION - DESCRIPTORS
DESCRIPTORS: ACHING

## 2025-02-26 ASSESSMENT — PAIN DESCRIPTION - ORIENTATION
ORIENTATION: RIGHT;LEFT
ORIENTATION: RIGHT;LEFT
ORIENTATION: MID

## 2025-02-26 ASSESSMENT — PAIN - FUNCTIONAL ASSESSMENT
PAIN_FUNCTIONAL_ASSESSMENT: PREVENTS OR INTERFERES SOME ACTIVE ACTIVITIES AND ADLS
PAIN_FUNCTIONAL_ASSESSMENT: PREVENTS OR INTERFERES SOME ACTIVE ACTIVITIES AND ADLS

## 2025-02-26 ASSESSMENT — PAIN DESCRIPTION - LOCATION
LOCATION: ARM;LEG
LOCATION: ARM;LEG
LOCATION: BACK;HEAD

## 2025-02-26 ASSESSMENT — PAIN DESCRIPTION - ONSET
ONSET: ON-GOING
ONSET: ON-GOING

## 2025-02-26 ASSESSMENT — PAIN DESCRIPTION - FREQUENCY
FREQUENCY: INTERMITTENT
FREQUENCY: INTERMITTENT

## 2025-02-26 NOTE — CARE COORDINATION
Care Management Initial Assessment       RUR: 11% (low RUR)   Readmission? No  1st IM letter given? Yes - Pt access  1st  letter given: No    CM met with patient and family at bedside. Patient sleeping att, family able to confirm demographic info but asked CM to come back and discuss dispo once patient aware.     PCP is Dr. Mayorga, unsure of last visit. Pt will confirm. Preferred pharmacy is [facility] on [address] .     Pt lives alone in a 2 level home, 0 GEE. Reports being independent in ADLs. Reports RW at home. Denies O2/CPAP at home. Reports they are supported by their family, [place] Patient is an active .    PT/OT recommend [facility]. CM to discuss with patient once she is awake. Anticipate family to transport at d/c.     CM will continue to follow.        02/26/25 7608   Service Assessment   Patient Orientation Unable to Assess  (Sleeping att, family provided info)   Cognition Alert   History Provided By Child/Family   Primary Caregiver Self   Accompanied By/Relationship daughter in law   Support Systems Children   Patient's Healthcare Decision Maker is: Legal Next of Kin   PCP Verified by CM Yes   Last Visit to PCP   (Unsure - need to address with patient when she is available)   Prior Functional Level Independent in ADLs/IADLs   Current Functional Level Independent in ADLs/IADLs   Can patient return to prior living arrangement Yes   Ability to make needs known: Good   Family able to assist with home care needs: Yes   Would you like for me to discuss the discharge plan with any other family members/significant others, and if so, who? Yes  (Family at bedside)   Financial Resources Medicare   Social/Functional History   Lives With Alone   Type of Home House   Home Layout Two level;Able to Live on Main level with bedroom/bathroom   Home Access Level entry   Home Equipment Rollator;Walker - Rolling   Receives Help From Family   Prior Level of Assist for ADLs Independent

## 2025-02-26 NOTE — PROCEDURES
[facility]              [address] [place] [address]                                   EEG      PATIENT NAME: JORGE LEAL                : 1945  MED REC NO: [id number]                       ROOM: [facility]  ACCOUNT NO: [id number]                       ADMIT DATE: 2025  PROVIDER: Juarez Seth MD    DATE OF SERVICE:  2025    CLINICAL INDICATION:  The patient is an 79-year-old female with a history of acute altered mental status, possible seizures.  EEG to rule out cortical abnormality, rule out epilepsy without encephalopathy.    EEG CLASSIFICATION:  Dysrhythmic grade 2 left hemisphere, maximum temple.    DESCRIPTION OF RECORD:  This is a 16-channel EEG recording with some EKG monitoring during the recording.  The patient had a mild-to-moderate increase in some low to medium amplitude 2-6 hertz theta activity seen in the left temporal region in the recording that was fairly continuous.  There were no clear spike or spike-and-wave discharges, and no recorded electrographic or dysrhythmic spells of any type seen.   Hyperventilation was not performed.  Photic stimulation was not performed.  The patient did enter brief stages of sleep with K complexes and sleep spindles seen in central head regions.    INTERPRETATION:  This is a mildly to moderately abnormal electroencephalogram due to some focal slowing in the left temporal region suggesting an area of cortical disturbance or structural lesion seen there.  Clinical correlation recommended.  No clear spike or spike-and-wave discharges are recorded, electrographic spells of any type are seen.        JUAREZ SETH MD      TAS/AQS  D:  2025 19:37:30  T:  2025 19:54:31  JOB #:  [id number]    CC:   Juarez Seth MD

## 2025-02-26 NOTE — CONSULTS
with the termination of cerebral blood flow, cerebral blood volume, and mean transit time. This study was analyzed by the [employer] algorithm. FINDINGS: CTA NECK Aortic Arch: Patent. Right Common Carotid Artery: Patent. Right Internal Carotid Artery: Motion artifact, mild to moderate calcific plaque at the origin without significant stenosis. NASCET Right: Less than 50%, allowing for motion Left Common Carotid Artery: Patent. Left Internal Carotid Artery: Moderate calcific plaque at the origin though without significant stenosis. NASCET Left: Less than 50%. Carotid stenosis determined using NASCET criteria. Right Vertebral Artery: Patent. Left Vertebral Artery: Patent. Cervical Soft Tissues: 2 cm left thyroid nodule. Lung Apices: 8 mm nodule superior segment left lower lobe. Bones: No destructive bone lesion. Additional Comments: N/A. CTA HEAD Posterior Circulation: No flow limiting stenosis or occlusion. Anterior Circulation: No flow limiting stenosis or occlusion. Venous Sinuses:  Patent. Additional Comments: No evidence of aneurysm or vascular malformation. CT PERFUSION: Predominantly mismatched hypoperfusion right occipital lobe.  RCBF < 30% = 2 cc.  Tmax > 6 seconds = 42 cc. Mismatch volume = 40 cc.     1.  No acute large vessel occlusion, arterial dissection, or hemodynamically significant stenosis. Scattered atherosclerosis as above. 2. Right occipital hypoperfusion, predominantly mismatch with volume 40 cc. Could be artifactual, consider MRI if high clinical concern for acute infarction in this region. 3. 8 mm nodule superior segment left lower lobe, recommend outpatient dedicated CT chest follow-up. Electronically signed by Cristino Tovar    CT BRAIN PERFUSION    Result Date: 2/25/2025  INDICATION: cva Disorientation, unspecified / Reason for exam:->cva EXAMINATION:  CT ANGIOGRAPHY HEAD AND NECK COMPARISON: CT head earlier today TECHNIQUE:  Following the uneventful administration of  intravenous contrast, axial  control. Additional past medical/surgical history included below. Patient presented to the ER on 2/24 with mental status changes, confusion and speech disturbance symptoms.  Symptoms started a day prior, both patient and her son are poor historians.  Her initial blood glucose was 435 per EMS.  It was 397 at presentation in the ER.        Neurological examination is significant for mild paraphasic errors.  She was able to repeat a full sentence, but missed few words in that sentence, no dysarthria. Unclear if this is new or residual from prior stroke. Patient or son unaware of any prior /chronic stroke (patient was noted on the brain MRI today)    CT of the head without contrast images on review negative for any definite evidence for  acute intracranial pathology. No acute ICH.  Chronic left frontal encephalomalacia noted      CTA of head and neck images on review negative for any definite proximal / Intervenable intracranial large Vessel occlusion (LVO).   No definite flow limiting intracranial or extracranial cerebrovascular stenosis.     CT perfusion study images on review negative for any definite focal areas of hypoperfusion.  Artifacts noted     - Brain MRI without contrast negative for any acute stroke.  Focal chronic encephalomalacia seen in the left frontal lobe, likely a chronic infarct.    For evaluation of additional differential as noted in the radiology report, a follow-up contrast MRI was ordered.      In the absence of an acute stroke on MRI study, may have to consider an epileptiform pathology in the differential for her intermittent symptoms of confusion, and aphasia like speech disturbance.  These symptoms localize to the chronic encephalomalacia involving left frontal cortical area.    Later this afternoon, patient had another spell consisting of transient confusion, aphasia symptoms, unresponsive to verbal commands, lasted about 4 minutes.    - Given the high clinical concern for recurrent

## 2025-02-26 NOTE — PROGRESS NOTES
white matter T2/FLAIR  hyperintensity in the left posterior frontal lobe, which may represent a chronic  infarct. However, recommend MRI brain with contrast to exclude the possibility  of an inflammatory process.  2. No acute infarct.      Electronically signed by Raymond Kim  ,    CAT Scan Result (most recent):  CT BRAIN PERFUSION 02/25/2025    Narrative  INDICATION: cva Disorientation, unspecified / Reason for exam:->cva    EXAMINATION:  CT ANGIOGRAPHY HEAD AND NECK    COMPARISON: CT head earlier today    TECHNIQUE:  Following the uneventful administration of  intravenous contrast,  axial CT angiography of the head and neck was performed.  3D image  postprocessing was performed.  CT dose reduction was achieved through use of a  standardized protocol tailored for this examination and automatic exposure  control for dose modulation.  Cerebral perfusion analysis using computed tomography with contrast  administration, including post processing of parametric maps with the  termination of cerebral blood flow, cerebral blood volume, and mean transit  time.  This study was analyzed by the V[employer]ai algorithm.    FINDINGS:    CTA NECK    Aortic Arch: Patent.  Right Common Carotid Artery: Patent.  Right Internal Carotid Artery: Motion artifact, mild to moderate calcific plaque  at the origin without significant stenosis.  NASCET Right: Less than 50%, allowing for motion  Left Common Carotid Artery: Patent.  Left Internal Carotid Artery: Moderate calcific plaque at the origin though  without significant stenosis.  NASCET Left: Less than 50%.  Carotid stenosis determined using NASCET criteria.  Right Vertebral Artery: Patent.  Left Vertebral Artery: Patent.  Cervical Soft Tissues: 2 cm left thyroid nodule.  Lung Apices: 8 mm nodule superior segment left lower lobe.  Bones: No destructive bone lesion.  Additional Comments: N/A.    CTA HEAD    Posterior Circulation: No flow limiting stenosis or occlusion.  Anterior  0.04 K/UL Final    Differential Type 02/24/2025 AUTOMATED    Final    Sodium 02/24/2025 135 (L)  136 - 145 mmol/L Final    Potassium 02/24/2025 3.7  3.5 - 5.1 mmol/L Final    Chloride 02/24/2025 100  97 - 108 mmol/L Final    CO2 02/24/2025 24  21 - 32 mmol/L Final    Anion Gap 02/24/2025 11  2 - 12 mmol/L Final    PLEASE NOTE NEW REFERENCE RANGE    Glucose 02/24/2025 397 (H)  65 - 100 mg/dL Final    BUN 02/24/2025 19  6 - 20 MG/DL Final    Creatinine 02/24/2025 1.34 (H)  0.55 - 1.02 MG/DL Final    BUN/Creatinine Ratio 02/24/2025 14  12 - 20   Final    Est, Glom Filt Rate 02/24/2025 40 (L)  >60 ml/min/1.73m2 Final    Comment:    Pediatric calculator link: https://www.kidney.org/professionals/kdoqi/gfr_calculatorped     These results are not intended for use in patients <18 years of age.     eGFR results are calculated without a race factor using  the 2021 CKD-EPI equation. Careful clinical correlation is recommended, particularly when comparing to results calculated using previous equations.  The CKD-EPI equation is less accurate in patients with extremes of muscle mass, extra-renal metabolism of creatinine, excessive creatine ingestion, or following therapy that affects renal tubular secretion.      Calcium 02/24/2025 9.8  8.5 - 10.1 MG/DL Final    Total Bilirubin 02/24/2025 1.0  0.2 - 1.0 MG/DL Final    ALT 02/24/2025 16  12 - 78 U/L Final    AST 02/24/2025 14 (L)  15 - 37 U/L Final    Alk Phosphatase 02/24/2025 122 (H)  45 - 117 U/L Final    Total Protein 02/24/2025 6.8  6.4 - 8.2 g/dL Final    Albumin 02/24/2025 3.5  3.5 - 5.0 g/dL Final    Globulin 02/24/2025 3.3  2.0 - 4.0 g/dL Final    Albumin/Globulin Ratio 02/24/2025 1.1  1.1 - 2.2   Final    Amphetamine, Urine 02/24/2025 Negative  NEG   Final    Barbiturates, Urine 02/24/2025 Negative  NEG   Final    Benzodiazepines, Urine 02/24/2025 Negative  NEG   Final    Cocaine, Urine 02/24/2025 Negative  NEG   Final    Methadone, Urine 02/24/2025 Negative  NEG   Final

## 2025-02-26 NOTE — PLAN OF CARE
Problem: Chronic Conditions and Co-morbidities  Goal: Patient's chronic conditions and co-morbidity symptoms are monitored and maintained or improved  2/26/2025 0138 by Aarti Davis RN  Outcome: Progressing  2/26/2025 0137 by Aarti Davis RN  Outcome: Not Progressing  2/25/2025 1838 by Briana Dangelo RN  Outcome: Progressing     Problem: Discharge Planning  Goal: Discharge to home or other facility with appropriate resources  2/26/2025 0138 by Aarti Davis RN  Outcome: Progressing  2/26/2025 0137 by Aarti Davis RN  Outcome: Not Progressing  2/25/2025 1838 by Briana Dangelo RN  Outcome: Progressing     Problem: Pain  Goal: Verbalizes/displays adequate comfort level or baseline comfort level  2/26/2025 0138 by Aarti Davis RN  Outcome: Progressing  2/26/2025 0137 by Aarti Davis RN  Outcome: Not Progressing  2/25/2025 1838 by Briana Dangelo RN  Outcome: Progressing     Problem: Neurosensory - Adult  Goal: Achieves stable or improved neurological status  2/26/2025 0138 by Aarti Davis RN  Outcome: Progressing  2/26/2025 0137 by Aarti Davis RN  Outcome: Not Progressing  2/25/2025 1838 by Briana Dangelo RN  Outcome: Progressing  Goal: Achieves maximal functionality and self care  2/26/2025 0138 by Aarti Davis RN  Outcome: Progressing  2/26/2025 0137 by Aarti Davis RN  Outcome: Not Progressing  2/25/2025 1838 by Briana Dangelo RN  Outcome: Progressing     Problem: ABCDS Injury Assessment  Goal: Absence of physical injury  2/26/2025 0138 by Aarti Davis RN  Outcome: Progressing  2/26/2025 0137 by Aarti Davis RN  Outcome: Not Progressing  2/25/2025 1838 by Briana Dangelo RN  Outcome: Progressing     Problem: Confusion  Goal: Confusion, delirium, dementia, or psychosis is improved or at baseline  Description: INTERVENTIONS:  1. Assess for possible contributors to thought disturbance, including medications, impaired vision or hearing, underlying metabolic abnormalities,

## 2025-02-26 NOTE — PROGRESS NOTES
Hospitalist Progress Note               Daily Progress Note: 2/26/2025      Hospital Day: 3     Chief complaint:   Chief Complaint   Patient presents with    Altered Mental Status    Hyperglycemia     Pt presents to ED via EMS from home and is moved off the stretcher. EMS reports pt reports she started feeling fuzzy yesterday and couldn't get her thoughts together; friend noted speech problem with jumbling her words and possibly slurred. LKW >24hrs. EMS noted afib RVR on EKG and .         Subjective:   Hospital course to date:  79-year-old lady with history of paroxysmal atrial fibrillation, essential hypertension, diabetes, hypothyroidism brought in by EMS with reports of alteration in mentation.  She was reportedly talking on the phone with her friend when friend noticed that she was confused and not speaking right.  Presented as a stroke alert.  CT scan of the brain without contrast obtained February 24 unremarkable.  A follow-up MRI of the brain without contrast negative for acute CVA.  She was admitted to the neuro telemetry floor for close monitoring.  Also noted with atrial fibrillation with rapid ventricular response upon arrival.  Received IV Cardizem with improvement in heart rate.  She was started on Xarelto for stroke prevention.  In the morning of February 25th, family noted some speech disturbance and right-sided weakness which self resolved.  During my routine rounds at about 9:45 AM, patient was awake alert oriented x 4.  No focal deficits.  Given earlier concerns of neurological change, CT perfusion study was ordered stat.     --------  Patient is seen today for follow-up.  Reportedly had speech disturbance and weakness last nite.  CT perfusion studies negative for large vessel occlusion or aneurysm.  She was loaded with 300 mg oral Plavix and continued on daily Xarelto.  MRI brain with and without contrast pending.  Son at bedside during this evaluation        Medications

## 2025-02-26 NOTE — PROGRESS NOTES
End of Shift Note    Bedside shift change report given to  Aarti Rn (oncoming nurse) by Briana Dangelo RN .        Shift worked:  Days   Shift summary and any significant changes:     -@1054 Code stroke called [facility] 7:Patient was awake, not following commands and confused; Sent to CT for scans (CT of head without contrast completed  -rapid response called to room # 129/01  @ 1657; Labs completed   - Kepprea ordered by  neuro for possible seizure  -Code [place] called at 1900: patient was agitated and aggressive towards staff; 2 mg IM haldol given  - Rollbelt in place and on patient     Concerns for physician to address:  See above    Zone phone for oncoming shift:   [phone]     Patient Information  Sarah Gordon  79 y.o.  2/24/2025 10:00 AM by Bria Hartley MD. Sarah Gordon was admitted from [facility]    Problem List  Patient Active Problem List    Diagnosis Date Noted    Acute alteration in mental status 02/25/2025    Convulsions ([facility]) 02/25/2025    Encephalopathy acute 02/24/2025    Hip fracture ([facility]) 04/27/2021    NSTEMI, initial episode of care ([facility]) 02/28/2018    DM (diabetes mellitus) ([facility]) 02/27/2018    GERD (gastroesophageal reflux disease) 02/27/2018    Asthma 02/27/2018    HTN (hypertension) 02/27/2018    NSTEMI (non-ST elevated myocardial infarction) ([facility]) 02/27/2018     Past Medical History:   Diagnosis Date    A-fib ([facility])     Asthma     Diabetes ([facility])     GERD (gastroesophageal reflux disease)     Hypertension     Other ill-defined conditions(799.89)     hypothyroid       Core Measures:  CVA: no  CHF: no  PNA: no    Activity:   Number times ambulated in hallways past shift: 1  Number of times OOB to chair past shift: 1    Cardiac:   Cardiac Monitoring: yes, 130    Access:   Current line(s): PIV    Respiratory:   O2 Device: None (Room air)    GI:     Current diet:  ADULT DIET; Regular; 4 carb choices (60 gm/meal)  Tolerating current diet: Yes    Pain Management:   Patient states pain is manageable on current

## 2025-02-26 NOTE — PROGRESS NOTES
Nurse Aarti contacted Nocturnist/cross cover provider via non-urgent messaging system [place] and notified patient had just called code [place] on due to agitation, pulled out her piv, restlessness, agitation, repeatedly trying to get out of the bed, was prior a+ox4, states only to self, was called RRT earlier today for AMS and confusion, states family at the bedside at present, asking for something for agitation, presently no combative behaviors reported. No other concerns reported. No acute distress reported. No other information provided by nurse. VSS. Patient denies any further complaints or concerns. See prior hospitalist group notes for complete details of course of treatment. Recent lab work and documented vs reviewed.    Ordered haldol 2mg IM x1 for patient safety with line pulling, repeatedly attempting to get out of the bed and agitation, appears got narcan around 1730 during RRT. I confirmed with nurse states pt may be able to swallow pills later when less agitated- I also ordered Trazodone 25mg hs start tonight to help facilitate sleep and relaxation overnight. Would consider additional dosing of meds overnight for safety or sleep if pt needs. Added am cbc, bmp, pt- defer to dayshift to follow-up on labs. Appreciate Nursing assistance in the care of this patient.    Continue remaining plan/orders as per dayshift team. Will defer further evaluation/management and timing of discontinuation of regimens to the day shift primary attending care team. Nursing to notify dayshift Hospitalist team in the AM of overnight events and for further/continued concerns. Will remain available overnight cross coverage for further concerns if nursing/patient needs. Please note, there are RRT systems in this hospital in place that if nursing has acute or critical patient condition change or concern, this is to help facilitate and notify that patient needs immediate bedside evaluation by a provider.    Update:  2040 nurse  reported pt now has aphasia again, nurse confirmed the aphasia started prior to the haldol being given earlier. pt had transient aphasia during dayshift prompting the RRT for r/o code stroke, remains pending for stat MRI as pt was unable to hold still long enough to tolerate the MRI procedure. Nurse states pt is moving all arms and legs, states the neurologist seen but is pending to place a note. Reported pt not following commands. States dayshift nurse reported pt NIH was changing/progressively worsening throughout the day, states she will follow-up with the pt closely and notify overnight for further concerns. No other acute focal deficits or concerns reported at present. No seizure like activity reported. I updated hospital rapid team to communicate changes also in case nurse notifies them of concerns.     Update  Nursing informed me family at the bedside states the patient did this earlier, having some difficulty to obtain and speak the correct words, moving all extremities, no acute focal deficits reported. No other acute concerns reported at present. Again confirmed pt likely won't be able to tolerate the MRI as is moving about too much to obtain a clear picture/accurate picture on the scan. NAD, no other concerns reported at present.    Update  0141 nurse reported pt had transient episode of aggressive and combative behaviors, then her family was able to calm her down w/o additional intervention. States took her trazodone but refused hydralazine pill, bp was 124/74 hr 82 so is fine for pt refusal at present time. Nurse states pt fell back asleep, but was asking if order for prn haldol for agitation and behaviors may be placed. Ordered haldol 2mg IM prn x3 doses- defer to dayshift for further mgmt/RECs. May would consider seroquel if pt/family willing for tonight- defer this d/w family to dayshift team as already received trazodone last night w/o significant effectiveness. No other concerns reported. Vss.

## 2025-02-26 NOTE — PROGRESS NOTES
Physical therapy services attempted 9:28AM. Pt received in bed being assisted by family with feeding/eating breakfast. Reporting DPT introduced self/role. Pt gentle decline therapy services to enable continued breakfast consumption. Pt/family confirm no further needs prior to therapist departure. Per communication with CM Team, no current pending discharge for this date and recommended HHPT/HHOT already in place. PT Team will try to provide skilled services at a later date as time permits and as medically appropriate to patient tolerance.    Ciara Martínez, PT, DPT

## 2025-02-27 ENCOUNTER — APPOINTMENT (OUTPATIENT)
Facility: HOSPITAL | Age: 80
End: 2025-02-27
Payer: MEDICARE

## 2025-02-27 LAB
GLUCOSE BLD STRIP.AUTO-MCNC: 266 MG/DL (ref 65–117)
GLUCOSE BLD STRIP.AUTO-MCNC: 333 MG/DL (ref 65–117)
GLUCOSE BLD STRIP.AUTO-MCNC: 365 MG/DL (ref 65–117)
GLUCOSE BLD STRIP.AUTO-MCNC: 416 MG/DL (ref 65–117)
SERVICE CMNT-IMP: ABNORMAL

## 2025-02-27 PROCEDURE — 1100000003 HC PRIVATE W/ TELEMETRY

## 2025-02-27 PROCEDURE — 95714 VEEG EA 12-26 HR UNMNTR: CPT

## 2025-02-27 PROCEDURE — 6370000000 HC RX 637 (ALT 250 FOR IP): Performed by: HOSPITALIST

## 2025-02-27 PROCEDURE — 82962 GLUCOSE BLOOD TEST: CPT

## 2025-02-27 PROCEDURE — 6360000004 HC RX CONTRAST MEDICATION: Performed by: INTERNAL MEDICINE

## 2025-02-27 PROCEDURE — 6370000000 HC RX 637 (ALT 250 FOR IP): Performed by: NURSE PRACTITIONER

## 2025-02-27 PROCEDURE — 92507 TX SP LANG VOICE COMM INDIV: CPT

## 2025-02-27 PROCEDURE — 6370000000 HC RX 637 (ALT 250 FOR IP): Performed by: INTERNAL MEDICINE

## 2025-02-27 PROCEDURE — 92526 ORAL FUNCTION THERAPY: CPT

## 2025-02-27 PROCEDURE — A9579 GAD-BASE MR CONTRAST NOS,1ML: HCPCS | Performed by: INTERNAL MEDICINE

## 2025-02-27 PROCEDURE — 2500000003 HC RX 250 WO HCPCS: Performed by: HOSPITALIST

## 2025-02-27 PROCEDURE — 6370000000 HC RX 637 (ALT 250 FOR IP): Performed by: STUDENT IN AN ORGANIZED HEALTH CARE EDUCATION/TRAINING PROGRAM

## 2025-02-27 PROCEDURE — 99233 SBSQ HOSP IP/OBS HIGH 50: CPT

## 2025-02-27 PROCEDURE — 6360000002 HC RX W HCPCS

## 2025-02-27 PROCEDURE — 70553 MRI BRAIN STEM W/O & W/DYE: CPT

## 2025-02-27 PROCEDURE — 95720 EEG PHY/QHP EA INCR W/VEEG: CPT | Performed by: PSYCHIATRY & NEUROLOGY

## 2025-02-27 RX ORDER — LEVETIRACETAM 500 MG/5ML
1500 INJECTION, SOLUTION, CONCENTRATE INTRAVENOUS ONCE
Status: COMPLETED | OUTPATIENT
Start: 2025-02-27 | End: 2025-02-27

## 2025-02-27 RX ORDER — LEVETIRACETAM 500 MG/5ML
1000 INJECTION, SOLUTION, CONCENTRATE INTRAVENOUS EVERY 12 HOURS
Status: DISCONTINUED | OUTPATIENT
Start: 2025-02-27 | End: 2025-02-28

## 2025-02-27 RX ADMIN — METOPROLOL SUCCINATE 100 MG: 50 TABLET, FILM COATED, EXTENDED RELEASE ORAL at 09:48

## 2025-02-27 RX ADMIN — INSULIN LISPRO 8 UNITS: 100 INJECTION, SOLUTION INTRAVENOUS; SUBCUTANEOUS at 21:27

## 2025-02-27 RX ADMIN — HYDRALAZINE HYDROCHLORIDE 25 MG: 50 TABLET ORAL at 09:48

## 2025-02-27 RX ADMIN — INSULIN LISPRO 6 UNITS: 100 INJECTION, SOLUTION INTRAVENOUS; SUBCUTANEOUS at 16:59

## 2025-02-27 RX ADMIN — LORAZEPAM 1 MG: 1 TABLET ORAL at 21:27

## 2025-02-27 RX ADMIN — HYDRALAZINE HYDROCHLORIDE 25 MG: 50 TABLET ORAL at 21:27

## 2025-02-27 RX ADMIN — ACETAMINOPHEN 650 MG: 325 TABLET ORAL at 15:11

## 2025-02-27 RX ADMIN — GADOTERIDOL 20 ML: 279.3 INJECTION, SOLUTION INTRAVENOUS at 22:10

## 2025-02-27 RX ADMIN — LEVETIRACETAM 1500 MG: 100 INJECTION INTRAVENOUS at 09:56

## 2025-02-27 RX ADMIN — INSULIN LISPRO 4 UNITS: 100 INJECTION, SOLUTION INTRAVENOUS; SUBCUTANEOUS at 06:53

## 2025-02-27 RX ADMIN — LEVOTHYROXINE SODIUM 88 MCG: 0.09 TABLET ORAL at 06:02

## 2025-02-27 RX ADMIN — LEVETIRACETAM 1000 MG: 100 INJECTION INTRAVENOUS at 21:00

## 2025-02-27 RX ADMIN — SODIUM CHLORIDE, PRESERVATIVE FREE 10 ML: 5 INJECTION INTRAVENOUS at 21:29

## 2025-02-27 RX ADMIN — RIVAROXABAN 20 MG: 20 TABLET, FILM COATED ORAL at 16:59

## 2025-02-27 RX ADMIN — SODIUM CHLORIDE, PRESERVATIVE FREE 10 ML: 5 INJECTION INTRAVENOUS at 09:47

## 2025-02-27 RX ADMIN — TRAZODONE HYDROCHLORIDE 25 MG: 50 TABLET ORAL at 21:26

## 2025-02-27 RX ADMIN — DILTIAZEM HYDROCHLORIDE 240 MG: 240 CAPSULE, COATED, EXTENDED RELEASE ORAL at 09:48

## 2025-02-27 RX ADMIN — INSULIN LISPRO 8 UNITS: 100 INJECTION, SOLUTION INTRAVENOUS; SUBCUTANEOUS at 12:18

## 2025-02-27 ASSESSMENT — PAIN DESCRIPTION - LOCATION: LOCATION: HEAD

## 2025-02-27 ASSESSMENT — PAIN SCALES - GENERAL: PAINLEVEL_OUTOF10: 5

## 2025-02-27 NOTE — PROGRESS NOTES
End of Shift Note    Bedside shift change report given to  Leeanne Sands (oncoming nurse) by Mayi Carr RN .        Shift worked:  Days   Shift summary and any significant changes:    24 hour EEG initiated  Echo pending   IV Keppra administered for possible seizures   MRI brain pending   Patient up to chair throughout shift    Concerns for physician to address:  See above    Zone phone for oncoming shift:   [phone]     Patient Information  Sarah Gordon  79 y.o.  2/24/2025 10:00 AM by Bria Hartley MD. Sarah Gordon was admitted from [facility]    Problem List  Patient Active Problem List    Diagnosis Date Noted    Acute alteration in mental status 02/25/2025    Convulsions ([facility]) 02/25/2025    Encephalopathy acute 02/24/2025    Hip fracture ([facility]) 04/27/2021    NSTEMI, initial episode of care ([facility]) 02/28/2018    DM (diabetes mellitus) ([facility]) 02/27/2018    GERD (gastroesophageal reflux disease) 02/27/2018    Asthma 02/27/2018    HTN (hypertension) 02/27/2018    NSTEMI (non-ST elevated myocardial infarction) ([facility]) 02/27/2018     Past Medical History:   Diagnosis Date    A-fib ([facility])     Asthma     Diabetes ([facility])     GERD (gastroesophageal reflux disease)     Hypertension     Other ill-defined conditions(799.89)     hypothyroid       Core Measures:  CVA: no  CHF: no  PNA: no    Activity:   Number times ambulated in hallways past shift: 1  Number of times OOB to chair past shift: 1    Cardiac:   Cardiac Monitoring: yes, 130    Access:   Current line(s): PIV    Respiratory:   O2 Device: None (Room air)    GI:     Current diet:  ADULT DIET; Regular; 4 carb choices (60 gm/meal)  Tolerating current diet: Yes    Pain Management:   Patient states pain is manageable on current regimen: yes    Skin:  Jordi Scale Score: 18  Interventions: N/A  Pressure injury: no    Patient Safety:  Fall Score: Herring Total Score: 75  Interventions: bed alarm  Self-release roll belt: Yes  Dexterity to release roll belt: yes   (must document dexterity   here by stating Yes or No here, otherwise this is a restraint and must follow restraint documentation policy.)    DVT prophylaxis:  DVT prophylaxis: meds    Active Consults:  IP CONSULT TO PHARMACY  IP CONSULT TO NEUROLOGY    Length of Stay:  Expected LOS: 3  Actual LOS: 2    Mayi Carr RN

## 2025-02-27 NOTE — PROGRESS NOTES
End of Shift Note    Bedside shift change report given to Madan RN (oncoming nurse) by SANCHEZ Santos .        Shift worked: days   Shift summary and any significant changes:    On EEG. Needs ECHO, MRI. BM this shift.    Concerns for physician to address:    Zone phone for oncoming shift:       Patient Information  Sarah Gordon  79 y.o.  2/24/2025 10:00 AM by Bria Hartley MD. Sarah Gordon was admitted from [facility]    Problem List  Patient Active Problem List    Diagnosis Date Noted    Acute alteration in mental status 02/25/2025    Convulsions ([facility]) 02/25/2025    Encephalopathy acute 02/24/2025    Hip fracture ([facility]) 04/27/2021    NSTEMI, initial episode of care ([facility]) 02/28/2018    DM (diabetes mellitus) ([facility]) 02/27/2018    GERD (gastroesophageal reflux disease) 02/27/2018    Asthma 02/27/2018    HTN (hypertension) 02/27/2018    NSTEMI (non-ST elevated myocardial infarction) ([facility]) 02/27/2018     Past Medical History:   Diagnosis Date    A-fib ([facility])     Asthma     Diabetes ([facility])     GERD (gastroesophageal reflux disease)     Hypertension     Other ill-defined conditions(799.89)     hypothyroid       Core Measures:  CVA: no  CHF: no  PNA: no    Activity:Level of Assistance: Moderate assist, patient does 50-74%  Number times ambulated in hallways past shift: 1  Number of times OOB to chair past shift: 1    Cardiac:   Cardiac Monitoring: yes, 130    Access:   Current line(s): PIV    Respiratory:   O2 Device: None (Room air)    GI:     Current diet:  ADULT DIET; Regular; 4 carb choices (60 gm/meal)  Tolerating current diet: Yes    Pain Management:   Patient states pain is manageable on current regimen: yes    Skin:  Jordi Scale Score: 18  Interventions: N/A  Pressure injury: no    Patient Safety:  Fall Score: Herring Total Score: 85  Interventions: bed alarm  Self-release roll belt: Yes  Dexterity to release roll belt: yes   (must document dexterity  here by stating Yes or No here, otherwise this is a restraint and must  follow restraint documentation policy.)    DVT prophylaxis:  DVT prophylaxis: meds    Active Consults:  IP CONSULT TO PHARMACY  IP CONSULT TO NEUROLOGY    Length of Stay:  Expected LOS: 4  Actual LOS: 3    SANCHEZ Santos

## 2025-02-27 NOTE — PROGRESS NOTES
Hospitalist Progress Note               Daily Progress Note: 2/27/2025      Hospital Day: 4     Chief complaint:   Chief Complaint   Patient presents with    Altered Mental Status    Hyperglycemia     Pt presents to ED via EMS from home and is moved off the stretcher. EMS reports pt reports she started feeling fuzzy yesterday and couldn't get her thoughts together; friend noted speech problem with jumbling her words and possibly slurred. LKW >24hrs. EMS noted afib RVR on EKG and .         Subjective:   Hospital course to date:  79-year-old lady with history of paroxysmal atrial fibrillation, essential hypertension, diabetes, hypothyroidism brought in by EMS with reports of alteration in mentation.  She was reportedly talking on the phone with her friend when friend noticed that she was confused and not speaking right.  Presented as a stroke alert.  CT scan of the brain without contrast obtained February 24 unremarkable.  A follow-up MRI of the brain without contrast negative for acute CVA.  She was admitted to the neuro telemetry floor for close monitoring.  Also noted with atrial fibrillation with rapid ventricular response upon arrival.  Received IV Cardizem with improvement in heart rate.  She was started on Xarelto for stroke prevention.  In the morning of February 25th, family noted some speech disturbance and right-sided weakness which self resolved.  During my routine rounds at about 9:45 AM, patient was awake alert oriented x 4.  No focal deficits.  Given earlier concerns of neurological change, CT perfusion study was ordered stat.     --------  Patient is seen today for follow-up. Unable to follow simple commands. Reportedly hallucinating. MRI brain with and without contrast pending. daughter at bedside during this evaluation        Medications reviewed  Current Facility-Administered Medications   Medication Dose Route Frequency    levETIRAcetam (KEPPRA) injection 1,000 mg  1,000 mg IntraVENous  Q12H    haloperidol lactate (HALDOL) injection 2 mg  2 mg IntraMUSCular Q6H PRN    glucose chewable tablet 16 g  4 tablet Oral PRN    dextrose bolus 10% 125 mL  125 mL IntraVENous PRN    Or    dextrose bolus 10% 250 mL  250 mL IntraVENous PRN    glucagon injection 1 mg  1 mg SubCUTAneous PRN    dextrose 10 % infusion   IntraVENous Continuous PRN    LORazepam (ATIVAN) tablet 1 mg  1 mg Oral Once    naloxone (NARCAN) injection 0.4 mg  0.4 mg IntraVENous Once    traZODone (DESYREL) tablet 25 mg  25 mg Oral Nightly    dilTIAZem (CARDIZEM CD) extended release capsule 240 mg  240 mg Oral Daily    metoprolol succinate (TOPROL XL) extended release tablet 100 mg  100 mg Oral Daily    sodium chloride flush 0.9 % injection 5-40 mL  5-40 mL IntraVENous 2 times per day    sodium chloride flush 0.9 % injection 5-40 mL  5-40 mL IntraVENous PRN    0.9 % sodium chloride infusion   IntraVENous PRN    ondansetron (ZOFRAN-ODT) disintegrating tablet 4 mg  4 mg Oral Q8H PRN    Or    ondansetron (ZOFRAN) injection 4 mg  4 mg IntraVENous Q6H PRN    polyethylene glycol (GLYCOLAX) packet 17 g  17 g Oral Daily PRN    acetaminophen (TYLENOL) tablet 650 mg  650 mg Oral Q6H PRN    Or    acetaminophen (TYLENOL) suppository 650 mg  650 mg Rectal Q6H PRN    insulin lispro (HUMALOG,ADMELOG) injection vial 0-8 Units  0-8 Units SubCUTAneous 4x Daily AC & HS    hydrALAZINE (APRESOLINE) tablet 25 mg  25 mg Oral Q12H    hydrALAZINE (APRESOLINE) injection 10 mg  10 mg IntraVENous Q6H PRN    levothyroxine (SYNTHROID) tablet 88 mcg  88 mcg Oral QAM AC    rivaroxaban (XARELTO) tablet 20 mg  20 mg Oral Daily       Review of Systems:   A comprehensive review of systems was negative.    Objective:   Physical Exam:     /80   Pulse 74   Temp 97.9 °F (36.6 °C) (Axillary)   Resp 18   Ht 1.778 m (5' 10\")   Wt 108.9 kg (240 lb)   SpO2 93%   BMI 34.44 kg/m²    O2 Device: None (Room air)    Temp (24hrs), Av.1 °F (36.7 °C), Min:97.7 °F (36.5 °C),

## 2025-02-27 NOTE — PROGRESS NOTES
HOSPITAL NEUROLOGY NOTE     Chief Complaint   Patient presents with    Altered Mental Status    Hyperglycemia     Pt presents to ED via EMS from home and is moved off the stretcher. EMS reports pt reports she started feeling fuzzy yesterday and couldn't get her thoughts together; friend noted speech problem with jumbling her words and possibly slurred. LKW >24hrs. EMS noted afib RVR on EKG and .         HPI  Sarah Gordon is a 79 y.o. female  with a history of atrial fibrillation on Xarelto, diabetes, hypertension, GERD, asthma, who presented to the ED on 2/24/2025 with a chief complaint of mental status changes, confusion, and speech disturbance.  Patient was taken to the ED for evaluation.  Per EMS, her initial blood sugar at the time was 435 and was found to have a blood glucose of 397 in the ED.  Patient was admitted for further evaluation and workup.  Neurology was consulted and patient was seen by Dr. Nickerson.    Head CT without contrast from 2/20/2025 showed no evidence of acute intracranial abnormality.  Chronic small vessel ischemic disease.    Brain MRI without contrast from 2/20/2025 showed no acute infarct. Generalized parenchymal volume loss and mild chronic microvascular ischemic disease. More confluent area of subcortical and deep white matter T2/FLAIR hyperintensity in the left posterior frontal lobe, which may represent a chronic infarct. However, recommend MRI brain with contrast to exclude the possibility of an inflammatory process.    Head CT without contrast from 2/25/2025 showed no acute process.    CTA head and neck from 2/25/2025 showed no acute large vessel occlusion.  No evidence of aneurysm or vascular malformation.  Scattered atherosclerosis.  3.8 mm nodule superior segment left lower lobe, recommend outpatient dedicated CT chest to follow-up.    CTP revealed predominantly mismatch hypoperfusion right occipital lobe.    Pertinent labs from 2/24/2025 showed unremarkable CBC, sodium  Glucose 02/26/2025 279 (H)  65 - 117 mg/dL Final    Comment: (NOTE)  The FDA has indicated that no capillary point of care blood glucose  monitoring systems are approved for use in \"critically ill\" patients,  however they have not defined this population. The College of  American Pathologists has recommended that these devices should not  be used in cases such as severe hypotension, dehydration, shock, and  hyperglycemic-hyperosmolar state, amongst others.  Venous or arterial  collection is the recommended specimen for testing these patients.      Performed by: 02/26/2025 Narinder Cagle PCT   Final       Vitals:    02/26/25 1425 02/26/25 1932 02/26/25 2311 02/27/25 0930   BP: (!) 145/69 (!) 142/92 121/73 133/80   Pulse: 61 64 82 74   Resp: 16 18 17 18   Temp: 98.6 °F (37 °C) 97.7 °F (36.5 °C) 97.9 °F (36.6 °C) 97.9 °F (36.6 °C)   TempSrc:  Oral Oral Axillary   SpO2:  95% 95% 93%   Weight:       Height:            Current Facility-Administered Medications   Medication Dose Route Frequency    levETIRAcetam (KEPPRA) injection 1,000 mg  1,000 mg IntraVENous Q12H    haloperidol lactate (HALDOL) injection 2 mg  2 mg IntraMUSCular Q6H PRN    glucose chewable tablet 16 g  4 tablet Oral PRN    dextrose bolus 10% 125 mL  125 mL IntraVENous PRN    Or    dextrose bolus 10% 250 mL  250 mL IntraVENous PRN    glucagon injection 1 mg  1 mg SubCUTAneous PRN    dextrose 10 % infusion   IntraVENous Continuous PRN    LORazepam (ATIVAN) tablet 1 mg  1 mg Oral Once    naloxone (NARCAN) injection 0.4 mg  0.4 mg IntraVENous Once    traZODone (DESYREL) tablet 25 mg  25 mg Oral Nightly    dilTIAZem (CARDIZEM CD) extended release capsule 240 mg  240 mg Oral Daily    metoprolol succinate (TOPROL XL) extended release tablet 100 mg  100 mg Oral Daily    sodium chloride flush 0.9 % injection 5-40 mL  5-40 mL IntraVENous 2 times per day    sodium chloride flush 0.9 % injection 5-40 mL  5-40 mL IntraVENous PRN    0.9 % sodium chloride infusion

## 2025-02-27 NOTE — PROGRESS NOTES
End of Shift Note    Bedside shift change report given to  Nisha, (oncoming nurse) by Abbey Christopher, SANCHEZ .        Shift worked: Nights   Shift summary and any significant changes:    -no acute changes    -on 24H EEG     -pending MRI with contrast & echo    Concerns for physician to address:  See above    Zone phone for oncoming shift:   [phone]     Patient Information  Sarah Gordon  79 y.o.  2/24/2025 10:00 AM by Bria Hartley MD. Sarah Gordon was admitted from [facility]    Problem List  Patient Active Problem List    Diagnosis Date Noted    Acute alteration in mental status 02/25/2025    Convulsions ([facility]) 02/25/2025    Encephalopathy acute 02/24/2025    Hip fracture ([facility]) 04/27/2021    NSTEMI, initial episode of care ([facility]) 02/28/2018    DM (diabetes mellitus) ([facility]) 02/27/2018    GERD (gastroesophageal reflux disease) 02/27/2018    Asthma 02/27/2018    HTN (hypertension) 02/27/2018    NSTEMI (non-ST elevated myocardial infarction) ([facility]) 02/27/2018     Past Medical History:   Diagnosis Date    A-fib ([facility])     Asthma     Diabetes ([facility])     GERD (gastroesophageal reflux disease)     Hypertension     Other ill-defined conditions(799.89)     hypothyroid       Core Measures:  CVA: no  CHF: no  PNA: no    Activity:Level of Assistance: Moderate assist, patient does 50-74%  Number times ambulated in hallways past shift: 1  Number of times OOB to chair past shift: 1    Cardiac:   Cardiac Monitoring: yes, 130    Access:   Current line(s): PIV    Respiratory:   O2 Device: None (Room air)    GI:     Current diet:  ADULT DIET; Regular; 4 carb choices (60 gm/meal)  Tolerating current diet: Yes    Pain Management:   Patient states pain is manageable on current regimen: yes    Skin:  Jordi Scale Score: 17  Interventions: N/A  Pressure injury: no    Patient Safety:  Fall Score: Herring Total Score: 40  Interventions: bed alarm  Self-release roll belt: Yes  Dexterity to release roll belt: yes   (must document dexterity

## 2025-02-27 NOTE — PLAN OF CARE
Speech LAnguage Pathology TREATMENT    Patient: Sarah Gordon (79 y.o. female)  Date: 2/27/2025  Primary Diagnosis: Encephalopathy acute [G93.40]  Disorientation [R41.0]  Acute kidney injury [N17.9]  Atrial fibrillation, unspecified type (HCC) [I48.91]       Precautions:  Fall Risk                  ASSESSMENT :  Patient with continued suspected WFL oral/pharyngeal swallow and no overt s/s aspiration observed, however patient only agreeable to minimal trials.    Patient with increased confusion compared to prior SLP session, today scoring 2/30 on O-Log compared to 14/30 last session. Patient only able to state 'yes' and 'no' today with decreased reliability. Poor command following noted. Note workup continues to determine etiology of encephalopathy.     Patient will benefit from skilled intervention to address the above impairments.     PLAN :  Recommendations and Planned Interventions:  Diet: Regular and thin liquids  -- Medication as tolerated  -- 1:1 assistance/supervision with feeding  -- Upright for all PO  -- Small bites/sips   -- Alternate bites/sips     Cognitive-Communication Recommendations:    --Eliminate distractions (TV off, door closed, etc.)  --Provide frequent orientation of patient to location, date, and time  -- Have date updated on whiteboard and clock visible to patient  --Keep lights on & curtains/blinds open during the day  --Allow for sleep at night with minimal and ideally no interruptions when possible   --Provide visual cues to assist with comprehension  --Anticipate wants/needs as patient not able to reliably communicate          Acute SLP Services: Yes, SLP will continue to follow per plan of care.  Discharge Recommendations: Yes, recommend SLP treatment at next level of care     SUBJECTIVE:   Patient stated, “No.”    OBJECTIVE:     Past Medical History:   Diagnosis Date    A-fib (HCC)     Asthma     Diabetes (HCC)     GERD (gastroesophageal reflux disease)     Hypertension     Other

## 2025-02-27 NOTE — PROGRESS NOTES
Physical Therapy     Chart reviewed up to date. Attempted to see pt for PT session. Pt receiving EEG at bedside. Not available. Will defer and follow up tomorrow as able.     Lorelei Barker PT, DPT, NCS

## 2025-02-27 NOTE — PLAN OF CARE
Problem: Chronic Conditions and Co-morbidities  Goal: Patient's chronic conditions and co-morbidity symptoms are monitored and maintained or improved  2/27/2025 1144 by Danielle Carl RN  Outcome: Progressing  2/27/2025 0039 by Abbey Christopher RN  Outcome: Progressing     Problem: Discharge Planning  Goal: Discharge to home or other facility with appropriate resources  2/27/2025 1144 by Danielle Carl RN  Outcome: Progressing  2/27/2025 0039 by Abbey Christopher RN  Outcome: Progressing     Problem: Pain  Goal: Verbalizes/displays adequate comfort level or baseline comfort level  2/27/2025 1144 by Danielle Carl RN  Outcome: Progressing  2/27/2025 0039 by Abbey Christopher RN  Outcome: Progressing     Problem: Neurosensory - Adult  Goal: Achieves stable or improved neurological status  2/27/2025 1144 by Danielle Carl RN  Outcome: Progressing  2/27/2025 0039 by Abbey Christopher RN  Outcome: Progressing  Goal: Achieves maximal functionality and self care  2/27/2025 1144 by Danielle Carl RN  Outcome: Progressing  2/27/2025 0039 by Abbey Christopher RN  Outcome: Progressing     Problem: ABCDS Injury Assessment  Goal: Absence of physical injury  2/27/2025 1144 by Danielle Carl RN  Outcome: Progressing  2/27/2025 0039 by Abbey Christopher RN  Outcome: Progressing     Problem: Confusion  Goal: Confusion, delirium, dementia, or psychosis is improved or at baseline  Description: INTERVENTIONS:  1. Assess for possible contributors to thought disturbance, including medications, impaired vision or hearing, underlying metabolic abnormalities, dehydration, psychiatric diagnoses, and notify attending LIP  2. [place] high risk fall precautions, as indicated  3. Provide frequent short contacts to provide reality reorientation, refocusing and direction  4. Decrease environmental stimuli, including noise as appropriate  5. Monitor and intervene to  maintain adequate nutrition, hydration, elimination, sleep and activity  6. If unable to ensure safety without constant attention obtain sitter and review sitter guidelines with assigned personnel  7. Initiate Psychosocial CNS and Spiritual Care consult, as indicated  2/27/2025 1144 by Danielle Carl RN  Outcome: Progressing  2/27/2025 0039 by Abbey Christopher RN  Outcome: Progressing     Problem: Safety - Adult  Goal: Free from fall injury  2/27/2025 1144 by Danielle Carl RN  Outcome: Progressing  2/27/2025 0039 by Abbey Christopher RN  Outcome: Progressing     Problem: Skin/Tissue Integrity  Goal: Skin integrity remains intact  Description: 1.  Monitor for areas of redness and/or skin breakdown  2.  Assess vascular access sites hourly  3.  Every 4-6 hours minimum:  Change oxygen saturation probe site  4.  Every 4-6 hours:  If on nasal continuous positive airway pressure, respiratory therapy assess nares and determine need for appliance change or resting period  2/27/2025 1144 by Danilele Carl RN  Outcome: Progressing  2/27/2025 0039 by Abbey Christopher RN  Outcome: Progressing

## 2025-02-27 NOTE — PROGRESS NOTES
Transition of Care Plan:    RUR: 12% (low RUR)   Prior Level of Functioning: Independent  Disposition: Home with follow-ups  LENNOX: 02/27-28  If SNF or IPR: Date FOC offered:   Date FOC received:   Accepting facility:   Date authorization started with reference number:   Date authorization received and expires:   Follow up appointments: PCP/Specialists as indicated  DME needed: None att  Transportation at discharge: Family  IM/IMM Medicare/ letter given: Last given 02/24  Is patient a [place] and connected with [facility]? N/a   If yes, was  transfer form completed and VA notified? N/a  Caregiver Contact: Olegario Gordon - Z[place] - [phone]  Discharge Caregiver contacted prior to discharge? Pt to contact  Care Conference needed? Not att  Barriers to discharge: Medical clearance, HH set-up     0821 - Chart reviewed. Current plan for home with HH, CM to discuss with patient today and arrange if agreeable. Will also follow for PT session to confirm dispo. Family to transport at d/c.     1404 - LENNOX and barriers updated.     KATE Dawn  Care Management  [facility]  [phone]

## 2025-02-27 NOTE — PROCEDURES
[facility]              [address] [place] [address]                                   EEG      PATIENT NAME: JORGE LEAL                : 1945  MED REC NO: [id number]                       ROOM: 129  ACCOUNT NO: [id number]                       ADMIT DATE: 2025  PROVIDER: Juarez Seth MD    DATE OF SERVICE:  2025    DATE OF SERVICE:  2025 to 2025.    CLINICAL INDICATION:  The patient is an 79-year-old female with a history of acute altered mental status and possible seizures.  The patient was brought in to get EEG to rule out seizures, rule out prolonged partial seizures or encephalopathy.    EEG CLASSIFICATION:  Dysrhythmia grade 2, left hemisphere, maximum temple.  Second classification is dysrhythmia grade 1, generalized.    DESCRIPTION OF THE RECORD:  This is a prolonged video EEG recording on the patient to evaluate for possible seizures.  This study did begin on 2025 at approximately 4:30 p.m. and ended on 2025 at approximately 7:30 a.m. for a total time of study of 15 hours.  During this time, the patient did have a fairly persistent increase in some 2-6 hertz theta activity seen coming from the left hemisphere.  There would be periods, but the patient had intermittent runs lasting about 10 seconds of the even more dysrhythmic activity occurring in that area, but no clear spike or spike-and-wave discharges seen and no clear clinical change seen on the patient on the video EEG.  The patient in the last few hours of recording had a lot more movement, muscle and electrode artifact seen, and seemed to be in a state of very prolonged sedation or sleep in addition.  There was more generalized slowing, but probably related to drowsiness and sleep and some electrode and movement artifact.  On her video monitor, she never showed any activity that looked like a seizure.  Again, she would have intermittent runs of some

## 2025-02-27 NOTE — PROGRESS NOTES
Occupational Therapy     Chart reviewed up to date. Attempted to see pt for OT session. Pt receiving EEG at bedside. Not available. Will defer and follow up tomorrow as able.     Robert Post, OTD, OTR/L

## 2025-02-28 ENCOUNTER — APPOINTMENT (OUTPATIENT)
Facility: HOSPITAL | Age: 80
End: 2025-02-28
Attending: HOSPITALIST
Payer: MEDICARE

## 2025-02-28 ENCOUNTER — TELEPHONE (OUTPATIENT)
Age: 80
End: 2025-02-28

## 2025-02-28 ENCOUNTER — HOSPITAL ENCOUNTER (INPATIENT)
Facility: HOSPITAL | Age: 80
Discharge: HOME | End: 2025-03-02
Attending: HOSPITALIST
Payer: MEDICARE

## 2025-02-28 LAB
APPEARANCE UR: CLEAR
BACTERIA URNS QL MICRO: ABNORMAL /HPF
BILIRUB UR QL: NEGATIVE
COLOR UR: ABNORMAL
ECHO AO ROOT DIAM: 3.2 CM
ECHO AO ROOT INDEX: 1.42 CM/M2
ECHO AV AREA PEAK VELOCITY: 1.6 CM2
ECHO AV AREA VTI: 1.6 CM2
ECHO AV AREA/BSA PEAK VELOCITY: 0.7 CM2/M2
ECHO AV AREA/BSA VTI: 0.7 CM2/M2
ECHO AV MEAN GRADIENT: 5 MMHG
ECHO AV MEAN VELOCITY: 1.1 M/S
ECHO AV PEAK GRADIENT: 9 MMHG
ECHO AV PEAK VELOCITY: 1.5 M/S
ECHO AV VELOCITY RATIO: 0.67
ECHO AV VTI: 30.7 CM
ECHO BSA: 2.32 M2
ECHO LA DIAMETER INDEX: 1.99 CM/M2
ECHO LA DIAMETER: 4.5 CM
ECHO LA TO AORTIC ROOT RATIO: 1.41
ECHO LV EDV A4C: 41 ML
ECHO LV EDV INDEX A4C: 18 ML/M2
ECHO LV EF PHYSICIAN: 55 %
ECHO LV EJECTION FRACTION A4C: 71 %
ECHO LV ESV A4C: 12 ML
ECHO LV ESV INDEX A4C: 5 ML/M2
ECHO LV FRACTIONAL SHORTENING: 36 % (ref 28–44)
ECHO LV INTERNAL DIMENSION DIASTOLE INDEX: 1.86 CM/M2
ECHO LV INTERNAL DIMENSION DIASTOLIC: 4.2 CM (ref 3.9–5.3)
ECHO LV INTERNAL DIMENSION SYSTOLIC INDEX: 1.19 CM/M2
ECHO LV INTERNAL DIMENSION SYSTOLIC: 2.7 CM
ECHO LV IVSD: 0.8 CM (ref 0.6–0.9)
ECHO LV MASS 2D: 101.3 G (ref 67–162)
ECHO LV MASS INDEX 2D: 44.8 G/M2 (ref 43–95)
ECHO LV POSTERIOR WALL DIASTOLIC: 0.8 CM (ref 0.6–0.9)
ECHO LV RELATIVE WALL THICKNESS RATIO: 0.38
ECHO LVOT AREA: 2.5 CM2
ECHO LVOT AV VTI INDEX: 0.62
ECHO LVOT DIAM: 1.8 CM
ECHO LVOT MEAN GRADIENT: 2 MMHG
ECHO LVOT PEAK GRADIENT: 4 MMHG
ECHO LVOT PEAK VELOCITY: 1 M/S
ECHO LVOT STROKE VOLUME INDEX: 21.5 ML/M2
ECHO LVOT SV: 48.6 ML
ECHO LVOT VTI: 19.1 CM
ECHO PV MAX VELOCITY: 0.9 M/S
ECHO PV MEAN GRADIENT: 2 MMHG
ECHO PV MEAN VELOCITY: 0.6 M/S
ECHO PV PEAK GRADIENT: 3 MMHG
ECHO RV INTERNAL DIMENSION: 3.4 CM
ECHO RV TAPSE: 1.7 CM (ref 1.7–?)
ECHO TV REGURGITANT MAX VELOCITY: 2.51 M/S
ECHO TV REGURGITANT PEAK GRADIENT: 25 MMHG
EPITH CASTS URNS QL MICRO: ABNORMAL /LPF
GLUCOSE BLD STRIP.AUTO-MCNC: 274 MG/DL (ref 65–117)
GLUCOSE BLD STRIP.AUTO-MCNC: 327 MG/DL (ref 65–117)
GLUCOSE BLD STRIP.AUTO-MCNC: 339 MG/DL (ref 65–117)
GLUCOSE BLD STRIP.AUTO-MCNC: 373 MG/DL (ref 65–117)
GLUCOSE UR STRIP.AUTO-MCNC: 500 MG/DL
HGB UR QL STRIP: NEGATIVE
KETONES UR QL STRIP.AUTO: NEGATIVE MG/DL
LEUKOCYTE ESTERASE UR QL STRIP.AUTO: NEGATIVE
NITRITE UR QL STRIP.AUTO: NEGATIVE
PH UR STRIP: 5.5 (ref 5–8)
PROT UR STRIP-MCNC: NEGATIVE MG/DL
RBC #/AREA URNS HPF: ABNORMAL /HPF (ref 0–5)
SERVICE CMNT-IMP: ABNORMAL
SP GR UR REFRACTOMETRY: 1.02 (ref 1–1.03)
URINE CULTURE IF INDICATED: ABNORMAL
UROBILINOGEN UR QL STRIP.AUTO: 0.2 EU/DL (ref 0.2–1)
WBC URNS QL MICRO: ABNORMAL /HPF (ref 0–4)
YEAST BUDDING URNS QL: PRESENT

## 2025-02-28 PROCEDURE — 99233 SBSQ HOSP IP/OBS HIGH 50: CPT

## 2025-02-28 PROCEDURE — 1100000003 HC PRIVATE W/ TELEMETRY

## 2025-02-28 PROCEDURE — 6370000000 HC RX 637 (ALT 250 FOR IP): Performed by: HOSPITALIST

## 2025-02-28 PROCEDURE — 93306 TTE W/DOPPLER COMPLETE: CPT

## 2025-02-28 PROCEDURE — 6370000000 HC RX 637 (ALT 250 FOR IP): Performed by: STUDENT IN AN ORGANIZED HEALTH CARE EDUCATION/TRAINING PROGRAM

## 2025-02-28 PROCEDURE — 6360000002 HC RX W HCPCS

## 2025-02-28 PROCEDURE — 81001 URINALYSIS AUTO W/SCOPE: CPT

## 2025-02-28 PROCEDURE — 95720 EEG PHY/QHP EA INCR W/VEEG: CPT | Performed by: PSYCHIATRY & NEUROLOGY

## 2025-02-28 PROCEDURE — 92507 TX SP LANG VOICE COMM INDIV: CPT

## 2025-02-28 PROCEDURE — 2500000003 HC RX 250 WO HCPCS: Performed by: HOSPITALIST

## 2025-02-28 PROCEDURE — 6370000000 HC RX 637 (ALT 250 FOR IP): Performed by: INTERNAL MEDICINE

## 2025-02-28 PROCEDURE — 6370000000 HC RX 637 (ALT 250 FOR IP): Performed by: NURSE PRACTITIONER

## 2025-02-28 PROCEDURE — 82962 GLUCOSE BLOOD TEST: CPT

## 2025-02-28 RX ORDER — LEVETIRACETAM 500 MG/5ML
1500 INJECTION, SOLUTION, CONCENTRATE INTRAVENOUS EVERY 12 HOURS
Status: DISCONTINUED | OUTPATIENT
Start: 2025-02-28 | End: 2025-03-07 | Stop reason: HOSPADM

## 2025-02-28 RX ORDER — BUTALBITAL, ACETAMINOPHEN AND CAFFEINE 50; 325; 40 MG/1; MG/1; MG/1
1 TABLET ORAL EVERY 4 HOURS PRN
Status: DISCONTINUED | OUTPATIENT
Start: 2025-02-28 | End: 2025-03-07 | Stop reason: HOSPADM

## 2025-02-28 RX ORDER — LEVETIRACETAM 500 MG/5ML
500 INJECTION, SOLUTION, CONCENTRATE INTRAVENOUS ONCE
Status: COMPLETED | OUTPATIENT
Start: 2025-02-28 | End: 2025-02-28

## 2025-02-28 RX ADMIN — DILTIAZEM HYDROCHLORIDE 240 MG: 240 CAPSULE, COATED, EXTENDED RELEASE ORAL at 09:30

## 2025-02-28 RX ADMIN — LEVETIRACETAM 500 MG: 100 INJECTION INTRAVENOUS at 11:28

## 2025-02-28 RX ADMIN — METOPROLOL SUCCINATE 100 MG: 50 TABLET, FILM COATED, EXTENDED RELEASE ORAL at 09:30

## 2025-02-28 RX ADMIN — SODIUM CHLORIDE, PRESERVATIVE FREE 10 ML: 5 INJECTION INTRAVENOUS at 23:17

## 2025-02-28 RX ADMIN — LEVETIRACETAM 1000 MG: 100 INJECTION INTRAVENOUS at 09:28

## 2025-02-28 RX ADMIN — HYDRALAZINE HYDROCHLORIDE 25 MG: 50 TABLET ORAL at 23:16

## 2025-02-28 RX ADMIN — TRAZODONE HYDROCHLORIDE 25 MG: 50 TABLET ORAL at 23:15

## 2025-02-28 RX ADMIN — HYDRALAZINE HYDROCHLORIDE 25 MG: 50 TABLET ORAL at 09:30

## 2025-02-28 RX ADMIN — LEVETIRACETAM 1500 MG: 100 INJECTION INTRAVENOUS at 23:15

## 2025-02-28 RX ADMIN — LEVOTHYROXINE SODIUM 88 MCG: 0.09 TABLET ORAL at 06:45

## 2025-02-28 RX ADMIN — INSULIN LISPRO 6 UNITS: 100 INJECTION, SOLUTION INTRAVENOUS; SUBCUTANEOUS at 16:28

## 2025-02-28 RX ADMIN — BUTALBITAL, ACETAMINOPHEN, AND CAFFEINE 1 TABLET: 325; 50; 40 TABLET ORAL at 14:00

## 2025-02-28 RX ADMIN — INSULIN LISPRO 8 UNITS: 100 INJECTION, SOLUTION INTRAVENOUS; SUBCUTANEOUS at 23:16

## 2025-02-28 RX ADMIN — RIVAROXABAN 20 MG: 20 TABLET, FILM COATED ORAL at 18:09

## 2025-02-28 RX ADMIN — INSULIN LISPRO 6 UNITS: 100 INJECTION, SOLUTION INTRAVENOUS; SUBCUTANEOUS at 06:45

## 2025-02-28 RX ADMIN — INSULIN LISPRO 4 UNITS: 100 INJECTION, SOLUTION INTRAVENOUS; SUBCUTANEOUS at 11:28

## 2025-02-28 ASSESSMENT — PAIN SCALES - GENERAL
PAINLEVEL_OUTOF10: 0
PAINLEVEL_OUTOF10: 5
PAINLEVEL_OUTOF10: 3

## 2025-02-28 NOTE — PROGRESS NOTES
HOSPITAL NEUROLOGY NOTE     Chief Complaint   Patient presents with    Altered Mental Status    Hyperglycemia     Pt presents to ED via EMS from home and is moved off the stretcher. EMS reports pt reports she started feeling fuzzy yesterday and couldn't get her thoughts together; friend noted speech problem with jumbling her words and possibly slurred. LKW >24hrs. EMS noted afib RVR on EKG and .         HPI  Sarah Gordon is a 79 y.o. female  with a history of atrial fibrillation on Xarelto, diabetes, hypertension, GERD, asthma, who presented to the ED on 2/24/2025 with a chief complaint of mental status changes, confusion, and speech disturbance.  Patient was taken to the ED for evaluation.  Per EMS, her initial blood sugar at the time was 435 and was found to have a blood glucose of 397 in the ED.  Patient was admitted for further evaluation and workup.  Neurology was consulted and patient was seen by Dr. Nickerson.    Head CT without contrast from 2/20/2025 showed no evidence of acute intracranial abnormality.  Chronic small vessel ischemic disease.    Brain MRI without contrast from 2/20/2025 showed no acute infarct. Generalized parenchymal volume loss and mild chronic microvascular ischemic disease. More confluent area of subcortical and deep white matter T2/FLAIR hyperintensity in the left posterior frontal lobe, which may represent a chronic infarct. However, recommend MRI brain with contrast to exclude the possibility of an inflammatory process.    Head CT without contrast from 2/25/2025 showed no acute process.    CTA head and neck from 2/25/2025 showed no acute large vessel occlusion.  No evidence of aneurysm or vascular malformation.  Scattered atherosclerosis.  3.8 mm nodule superior segment left lower lobe, recommend outpatient dedicated CT chest to follow-up.    CTP revealed predominantly mismatch hypoperfusion right occipital lobe.    Pertinent labs from 2/24/2025 showed unremarkable CBC, sodium  indicated that no capillary point of care blood glucose  monitoring systems are approved for use in \"critically ill\" patients,  however they have not defined this population. The College of  American Pathologists has recommended that these devices should not  be used in cases such as severe hypotension, dehydration, shock, and  hyperglycemic-hyperosmolar state, amongst others.  Venous or arterial  collection is the recommended specimen for testing these patients.      Performed by: 02/26/2025 Narinder Cagle PCT   Final       Vitals:    02/27/25 0930 02/27/25 1956 02/28/25 0830 02/28/25 0930   BP: 133/80 130/83 (!) 138/97 (!) 138/97   Pulse: 74 80 81 81   Resp: 18 18 18    Temp: 97.9 °F (36.6 °C) 97.5 °F (36.4 °C) 97.5 °F (36.4 °C)    TempSrc: Axillary Oral Oral    SpO2: 93% 94% 94%    Weight:       Height:            Current Facility-Administered Medications   Medication Dose Route Frequency    levETIRAcetam (KEPPRA) injection 1,000 mg  1,000 mg IntraVENous Q12H    haloperidol lactate (HALDOL) injection 2 mg  2 mg IntraMUSCular Q6H PRN    glucose chewable tablet 16 g  4 tablet Oral PRN    dextrose bolus 10% 125 mL  125 mL IntraVENous PRN    Or    dextrose bolus 10% 250 mL  250 mL IntraVENous PRN    glucagon injection 1 mg  1 mg SubCUTAneous PRN    dextrose 10 % infusion   IntraVENous Continuous PRN    naloxone (NARCAN) injection 0.4 mg  0.4 mg IntraVENous Once    traZODone (DESYREL) tablet 25 mg  25 mg Oral Nightly    dilTIAZem (CARDIZEM CD) extended release capsule 240 mg  240 mg Oral Daily    metoprolol succinate (TOPROL XL) extended release tablet 100 mg  100 mg Oral Daily    sodium chloride flush 0.9 % injection 5-40 mL  5-40 mL IntraVENous 2 times per day    sodium chloride flush 0.9 % injection 5-40 mL  5-40 mL IntraVENous PRN    0.9 % sodium chloride infusion   IntraVENous PRN    ondansetron (ZOFRAN-ODT) disintegrating tablet 4 mg  4 mg Oral Q8H PRN    Or    ondansetron (ZOFRAN) injection 4 mg  4 mg IntraVENous

## 2025-02-28 NOTE — CARE COORDINATION
Transition of Care Plan:     RUR: 11% (low RUR)   Prior Level of Functioning: Independent  Disposition: Home with  - [facility]  PT/OT/SN; SOC 24-48 hours  LENNOX: 03/02  If SNF or IPR: Date FOC offered:   Date FOC received:   Accepting facility:   Date authorization started with reference number:   Date authorization received and expires:   Follow up appointments: PCP/Specialists as indicated  DME needed: None att  Transportation at discharge: Family  IM/IMM Medicare/ letter given: Last given 02/28  Is patient a  and connected with VA? N/a              If yes, was [place] transfer form completed and VA notified? N/a  Caregiver Contact: Olegario Gordon - Z[place]gy - [phone]  Discharge Caregiver contacted prior to discharge? Pt to contact  Care Conference needed? Not att  Barriers to discharge: Medical clearance,  set-up     0829 - Chart reviewed. Plan remains for HH, CM to arrange. Family to transport at d/c.     1230 - Met with patient and family at bedside. All in agreement with , no preference in agency. Referrals placed. IMM given. LENNOX updated per MD note.     1401 -  [facility]  accepted for PT/OT/SN; SOC 24-48 hours. Info added to AVS.     KATE Dawn  Care Management  [facility]  [phone]

## 2025-02-28 NOTE — PROGRESS NOTES
Q4H PRN    levETIRAcetam (KEPPRA) injection 500 mg  500 mg IntraVENous Once    levETIRAcetam (KEPPRA) injection 1,500 mg  1,500 mg IntraVENous Q12H    haloperidol lactate (HALDOL) injection 2 mg  2 mg IntraMUSCular Q6H PRN    glucose chewable tablet 16 g  4 tablet Oral PRN    dextrose bolus 10% 125 mL  125 mL IntraVENous PRN    Or    dextrose bolus 10% 250 mL  250 mL IntraVENous PRN    glucagon injection 1 mg  1 mg SubCUTAneous PRN    dextrose 10 % infusion   IntraVENous Continuous PRN    naloxone (NARCAN) injection 0.4 mg  0.4 mg IntraVENous Once    traZODone (DESYREL) tablet 25 mg  25 mg Oral Nightly    dilTIAZem (CARDIZEM CD) extended release capsule 240 mg  240 mg Oral Daily    metoprolol succinate (TOPROL XL) extended release tablet 100 mg  100 mg Oral Daily    sodium chloride flush 0.9 % injection 5-40 mL  5-40 mL IntraVENous 2 times per day    sodium chloride flush 0.9 % injection 5-40 mL  5-40 mL IntraVENous PRN    0.9 % sodium chloride infusion   IntraVENous PRN    ondansetron (ZOFRAN-ODT) disintegrating tablet 4 mg  4 mg Oral Q8H PRN    Or    ondansetron (ZOFRAN) injection 4 mg  4 mg IntraVENous Q6H PRN    polyethylene glycol (GLYCOLAX) packet 17 g  17 g Oral Daily PRN    acetaminophen (TYLENOL) tablet 650 mg  650 mg Oral Q6H PRN    Or    acetaminophen (TYLENOL) suppository 650 mg  650 mg Rectal Q6H PRN    insulin lispro (HUMALOG,ADMELOG) injection vial 0-8 Units  0-8 Units SubCUTAneous 4x Daily AC & HS    hydrALAZINE (APRESOLINE) tablet 25 mg  25 mg Oral Q12H    hydrALAZINE (APRESOLINE) injection 10 mg  10 mg IntraVENous Q6H PRN    levothyroxine (SYNTHROID) tablet 88 mcg  88 mcg Oral QAM AC    rivaroxaban (XARELTO) tablet 20 mg  20 mg Oral Daily       Review of Systems:   A comprehensive review of systems was negative.    Objective:   Physical Exam:     BP (!) 138/97   Pulse 81   Temp 97.5 °F (36.4 °C) (Oral)   Resp 18   Ht 1.778 m (5' 10\")   Wt 108.9 kg (240 lb)   SpO2 94%   BMI 34.44 kg/m²    O2  for stroke prevention  -- Speech evaluation  -- PT OT evaluation  -- Appreciate teleneurology/neurologist input    Atrial fibrillation with rapid ventricular response  Secondary hypercoagulable state due to A-fib  --Continue rate control with Cardizem  -- Xarelto for stroke prevention  -- High risk of thromboembolic CVA without anticoagulation    Metabolic encephalopathy  -- No signs of acute infection  -- Chest x-ray urine analysis unremarkable  -- Monitor closely with frequent neurochecks    Type 2 diabetes  -- Uncontrolled  -- Blood sugar ACHS  -- Obtain A1c and fasting lipid profile    Hypothyroidism  -- TSH of 3.98  -- Resume levothyroxine home dose          Plan:  Continue supportive care  Frequent neurochecks  Continue stroke workup  Updated daughter at bedside    Code status: Full code    Social determinants of health: none      Estimated discharge date//time frame/disposition:03/02 SNF    Barriers to discharge: neurologic clearance/placement          Adrian Fonseca MD

## 2025-02-28 NOTE — PROGRESS NOTES
Physical Therapy     Chart reviewed up to date. Pt receiving continuous EEG and other testing at bedside. Not available for PT session. Will defer and follow up later as able.     Lorelei Barker PT, DPT, NCS

## 2025-02-28 NOTE — PLAN OF CARE
Problem: SLP Adult - Impaired Communication  Goal: By Discharge: Demonstrates communication skills at highest level of function for planned discharge setting.  See evaluation for individualized goals.  Description: Speech Pathology Goals  Initiated 2/25/2025     1. Patient will tolerate least restrictive diet without signs of aspiration or decline in respiratory status within 7 days.  2. Patient will participate in continued cognitive-communication diagnostic treatment/delirium precautions within 7 days.  2/27/2025 1158 by Sonali Nieto, SLP  Outcome: Not Progressing

## 2025-02-28 NOTE — PROCEDURES
[facility]              [address] [id number]                                   EEG      PATIENT NAME: JORGE LEAL                : 1945  MED REC NO: [id number]                       ROOM: 129  ACCOUNT NO: [id number]                       ADMIT DATE: 2025  PROVIDER: Juarez Seth MD    DATE OF SERVICE:  2025    DATE OF STUDY:   to 2025.    CLINICAL INDICATION:  The patient is an 79-year-old female with a history of recurring spells of altered mental status and possible seizures.  EEG to rule out seizures, rule out encephalopathy, rule out cortical abnormality.    EEG CLASSIFICATION:  On this patient is dysrhythmia grade 2 left hemisphere, maximum temporal.    DESCRIPTION OF THE RECORD:  This is a 16-channel EEG recording on the patient to evaluate for seizures.  This patient also had video monitoring through the whole study.  The patient had a posteriorly located occipital alpha rhythm of 8-9 hertz in the posterior head region that did attenuate with eye opening in the awake state.  Throughout the recording, there was an intermittent, but fairly frequent increase in 2-6 hertz theta activity in the left temporal region with some runs of dysrhythmic activity occurring, with a more prominent theta activity in the left frontal region at times, but not associated with any clear change in the patient clinically and these runs were relatively brief, lasting about 10-20 seconds on average.  The patient did not have any clear spike or spike-and-wave discharges or recorded electrographic seizures other than these runs of dysrhythmic activity.  The patient did enter prolonged states of sleep in the evening hours with K complexes and sleep spindles seen in the central head regions.  During the recording, the patient did have some movement, muscle, and electrode artifact seen, but the study was overall of good quality and

## 2025-02-28 NOTE — PROGRESS NOTES
End of Shift Note    Bedside shift change report given to  Lisa RN  (oncoming nurse) by Leatha Seth RN .        Shift worked:  5923-2181   Shift summary and any significant changes:     EEG finished, ECHO completed, UA completed,        Concerns for physician to address:  -   Zone phone for oncoming shift:   [phone]     Patient Information  Sarah Gordon  79 y.o.  2/24/2025 10:00 AM by Bria Hartley MD. Sarah Gordon was admitted from [facility]    Problem List  Patient Active Problem List    Diagnosis Date Noted    Acute alteration in mental status 02/25/2025    Convulsions ([facility]) 02/25/2025    Encephalopathy acute 02/24/2025    Hip fracture ([facility]) 04/27/2021    NSTEMI, initial episode of care ([facility]) 02/28/2018    DM (diabetes mellitus) ([facility]) 02/27/2018    GERD (gastroesophageal reflux disease) 02/27/2018    Asthma 02/27/2018    HTN (hypertension) 02/27/2018    NSTEMI (non-ST elevated myocardial infarction) ([facility]) 02/27/2018     Past Medical History:   Diagnosis Date    A-fib ([facility])     Asthma     Diabetes ([facility])     GERD (gastroesophageal reflux disease)     Hypertension     Other ill-defined conditions(799.89)     hypothyroid       Core Measures:  CVA: no  CHF: no  PNA: no    Activity:Level of Assistance: Moderate assist, patient does 50-74%  Number times ambulated in hallways past shift: 0  Number of times OOB to chair past shift: 0    Cardiac:   Cardiac Monitoring: yes, nsr    Access:   Current line(s): PIV    Respiratory:   O2 Device: None (Room air)    GI:     Current diet:  ADULT DIET; Regular; 4 carb choices (60 gm/meal)  Tolerating current diet: Yes    Pain Management:   Patient states pain is manageable on current regimen: yes    Skin:  Jordi Scale Score: 18  Interventions: N/A  Pressure injury: no    Patient Safety:  Fall Score: Herring Total Score: 55  Interventions: stay with me program  Self-release roll belt: No  Dexterity to release roll belt: no   (must document dexterity  here by stating Yes or No here,  otherwise this is a restraint and must follow restraint documentation policy.)    DVT prophylaxis:  DVT prophylaxis: meds    Active Consults:  IP CONSULT TO PHARMACY  IP CONSULT TO NEUROLOGY  IP CONSULT TO CASE MANAGEMENT    Length of Stay:  Expected LOS: 6  Actual LOS: 4    Leatha Seth RN

## 2025-02-28 NOTE — PLAN OF CARE
Speech LAnguage Pathology TREATMENT    Patient: Sarah Gordon (79 y.o. female)  Date: 2/28/2025  Primary Diagnosis: Encephalopathy acute [G93.40]  Disorientation [R41.0]  Acute kidney injury [N17.9]  Atrial fibrillation, unspecified type (HCC) [I48.91]       Precautions:  Fall Risk                  ASSESSMENT :  Session targeting diagnostic cognitive-communication treatment. Patient alert, remains confused and disoriented. Patient oriented to person only. With MAX cues and use of whiteboard, patient able to states the year. Patient looks at the clock to determine time of day accurately. SLP provided reinforcement and therapeutic reorientation to place, month, date. Reduced insight, stating she feels like she could go home at this time despite feeling confused. Family present at bedside and reports patient looks to the left and talks to no one often. SLP educated family on delirium and cognitive-communication strategies/recommendations. Additionally, patient noted with occasional jargon during conversation, only occasionally answering with one word response. Patient was able to state who family members were at the beside. SLP will continue to follow.     Patient will benefit from skilled intervention to address the above impairments.     PLAN :  Recommendations and Planned Interventions:  Diet: Regular and thin liquids  -- Medication as tolerated  -- 1:1 assistance/supervision with feeding  -- Upright for all PO  -- Small bites/sips   -- Alternate bites/sips     Cognitive-Communication Recommendations:    --Eliminate distractions (TV off, door closed, etc.)  --Provide frequent orientation of patient to location, date, and time  -- Have date updated on whiteboard and clock visible to patient  --Keep lights on & curtains/blinds open during the day  --Allow for sleep at night with minimal and ideally no interruptions when possible   --Provide visual cues to assist with comprehension  --Anticipate wants/needs as patient

## 2025-02-28 NOTE — TELEPHONE ENCOUNTER
Hello,    Can this patient be scheduled for hospital follow-up in 2 to 3 weeks with any other neurology providers?    Thanks,  [place]

## 2025-02-28 NOTE — PROGRESS NOTES
End of Shift Note    Bedside shift change report given to LeathaRN (oncoming nurse) by Abbey Christopher RN .        Shift worked: Nights   Shift summary and any significant changes:    -no acute changes    -on 24H EEG     -MRI done ativan 1mg given     -pending ECHO    Concerns for physician to address:  See above    Zone phone for oncoming shift:   [phone]     Patient Information  Sarah Gordon  79 y.o.  2/24/2025 10:00 AM by Bria Hartley MD. Sarah Gordon was admitted from [facility]    Problem List  Patient Active Problem List    Diagnosis Date Noted    Acute alteration in mental status 02/25/2025    Convulsions ([facility]) 02/25/2025    Encephalopathy acute 02/24/2025    Hip fracture ([facility]) 04/27/2021    NSTEMI, initial episode of care ([facility]) 02/28/2018    DM (diabetes mellitus) ([facility]) 02/27/2018    GERD (gastroesophageal reflux disease) 02/27/2018    Asthma 02/27/2018    HTN (hypertension) 02/27/2018    NSTEMI (non-ST elevated myocardial infarction) ([facility]) 02/27/2018     Past Medical History:   Diagnosis Date    A-fib ([facility])     Asthma     Diabetes ([facility])     GERD (gastroesophageal reflux disease)     Hypertension     Other ill-defined conditions(799.89)     hypothyroid       Core Measures:  CVA: no  CHF: no  PNA: no    Activity:Level of Assistance: Moderate assist, patient does 50-74%  Number times ambulated in hallways past shift: 1  Number of times OOB to chair past shift: 1    Cardiac:   Cardiac Monitoring: yes, 130    Access:   Current line(s): PIV    Respiratory:   O2 Device: None (Room air)    GI:     Current diet:  ADULT DIET; Regular; 4 carb choices (60 gm/meal)  Tolerating current diet: Yes    Pain Management:   Patient states pain is manageable on current regimen: yes    Skin:  Jordi Scale Score: 18  Interventions: N/A  Pressure injury: no    Patient Safety:  Fall Score: Herring Total Score: 55  Interventions: bed alarm  Self-release roll belt: Yes  Dexterity to release roll belt: yes   (must document

## 2025-02-28 NOTE — PROGRESS NOTES
Occupational Therapy     Chart reviewed up to date. Pt receiving continuous EEG and other testing at bedside. Not available for OT session. Will defer and follow up later as able.     Robert Post, OTMELODY, OTR/L

## 2025-02-28 NOTE — PROGRESS NOTES
1504 - PCP hospital follow-up transitional care appointment has been scheduled with Dr. Dayna Knox on 3/5/25 1615. [facility] information on AVS for patient resource.   Pending patient discharge.       1247 - Attempted to schedule PCP hospital follow up appointment. Unable to reach anyone, unable to leave voicemail. [facility] information on AVS for patient resource.  Pending patient discharge.     Attempted to schedule PCP hospital follow up appointment. Unable to reach anyone, unable to leave voicemail. [facility] information on AVS for patient resource.  Pending patient discharge.

## 2025-03-01 PROBLEM — I67.9 CEREBRAL VASCULAR DISEASE: Status: ACTIVE | Noted: 2025-03-01

## 2025-03-01 LAB
GLUCOSE BLD STRIP.AUTO-MCNC: 248 MG/DL (ref 65–117)
GLUCOSE BLD STRIP.AUTO-MCNC: 265 MG/DL (ref 65–117)
GLUCOSE BLD STRIP.AUTO-MCNC: 321 MG/DL (ref 65–117)
GLUCOSE BLD STRIP.AUTO-MCNC: 407 MG/DL (ref 65–117)
SERVICE CMNT-IMP: ABNORMAL

## 2025-03-01 PROCEDURE — 6370000000 HC RX 637 (ALT 250 FOR IP): Performed by: INTERNAL MEDICINE

## 2025-03-01 PROCEDURE — 99232 SBSQ HOSP IP/OBS MODERATE 35: CPT | Performed by: PSYCHIATRY & NEUROLOGY

## 2025-03-01 PROCEDURE — 6370000000 HC RX 637 (ALT 250 FOR IP): Performed by: NURSE PRACTITIONER

## 2025-03-01 PROCEDURE — 82962 GLUCOSE BLOOD TEST: CPT

## 2025-03-01 PROCEDURE — 6370000000 HC RX 637 (ALT 250 FOR IP): Performed by: STUDENT IN AN ORGANIZED HEALTH CARE EDUCATION/TRAINING PROGRAM

## 2025-03-01 PROCEDURE — 6370000000 HC RX 637 (ALT 250 FOR IP): Performed by: HOSPITALIST

## 2025-03-01 PROCEDURE — 1100000003 HC PRIVATE W/ TELEMETRY

## 2025-03-01 PROCEDURE — 2500000003 HC RX 250 WO HCPCS: Performed by: HOSPITALIST

## 2025-03-01 PROCEDURE — 2500000003 HC RX 250 WO HCPCS: Performed by: INTERNAL MEDICINE

## 2025-03-01 PROCEDURE — 6360000002 HC RX W HCPCS

## 2025-03-01 RX ORDER — HYDRALAZINE HYDROCHLORIDE 50 MG/1
50 TABLET, FILM COATED ORAL EVERY 8 HOURS SCHEDULED
Status: DISCONTINUED | OUTPATIENT
Start: 2025-03-01 | End: 2025-03-03

## 2025-03-01 RX ORDER — GUAIFENESIN 200 MG/10ML
100 LIQUID ORAL EVERY 6 HOURS PRN
Status: DISCONTINUED | OUTPATIENT
Start: 2025-03-01 | End: 2025-03-07 | Stop reason: HOSPADM

## 2025-03-01 RX ADMIN — DILTIAZEM HYDROCHLORIDE 240 MG: 240 CAPSULE, COATED, EXTENDED RELEASE ORAL at 08:36

## 2025-03-01 RX ADMIN — GUAIFENESIN 100 MG: 200 SOLUTION ORAL at 22:28

## 2025-03-01 RX ADMIN — LEVETIRACETAM 1500 MG: 100 INJECTION INTRAVENOUS at 08:35

## 2025-03-01 RX ADMIN — TRAZODONE HYDROCHLORIDE 25 MG: 50 TABLET ORAL at 20:30

## 2025-03-01 RX ADMIN — RIVAROXABAN 20 MG: 20 TABLET, FILM COATED ORAL at 16:48

## 2025-03-01 RX ADMIN — ACETAMINOPHEN 650 MG: 325 TABLET ORAL at 14:05

## 2025-03-01 RX ADMIN — METOPROLOL SUCCINATE 100 MG: 50 TABLET, FILM COATED, EXTENDED RELEASE ORAL at 08:36

## 2025-03-01 RX ADMIN — LEVOTHYROXINE SODIUM 88 MCG: 0.09 TABLET ORAL at 08:36

## 2025-03-01 RX ADMIN — INSULIN LISPRO 2 UNITS: 100 INJECTION, SOLUTION INTRAVENOUS; SUBCUTANEOUS at 08:36

## 2025-03-01 RX ADMIN — INSULIN LISPRO 8 UNITS: 100 INJECTION, SOLUTION INTRAVENOUS; SUBCUTANEOUS at 16:47

## 2025-03-01 RX ADMIN — INSULIN LISPRO 6 UNITS: 100 INJECTION, SOLUTION INTRAVENOUS; SUBCUTANEOUS at 21:33

## 2025-03-01 RX ADMIN — INSULIN LISPRO 1 UNITS: 100 INJECTION, SOLUTION INTRAVENOUS; SUBCUTANEOUS at 12:33

## 2025-03-01 RX ADMIN — SODIUM CHLORIDE, PRESERVATIVE FREE 10 ML: 5 INJECTION INTRAVENOUS at 08:45

## 2025-03-01 RX ADMIN — LEVETIRACETAM 1500 MG: 100 INJECTION INTRAVENOUS at 20:30

## 2025-03-01 RX ADMIN — SODIUM CHLORIDE, PRESERVATIVE FREE 10 ML: 5 INJECTION INTRAVENOUS at 20:30

## 2025-03-01 RX ADMIN — HYDRALAZINE HYDROCHLORIDE 50 MG: 50 TABLET ORAL at 14:07

## 2025-03-01 RX ADMIN — HYDRALAZINE HYDROCHLORIDE 50 MG: 50 TABLET ORAL at 20:30

## 2025-03-01 ASSESSMENT — PAIN SCALES - GENERAL
PAINLEVEL_OUTOF10: 5
PAINLEVEL_OUTOF10: 0
PAINLEVEL_OUTOF10: 2

## 2025-03-01 ASSESSMENT — PAIN DESCRIPTION - ORIENTATION: ORIENTATION: LEFT

## 2025-03-01 ASSESSMENT — PAIN DESCRIPTION - DESCRIPTORS: DESCRIPTORS: ACHING

## 2025-03-01 ASSESSMENT — PAIN DESCRIPTION - LOCATION: LOCATION: HIP

## 2025-03-01 NOTE — PROGRESS NOTES
Hospitalist Progress Note               Daily Progress Note: 3/1/2025      Hospital Day: 6     Chief complaint:   Chief Complaint   Patient presents with    Altered Mental Status    Hyperglycemia     Pt presents to ED via EMS from home and is moved off the stretcher. EMS reports pt reports she started feeling fuzzy yesterday and couldn't get her thoughts together; friend noted speech problem with jumbling her words and possibly slurred. LKW >24hrs. EMS noted afib RVR on EKG and .         Subjective:   Hospital course to date:  79-year-old lady with history of paroxysmal atrial fibrillation, essential hypertension, diabetes, hypothyroidism brought in by EMS with reports of alteration in mentation.  She was reportedly talking on the phone with her friend when friend noticed that she was confused and not speaking right.  Presented as a stroke alert.  CT scan of the brain without contrast obtained February 24 unremarkable.  A follow-up MRI of the brain without contrast negative for acute CVA.  She was admitted to the neuro telemetry floor for close monitoring.  Also noted with atrial fibrillation with rapid ventricular response upon arrival.  Received IV Cardizem with improvement in heart rate.  She was started on Xarelto for stroke prevention.  In the morning of February 25th, family noted some speech disturbance and right-sided weakness which self resolved.  During my routine rounds at about 9:45 AM, patient was awake alert oriented x 4.  No focal deficits.  Given earlier concerns of neurological change, CT perfusion study was ordered stat.     --------  Patient is seen today for follow-up. More awake and alert. MRI brain with and without contrast no acute pathology. Son at bedside. Offers no complains        Medications reviewed  Current Facility-Administered Medications   Medication Dose Route Frequency    hydrALAZINE (APRESOLINE) tablet 50 mg  50 mg Oral 3 times per day     Av.1 °F (36.7 °C), Min:97.9 °F (36.6 °C), Max:98.2 °F (36.8 °C)    No intake/output data recorded.   No intake/output data recorded.    Mode Rate TV Press PEEP FiO2 PIP Min. Vent                              General:  Sleepy but arousable   Lungs:   Clear to auscultation bilaterally.   Chest wall:  No tenderness or deformity.   Heart:  Regular rate and rhythm, S1, S2 normal, no murmur, click, rub or gallop.   Abdomen:   Soft, non-tender. Bowel sounds normal. No masses,  No organomegaly.   Extremities: Extremities normal, atraumatic, no cyanosis or edema.   Pulses: 2+ and symmetric all extremities.   Skin: Skin color, texture, turgor normal. No rashes or lesions   Neurologic: CNII-XII intact.  No gross focal deficits         Data Review:       Recent Days:  No results for input(s): \"WBC\", \"HGB\", \"HCT\", \"PLT\" in the last 72 hours.    No results for input(s): \"NA\", \"K\", \"CL\", \"CO2\", \"GLUCOSE\", \"BUN\", \"CREATININE\", \"CALCIUM\", \"MG\", \"PHOS\", \"LABALBU\", \"BILITOT\", \"AST\", \"ALT\", \"INR\" in the last 72 hours.    No results for input(s): \"PHART\", \"OUQ6TUV\", \"PO2ART\", \"FKW2URX\", \"BEART\", \"QFO1ARDR\", \"HGBART\", \"UF7SKNNWY\", \"FIO2A\", \"H1LRXIOD\", \"OXYHEM\", \"CARBOXHGBART\", \"METHGBART\", \"S6JBWLDCD\", \"PHCORART\", \"TEMP\" in the last 72 hours.    Invalid input(s): \"ENT0OTSGF\"      24 Hour Results:  Recent Results (from the past 24 hour(s))   POCT Glucose    Collection Time: 25 10:44 AM   Result Value Ref Range    POC Glucose 274 (H) 65 - 117 mg/dL    Performed by: JAXON JACKSON    Urinalysis with Reflex to Culture    Collection Time: 25  1:31 PM    Specimen: Urine   Result Value Ref Range    Color, UA YELLOW/STRAW      Appearance CLEAR CLEAR      Specific Gravity, UA 1.025 1.003 - 1.030      pH, Urine 5.5 5.0 - 8.0      Protein, UA Negative NEG mg/dL    Glucose, Ur 500 (A) NEG mg/dL    Ketones, Urine Negative NEG mg/dL    Bilirubin, Urine Negative NEG      Blood, Urine Negative NEG      Urobilinogen, Urine 0.2 0.2 -

## 2025-03-01 NOTE — PROGRESS NOTES
End of Shift Note    Bedside shift change report given to  SANCHEZ Hager  (oncoming nurse) by Mayi Carr RN .        Shift worked:  4891-1385   Shift summary and any significant changes:    No acute changes  Bladder scan x1  Neuro following   Concerns for physician to address:  -   Zone phone for oncoming shift:   [phone]       Patient Information  Sarah Gordon  79 y.o.  2/24/2025 10:00 AM by Bria Hartley MD. Sarah Gordon was admitted from [facility]    Problem List  Patient Active Problem List    Diagnosis Date Noted    Cerebral vascular disease 03/01/2025    Acute alteration in mental status 02/25/2025    Seizures ([facility]) 02/25/2025    Encephalopathy acute 02/24/2025    Hip fracture ([facility]) 04/27/2021    NSTEMI, initial episode of care ([facility]) 02/28/2018    DM (diabetes mellitus) ([facility]) 02/27/2018    GERD (gastroesophageal reflux disease) 02/27/2018    Asthma 02/27/2018    HTN (hypertension) 02/27/2018    NSTEMI (non-ST elevated myocardial infarction) ([facility]) 02/27/2018     Past Medical History:   Diagnosis Date    A-fib ([facility])     Asthma     Diabetes ([facility])     GERD (gastroesophageal reflux disease)     Hypertension     Other ill-defined conditions(799.89)     hypothyroid       Core Measures:  CVA: no  CHF: no  PNA: no    Activity:Level of Assistance: Moderate assist, patient does 50-74%  Number times ambulated in hallways past shift: 0  Number of times OOB to chair past shift: 0    Cardiac:   Cardiac Monitoring: yes, nsr    Access:   Current line(s): PIV    Respiratory:   O2 Device: None (Room air)    GI:     Current diet:  ADULT DIET; Regular; 4 carb choices (60 gm/meal)  Tolerating current diet: Yes    Pain Management:   Patient states pain is manageable on current regimen: yes    Skin:  Jordi Scale Score: 18  Interventions: N/A  Pressure injury: no    Patient Safety:  Fall Score: Herring Total Score: 55  Interventions: stay with me program  Self-release roll belt: No  Dexterity to release roll belt: no   (must document  dexterity  here by stating Yes or No here, otherwise this is a restraint and must follow restraint documentation policy.)    DVT prophylaxis:  DVT prophylaxis: meds    Active Consults:  IP CONSULT TO PHARMACY  IP CONSULT TO CASE MANAGEMENT    Length of Stay:  Expected LOS: 6  Actual LOS: 5    Mayi Carr RN

## 2025-03-01 NOTE — PROGRESS NOTES
Bedside shift change report given to SANCHEZ See (oncoming nurse) by SANCHEZ Gonzalez (offgoing nurse). Report included the following information Nurse Handoff Report, ED SBAR, Adult Overview, Intake/Output, MAR, Recent Results, and Cardiac Rhythm Afib .

## 2025-03-01 NOTE — PROGRESS NOTES
Neurology Note    Patient ID:  Sarah Gordon  [id number]  79 y.o.  1945      Date of Consultation:  March 1, 2025      Assessment and Plan:    The patient is a 79-year-old female who presented to the hospital with alteration of mental status.  Mental status has improved since admission, but not to baseline per her stepson.  Patient did have seizures earlier during the admission    Focal epilepsy:  Patient did have a brain MRI with no acute abnormality.  There was left frontal encephalomalacia, which would be a focus for seizures  No further seizures on most recent eeg  Continue Keppra 1500 mg twice a day  Follow-up EEG to be obtained Monday morning    History of stroke:  Risk factors for stroke include atrial fibrillation, hypertension, poorly controlled diabetes  Continue aggressive glycemic control.  Continue anticoagulation  Hypertension: Aggressive control goal blood pressure of less than 140/90  PT and OT consultation      A-fib with RVR  Hypothyroidism        Neurology will continue to follow closely in this complicated patient with ongoing neurological symptoms necessitating additional testing. Updated orders placed.  Care plan discussed with primary team and patient's step son           Subjective: Confusion       History of Present Illness:   Sarah Gordon is a 79 y.o. female with a history of atrial fibrillation, hypertension, diabetes and hypothyroidism who was brought to the emergency department due to worsening mental status.  The patient has been followed by neurology during the hospitalization.  The patient has had multiple EEGs.  There was concerns initially for seizure activity.  Most recent EEG from February 27 through revealed no further seizure activity.  There was intermittent focal slowing in the left temporal region but no spike or spike and wave discharges.  The patient has been on Keppra.  Patient did have neuroimaging which revealed known chronic infarct with encephalomalacia which was  mg  2 mg IntraMUSCular Q6H PRN    glucose chewable tablet 16 g  4 tablet Oral PRN    dextrose bolus 10% 125 mL  125 mL IntraVENous PRN    Or    dextrose bolus 10% 250 mL  250 mL IntraVENous PRN    glucagon injection 1 mg  1 mg SubCUTAneous PRN    dextrose 10 % infusion   IntraVENous Continuous PRN    naloxone (NARCAN) injection 0.4 mg  0.4 mg IntraVENous Once    traZODone (DESYREL) tablet 25 mg  25 mg Oral Nightly    dilTIAZem (CARDIZEM CD) extended release capsule 240 mg  240 mg Oral Daily    metoprolol succinate (TOPROL XL) extended release tablet 100 mg  100 mg Oral Daily    sodium chloride flush 0.9 % injection 5-40 mL  5-40 mL IntraVENous 2 times per day    sodium chloride flush 0.9 % injection 5-40 mL  5-40 mL IntraVENous PRN    0.9 % sodium chloride infusion   IntraVENous PRN    ondansetron (ZOFRAN-ODT) disintegrating tablet 4 mg  4 mg Oral Q8H PRN    Or    ondansetron (ZOFRAN) injection 4 mg  4 mg IntraVENous Q6H PRN    polyethylene glycol (GLYCOLAX) packet 17 g  17 g Oral Daily PRN    acetaminophen (TYLENOL) tablet 650 mg  650 mg Oral Q6H PRN    Or    acetaminophen (TYLENOL) suppository 650 mg  650 mg Rectal Q6H PRN    insulin lispro (HUMALOG,ADMELOG) injection vial 0-8 Units  0-8 Units SubCUTAneous 4x Daily AC & HS    hydrALAZINE (APRESOLINE) injection 10 mg  10 mg IntraVENous Q6H PRN    levothyroxine (SYNTHROID) tablet 88 mcg  88 mcg Oral QAM AC    rivaroxaban (XARELTO) tablet 20 mg  20 mg Oral Daily         Review of Systems:    General, constitutional: negative  Eyes, vision: negative  Ears, nose, throat: negative  Cardiovascular, heart: negative  Respiratory: negative  Gastrointestinal: negative  Genitourinary: negative  Musculoskeletal: negative  Skin and integumentary: negative  Psychiatric: negative  Endocrine: negative  Neurological: negative, except for HPI  Hematologic/lymphatic: negative  Allergy/immunology: negative    Objective:     /76   Pulse 86   Temp 97.9 °F (36.6 °C) (Oral)

## 2025-03-02 LAB
ANION GAP SERPL CALC-SCNC: 4 MMOL/L (ref 2–12)
BACTERIA SPEC CULT: NORMAL
BACTERIA SPEC CULT: NORMAL
BASOPHILS # BLD: 0.04 K/UL (ref 0–0.1)
BASOPHILS NFR BLD: 0.5 % (ref 0–1)
BUN SERPL-MCNC: 17 MG/DL (ref 6–20)
BUN/CREAT SERPL: 17 (ref 12–20)
CALCIUM SERPL-MCNC: 9.8 MG/DL (ref 8.5–10.1)
CHLORIDE SERPL-SCNC: 105 MMOL/L (ref 97–108)
CO2 SERPL-SCNC: 28 MMOL/L (ref 21–32)
CREAT SERPL-MCNC: 0.99 MG/DL (ref 0.55–1.02)
DIFFERENTIAL METHOD BLD: NORMAL
EOSINOPHIL # BLD: 0.14 K/UL (ref 0–0.4)
EOSINOPHIL NFR BLD: 1.7 % (ref 0–7)
ERYTHROCYTE [DISTWIDTH] IN BLOOD BY AUTOMATED COUNT: 13.4 % (ref 11.5–14.5)
GLUCOSE BLD STRIP.AUTO-MCNC: 182 MG/DL (ref 65–117)
GLUCOSE BLD STRIP.AUTO-MCNC: 202 MG/DL (ref 65–117)
GLUCOSE BLD STRIP.AUTO-MCNC: 275 MG/DL (ref 65–117)
GLUCOSE BLD STRIP.AUTO-MCNC: 276 MG/DL (ref 65–117)
GLUCOSE SERPL-MCNC: 279 MG/DL (ref 65–100)
HCT VFR BLD AUTO: 42.1 % (ref 35–47)
HGB BLD-MCNC: 13.3 G/DL (ref 11.5–16)
IMM GRANULOCYTES # BLD AUTO: 0.04 K/UL (ref 0–0.04)
IMM GRANULOCYTES NFR BLD AUTO: 0.5 % (ref 0–0.5)
LYMPHOCYTES # BLD: 1.26 K/UL (ref 0.8–3.5)
LYMPHOCYTES NFR BLD: 15.5 % (ref 12–49)
MCH RBC QN AUTO: 27 PG (ref 26–34)
MCHC RBC AUTO-ENTMCNC: 31.6 G/DL (ref 30–36.5)
MCV RBC AUTO: 85.6 FL (ref 80–99)
MONOCYTES # BLD: 0.86 K/UL (ref 0–1)
MONOCYTES NFR BLD: 10.6 % (ref 5–13)
NEUTS SEG # BLD: 5.78 K/UL (ref 1.8–8)
NEUTS SEG NFR BLD: 71.2 % (ref 32–75)
NRBC # BLD: 0 K/UL (ref 0–0.01)
NRBC BLD-RTO: 0 PER 100 WBC
PLATELET # BLD AUTO: 242 K/UL (ref 150–400)
PMV BLD AUTO: 11.4 FL (ref 8.9–12.9)
POTASSIUM SERPL-SCNC: 3.5 MMOL/L (ref 3.5–5.1)
RBC # BLD AUTO: 4.92 M/UL (ref 3.8–5.2)
SERVICE CMNT-IMP: ABNORMAL
SERVICE CMNT-IMP: NORMAL
SERVICE CMNT-IMP: NORMAL
SODIUM SERPL-SCNC: 137 MMOL/L (ref 136–145)
WBC # BLD AUTO: 8.1 K/UL (ref 3.6–11)

## 2025-03-02 PROCEDURE — 2500000003 HC RX 250 WO HCPCS: Performed by: HOSPITALIST

## 2025-03-02 PROCEDURE — 80048 BASIC METABOLIC PNL TOTAL CA: CPT

## 2025-03-02 PROCEDURE — 6370000000 HC RX 637 (ALT 250 FOR IP): Performed by: STUDENT IN AN ORGANIZED HEALTH CARE EDUCATION/TRAINING PROGRAM

## 2025-03-02 PROCEDURE — 85025 COMPLETE CBC W/AUTO DIFF WBC: CPT

## 2025-03-02 PROCEDURE — 6360000002 HC RX W HCPCS: Performed by: NURSE PRACTITIONER

## 2025-03-02 PROCEDURE — 6370000000 HC RX 637 (ALT 250 FOR IP): Performed by: INTERNAL MEDICINE

## 2025-03-02 PROCEDURE — 6370000000 HC RX 637 (ALT 250 FOR IP): Performed by: HOSPITALIST

## 2025-03-02 PROCEDURE — 99232 SBSQ HOSP IP/OBS MODERATE 35: CPT | Performed by: PSYCHIATRY & NEUROLOGY

## 2025-03-02 PROCEDURE — 1100000003 HC PRIVATE W/ TELEMETRY

## 2025-03-02 PROCEDURE — 6360000002 HC RX W HCPCS

## 2025-03-02 PROCEDURE — 6370000000 HC RX 637 (ALT 250 FOR IP): Performed by: NURSE PRACTITIONER

## 2025-03-02 PROCEDURE — 82962 GLUCOSE BLOOD TEST: CPT

## 2025-03-02 PROCEDURE — 36415 COLL VENOUS BLD VENIPUNCTURE: CPT

## 2025-03-02 PROCEDURE — 2500000003 HC RX 250 WO HCPCS: Performed by: INTERNAL MEDICINE

## 2025-03-02 RX ORDER — INSULIN GLARGINE 100 [IU]/ML
12 INJECTION, SOLUTION SUBCUTANEOUS NIGHTLY
Status: DISCONTINUED | OUTPATIENT
Start: 2025-03-02 | End: 2025-03-03

## 2025-03-02 RX ORDER — INSULIN LISPRO 100 [IU]/ML
5 INJECTION, SOLUTION INTRAVENOUS; SUBCUTANEOUS
Status: DISCONTINUED | OUTPATIENT
Start: 2025-03-02 | End: 2025-03-07 | Stop reason: HOSPADM

## 2025-03-02 RX ADMIN — LEVETIRACETAM 1500 MG: 100 INJECTION INTRAVENOUS at 09:46

## 2025-03-02 RX ADMIN — HALOPERIDOL LACTATE 2 MG: 5 INJECTION, SOLUTION INTRAMUSCULAR at 06:07

## 2025-03-02 RX ADMIN — INSULIN GLARGINE 12 UNITS: 100 INJECTION, SOLUTION SUBCUTANEOUS at 20:41

## 2025-03-02 RX ADMIN — HYDRALAZINE HYDROCHLORIDE 50 MG: 50 TABLET ORAL at 14:01

## 2025-03-02 RX ADMIN — INSULIN LISPRO 2 UNITS: 100 INJECTION, SOLUTION INTRAVENOUS; SUBCUTANEOUS at 16:38

## 2025-03-02 RX ADMIN — LEVOTHYROXINE SODIUM 88 MCG: 0.09 TABLET ORAL at 06:11

## 2025-03-02 RX ADMIN — HYDRALAZINE HYDROCHLORIDE 50 MG: 50 TABLET ORAL at 06:11

## 2025-03-02 RX ADMIN — SODIUM CHLORIDE, PRESERVATIVE FREE 10 ML: 5 INJECTION INTRAVENOUS at 09:49

## 2025-03-02 RX ADMIN — INSULIN LISPRO 4 UNITS: 100 INJECTION, SOLUTION INTRAVENOUS; SUBCUTANEOUS at 06:53

## 2025-03-02 RX ADMIN — INSULIN LISPRO 5 UNITS: 100 INJECTION, SOLUTION INTRAVENOUS; SUBCUTANEOUS at 11:16

## 2025-03-02 RX ADMIN — INSULIN LISPRO 4 UNITS: 100 INJECTION, SOLUTION INTRAVENOUS; SUBCUTANEOUS at 11:16

## 2025-03-02 RX ADMIN — TRAZODONE HYDROCHLORIDE 25 MG: 50 TABLET ORAL at 20:42

## 2025-03-02 RX ADMIN — GUAIFENESIN 100 MG: 200 SOLUTION ORAL at 20:41

## 2025-03-02 RX ADMIN — GUAIFENESIN 100 MG: 200 SOLUTION ORAL at 11:04

## 2025-03-02 RX ADMIN — INSULIN LISPRO 2 UNITS: 100 INJECTION, SOLUTION INTRAVENOUS; SUBCUTANEOUS at 20:41

## 2025-03-02 RX ADMIN — RIVAROXABAN 20 MG: 20 TABLET, FILM COATED ORAL at 16:38

## 2025-03-02 RX ADMIN — INSULIN LISPRO 5 UNITS: 100 INJECTION, SOLUTION INTRAVENOUS; SUBCUTANEOUS at 16:38

## 2025-03-02 RX ADMIN — DILTIAZEM HYDROCHLORIDE 240 MG: 240 CAPSULE, COATED, EXTENDED RELEASE ORAL at 09:45

## 2025-03-02 RX ADMIN — METOPROLOL SUCCINATE 100 MG: 50 TABLET, FILM COATED, EXTENDED RELEASE ORAL at 09:45

## 2025-03-02 RX ADMIN — LEVETIRACETAM 1500 MG: 100 INJECTION INTRAVENOUS at 20:40

## 2025-03-02 RX ADMIN — SODIUM CHLORIDE, PRESERVATIVE FREE 10 ML: 5 INJECTION INTRAVENOUS at 20:41

## 2025-03-02 RX ADMIN — HYDRALAZINE HYDROCHLORIDE 50 MG: 50 TABLET ORAL at 20:42

## 2025-03-02 NOTE — PROGRESS NOTES
End of Shift Note    Bedside shift change report given to  SANCHEZ Hager  (oncoming nurse) by Mayi Carr RN .        Shift worked:  Days   Shift summary and any significant changes:    No acute changes  Neuro came to bedside   Patient up to chair throughout shift    Concerns for physician to address:  -   Zone phone for oncoming shift:   [phone]       Patient Information  Sarah Gordon  79 y.o.  2/24/2025 10:00 AM by Bria Hartley MD. Sarah Gordon was admitted from [facility]    Problem List  Patient Active Problem List    Diagnosis Date Noted    Cerebral vascular disease 03/01/2025    Acute alteration in mental status 02/25/2025    Seizures ([facility]) 02/25/2025    Encephalopathy acute 02/24/2025    Hip fracture ([facility]) 04/27/2021    NSTEMI, initial episode of care ([facility]) 02/28/2018    DM (diabetes mellitus) ([facility]) 02/27/2018    GERD (gastroesophageal reflux disease) 02/27/2018    Asthma 02/27/2018    HTN (hypertension) 02/27/2018    NSTEMI (non-ST elevated myocardial infarction) ([facility]) 02/27/2018     Past Medical History:   Diagnosis Date    A-fib ([facility])     Asthma     Diabetes ([facility])     GERD (gastroesophageal reflux disease)     Hypertension     Other ill-defined conditions(799.89)     hypothyroid       Core Measures:  CVA: no  CHF: no  PNA: no    Activity:Level of Assistance: Moderate assist, patient does 50-74%  Number times ambulated in hallways past shift: 0  Number of times OOB to chair past shift: 1    Cardiac:   Cardiac Monitoring: yes, nsr    Access:   Current line(s): PIV    Respiratory:   O2 Device: None (Room air)    GI:     Current diet:  ADULT DIET; Regular; 4 carb choices (60 gm/meal)  Tolerating current diet: Yes    Pain Management:   Patient states pain is manageable on current regimen: yes    Skin:  Jordi Scale Score: 17  Interventions: N/A  Pressure injury: no    Patient Safety:  Fall Score: Herring Total Score: 40  Interventions: stay with me program  Self-release roll belt: No  Dexterity to release roll belt:

## 2025-03-02 NOTE — PROGRESS NOTES
Neurology Note    Patient ID:  Sarah Gordon  [id number]  79 y.o.  1945      Date of Consultation:  March 2, 2025      Assessment and Plan:    The patient is a 79-year-old female who presented to the hospital with alteration of mental status.  Mental status has improved since admission, but not to baseline per her stepson.  Patient did have seizures earlier during the admission    Focal epilepsy:  Patient did have a brain MRI with no acute abnormality.  There was left frontal encephalomalacia, which would be a focus for seizures  No further seizures on most recent eeg  Continue Keppra 1500 mg twice a day  Follow-up EEG to be obtained Monday morning    History of stroke:  Risk factors for stroke include atrial fibrillation, hypertension, poorly controlled diabetes  Continue aggressive glycemic control.  Continue anticoagulation  Hypertension: Aggressive control goal blood pressure of less than 140/90  PT and OT consultation    Cognitive slowing:   Most likely multifactorial including metabolic derangements, prolonged seizure, medication induced, possible underlying early dementia.  Most likely multifactorial in etiology  Please ensure to implement nonpharmacological measures to minimize risk  Correct underlying metabolic derangements    A-fib with RVR  Hypothyroidism  Poor controlled diabetes      Neurology will continue to follow closely in this complicated patient with ongoing neurological symptoms necessitating additional testing. Updated orders placed.  Care plan discussed with primary team and multiple family members           Subjective: Confusion       History of Present Illness:   Sarah Gordon is a 79 y.o. female with a history of atrial fibrillation, hypertension, diabetes and hypothyroidism who was brought to the emergency department due to worsening mental status.  The patient has been followed by neurology during the hospitalization.  The patient has had multiple EEGs.  There was concerns initially  safety  Labs:     Lab Results   Component Value Date/Time     03/02/2025 04:54 AM    K 3.5 03/02/2025 04:54 AM     03/02/2025 04:54 AM    BUN 17 03/02/2025 04:54 AM    WBC 8.1 03/02/2025 04:54 AM    HCT 42.1 03/02/2025 04:54 AM    HGB 13.3 03/02/2025 04:54 AM     03/02/2025 04:54 AM                      Principal Problem:    Encephalopathy acute  Active Problems:    Acute alteration in mental status    Seizures (HCC)    Cerebral vascular disease  Resolved Problems:    * No resolved hospital problems. *      I spent   35  minutes providing care to this  acutely ill inpatient with > 50% of the time counseling and assisting in the coordination of care of the patient on the patient's hospital floor/unit.               Signed By:  Pranav Devlin DO FAAN    March 2, 2025

## 2025-03-02 NOTE — PROGRESS NOTES
End of Shift Note    Bedside shift change report given to  MayiRN  (oncoming nurse) by Abbey Christopher RN .        Shift worked:  9170-7159   Shift summary and any significant changes:    -Robitussin given x1 for cough,given haldol and put on roll belt due to agitation     -up to the bathroom with a walker   Concerns for physician to address:  -   Zone phone for oncoming shift:   [phone]       Patient Information  Sarah Gordon  79 y.o.  2/24/2025 10:00 AM by Bria Hartley MD. Sarah Gordon was admitted from [facility]    Problem List  Patient Active Problem List    Diagnosis Date Noted    Cerebral vascular disease 03/01/2025    Acute alteration in mental status 02/25/2025    Seizures ([facility]) 02/25/2025    Encephalopathy acute 02/24/2025    Hip fracture ([facility]) 04/27/2021    NSTEMI, initial episode of care ([facility]) 02/28/2018    DM (diabetes mellitus) ([facility]) 02/27/2018    GERD (gastroesophageal reflux disease) 02/27/2018    Asthma 02/27/2018    HTN (hypertension) 02/27/2018    NSTEMI (non-ST elevated myocardial infarction) ([facility]) 02/27/2018     Past Medical History:   Diagnosis Date    A-fib ([facility])     Asthma     Diabetes ([facility])     GERD (gastroesophageal reflux disease)     Hypertension     Other ill-defined conditions(799.89)     hypothyroid       Core Measures:  CVA: no  CHF: no  PNA: no    Activity:Level of Assistance: Moderate assist, patient does 50-74%  Number times ambulated in hallways past shift: 0  Number of times OOB to chair past shift: 0    Cardiac:   Cardiac Monitoring: yes, nsr    Access:   Current line(s): PIV    Respiratory:   O2 Device: None (Room air)    GI:     Current diet:  ADULT DIET; Regular; 4 carb choices (60 gm/meal)  Tolerating current diet: Yes    Pain Management:   Patient states pain is manageable on current regimen: yes    Skin:  Jordi Scale Score: 18  Interventions: N/A  Pressure injury: no    Patient Safety:  Fall Score: Herring Total Score: 40  Interventions: stay with me

## 2025-03-03 PROBLEM — R41.0 DISORIENTATION: Status: ACTIVE | Noted: 2025-03-03

## 2025-03-03 LAB
GLUCOSE BLD STRIP.AUTO-MCNC: 158 MG/DL (ref 65–117)
GLUCOSE BLD STRIP.AUTO-MCNC: 160 MG/DL (ref 65–117)
GLUCOSE BLD STRIP.AUTO-MCNC: 173 MG/DL (ref 65–117)
GLUCOSE BLD STRIP.AUTO-MCNC: 227 MG/DL (ref 65–117)
SERVICE CMNT-IMP: ABNORMAL

## 2025-03-03 PROCEDURE — 82962 GLUCOSE BLOOD TEST: CPT

## 2025-03-03 PROCEDURE — 2500000003 HC RX 250 WO HCPCS: Performed by: INTERNAL MEDICINE

## 2025-03-03 PROCEDURE — 95816 EEG AWAKE AND DROWSY: CPT | Performed by: PSYCHIATRY & NEUROLOGY

## 2025-03-03 PROCEDURE — 97530 THERAPEUTIC ACTIVITIES: CPT

## 2025-03-03 PROCEDURE — 97535 SELF CARE MNGMENT TRAINING: CPT

## 2025-03-03 PROCEDURE — 1100000003 HC PRIVATE W/ TELEMETRY

## 2025-03-03 PROCEDURE — 6370000000 HC RX 637 (ALT 250 FOR IP): Performed by: INTERNAL MEDICINE

## 2025-03-03 PROCEDURE — 6370000000 HC RX 637 (ALT 250 FOR IP): Performed by: HOSPITALIST

## 2025-03-03 PROCEDURE — 6360000002 HC RX W HCPCS

## 2025-03-03 PROCEDURE — 99222 1ST HOSP IP/OBS MODERATE 55: CPT

## 2025-03-03 PROCEDURE — 6370000000 HC RX 637 (ALT 250 FOR IP): Performed by: NURSE PRACTITIONER

## 2025-03-03 PROCEDURE — 97116 GAIT TRAINING THERAPY: CPT

## 2025-03-03 PROCEDURE — 95816 EEG AWAKE AND DROWSY: CPT

## 2025-03-03 PROCEDURE — 2500000003 HC RX 250 WO HCPCS: Performed by: HOSPITALIST

## 2025-03-03 PROCEDURE — 6370000000 HC RX 637 (ALT 250 FOR IP): Performed by: STUDENT IN AN ORGANIZED HEALTH CARE EDUCATION/TRAINING PROGRAM

## 2025-03-03 PROCEDURE — 99233 SBSQ HOSP IP/OBS HIGH 50: CPT

## 2025-03-03 RX ORDER — PEN NEEDLE, DIABETIC 31 GX5/16"
1 NEEDLE, DISPOSABLE MISCELLANEOUS 4 TIMES DAILY
Qty: 100 EACH | Refills: 2 | Status: SHIPPED | OUTPATIENT
Start: 2025-03-03 | End: 2025-06-01

## 2025-03-03 RX ORDER — INSULIN ASPART 100 [IU]/ML
5 INJECTION, SOLUTION INTRAVENOUS; SUBCUTANEOUS
Qty: 5 ADJUSTABLE DOSE PRE-FILLED PEN SYRINGE | Refills: 3 | Status: SHIPPED | OUTPATIENT
Start: 2025-03-03

## 2025-03-03 RX ORDER — LEVETIRACETAM 500 MG/1
1500 TABLET ORAL 2 TIMES DAILY
Qty: 60 TABLET | Refills: 3 | Status: SHIPPED | OUTPATIENT
Start: 2025-03-03 | End: 2025-04-02

## 2025-03-03 RX ORDER — FUROSEMIDE 40 MG/1
40 TABLET ORAL DAILY
Qty: 60 TABLET | Refills: 3 | Status: SHIPPED | OUTPATIENT
Start: 2025-03-03

## 2025-03-03 RX ORDER — AVOBENZONE, HOMOSALATE, OCTISALATE, OCTOCRYLENE 30; 40; 45; 26 MG/ML; MG/ML; MG/ML; MG/ML
1 CREAM TOPICAL DAILY
Qty: 100 EACH | Refills: 5 | Status: SHIPPED | OUTPATIENT
Start: 2025-03-03

## 2025-03-03 RX ORDER — GLUCOSAMINE HCL/CHONDROITIN SU 500-400 MG
CAPSULE ORAL
Qty: 100 STRIP | Refills: 2 | Status: SHIPPED | OUTPATIENT
Start: 2025-03-03

## 2025-03-03 RX ORDER — HYDRALAZINE HYDROCHLORIDE 100 MG/1
100 TABLET, FILM COATED ORAL EVERY 8 HOURS SCHEDULED
Qty: 90 TABLET | Refills: 3 | Status: SHIPPED | OUTPATIENT
Start: 2025-03-03

## 2025-03-03 RX ORDER — HYDRALAZINE HYDROCHLORIDE 50 MG/1
100 TABLET, FILM COATED ORAL EVERY 8 HOURS SCHEDULED
Status: DISCONTINUED | OUTPATIENT
Start: 2025-03-03 | End: 2025-03-07 | Stop reason: HOSPADM

## 2025-03-03 RX ORDER — INSULIN GLARGINE 100 [IU]/ML
15 INJECTION, SOLUTION SUBCUTANEOUS NIGHTLY
Qty: 10 ML | Refills: 3 | Status: SHIPPED | OUTPATIENT
Start: 2025-03-03 | End: 2025-03-03 | Stop reason: CLARIF

## 2025-03-03 RX ORDER — BLOOD-GLUCOSE METER
1 KIT MISCELLANEOUS DAILY
Qty: 1 KIT | Refills: 0 | Status: SHIPPED | OUTPATIENT
Start: 2025-03-03

## 2025-03-03 RX ORDER — INSULIN GLARGINE 100 [IU]/ML
15 INJECTION, SOLUTION SUBCUTANEOUS DAILY
Qty: 5 ADJUSTABLE DOSE PRE-FILLED PEN SYRINGE | Refills: 3 | Status: SHIPPED | OUTPATIENT
Start: 2025-03-03 | End: 2025-03-04

## 2025-03-03 RX ORDER — INSULIN LISPRO 100 [IU]/ML
5 INJECTION, SOLUTION INTRAVENOUS; SUBCUTANEOUS
Qty: 10 ML | Refills: 2 | Status: SHIPPED | OUTPATIENT
Start: 2025-03-03 | End: 2025-03-03 | Stop reason: CLARIF

## 2025-03-03 RX ORDER — INSULIN GLARGINE 100 [IU]/ML
15 INJECTION, SOLUTION SUBCUTANEOUS NIGHTLY
Status: DISCONTINUED | OUTPATIENT
Start: 2025-03-03 | End: 2025-03-04

## 2025-03-03 RX ADMIN — INSULIN GLARGINE 15 UNITS: 100 INJECTION, SOLUTION SUBCUTANEOUS at 21:29

## 2025-03-03 RX ADMIN — INSULIN LISPRO 5 UNITS: 100 INJECTION, SOLUTION INTRAVENOUS; SUBCUTANEOUS at 08:59

## 2025-03-03 RX ADMIN — GUAIFENESIN 100 MG: 200 SOLUTION ORAL at 06:55

## 2025-03-03 RX ADMIN — BUTALBITAL, ACETAMINOPHEN, AND CAFFEINE 1 TABLET: 325; 50; 40 TABLET ORAL at 08:58

## 2025-03-03 RX ADMIN — TRAZODONE HYDROCHLORIDE 25 MG: 50 TABLET ORAL at 21:29

## 2025-03-03 RX ADMIN — POLYETHYLENE GLYCOL 3350 17 G: 17 POWDER, FOR SOLUTION ORAL at 12:06

## 2025-03-03 RX ADMIN — SODIUM CHLORIDE, PRESERVATIVE FREE 10 ML: 5 INJECTION INTRAVENOUS at 11:17

## 2025-03-03 RX ADMIN — INSULIN LISPRO 5 UNITS: 100 INJECTION, SOLUTION INTRAVENOUS; SUBCUTANEOUS at 12:08

## 2025-03-03 RX ADMIN — GUAIFENESIN 100 MG: 200 SOLUTION ORAL at 15:31

## 2025-03-03 RX ADMIN — HYDRALAZINE HYDROCHLORIDE 100 MG: 50 TABLET ORAL at 14:21

## 2025-03-03 RX ADMIN — HYDRALAZINE HYDROCHLORIDE 100 MG: 50 TABLET ORAL at 21:33

## 2025-03-03 RX ADMIN — SODIUM CHLORIDE, PRESERVATIVE FREE 5 ML: 5 INJECTION INTRAVENOUS at 21:32

## 2025-03-03 RX ADMIN — BUTALBITAL, ACETAMINOPHEN, AND CAFFEINE 1 TABLET: 325; 50; 40 TABLET ORAL at 15:31

## 2025-03-03 RX ADMIN — ACETAMINOPHEN 650 MG: 325 TABLET ORAL at 21:28

## 2025-03-03 RX ADMIN — LEVETIRACETAM 1500 MG: 100 INJECTION INTRAVENOUS at 21:31

## 2025-03-03 RX ADMIN — LEVETIRACETAM 1500 MG: 100 INJECTION INTRAVENOUS at 08:58

## 2025-03-03 RX ADMIN — METOPROLOL SUCCINATE 100 MG: 50 TABLET, FILM COATED, EXTENDED RELEASE ORAL at 08:58

## 2025-03-03 RX ADMIN — HYDRALAZINE HYDROCHLORIDE 50 MG: 50 TABLET ORAL at 06:45

## 2025-03-03 RX ADMIN — DILTIAZEM HYDROCHLORIDE 240 MG: 240 CAPSULE, COATED, EXTENDED RELEASE ORAL at 08:59

## 2025-03-03 RX ADMIN — INSULIN LISPRO 2 UNITS: 100 INJECTION, SOLUTION INTRAVENOUS; SUBCUTANEOUS at 17:03

## 2025-03-03 RX ADMIN — INSULIN LISPRO 5 UNITS: 100 INJECTION, SOLUTION INTRAVENOUS; SUBCUTANEOUS at 17:02

## 2025-03-03 RX ADMIN — RIVAROXABAN 20 MG: 20 TABLET, FILM COATED ORAL at 17:02

## 2025-03-03 RX ADMIN — LEVOTHYROXINE SODIUM 88 MCG: 0.09 TABLET ORAL at 06:45

## 2025-03-03 ASSESSMENT — PAIN DESCRIPTION - LOCATION
LOCATION: HEAD
LOCATION: GENERALIZED
LOCATION: HEAD

## 2025-03-03 ASSESSMENT — PAIN SCALES - GENERAL
PAINLEVEL_OUTOF10: 5
PAINLEVEL_OUTOF10: 6

## 2025-03-03 NOTE — PROCEDURES
EEG REPORT    Patient Name: Sarah Gordon  : 1945  Age: 79 y.o.    Ordering physician: Dr. Fonseca  Date of EEG: 3/3/2025   07:31 -  7:51  Diagnosis: seizures  Interpreting physician: Pranav Devlin D.O. FAAN    Procedure: EEG    CLINICAL INDICATION: The patient is a 79 y.o. female who is being evaluated for baseline electro cerebral activities and to rule out seizure focus.      Current Facility-Administered Medications   Medication Dose Route Frequency    insulin glargine (LANTUS) injection vial 15 Units  15 Units SubCUTAneous Nightly    hydrALAZINE (APRESOLINE) tablet 100 mg  100 mg Oral 3 times per day    insulin lispro (HUMALOG,ADMELOG) injection vial 5 Units  5 Units SubCUTAneous TID WC    guaiFENesin (ROBITUSSIN) 100 MG/5ML liquid 100 mg  100 mg Oral Q6H PRN    butalbital-acetaminophen-caffeine (FIORICET, ESGIC) per tablet 1 tablet  1 tablet Oral Q4H PRN    levETIRAcetam (KEPPRA) injection 1,500 mg  1,500 mg IntraVENous Q12H    haloperidol lactate (HALDOL) injection 2 mg  2 mg IntraMUSCular Q6H PRN    glucose chewable tablet 16 g  4 tablet Oral PRN    dextrose bolus 10% 125 mL  125 mL IntraVENous PRN    Or    dextrose bolus 10% 250 mL  250 mL IntraVENous PRN    glucagon injection 1 mg  1 mg SubCUTAneous PRN    dextrose 10 % infusion   IntraVENous Continuous PRN    naloxone (NARCAN) injection 0.4 mg  0.4 mg IntraVENous Once    traZODone (DESYREL) tablet 25 mg  25 mg Oral Nightly    dilTIAZem (CARDIZEM CD) extended release capsule 240 mg  240 mg Oral Daily    metoprolol succinate (TOPROL XL) extended release tablet 100 mg  100 mg Oral Daily    sodium chloride flush 0.9 % injection 5-40 mL  5-40 mL IntraVENous 2 times per day    sodium chloride flush 0.9 % injection 5-40 mL  5-40 mL IntraVENous PRN    0.9 % sodium chloride infusion   IntraVENous PRN    ondansetron (ZOFRAN-ODT) disintegrating tablet 4 mg  4 mg Oral Q8H PRN    Or    ondansetron (ZOFRAN) injection 4 mg  4 mg IntraVENous Q6H PRN

## 2025-03-03 NOTE — PROGRESS NOTES
End of Shift Note    Bedside shift change report given to  Danielle,RN  (oncoming nurse) by Abbey Christopher RN .        Shift worked:  Nights   Shift summary and any significant changes:    No acute changes    Robitussin given x2 for coughing    Concerns for physician to address:  -   Zone phone for oncoming shift:   [phone]       Patient Information  Sarah Gordon  79 y.o.  2/24/2025 10:00 AM by Bria Hartley MD. Sarah Gordon was admitted from [facility]    Problem List  Patient Active Problem List    Diagnosis Date Noted    Cerebral vascular disease 03/01/2025    Acute alteration in mental status 02/25/2025    Seizures ([facility]) 02/25/2025    Encephalopathy acute 02/24/2025    Hip fracture ([facility]) 04/27/2021    NSTEMI, initial episode of care ([facility]) 02/28/2018    DM (diabetes mellitus) ([facility]) 02/27/2018    GERD (gastroesophageal reflux disease) 02/27/2018    Asthma 02/27/2018    HTN (hypertension) 02/27/2018    NSTEMI (non-ST elevated myocardial infarction) ([facility]) 02/27/2018     Past Medical History:   Diagnosis Date    A-fib ([facility])     Asthma     Diabetes ([facility])     GERD (gastroesophageal reflux disease)     Hypertension     Other ill-defined conditions(799.89)     hypothyroid       Core Measures:  CVA: no  CHF: no  PNA: no    Activity:Level of Assistance: Moderate assist, patient does 50-74%  Number times ambulated in hallways past shift: 0  Number of times OOB to chair past shift: 1    Cardiac:   Cardiac Monitoring: yes, nsr    Access:   Current line(s): PIV    Respiratory:   O2 Device: None (Room air)    GI:     Current diet:  ADULT DIET; Regular; 4 carb choices (60 gm/meal)  Tolerating current diet: Yes    Pain Management:   Patient states pain is manageable on current regimen: yes    Skin:  Jordi Scale Score: 18  Interventions: N/A  Pressure injury: no    Patient Safety:  Fall Score: Herring Total Score: 55  Interventions: stay with me program  Self-release roll belt: No  Dexterity to release roll belt: no    (must document dexterity  here by stating Yes or No here, otherwise this is a restraint and must follow restraint documentation policy.)    DVT prophylaxis:  DVT prophylaxis: meds    Active Consults:  IP CONSULT TO PHARMACY  IP CONSULT TO CASE MANAGEMENT  IP CONSULT TO DIABETES MANAGEMENT    Length of Stay:  Expected LOS: 7  Actual LOS: 7    Abbey Christopher RN

## 2025-03-03 NOTE — PLAN OF CARE
Problem: Physical Therapy - Adult  Goal: By Discharge: Performs mobility at highest level of function for planned discharge setting.  See evaluation for individualized goals.  Description: FUNCTIONAL STATUS PRIOR TO ADMISSION: Patient was modified independent using a rolling walker for functional mobility.    HOME SUPPORT PRIOR TO ADMISSION: The patient lived alone with local friends and family that can travel to provide assistance.    Physical Therapy Goals  Initiated 2/25/2025.     Weekly re-assessment performed 3/3/2025, goals updated below 2/2 functional decline:  1. Pt will perform bed mobility consistently with Supervision and rail within 7 day(S).  2.Pt will perform transfers with LRAD and Supervision within 7 day(S).  3. Pt will walk >50ft with LRAD and CGA within 7 day(s).  4. Pt will negotiate 4 steps with 1 rail and Min A within 7 day(s).    2/25/25 initial goals:  1.  Patient will move from supine to sit and sit to supine, scoot up and down, and roll side to side in bed with modified independence within 7 day(s).    2.  Patient will perform sit to stand with modified independence within 7 day(s).  3.  Patient will transfer from bed to chair and chair to bed with modified independence using the least restrictive device within 7 day(s).  4.  Patient will ambulate with modified independence for 100 feet with the least restrictive device within 7 day(s).   Outcome: Not Progressing   PHYSICAL THERAPY TREATMENT: WEEKLY REASSESSMENT    Patient: Sarah Gordon (79 y.o. female)  Date: 3/3/2025  Primary Diagnosis: Encephalopathy acute [G93.40]  Disorientation [R41.0]  Acute kidney injury [N17.9]  Atrial fibrillation, unspecified type (HCC) [I48.91]       Precautions: Restrictions/Precautions  Restrictions/Precautions: Fall Risk          ASSESSMENT :  Patient continues to benefit from skilled PT services and is not progressing towards goals. Pt received for PT session supine in bed, eager for mobility. She  performed bed mobility with min A and rail, extra time and encouragement. Scooting EOB with Min-mod A via hayden pad. Sit <> stand EOB with RW and Min A. Pt stepped 3 feet bed > BSC for toileting attempt, RW and Min A. Time spent on toilet with SBA for safety during toileting, min A for balance during laura care. Pt walked 18ft with RW and Min A, shuffling gait and poor Rw management. She was left end of session up in chair, all needs met, call bell in reach, chair alarm on. Pt is below a safe mobility baseline to [place] home alone, family reporting unable to provide level of care needed at this time. Pt will require moderate intensity rehab at [place]. Continue to follow.    Patient's progression toward goals since last assessment: functional decline this last week, limited progress with mobility.    Functional Outcome Measure:  The patient scored 15/24 on the [facility] outcome measure which is indicative of likely need for rehab at [place].          PLAN :  Goals have been updated based on progression since last assessment.  Patient continues to benefit from skilled intervention to address the above impairments.    Recommendations and Planned Interventions:   bed mobility training, transfer training, gait training, therapeutic exercises, neuromuscular re-education, edema management/control, patient and family training/education, and therapeutic activities      Frequency/Duration: Patient will be followed by physical therapy to address goals, PT Plan of Care: 3 times/week  per day/week to address goals.        Recommendation for discharge: (in order for the patient to meet his/her long term goals):   Moderate intensity short-term skilled physical therapy up to 5x/week    Other factors to consider for discharge: lives alone, poor safety awareness, impaired cognition, high risk for falls, and not safe to be alone    IF patient discharges home will need the following DME: continuing to assess with progress       SUBJECTIVE:   Patient

## 2025-03-03 NOTE — DIABETES MGMT
[facility]  [facility] FOR DI[facility]ETES HEALTH  DIABETES MANAGEMENT CONSULT    Consulted by Provider for advanced nursing evaluation and care for inpatient blood glucose management.    Evaluation and Action Plan   Sarah Gordon  Is a 79 year-old female patient with the history of type two diabetes. Her current A1c is 13.1% which demonstrates poorly controlled diabetes prior to admission. The patient reportedly takes insulin but unable to provide dosing but administers to herself. The patient is admitted for altered mental status, speech, impairment, and headache. The patient’s close friend reportedly called the patient and noticed the patient alter mental status, she was also unable to complete her thoughts, repeating questions, abruptly changing the subject and complaining of a severe headache. The patient’s friend drove to the patient’s house to see her in person and was concerned and brought the patient to the ER for treatment. The patient had further testing while here and at MRI confirmed chronic infarct. Neurology is following with this patient.  The patient is receiving basal and bolus insulin and blood sugars are stable. The patient is anticipated to be discharged on the current and patient insulin dosing. The patient son Will reportedly stay with the patient at discharge. He practice insulin pen administration with fake skin model and demo insulin pen. Survival skills taught. The patient would likely benefit from outpatient diabetes, education. Referral sent.       Blood glucose pattern            Diabetes Discharge Plan   Medication  Using 15 units Lantus daily  Using 5 units Humalog with each meal  Monitor Bg's   Follow up with outpatient provider for future diabetes management   Referral  [x]        Outpatient diabetes education   Additional orders            Initial Presentation   Sarah Gordon is a 79 y.o. female who presented to the ED on 2/24/25  LAB: Glucose 397. Creatine 1.34. GFR 40. A1c 13.1%.  initial hospital care - 40 minutes   [x] 78816 IP initial hospital care -55 minutes  [] 74596 IP initial hospital care -75 minutes  []        32705 IP subsequent hospital care - 50 minutes   [] 46315 IP subsequent hospital care - 35 minutes   [] 46151 IP subsequent hospital care - 25 minutes       Before making these care recommendations, I personally reviewed the hospitalization record, including notes, laboratory & diagnostic data and current medications, and examined the patient at the bedside.  Total minutes: 55 minutes    JASON Haynes - CNS   Diabetes Clinical Nurse Specialist   [facility]  Access via Next audience

## 2025-03-03 NOTE — PROGRESS NOTES
HOSPITAL NEUROLOGY NOTE     Chief Complaint   Patient presents with    Altered Mental Status    Hyperglycemia     Pt presents to ED via EMS from home and is moved off the stretcher. EMS reports pt reports she started feeling fuzzy yesterday and couldn't get her thoughts together; friend noted speech problem with jumbling her words and possibly slurred. LKW >24hrs. EMS noted afib RVR on EKG and .         HPI  Sarah Gordon is a 79 y.o. female  with a history of atrial fibrillation on Xarelto, diabetes, hypertension, GERD, asthma, who presented to the ED on 2/24/2025 with a chief complaint of mental status changes, confusion, and speech disturbance.  Patient was taken to the ED for evaluation.  Per EMS, her initial blood sugar at the time was 435 and was found to have a blood glucose of 397 in the ED.  Patient was admitted for further evaluation and workup.  Neurology was consulted and patient was seen by Dr. Nickerson.    Head CT without contrast from 2/20/2025 showed no evidence of acute intracranial abnormality.  Chronic small vessel ischemic disease.    Brain MRI without contrast from 2/20/2025 showed no acute infarct. Generalized parenchymal volume loss and mild chronic microvascular ischemic disease. More confluent area of subcortical and deep white matter T2/FLAIR hyperintensity in the left posterior frontal lobe, which may represent a chronic infarct. However, recommend MRI brain with contrast to exclude the possibility of an inflammatory process.    Head CT without contrast from 2/25/2025 showed no acute process.    CTA head and neck from 2/25/2025 showed no acute large vessel occlusion.  No evidence of aneurysm or vascular malformation.  Scattered atherosclerosis.  3.8 mm nodule superior segment left lower lobe, recommend outpatient dedicated CT chest to follow-up.    CTP revealed predominantly mismatch hypoperfusion right occipital lobe.    Pertinent labs from 2/24/2025 showed unremarkable CBC, sodium  atherosclerosis.  3.8 mm nodule superior segment left lower lobe, recommend outpatient dedicated CT chest to follow-up.    CTP revealed predominantly mismatch hypoperfusion right occipital lobe.       RECOMMENDATIONS:  Altered mental status, confusion, speech disturbances: Improving.  This could be multifactorial in etiology including seizures due to chronic left frontal encephalomalacia triggered by elevated BG level, metabolic abnormality.  Latest EEG showed mild slowing without any seizures.  - Continue Keppra 1500 mg twice a day.  - Continue to correct any metabolic abnormality  - Continue to minimize cognitively impacting medications.  - Please implement nonpharmacological measures to minimize risk for hospital-acquired delirium.  - Vitamin B12 and folate was normal     History of stroke: Brain MRI revealed focal chronic encephalomalacia in the left frontal lobe, likely chronic infarct:  Her stroke risk factors include hypertension, A-fib, diabetes, prior stroke  - Brain MRI with contrast - reviewed  - Continue home dosing of  Xarelto  - Continue aggressive secondary stroke risk factors modification.  Education today in regards to risk factors for stroke and lifestyle modifications to help minimize the risk of future stroke.  This included medication compliance, regular follow up with primary care physician,  and healthy lifestyle habits (nutrition/exercise).  - PT/OT    There is no additional work-up needed at this time from a neurological standpoint.  Patient can be discharged if cleared by PT/OT.  Plan of care was discussed with primary team, Dr. Adrian Fonseca.  Patient  can follow-up in neurology clinic in 2- 4 weeks.  Please do not hesitate to reach out for any questions or concerns.     Thank you very much for this consultation.    I spent 38 minutes providing care to this acutely ill inpatient with > 50% of the time counseling as well as reviewing the patient's chart, notes, labs, medications and

## 2025-03-03 NOTE — PROGRESS NOTES
End of Shift Note    Bedside shift change report given to AmadoRN    (oncoming nurse) by SANCHEZ Santos.         Shift worked:  Days   Shift summary and any significant changes:    Robitussin given x1 for coughing   Fioricet X 2 for headache.  Mirilax.   Concerns for physician to address:     Zone phone for oncoming shift:   [phone]       Patient Information  Sarah Gordon  79 y.o.  2/24/2025 10:00 AM by Bria Hartley MD. Sarah Gordon was admitted from [facility]    Problem List  Patient Active Problem List    Diagnosis Date Noted    Disorientation 03/03/2025    Cerebral vascular disease 03/01/2025    Acute alteration in mental status 02/25/2025    Seizures ([facility]) 02/25/2025    Encephalopathy acute 02/24/2025    Hip fracture ([facility]) 04/27/2021    NSTEMI, initial episode of care ([facility]) 02/28/2018    DM (diabetes mellitus) ([facility]) 02/27/2018    GERD (gastroesophageal reflux disease) 02/27/2018    Asthma 02/27/2018    HTN (hypertension) 02/27/2018    NSTEMI (non-ST elevated myocardial infarction) ([facility]) 02/27/2018     Past Medical History:   Diagnosis Date    A-fib ([facility])     Asthma     Diabetes ([facility])     GERD (gastroesophageal reflux disease)     Hypertension     Other ill-defined conditions(799.89)     hypothyroid       Core Measures:  CVA: no  CHF: no  PNA: no    Activity:Level of Assistance: Moderate assist, patient does 50-74%  Number times ambulated in hallways past shift: 0  Number of times OOB to chair past shift: 1    Cardiac:   Cardiac Monitoring: yes, nsr    Access:   Current line(s): PIV    Respiratory:   O2 Device: None (Room air)    GI:     Current diet:  DIET ONE TIME MESSAGE;  ADULT DIET; Regular; 4 carb choices (60 gm/meal)  Tolerating current diet: Yes    Pain Management:   Patient states pain is manageable on current regimen: yes    Skin:  Jordi Scale Score: 19  Interventions: N/A  Pressure injury: no    Patient Safety:  Fall Score: Herring Total Score: 100  Interventions: stay with me program  Self-release

## 2025-03-03 NOTE — PLAN OF CARE
Problem: Occupational Therapy - Adult  Goal: By Discharge: Performs self-care activities at highest level of function for planned discharge setting.  See evaluation for individualized goals.  Description: FUNCTIONAL STATUS PRIOR TO ADMISSION:  pt states independence w/ ADLs and mod I for mobility w/ use of RW or Rollator   Receives Help From: Family, Prior Level of Assist for ADLs: Independent,  ,  ,  ,  ,  , Prior Level of Assist for Homemaking: Independent,  , Prior Level of Assist for Transfers: Independent, Active : Yes     HOME SUPPORT: Patient lived alone, has friends who check on her and son who will stay with her at time of dc.    Occupational Therapy Goals:  Initiated 2/25/2025  1.  Patient will perform grooming in stance w/ RW in front with Modified [place] within 7 day(s).  2.  Patient will perform upper body dressing with [place] within 7 day(s).  3.  Patient will perform lower body dressing with [place] within 7 day(s).  4.  Patient will perform toilet transfers with Modified [place]  within 7 day(s).  5.  Patient will perform all aspects of toileting with [place] within 7 day(s).  6.  Patient will participate in upper extremity therapeutic exercise/activities with Supervision for 5 minutes within 7 day(s).    7.  Patient will utilize energy conservation techniques during functional activities with verbal cues within 7 day(s).   Outcome: Progressing    OCCUPATIONAL THERAPY TREATMENT  Patient: Sarah Gordon (79 y.o. female)  Date: 3/3/2025  Primary Diagnosis: Encephalopathy acute [G93.40]  Disorientation [R41.0]  Acute kidney injury [N17.9]  Atrial fibrillation, unspecified type (HCC) [I48.91]       Precautions: Fall Risk                Chart, occupational therapy assessment, plan of care, and goals were reviewed.    ASSESSMENT  Patient continues to benefit from skilled OT services and is slowly progressing towards goals. Pt received supine w/ HOB elevated, pt agreeable

## 2025-03-03 NOTE — CARE COORDINATION
Transition of Care Plan:     RUR: 11% (low RUR)   Prior Level of Functioning: Independent  Disposition: Plan A: SNF - Referrals pending, will need auth   Plan B: Home with HH - [facility] PT/OT/SN; SOC 24-48 hours  LENNOX: 03/03  If SNF or IPR: Date FOC offered: 03/03  Date FOC received: 3/03  Accepting facility: Referrals pending  Date authorization started with reference number: Will need BCBS auth   Date authorization received and expires: N/a, pending auth start   Follow up appointments: PCP/Specialists as indicated  DME needed: None att  Transportation at discharge: Family  IM/IMM Medicare/ letter given: Last given 02/28  Is patient a  and connected with [facility]? N/a              If yes, was  transfer form completed and [facility] notified? N/a  Caregiver Contact: Olegario Gordon - [phone]  Discharge Caregiver contacted prior to discharge? Pt to contact  Care Conference needed? Not att  Barriers to discharge: Medical clearance    0833 - Chart reviewed from weekend. Plan remains for home with [facility]. Family to transport at d/c.  following for any additional needs.     1230 - Noted family preference for SNF. Reached out to MD and requested permission to pursue. Awaiting response.     2327 - Provider and therapy in agreement for SNF. Offered FOC to family, referrals placed.     1) [facility]  2) [facility]  3) [facility] Baldwin  Care Management  [facility]  [phone]

## 2025-03-03 NOTE — DISCHARGE SUMMARY
Physician Discharge Summary     Patient ID:    Sarah Gordon  [id number]  79 y.o.  1945    Admit date: 2/24/2025    Discharge date : 3/3/2025      Final Diagnoses:   Acute metabolic encephalopathy related to seizure disorder  Atrial fibrillation with rapid ventricular response.  Rate controlled  Type 2 diabetes uncontrolled with A1c of 13.1  Hypothyroidism    Consultations  Neurologist  Diabetic educator    Reason for Hospitalization:    Alteration in mentation      Hospital Course:     79-year-old lady with history of paroxysmal atrial fibrillation, essential hypertension, diabetes, hypothyroidism brought in by EMS with reports of alteration in mentation. She was reportedly talking on the phone with her friend when friend noticed that she was confused and not speaking right. Presented as a stroke alert. CT scan of the brain without contrast obtained February 24 unremarkable. A follow-up MRI of the brain without contrast negative for acute CVA. She was admitted to the neuro telemetry floor for close monitoring. Also noted with atrial fibrillation with rapid ventricular response upon arrival. Received IV Cardizem with improvement in heart rate. She was started on Xarelto for stroke prevention.  Follow-up MRI with and without contrast of the brain negative for acute pathology.  Patient was seen in consultation by neurologist and had multiple EEGs.  Concerns for some abnormal activity on the left brain.  Dose of Keppra was increased to 1500 mg twice daily.  No further alteration in mentation.  Overall clinically improved but remains weak from acute illness.  Home health with PT OT will be ordered.  Delmar stable for discharge to home with outpatient follow-up.      Discharge Medications:      Medication List        START taking these medications      blood glucose test strips  Test 3 times a day & as needed for symptoms of irregular blood glucose. Dispense sufficient amount for indicated testing  last 72 hours.    Invalid input(s): \"GPT\", \"PROT\"  No results for input(s): \"INR\", \"PROTIME\", \"APTT\" in the last 72 hours.   No results for input(s): \"IRON\", \"TIBC\" in the last 72 hours.    Invalid input(s): \"PSAT\", \"FERR\"   No results for input(s): \"PH\", \"PCO2\", \"PO2\" in the last 72 hours.  No results for input(s): \"CKTOTAL\", \"CKMB\", \"TROPONINI\" in the last 72 hours.  Lab Results   Component Value Date/Time    POCGLU 173 03/03/2025 06:43 AM    POCGLU 182 03/02/2025 08:26 PM    POCGLU 202 03/02/2025 03:56 PM    POCGLU 275 03/02/2025 10:50 AM    POCGLU 276 03/02/2025 06:43 AM         Discharge time spent 35 minutes    Signed:  Adrian Fonseca MD  3/3/2025  9:54 AM

## 2025-03-03 NOTE — PLAN OF CARE
Problem: Chronic Conditions and Co-morbidities  Goal: Patient's chronic conditions and co-morbidity symptoms are monitored and maintained or improved  3/3/2025 1121 by Danielle Carl RN  Outcome: Progressing  3/2/2025 2253 by Abbey Christopher RN  Outcome: Progressing     Problem: Discharge Planning  Goal: Discharge to home or other facility with appropriate resources  3/3/2025 1121 by Danielle Carl RN  Outcome: Progressing  3/2/2025 2253 by Abbey Christopher RN  Outcome: Progressing     Problem: Pain  Goal: Verbalizes/displays adequate comfort level or baseline comfort level  3/3/2025 1121 by Danielle Carl RN  Outcome: Progressing  3/2/2025 2253 by Abbey Christopher RN  Outcome: Progressing     Problem: Neurosensory - Adult  Goal: Achieves stable or improved neurological status  3/3/2025 1121 by Danielle Carl RN  Outcome: Progressing  3/2/2025 2253 by Abbey Christopher RN  Outcome: Progressing  Goal: Achieves maximal functionality and self care  3/3/2025 1121 by Danielle Carl RN  Outcome: Progressing  3/2/2025 2253 by Abbey Christopher RN  Outcome: Progressing     Problem: ABCDS Injury Assessment  Goal: Absence of physical injury  3/3/2025 1121 by Danielle Carl RN  Outcome: Progressing  3/2/2025 2253 by Abbey Christopher RN  Outcome: Progressing     Problem: Confusion  Goal: Confusion, delirium, dementia, or psychosis is improved or at baseline  Description: INTERVENTIONS:  1. Assess for possible contributors to thought disturbance, including medications, impaired vision or hearing, underlying metabolic abnormalities, dehydration, psychiatric diagnoses, and notify attending LIP  2. [place] high risk fall precautions, as indicated  3. Provide frequent short contacts to provide reality reorientation, refocusing and direction  4. Decrease environmental stimuli, including noise as appropriate  5. Monitor and intervene to maintain

## 2025-03-04 LAB
GLUCOSE BLD STRIP.AUTO-MCNC: 135 MG/DL (ref 65–117)
GLUCOSE BLD STRIP.AUTO-MCNC: 165 MG/DL (ref 65–117)
GLUCOSE BLD STRIP.AUTO-MCNC: 176 MG/DL (ref 65–117)
GLUCOSE BLD STRIP.AUTO-MCNC: 176 MG/DL (ref 65–117)
SERVICE CMNT-IMP: ABNORMAL

## 2025-03-04 PROCEDURE — 6370000000 HC RX 637 (ALT 250 FOR IP): Performed by: NURSE PRACTITIONER

## 2025-03-04 PROCEDURE — 6370000000 HC RX 637 (ALT 250 FOR IP): Performed by: STUDENT IN AN ORGANIZED HEALTH CARE EDUCATION/TRAINING PROGRAM

## 2025-03-04 PROCEDURE — 6370000000 HC RX 637 (ALT 250 FOR IP): Performed by: HOSPITALIST

## 2025-03-04 PROCEDURE — 1100000003 HC PRIVATE W/ TELEMETRY

## 2025-03-04 PROCEDURE — 6370000000 HC RX 637 (ALT 250 FOR IP): Performed by: INTERNAL MEDICINE

## 2025-03-04 PROCEDURE — 2500000003 HC RX 250 WO HCPCS: Performed by: HOSPITALIST

## 2025-03-04 PROCEDURE — 6360000002 HC RX W HCPCS

## 2025-03-04 PROCEDURE — 2500000003 HC RX 250 WO HCPCS: Performed by: INTERNAL MEDICINE

## 2025-03-04 PROCEDURE — 82962 GLUCOSE BLOOD TEST: CPT

## 2025-03-04 RX ORDER — INSULIN GLARGINE 100 [IU]/ML
18 INJECTION, SOLUTION SUBCUTANEOUS NIGHTLY
Status: DISCONTINUED | OUTPATIENT
Start: 2025-03-04 | End: 2025-03-06

## 2025-03-04 RX ORDER — INSULIN GLARGINE 100 [IU]/ML
18 INJECTION, SOLUTION SUBCUTANEOUS DAILY
Qty: 5 ADJUSTABLE DOSE PRE-FILLED PEN SYRINGE | Refills: 3 | Status: SHIPPED | OUTPATIENT
Start: 2025-03-04 | End: 2025-03-07

## 2025-03-04 RX ADMIN — HYDRALAZINE HYDROCHLORIDE 100 MG: 50 TABLET ORAL at 21:37

## 2025-03-04 RX ADMIN — INSULIN LISPRO 5 UNITS: 100 INJECTION, SOLUTION INTRAVENOUS; SUBCUTANEOUS at 12:00

## 2025-03-04 RX ADMIN — GUAIFENESIN 100 MG: 200 SOLUTION ORAL at 10:22

## 2025-03-04 RX ADMIN — LEVETIRACETAM 1500 MG: 100 INJECTION INTRAVENOUS at 21:37

## 2025-03-04 RX ADMIN — LEVETIRACETAM 1500 MG: 100 INJECTION INTRAVENOUS at 10:21

## 2025-03-04 RX ADMIN — INSULIN LISPRO 5 UNITS: 100 INJECTION, SOLUTION INTRAVENOUS; SUBCUTANEOUS at 17:10

## 2025-03-04 RX ADMIN — HYDRALAZINE HYDROCHLORIDE 100 MG: 50 TABLET ORAL at 06:06

## 2025-03-04 RX ADMIN — INSULIN LISPRO 5 UNITS: 100 INJECTION, SOLUTION INTRAVENOUS; SUBCUTANEOUS at 10:22

## 2025-03-04 RX ADMIN — LEVOTHYROXINE SODIUM 88 MCG: 0.09 TABLET ORAL at 06:06

## 2025-03-04 RX ADMIN — ACETAMINOPHEN 650 MG: 325 TABLET ORAL at 14:47

## 2025-03-04 RX ADMIN — METOPROLOL SUCCINATE 100 MG: 50 TABLET, FILM COATED, EXTENDED RELEASE ORAL at 10:22

## 2025-03-04 RX ADMIN — GUAIFENESIN 100 MG: 200 SOLUTION ORAL at 21:37

## 2025-03-04 RX ADMIN — HYDRALAZINE HYDROCHLORIDE 100 MG: 50 TABLET ORAL at 13:56

## 2025-03-04 RX ADMIN — SODIUM CHLORIDE, PRESERVATIVE FREE 5 ML: 5 INJECTION INTRAVENOUS at 21:38

## 2025-03-04 RX ADMIN — ACETAMINOPHEN 650 MG: 325 TABLET ORAL at 21:37

## 2025-03-04 RX ADMIN — TRAZODONE HYDROCHLORIDE 25 MG: 50 TABLET ORAL at 21:37

## 2025-03-04 RX ADMIN — RIVAROXABAN 20 MG: 20 TABLET, FILM COATED ORAL at 17:11

## 2025-03-04 RX ADMIN — DILTIAZEM HYDROCHLORIDE 240 MG: 240 CAPSULE, COATED, EXTENDED RELEASE ORAL at 10:22

## 2025-03-04 RX ADMIN — INSULIN GLARGINE 18 UNITS: 100 INJECTION, SOLUTION SUBCUTANEOUS at 21:38

## 2025-03-04 RX ADMIN — SODIUM CHLORIDE, PRESERVATIVE FREE 10 ML: 5 INJECTION INTRAVENOUS at 10:23

## 2025-03-04 ASSESSMENT — PAIN SCALES - GENERAL: PAINLEVEL_OUTOF10: 6

## 2025-03-04 ASSESSMENT — PAIN DESCRIPTION - LOCATION
LOCATION: GENERALIZED
LOCATION: GENERALIZED

## 2025-03-04 ASSESSMENT — PAIN DESCRIPTION - DESCRIPTORS: DESCRIPTORS: ACHING

## 2025-03-04 NOTE — PLAN OF CARE
airway pressure, respiratory therapy assess nares and determine need for appliance change or resting period  Outcome: Progressing  Flowsheets (Taken 3/4/2025 0117 by Jason Rodriguez RN)  Skin Integrity Remains Intact: Monitor for areas of redness and/or skin breakdown

## 2025-03-04 NOTE — PROGRESS NOTES
Comprehensive Nutrition Assessment    Type and Reason for Visit:  Initial, LOS    Nutrition Recommendations/Plan:   Continue carb controlled diet  Continue Glucerna BID     Malnutrition Assessment:  Malnutrition Status:  At risk for malnutrition (03/04/25 1523)    Context:  Acute Illness     Findings of the 6 clinical characteristics of malnutrition:  Energy Intake:  Mild decrease in energy intake  Weight Loss:  No weight loss     Body Fat Loss:  No body fat loss     Muscle Mass Loss:  No muscle mass loss    Fluid Accumulation:  No fluid accumulation     Strength:  Not Performed    Nutrition Assessment:    Patient medically noted for metabolic encephalopathy, AFIB, EVENS, seizures, DM, and HTN. Chart reviewed for length of stay. Patient receiving a carb controlled diet; noted one time message and request in diet order for boost which [facility] does not carry. Added Glucerna BID this morning. Visited patient this afternoon with multiple visitors at bedside. She reports appetite is improved today as she is more alert. Hadn't been eating much the last few days. She liked the strawberry glucerna she had earlier today so will continue as ordered. No reported weight loss; none noted on chart review. Encouraged intake of meals/supplements. Patient stable for discharge pending placement.     Nutrition Related Findings:    -[phone], A1c 13.1   BM 3/3   Hydralazine, lantus, humalog, Keppra, Synthroid   Wound Type: None       Current Nutrition Intake & Therapies:          ADULT DIET; Regular; 4 carb choices (60 gm/meal)  ADULT ORAL NUTRITION SUPPLEMENT; Lunch, Dinner; Diabetic Oral Supplement    Anthropometric Measures:  Height: 177.8 cm (5' 10\")  Ideal Body Weight (IBW): 150 lbs (68 kg)       Current Body Weight: 108.9 kg (240 lb 1.3 oz),   IBW.    Current BMI (kg/m2): 34.4                             BMI Categories: Obese Class 1 (BMI 30.0-34.9)    Estimated Daily Nutrient Needs:  Energy Requirements Based On:

## 2025-03-04 NOTE — DISCHARGE SUMMARY
Physician Discharge Summary     Patient ID:    Sarah Gordon  [id number]  79 y.o.  1945    Admit date: 2/24/2025    Discharge date : 3/4/2025      Final Diagnoses:   Acute metabolic encephalopathy related to seizure disorder  Atrial fibrillation with rapid ventricular response.  Rate controlled  Type 2 diabetes uncontrolled with A1c of 13.1  Hypothyroidism  Generalized weakness secondary to acute illness.    Consultations  Neurologist  Diabetic educator    Reason for Hospitalization:    Alteration in mentation      Hospital Course:     79-year-old lady with history of paroxysmal atrial fibrillation, essential hypertension, diabetes, hypothyroidism brought in by EMS with reports of alteration in mentation. She was reportedly talking on the phone with her friend when friend noticed that she was confused and not speaking right. Presented as a stroke alert. CT scan of the brain without contrast obtained February 24 unremarkable. A follow-up MRI of the brain without contrast negative for acute CVA. She was admitted to the neuro telemetry floor for close monitoring. Also noted with atrial fibrillation with rapid ventricular response upon arrival. Received IV Cardizem with improvement in heart rate. She was started on Xarelto for stroke prevention.  Follow-up MRI with and without contrast of the brain negative for acute pathology.  Patient was seen in consultation by neurologist and had multiple EEGs.  Concerns for some abnormal activity on the left brain.  Dose of Keppra was increased to 1500 mg twice daily.  No further alteration in mentation.  Overall clinically improved but remains weak from acute illness.  Patient will be discharged on insulin given elevated A1c of 13.1.  Diabetic education was requested during this hospital stay.    Discharge Medications:      Medication List        START taking these medications      B-D ULTRAFINE III SHORT PEN 31G X 8 MM Misc  Generic drug: Insulin Pen

## 2025-03-04 NOTE — PROGRESS NOTES
End of Shift Note    Bedside shift change report given to Amado RN (oncoming nurse) by SANCHEZ Santos.         Shift worked:  Days   Shift summary and any significant changes:    Robitussin given x1 for coughing   Tylenol X 1 for headache/Aches.  Patient much more alert and interactive today, appetite much better.    Concerns for physician to address:     Zone phone for oncoming shift:   [phone]       Patient Information  Sarah Gordon  79 y.o.  2/24/2025 10:00 AM by Bria Hartley MD. Sarah Gordon was admitted from [facility]    Problem List  Patient Active Problem List    Diagnosis Date Noted    Disorientation 03/03/2025    Cerebral vascular disease 03/01/2025    Acute alteration in mental status 02/25/2025    Seizures ([facility]) 02/25/2025    Encephalopathy acute 02/24/2025    Hip fracture ([facility]) 04/27/2021    NSTEMI, initial episode of care ([facility]) 02/28/2018    DM (diabetes mellitus) ([facility]) 02/27/2018    GERD (gastroesophageal reflux disease) 02/27/2018    Asthma 02/27/2018    HTN (hypertension) 02/27/2018    NSTEMI (non-ST elevated myocardial infarction) ([facility]) 02/27/2018     Past Medical History:   Diagnosis Date    A-fib ([facility])     Asthma     Diabetes ([facility])     GERD (gastroesophageal reflux disease)     Hypertension     Other ill-defined conditions(799.89)     hypothyroid       Core Measures:  CVA: no  CHF: no  PNA: no    Activity:Level of Assistance: Moderate assist, patient does 50-74%  Number times ambulated in hallways past shift: 0  Number of times OOB to chair past shift: 1    Cardiac:   Cardiac Monitoring: yes, nsr    Access:   Current line(s): PIV    Respiratory:   O2 Device: None (Room air)    GI:  Last BM (including prior to admit): 03/03/25  Current diet:  ADULT DIET; Regular; 4 carb choices (60 gm/meal)  ADULT ORAL NUTRITION SUPPLEMENT; Lunch, Dinner; Diabetic Oral Supplement  Tolerating current diet: Yes    Pain Management:   Patient states pain is manageable on current regimen: yes    Skin:  Jordi Scale  Score: 19  Interventions: N/A  Pressure injury: no    Patient Safety:  Fall Score: Herring Total Score: 85  Interventions: stay with me program  Self-release roll belt: No  Dexterity to release roll belt: no   (must document dexterity  here by stating Yes or No here, otherwise this is a restraint and must follow restraint documentation policy.)    DVT prophylaxis:  DVT prophylaxis: meds    Active Consults:  IP CONSULT TO PHARMACY  IP CONSULT TO CASE MANAGEMENT  IP CONSULT TO DIABETES MANAGEMENT    Length of Stay:  Expected LOS: 10  Actual LOS: 8    SANCHEZ Santos

## 2025-03-04 NOTE — CARE COORDINATION
Initial note: Chart reviewed, IDR's completed. Pt is medically stable for d/c pending SNF placement/insurance auth. Referrals sent for SNF 3/3/25. [facility] declined due to lack of bed availability, [facility] declined due to lack of bed availability, & [facility] is actively reviewing pending bed availability. CM met with pt and family to provide updates and request additional options for placement to avoid delay in d/c. Family provided [facility] and [facility] as additional options. Family specified that if pt is not accepted to any of the facilities mentioned above, they would like to switch gears towards working on a home-based plan. Pt & family aware that [facility] has accepted pending direction of the disposition.    CM had lengthy discussion regarding the differences between Medicare vs Medicaid, offering an opportunity for clarifying questions as needed. CM asked pre-screening Medicaid questions to determine if pt would qualify for coverage. Based on information received, pt is over-income for Medicaid at this time. Family aware that pt will have to complete a spend-down prior to qualifying for coverage. CM also addressed family's follow up questions pertaining to the SNF referral process, including concerns about insurance providing authorization for intervention. Pt and family agreeable to the direction of the plan re: Plan A) SNF (pending acceptance/insurance auth) and Plan B) Home with [facility] HH and increased family support. Referrals to be expanded to [facility] and [facility]; updates to be provided once available. Once accepted by a facility, pt will require insurance auth via [employer]; updated PT/OT notes needed until auth is obtained. Avoidable days captured.    Brenda Throckmorton RN  [facility] Case Management

## 2025-03-04 NOTE — PROGRESS NOTES
Spiritual Care Partner Volunteer visited patient at [facility] in [facility] NEUROSCIENCE TELEMETRY on 3/4/2025   Documented by: Chaplain Hipolito Gamez M.Div., [facility].   Paging Service: [phone])

## 2025-03-05 LAB
GLUCOSE BLD STRIP.AUTO-MCNC: 121 MG/DL (ref 65–117)
GLUCOSE BLD STRIP.AUTO-MCNC: 183 MG/DL (ref 65–117)
GLUCOSE BLD STRIP.AUTO-MCNC: 195 MG/DL (ref 65–117)
GLUCOSE BLD STRIP.AUTO-MCNC: 198 MG/DL (ref 65–117)
SERVICE CMNT-IMP: ABNORMAL

## 2025-03-05 PROCEDURE — 97116 GAIT TRAINING THERAPY: CPT

## 2025-03-05 PROCEDURE — 2500000003 HC RX 250 WO HCPCS: Performed by: INTERNAL MEDICINE

## 2025-03-05 PROCEDURE — 6370000000 HC RX 637 (ALT 250 FOR IP): Performed by: HOSPITALIST

## 2025-03-05 PROCEDURE — 97530 THERAPEUTIC ACTIVITIES: CPT

## 2025-03-05 PROCEDURE — 2500000003 HC RX 250 WO HCPCS: Performed by: HOSPITALIST

## 2025-03-05 PROCEDURE — 6360000002 HC RX W HCPCS

## 2025-03-05 PROCEDURE — 1100000003 HC PRIVATE W/ TELEMETRY

## 2025-03-05 PROCEDURE — 6370000000 HC RX 637 (ALT 250 FOR IP): Performed by: NURSE PRACTITIONER

## 2025-03-05 PROCEDURE — 6370000000 HC RX 637 (ALT 250 FOR IP): Performed by: INTERNAL MEDICINE

## 2025-03-05 PROCEDURE — 6370000000 HC RX 637 (ALT 250 FOR IP): Performed by: STUDENT IN AN ORGANIZED HEALTH CARE EDUCATION/TRAINING PROGRAM

## 2025-03-05 PROCEDURE — 82962 GLUCOSE BLOOD TEST: CPT

## 2025-03-05 PROCEDURE — 97535 SELF CARE MNGMENT TRAINING: CPT

## 2025-03-05 RX ADMIN — SODIUM CHLORIDE, PRESERVATIVE FREE 10 ML: 5 INJECTION INTRAVENOUS at 09:49

## 2025-03-05 RX ADMIN — INSULIN GLARGINE 18 UNITS: 100 INJECTION, SOLUTION SUBCUTANEOUS at 22:31

## 2025-03-05 RX ADMIN — DILTIAZEM HYDROCHLORIDE 240 MG: 240 CAPSULE, COATED, EXTENDED RELEASE ORAL at 09:49

## 2025-03-05 RX ADMIN — HYDRALAZINE HYDROCHLORIDE 100 MG: 50 TABLET ORAL at 22:31

## 2025-03-05 RX ADMIN — SODIUM CHLORIDE, PRESERVATIVE FREE 10 ML: 5 INJECTION INTRAVENOUS at 22:32

## 2025-03-05 RX ADMIN — METOPROLOL SUCCINATE 100 MG: 50 TABLET, FILM COATED, EXTENDED RELEASE ORAL at 09:49

## 2025-03-05 RX ADMIN — INSULIN LISPRO 5 UNITS: 100 INJECTION, SOLUTION INTRAVENOUS; SUBCUTANEOUS at 16:35

## 2025-03-05 RX ADMIN — LEVOTHYROXINE SODIUM 88 MCG: 0.09 TABLET ORAL at 07:23

## 2025-03-05 RX ADMIN — LEVETIRACETAM 1500 MG: 100 INJECTION INTRAVENOUS at 09:49

## 2025-03-05 RX ADMIN — ACETAMINOPHEN 650 MG: 325 TABLET ORAL at 11:33

## 2025-03-05 RX ADMIN — LEVETIRACETAM 1500 MG: 100 INJECTION INTRAVENOUS at 22:28

## 2025-03-05 RX ADMIN — TRAZODONE HYDROCHLORIDE 25 MG: 50 TABLET ORAL at 22:31

## 2025-03-05 RX ADMIN — INSULIN LISPRO 2 UNITS: 100 INJECTION, SOLUTION INTRAVENOUS; SUBCUTANEOUS at 11:33

## 2025-03-05 RX ADMIN — GUAIFENESIN 100 MG: 200 SOLUTION ORAL at 09:49

## 2025-03-05 RX ADMIN — INSULIN LISPRO 2 UNITS: 100 INJECTION, SOLUTION INTRAVENOUS; SUBCUTANEOUS at 16:35

## 2025-03-05 RX ADMIN — HYDRALAZINE HYDROCHLORIDE 100 MG: 50 TABLET ORAL at 07:23

## 2025-03-05 RX ADMIN — RIVAROXABAN 20 MG: 20 TABLET, FILM COATED ORAL at 18:32

## 2025-03-05 RX ADMIN — INSULIN LISPRO 5 UNITS: 100 INJECTION, SOLUTION INTRAVENOUS; SUBCUTANEOUS at 11:33

## 2025-03-05 RX ADMIN — INSULIN LISPRO 2 UNITS: 100 INJECTION, SOLUTION INTRAVENOUS; SUBCUTANEOUS at 22:31

## 2025-03-05 ASSESSMENT — PAIN SCALES - GENERAL
PAINLEVEL_OUTOF10: 0
PAINLEVEL_OUTOF10: 5
PAINLEVEL_OUTOF10: 0

## 2025-03-05 ASSESSMENT — PAIN DESCRIPTION - LOCATION: LOCATION: GENERALIZED

## 2025-03-05 ASSESSMENT — PAIN DESCRIPTION - DESCRIPTORS: DESCRIPTORS: ACHING

## 2025-03-05 NOTE — PLAN OF CARE
Problem: Physical Therapy - Adult  Goal: By Discharge: Performs mobility at highest level of function for planned discharge setting.  See evaluation for individualized goals.  Description: FUNCTIONAL STATUS PRIOR TO ADMISSION: Patient was modified independent using a rolling walker for functional mobility.    HOME SUPPORT PRIOR TO ADMISSION: The patient lived alone with local friends and family that can travel to provide assistance.    Physical Therapy Goals  Initiated 2/25/2025.     Weekly re-assessment performed 3/3/2025, goals updated below 2/2 functional decline:  1. Pt will perform bed mobility consistently with Supervision and rail within 7 day(S).  2.Pt will perform transfers with LRAD and Supervision within 7 day(S).  3. Pt will walk >50ft with LRAD and CGA within 7 day(s).  4. Pt will negotiate 4 steps with 1 rail and Min A within 7 day(s).    2/25/25 initial goals:  1.  Patient will move from supine to sit and sit to supine, scoot up and down, and roll side to side in bed with modified independence within 7 day(s).    2.  Patient will perform sit to stand with modified independence within 7 day(s).  3.  Patient will transfer from bed to chair and chair to bed with modified independence using the least restrictive device within 7 day(s).  4.  Patient will ambulate with modified independence for 100 feet with the least restrictive device within 7 day(s).   Outcome: Progressing   PHYSICAL THERAPY TREATMENT    Patient: Sarah Gordon (79 y.o. female)  Date: 3/5/2025  Diagnosis: Encephalopathy acute [G93.40]  Disorientation [R41.0]  Acute kidney injury [N17.9]  Atrial fibrillation, unspecified type (HCC) [I48.91] Encephalopathy acute      Precautions: Restrictions/Precautions  Restrictions/Precautions: Fall Risk            ASSESSMENT:  Patient continues to benefit from skilled PT services and is slowly progressing towards goals. Pt received for PT session standing in room at  with OT present, having just

## 2025-03-05 NOTE — PROGRESS NOTES
Hospitalist Progress Note               Daily Progress Note: 3/5/2025      Hospital Day: 10     Chief complaint:   Chief Complaint   Patient presents with    Altered Mental Status    Hyperglycemia     Pt presents to ED via EMS from home and is moved off the stretcher. EMS reports pt reports she started feeling fuzzy yesterday and couldn't get her thoughts together; friend noted speech problem with jumbling her words and possibly slurred. LKW >24hrs. EMS noted afib RVR on EKG and .         Subjective:   Hospital course to date:  79-year-old lady with history of paroxysmal atrial fibrillation, essential hypertension, diabetes, hypothyroidism brought in by EMS with reports of alteration in mentation.  She was reportedly talking on the phone with her friend when friend noticed that she was confused and not speaking right.  Presented as a stroke alert.  CT scan of the brain without contrast obtained February 24 unremarkable.  A follow-up MRI of the brain without contrast negative for acute CVA.  She was admitted to the neuro telemetry floor for close monitoring.  Also noted with atrial fibrillation with rapid ventricular response upon arrival.  Received IV Cardizem with improvement in heart rate.  She was started on Xarelto for stroke prevention.  In the morning of February 25th, family noted some speech disturbance and right-sided weakness which self resolved.  During my routine rounds at about 9:45 AM, patient was awake alert oriented x 4.  No focal deficits.  Given earlier concerns of neurological change, CT perfusion study was ordered stat.     --------  She is sitting up in chair this morning.  She is currently alert awake oriented x 3.  No new complaints last night.  No seizures.  Waiting on placement          Medications reviewed  Current Facility-Administered Medications   Medication Dose Route Frequency    insulin glargine (LANTUS) injection vial 18 Units  18 Units SubCUTAneous Nightly    hydrALAZINE  contrast to exclude the possibility   of an inflammatory process.   2. No acute infarct.         Electronically signed by Raymond Kim      XR CHEST PORTABLE   Final Result   No acute cardiopulmonary abnormalities.         Electronically signed by DAYDAY Hinkle      CT HEAD WO CONTRAST   Final Result      No evidence for acute intracranial abnormality               Electronically signed by Joann Dumont             Discussion/MDM:     Labs: CBC, BMP  Toxic drug monitoring: Monitor blood sugar due to risk of hypoglycemia while on insulin  Discussion: Discussed with pharmacy              Assessment:    Acute metabolic encephalopathy  Seizures  --Presume to be seizure related  --continue Keppra 1500 mg BID  -- MRI brain with and without contrast unremarkable  -- Will continue with Xarelto 20 mg daily for stroke prevention  -- PT OT recommended HH  -- Appreciate teleneurology/neurologist input  --No driving  -Neurology cleared the patient for discharge on Keppra for outpatient follow-up    Atrial fibrillation with rapid ventricular response  Secondary hypercoagulable state due to A-fib  --Continue rate control with Cardizem  -- Xarelto for stroke prevention  -- High risk of thromboembolic CVA without anticoagulation    Metabolic encephalopathy  -- No signs of acute infection  -- Chest x-ray urine analysis unremarkable  -- Monitor closely with frequent neuro checks  -She is close to her baseline now    Type 2 diabetes  -- Uncontrolled A1C 13.1  --Begin Lantus 18 units nightly and pre meal insulin 5 units TID  -- Blood sugar ACHS      Hypothyroidism  -- TSH of 3.98  -- Resume levothyroxine home dose    Hypertension  -Continue PTA hydralazine      Code status: Full code    Social determinants of health: none      Estimated discharge date//time frame/disposition:03/05    Barriers to discharge:DME delivery/diabetes management consult          George Ye MD

## 2025-03-05 NOTE — CARE COORDINATION
Transition of Care Plan:     RUR: 11% (low RUR)   Prior Level of Functioning: Independent  Disposition: Plan A: SNF - [facility], auth pending   Plan B: Home with  - [facility] HH PT/OT/SN; SOC 24-48 hours  LENNOX: 03/06  If SNF or IPR: Date FOC offered: 03/03  Date FOC received: 3/03  Accepting facility: [facility]  Date authorization started with reference number: Requested facility start auth 03/05  Date authorization received and expires: Pending  Follow up appointments: PCP/Specialists as indicated  DME needed: None att  Transportation at discharge: Family  IM/IMM Medicare/ letter given: Last given 02/28  Is patient a  and connected with [facility]? N/a              If yes, was [place] transfer form completed and [facility] notified? N/a  Caregiver Contact: Olegario Gordon - Z[place] - [phone]  Discharge Caregiver contacted prior to discharge? Pt to contact  Care Conference needed? Not att  Barriers to discharge:  Placement + auth    0840 - Chart reviewed. SNF referrals remain pending. See below. Patient will need auth. Avoidable days captured.     Of note, [facility] reporting they will need pt to be 72 hours free of psychotropic meds. Sent MAR to facility so they can review. CM will relay to medical team.     1221 - Updated family.     1406 - Called [facility] asking for review.     1504 - [facility] accepted, updated family. They are in agreement with placement. CM requested facility start auth.     1) [facility] - Reviewing, agreed to start auth but no beds till next week.    2) [facility] - Declined, no bed available  3) [facility] - Declined, no bed available   4) [facility] - Declined, out of network  5) [facility] - ACCEPTED, auth requested        Adri Campos MSW  Care Management  [facility]  [phone]

## 2025-03-05 NOTE — PROGRESS NOTES
End of Shift Note    Bedside shift change report given to Aarti (oncoming nurse) by Kathya Sabillon RN .        Shift worked: 7a-7p   Shift summary and any significant changes:    No significant changes.       Concerns for physician to address:    Zone phone for oncoming shift:  [phone]     Patient Information  Sarah Gordon  79 y.o.  2/24/2025 10:00 AM by Bria Hartley MD. Sarah Gordon was admitted from [facility]    Problem List  Patient Active Problem List    Diagnosis Date Noted    Disorientation 03/03/2025    Cerebral vascular disease 03/01/2025    Acute alteration in mental status 02/25/2025    Seizures ([facility]) 02/25/2025    Encephalopathy acute 02/24/2025    Hip fracture ([facility]) 04/27/2021    NSTEMI, initial episode of care ([facility]) 02/28/2018    DM (diabetes mellitus) ([facility]) 02/27/2018    GERD (gastroesophageal reflux disease) 02/27/2018    Asthma 02/27/2018    HTN (hypertension) 02/27/2018    NSTEMI (non-ST elevated myocardial infarction) ([facility]) 02/27/2018     Past Medical History:   Diagnosis Date    A-fib ([facility])     Asthma     Diabetes ([facility])     GERD (gastroesophageal reflux disease)     Hypertension     Other ill-defined conditions(799.89)     hypothyroid       Activity:Level of Assistance: Moderate assist, patient does 50-74%  Number times ambulated in hallways past shift: 1  Number of times OOB to chair past shift: 2    Cardiac:   Cardiac Monitoring: yes    Access:   Current line(s): PIV    Respiratory:   O2 Device: None (Room air)    GI:  Last BM (including prior to admit): 03/03/25  Current diet:  ADULT DIET; Regular; 4 carb choices (60 gm/meal)  ADULT ORAL NUTRITION SUPPLEMENT; Lunch, Dinner; Diabetic Oral Supplement  Tolerating current diet: Yes    Pain Management:   Patient states pain is manageable on current regimen: yes    Skin:  Jordi Scale Score: 18  Interventions: turn q2  Pressure injury: no    Patient Safety:  Fall Score: Herring Total Score: 85  Interventions: stay with me program  Self-release roll

## 2025-03-05 NOTE — PLAN OF CARE
Problem: Occupational Therapy - Adult  Goal: By Discharge: Performs self-care activities at highest level of function for planned discharge setting.  See evaluation for individualized goals.  Description: FUNCTIONAL STATUS PRIOR TO ADMISSION:  pt states independence w/ ADLs and mod I for mobility w/ use of RW or Rollator   Receives Help From: Family, Prior Level of Assist for ADLs: Independent,  ,  ,  ,  ,  , Prior Level of Assist for Homemaking: Independent,  , Prior Level of Assist for Transfers: Independent, Active : Yes     HOME SUPPORT: Patient lived alone, has friends who check on her and son who will stay with her at time of dc.    Occupational Therapy Goals:  Initiated 2/25/2025  1.  Patient will perform grooming in stance w/ RW in front with Modified [facility] within 7 day(s).  2.  Patient will perform upper body dressing with [facility] within 7 day(s).  3.  Patient will perform lower body dressing with [facility] within 7 day(s).  4.  Patient will perform toilet transfers with Modified [facility]  within 7 day(s).  5.  Patient will perform all aspects of toileting with [facility] within 7 day(s).  6.  Patient will participate in upper extremity therapeutic exercise/activities with Supervision for 5 minutes within 7 day(s).    7.  Patient will utilize energy conservation techniques during functional activities with verbal cues within 7 day(s).   Outcome: Progressing    OCCUPATIONAL THERAPY TREATMENT  Patient: Sarah Gordon (79 y.o. female)  Date: 3/5/2025  Primary Diagnosis: Encephalopathy acute [G93.40]  Disorientation [R41.0]  Acute kidney injury [N17.9]  Atrial fibrillation, unspecified type (HCC) [I48.91]       Precautions: Fall Risk                Chart, occupational therapy assessment, plan of care, and goals were reviewed.    ASSESSMENT  Patient continues to benefit from skilled OT services and is slowly progressing towards goals.  Pt with continued confusion; however, progressive

## 2025-03-06 LAB
GLUCOSE BLD STRIP.AUTO-MCNC: 138 MG/DL (ref 65–117)
GLUCOSE BLD STRIP.AUTO-MCNC: 149 MG/DL (ref 65–117)
GLUCOSE BLD STRIP.AUTO-MCNC: 162 MG/DL (ref 65–117)
GLUCOSE BLD STRIP.AUTO-MCNC: 174 MG/DL (ref 65–117)
SERVICE CMNT-IMP: ABNORMAL

## 2025-03-06 PROCEDURE — 2500000003 HC RX 250 WO HCPCS: Performed by: HOSPITALIST

## 2025-03-06 PROCEDURE — 6370000000 HC RX 637 (ALT 250 FOR IP): Performed by: NURSE PRACTITIONER

## 2025-03-06 PROCEDURE — 6370000000 HC RX 637 (ALT 250 FOR IP): Performed by: INTERNAL MEDICINE

## 2025-03-06 PROCEDURE — 97535 SELF CARE MNGMENT TRAINING: CPT

## 2025-03-06 PROCEDURE — 6360000002 HC RX W HCPCS

## 2025-03-06 PROCEDURE — 1100000003 HC PRIVATE W/ TELEMETRY

## 2025-03-06 PROCEDURE — 6370000000 HC RX 637 (ALT 250 FOR IP): Performed by: HOSPITALIST

## 2025-03-06 PROCEDURE — 6370000000 HC RX 637 (ALT 250 FOR IP): Performed by: STUDENT IN AN ORGANIZED HEALTH CARE EDUCATION/TRAINING PROGRAM

## 2025-03-06 PROCEDURE — 82962 GLUCOSE BLOOD TEST: CPT

## 2025-03-06 PROCEDURE — 97530 THERAPEUTIC ACTIVITIES: CPT

## 2025-03-06 RX ORDER — INSULIN GLARGINE 100 [IU]/ML
20 INJECTION, SOLUTION SUBCUTANEOUS NIGHTLY
Status: DISCONTINUED | OUTPATIENT
Start: 2025-03-06 | End: 2025-03-07 | Stop reason: HOSPADM

## 2025-03-06 RX ADMIN — INSULIN LISPRO 5 UNITS: 100 INJECTION, SOLUTION INTRAVENOUS; SUBCUTANEOUS at 17:38

## 2025-03-06 RX ADMIN — HYDRALAZINE HYDROCHLORIDE 100 MG: 50 TABLET ORAL at 06:17

## 2025-03-06 RX ADMIN — ACETAMINOPHEN 650 MG: 325 TABLET ORAL at 09:45

## 2025-03-06 RX ADMIN — HYDRALAZINE HYDROCHLORIDE 100 MG: 50 TABLET ORAL at 21:38

## 2025-03-06 RX ADMIN — SODIUM CHLORIDE, PRESERVATIVE FREE 10 ML: 5 INJECTION INTRAVENOUS at 21:37

## 2025-03-06 RX ADMIN — RIVAROXABAN 20 MG: 20 TABLET, FILM COATED ORAL at 17:37

## 2025-03-06 RX ADMIN — BUTALBITAL, ACETAMINOPHEN, AND CAFFEINE 1 TABLET: 325; 50; 40 TABLET ORAL at 23:14

## 2025-03-06 RX ADMIN — DILTIAZEM HYDROCHLORIDE 240 MG: 240 CAPSULE, COATED, EXTENDED RELEASE ORAL at 09:46

## 2025-03-06 RX ADMIN — METOPROLOL SUCCINATE 100 MG: 50 TABLET, FILM COATED, EXTENDED RELEASE ORAL at 09:46

## 2025-03-06 RX ADMIN — INSULIN LISPRO 5 UNITS: 100 INJECTION, SOLUTION INTRAVENOUS; SUBCUTANEOUS at 11:39

## 2025-03-06 RX ADMIN — INSULIN LISPRO 5 UNITS: 100 INJECTION, SOLUTION INTRAVENOUS; SUBCUTANEOUS at 09:46

## 2025-03-06 RX ADMIN — LEVETIRACETAM 1500 MG: 100 INJECTION INTRAVENOUS at 20:15

## 2025-03-06 RX ADMIN — HYDRALAZINE HYDROCHLORIDE 100 MG: 50 TABLET ORAL at 14:21

## 2025-03-06 RX ADMIN — INSULIN GLARGINE 20 UNITS: 100 INJECTION, SOLUTION SUBCUTANEOUS at 20:16

## 2025-03-06 RX ADMIN — TRAZODONE HYDROCHLORIDE 25 MG: 50 TABLET ORAL at 20:16

## 2025-03-06 RX ADMIN — SODIUM CHLORIDE, PRESERVATIVE FREE 10 ML: 5 INJECTION INTRAVENOUS at 09:46

## 2025-03-06 RX ADMIN — LEVETIRACETAM 1500 MG: 100 INJECTION INTRAVENOUS at 09:49

## 2025-03-06 RX ADMIN — LEVOTHYROXINE SODIUM 88 MCG: 0.09 TABLET ORAL at 06:12

## 2025-03-06 ASSESSMENT — PAIN DESCRIPTION - LOCATION
LOCATION: HEAD
LOCATION: GENERALIZED

## 2025-03-06 ASSESSMENT — PAIN DESCRIPTION - ORIENTATION: ORIENTATION: ANTERIOR

## 2025-03-06 ASSESSMENT — PAIN DESCRIPTION - DESCRIPTORS
DESCRIPTORS: ACHING;DISCOMFORT
DESCRIPTORS: ACHING

## 2025-03-06 ASSESSMENT — PAIN SCALES - GENERAL
PAINLEVEL_OUTOF10: 9
PAINLEVEL_OUTOF10: 0
PAINLEVEL_OUTOF10: 6

## 2025-03-06 NOTE — PLAN OF CARE
Problem: Occupational Therapy - Adult  Goal: By Discharge: Performs self-care activities at highest level of function for planned discharge setting.  See evaluation for individualized goals.  Description: FUNCTIONAL STATUS PRIOR TO ADMISSION:  pt states independence w/ ADLs and mod I for mobility w/ use of RW or Rollator   Receives Help From: Family, Prior Level of Assist for ADLs: Independent,  ,  ,  ,  ,  , Prior Level of Assist for Homemaking: Independent,  , Prior Level of Assist for Transfers: Independent, Active : Yes     HOME SUPPORT: Patient lived alone, has friends who check on her and son who will stay with her at time of dc.    Occupational Therapy Goals:  Initiated 2/25/2025. Weekly re-assessment performed on 3/3/25, continue goals as stated  1.  Patient will perform grooming in stance w/ RW in front with Modified [place] within 7 day(s).  2.  Patient will perform upper body dressing with [place] within 7 day(s).  3.  Patient will perform lower body dressing with [place] within 7 day(s).  4.  Patient will perform toilet transfers with Modified [place]  within 7 day(s).  5.  Patient will perform all aspects of toileting with [place] within 7 day(s).  6.  Patient will participate in upper extremity therapeutic exercise/activities with Supervision for 5 minutes within 7 day(s).    7.  Patient will utilize energy conservation techniques during functional activities with verbal cues within 7 day(s).   Outcome: Progressing    OCCUPATIONAL THERAPY TREATMENT  Patient: Sarah Gordon (79 y.o. female)  Date: 3/6/2025  Primary Diagnosis: Encephalopathy acute [G93.40]  Disorientation [R41.0]  Acute kidney injury [N17.9]  Atrial fibrillation, unspecified type (HCC) [I48.91]       Precautions: Fall Risk                Chart, occupational therapy assessment, plan of care, and goals were reviewed.    ASSESSMENT  Patient continues to benefit from skilled OT services and is progressing  assistance  Scooting: Minimal assistance     Transfers:   Transfer Training  Transfer Training: Yes  Sit to Stand: Stand by assistance  Stand to Sit: Stand by assistance  Bed to Chair: Contact guard assistance (w/ use of RW)           Balance:     Balance  Sitting: Impaired  Sitting - Static: Good (unsupported)  Sitting - Dynamic: Fair (occasional)  Standing: Impaired  Standing - Static: Good;Constant support  Standing - Dynamic: Fair;Constant support      ADL Intervention:       Grooming: Contact Guard assistance   Grooming Skilled Clinical Factors: Max verbal cues for DME positioning at sink; completed face washing sitting in chair       LE Dressing: Moderate assistance  LE Dressing Skilled Clinical Factors: [facility] brief    Functional Mobility: Contact guard assistance    Pt educated on safe transfer techniques, with specific emphasis on proper hand placement to push up from seated surface rather than attempt to pull self up, fully positioning self in-front of desired seated location, feeling chair on back of legs and reaching back with 1-2 UE to slowly lower self to seated position.             Pain Rating:  Pt stated stiffness in LLE   Pain Intervention(s):         Activity Tolerance:   Fair   Please refer to the flowsheet for vital signs taken during this treatment.    After treatment:   Patient left in no apparent distress sitting up in chair, Call bell within reach, Bed/ chair alarm activated, and Caregiver / family present    COMMUNICATION/EDUCATION:   The patient's plan of care was discussed with: physical therapist and registered nurse         Thank you for this referral.  Robert Post OT  Minutes: 25

## 2025-03-06 NOTE — PROGRESS NOTES
End of Shift Note    Bedside shift change report given to  Kathya  (oncoming nurse) by Aarti Davis RN .        Shift worked:  [id number]   Shift summary and any significant changes:     No significant changes during shift. PT ambulated to bathroom x2 with walker and RN over night       Concerns for physician to address:  N/a   [facility] phone for oncoming shift:   [phone]     Patient Information  Sarah Gordon  79 y.o.  2/24/2025 10:00 AM by Bria Hartley MD. Sarah Gordon was admitted from [facility]    Problem List  Patient Active Problem List    Diagnosis Date Noted    Disorientation 03/03/2025    Cerebral vascular disease 03/01/2025    Acute alteration in mental status 02/25/2025    Seizures ([facility]) 02/25/2025    Encephalopathy acute 02/24/2025    Hip fracture ([facility]) 04/27/2021    NSTEMI, initial episode of care ([facility]) 02/28/2018    DM (diabetes mellitus) ([facility]) 02/27/2018    GERD (gastroesophageal reflux disease) 02/27/2018    Asthma 02/27/2018    HTN (hypertension) 02/27/2018    NSTEMI (non-ST elevated myocardial infarction) ([facility]) 02/27/2018     Past Medical History:   Diagnosis Date    A-fib ([facility])     Asthma     Diabetes ([facility])     GERD (gastroesophageal reflux disease)     Hypertension     Other ill-defined conditions(799.89)     hypothyroid       Core Measures:  CVA: no  CHF: no  PNA: no    Activity:Level of Assistance: Minimal assist, patient does 75% or more  Number times ambulated in hallways past shift: 1  Number of times OOB to chair past shift: 0    Cardiac:   Cardiac Monitoring: no    Access:   Current line(s): PIV    Respiratory:   O2 Device: None (Room air)    GI:  Last BM (including prior to admit): 03/03/25  Current diet:  ADULT DIET; Regular; 4 carb choices (60 gm/meal)  ADULT ORAL NUTRITION SUPPLEMENT; Lunch, Dinner; Diabetic Oral Supplement  Tolerating current diet: Yes    Pain Management:   Patient states pain is manageable on current regimen: N/A    Skin:  Jordi Scale Score: 18  Interventions:

## 2025-03-06 NOTE — CARE COORDINATION
Transition of Care Plan:     RUR: 11% (low RUR)   Prior Level of Functioning: Independent  Disposition: Plan A: SNF - [facility], auth pending   Plan B: Home with  - [facility] Care HH PT/OT/SN; SOC 24-48 hours  LENNOX: 03/06-7  If SNF or IPR: Date FOC offered: 03/03  Date FOC received: 3/03  Accepting facility: [facility]  Date authorization started with reference number: Requested facility start auth 03/05  Date authorization received and expires: Pending  Follow up appointments: PCP/Specialists as indicated  DME needed: None att  Transportation at discharge: Family  IM/IMM Medicare/ letter given: Last given 02/28  Is patient a [place] and connected with [facility]? N/a              If yes, was  transfer form completed and [facility] notified? N/a  Caregiver Contact: EvanOlegario - [facility] - [phone]  Discharge Caregiver contacted prior to discharge? Pt to contact  Care Conference needed? Not att  Barriers to discharge: Auth pending for SNF    0821 - Chart reviewed. Current plan for SNF at [facility]. Auth pending att. CM requested updates. Ruth at d/c.     Avoidable days captured.     1049 - Per SNF, auth still pending.     KATE Dawn  Care Management  [facility]  [phone]

## 2025-03-06 NOTE — PROGRESS NOTES
End of Shift Note    Bedside shift change report given to Elizabeth (oncoming nurse) by Kathya Sabillon RN .        Shift worked: 7a-7p   Shift summary and any significant changes:    No significant changes.       Concerns for physician to address:    [employer] phone for oncoming shift:  [phone]     Patient Information  Sarah Gordon  79 y.o.  2/24/2025 10:00 AM by Bria Hartley MD. Sarah Gordon was admitted from [facility]    Problem List  Patient Active Problem List    Diagnosis Date Noted    Disorientation 03/03/2025    Cerebral vascular disease 03/01/2025    Acute alteration in mental status 02/25/2025    Seizures ([facility]) 02/25/2025    Encephalopathy acute 02/24/2025    Hip fracture ([facility]) 04/27/2021    NSTEMI, initial episode of care ([facility]) 02/28/2018    DM (diabetes mellitus) ([facility]) 02/27/2018    GERD (gastroesophageal reflux disease) 02/27/2018    Asthma 02/27/2018    HTN (hypertension) 02/27/2018    NSTEMI (non-ST elevated myocardial infarction) ([facility]) 02/27/2018     Past Medical History:   Diagnosis Date    A-fib ([facility])     Asthma     Diabetes ([facility])     GERD (gastroesophageal reflux disease)     Hypertension     Other ill-defined conditions(799.89)     hypothyroid       Activity:Level of Assistance: Minimal assist, patient does 75% or more  Number times ambulated in hallways past shift: 1  Number of times OOB to chair past shift: 2    Cardiac:   Cardiac Monitoring: no    Access:   Current line(s): PIV    Respiratory:   O2 Device: None (Room air)    GI:  Last BM (including prior to admit): 03/05/25  Current diet:  ADULT DIET; Regular; 4 carb choices (60 gm/meal)  ADULT ORAL NUTRITION SUPPLEMENT; Lunch, Dinner; Diabetic Oral Supplement  Tolerating current diet: Yes    Pain Management:   Patient states pain is manageable on current regimen: yes    Skin:  Jordi Scale Score: 18  Interventions: turn q2  Pressure injury: no    Patient Safety:  Fall Score: [place] Total Score: 85  Interventions: stay with me program  Self-release roll  belt: No  Dexterity to release roll belt: N/A   (must document dexterity  here by stating Yes or No here, otherwise this is a restraint and must follow restraint documentation policy.)    DVT prophylaxis:  DVT prophylaxis: meds    Active Consults:  IP CONSULT TO PHARMACY  IP CONSULT TO CASE MANAGEMENT  IP CONSULT TO DIABETES MANAGEMENT    Length of Stay:  Expected LOS: 11  Actual LOS: 10    Kathya Sabillon RN

## 2025-03-06 NOTE — PROGRESS NOTES
Hospitalist Progress Note               Daily Progress Note: 3/6/2025      Estimated discharge date//time frame/disposition:3/6?-7 , SNF/[facility]    Barriers to discharge:Auth pending for SNF-       Hospital Day: 11     Chief complaint:   Chief Complaint   Patient presents with    Altered Mental Status    Hyperglycemia     Pt presents to ED via EMS from home and is moved off the stretcher. EMS reports pt reports she started feeling fuzzy yesterday and couldn't get her thoughts together; friend noted speech problem with jumbling her words and possibly slurred. LKW >24hrs. EMS noted afib RVR on EKG and .         Subjective:   Hospital course to date:  79-year-old lady with history of paroxysmal atrial fibrillation, essential hypertension, diabetes, hypothyroidism brought in by EMS with reports of alteration in mentation.  She was reportedly talking on the phone with her friend when friend noticed that she was confused and not speaking right.  Presented as a stroke alert.  CT scan of the brain without contrast obtained February 24 unremarkable.  A follow-up MRI of the brain without contrast negative for acute CVA.  She was admitted to the neuro telemetry floor for close monitoring.  Also noted with atrial fibrillation with rapid ventricular response upon arrival.  Received IV Cardizem with improvement in heart rate.  She was started on Xarelto for stroke prevention.  In the morning of February 25th, family noted some speech disturbance and right-sided weakness which self resolved.  During my routine rounds at about 9:45 AM, patient was awake alert oriented x 4.  No focal deficits.  Given earlier concerns of neurological change, CT perfusion study was ordered stat.     --------  She is sitting up in chair this morning.  She is currently alert awake oriented x 3.  No new complaints last night.  No seizures.  Waiting on placement- auth pending for [facility]          Medications reviewed  Current

## 2025-03-06 NOTE — PLAN OF CARE
Problem: Chronic Conditions and Co-morbidities  Goal: Patient's chronic conditions and co-morbidity symptoms are monitored and maintained or improved  Outcome: Progressing     Problem: Discharge Planning  Goal: Discharge to home or other facility with appropriate resources  Outcome: Progressing     Problem: Pain  Goal: Verbalizes/displays adequate comfort level or baseline comfort level  Outcome: Progressing     Problem: Neurosensory - Adult  Goal: Achieves stable or improved neurological status  Outcome: Progressing  Goal: Achieves maximal functionality and self care  Outcome: Progressing     Problem: ABCDS Injury Assessment  Goal: Absence of physical injury  Outcome: Progressing     Problem: Confusion  Goal: Confusion, delirium, dementia, or psychosis is improved or at baseline  Description: INTERVENTIONS:  1. Assess for possible contributors to thought disturbance, including medications, impaired vision or hearing, underlying metabolic abnormalities, dehydration, psychiatric diagnoses, and notify attending LIP  2. [place] high risk fall precautions, as indicated  3. Provide frequent short contacts to provide reality reorientation, refocusing and direction  4. Decrease environmental stimuli, including noise as appropriate  5. Monitor and intervene to maintain adequate nutrition, hydration, elimination, sleep and activity  6. If unable to ensure safety without constant attention obtain sitter and review sitter guidelines with assigned personnel  7. Initiate Psychosocial CNS and Spiritual Care consult, as indicated  Outcome: Progressing     Problem: Safety - Adult  Goal: Free from fall injury  Outcome: Progressing     Problem: Occupational Therapy - Adult  Goal: By Discharge: Performs self-care activities at highest level of function for planned discharge setting.  See evaluation for individualized goals.  Description: FUNCTIONAL STATUS PRIOR TO ADMISSION:  pt states independence w/ ADLs and mod I for mobility  w/ use of RW or Rollator   Receives Help From: Family, Prior Level of Assist for ADLs: Independent,  ,  ,  ,  ,  , Prior Level of Assist for Homemaking: Independent,  , Prior Level of Assist for Transfers: Independent, Active : Yes     HOME SUPPORT: Patient lived alone, has friends who check on her and son who will stay with her at time of dc.    Occupational Therapy Goals:  Initiated 2/25/2025  1.  Patient will perform grooming in stance w/ RW in front with Modified [place] within 7 day(s).  2.  Patient will perform upper body dressing with [place] within 7 day(s).  3.  Patient will perform lower body dressing with [place] within 7 day(s).  4.  Patient will perform toilet transfers with Modified [place]  within 7 day(s).  5.  Patient will perform all aspects of toileting with [place] within 7 day(s).  6.  Patient will participate in upper extremity therapeutic exercise/activities with Supervision for 5 minutes within 7 day(s).    7.  Patient will utilize energy conservation techniques during functional activities with verbal cues within 7 day(s).   3/5/2025 1449 by Kerley, Matthew, OT  Outcome: Progressing     Problem: Physical Therapy - Adult  Goal: By Discharge: Performs mobility at highest level of function for planned discharge setting.  See evaluation for individualized goals.  Description: FUNCTIONAL STATUS PRIOR TO ADMISSION: Patient was modified independent using a rolling walker for functional mobility.    HOME SUPPORT PRIOR TO ADMISSION: The patient lived alone with local friends and family that can travel to provide assistance.    Physical Therapy Goals  Initiated 2/25/2025.     Weekly re-assessment performed 3/3/2025, goals updated below 2/2 functional decline:  1. Pt will perform bed mobility consistently with Supervision and rail within 7 day(S).  2.Pt will perform transfers with LRAD and Supervision within 7 day(S).  3. Pt will walk >50ft with LRAD and CGA within 7

## 2025-03-07 VITALS
HEIGHT: 70 IN | OXYGEN SATURATION: 92 % | HEART RATE: 77 BPM | RESPIRATION RATE: 16 BRPM | SYSTOLIC BLOOD PRESSURE: 118 MMHG | DIASTOLIC BLOOD PRESSURE: 64 MMHG | TEMPERATURE: 98.2 F | BODY MASS INDEX: 34.36 KG/M2 | WEIGHT: 240 LBS

## 2025-03-07 LAB
GLUCOSE BLD STRIP.AUTO-MCNC: 104 MG/DL (ref 65–117)
GLUCOSE BLD STRIP.AUTO-MCNC: 159 MG/DL (ref 65–117)
SERVICE CMNT-IMP: ABNORMAL
SERVICE CMNT-IMP: NORMAL

## 2025-03-07 PROCEDURE — 6360000002 HC RX W HCPCS

## 2025-03-07 PROCEDURE — 2500000003 HC RX 250 WO HCPCS: Performed by: HOSPITALIST

## 2025-03-07 PROCEDURE — 82962 GLUCOSE BLOOD TEST: CPT

## 2025-03-07 PROCEDURE — 6370000000 HC RX 637 (ALT 250 FOR IP): Performed by: STUDENT IN AN ORGANIZED HEALTH CARE EDUCATION/TRAINING PROGRAM

## 2025-03-07 PROCEDURE — 2500000003 HC RX 250 WO HCPCS: Performed by: INTERNAL MEDICINE

## 2025-03-07 PROCEDURE — 6370000000 HC RX 637 (ALT 250 FOR IP): Performed by: INTERNAL MEDICINE

## 2025-03-07 PROCEDURE — 6370000000 HC RX 637 (ALT 250 FOR IP): Performed by: HOSPITALIST

## 2025-03-07 RX ORDER — INSULIN GLARGINE 100 [IU]/ML
20 INJECTION, SOLUTION SUBCUTANEOUS DAILY
Qty: 5 ADJUSTABLE DOSE PRE-FILLED PEN SYRINGE | Refills: 3 | Status: SHIPPED
Start: 2025-03-07

## 2025-03-07 RX ADMIN — ACETAMINOPHEN 650 MG: 325 TABLET ORAL at 09:36

## 2025-03-07 RX ADMIN — SODIUM CHLORIDE, PRESERVATIVE FREE 10 ML: 5 INJECTION INTRAVENOUS at 09:40

## 2025-03-07 RX ADMIN — DILTIAZEM HYDROCHLORIDE 240 MG: 240 CAPSULE, COATED, EXTENDED RELEASE ORAL at 09:39

## 2025-03-07 RX ADMIN — GUAIFENESIN 100 MG: 200 SOLUTION ORAL at 09:38

## 2025-03-07 RX ADMIN — LEVOTHYROXINE SODIUM 88 MCG: 0.09 TABLET ORAL at 05:41

## 2025-03-07 RX ADMIN — METOPROLOL SUCCINATE 100 MG: 50 TABLET, FILM COATED, EXTENDED RELEASE ORAL at 09:39

## 2025-03-07 RX ADMIN — LEVETIRACETAM 1500 MG: 100 INJECTION INTRAVENOUS at 09:40

## 2025-03-07 RX ADMIN — INSULIN LISPRO 5 UNITS: 100 INJECTION, SOLUTION INTRAVENOUS; SUBCUTANEOUS at 12:07

## 2025-03-07 RX ADMIN — HYDRALAZINE HYDROCHLORIDE 100 MG: 50 TABLET ORAL at 05:41

## 2025-03-07 ASSESSMENT — PAIN DESCRIPTION - LOCATION: LOCATION: GENERALIZED

## 2025-03-07 ASSESSMENT — PAIN SCALES - GENERAL: PAINLEVEL_OUTOF10: 5

## 2025-03-07 ASSESSMENT — PAIN DESCRIPTION - ORIENTATION: ORIENTATION: ANTERIOR

## 2025-03-07 ASSESSMENT — PAIN DESCRIPTION - DESCRIPTORS: DESCRIPTORS: ACHING

## 2025-03-07 NOTE — PROGRESS NOTES
End of Shift Note    Bedside shift change report given to AMERICA RN (oncoming nurse) by RAMÓN LYNN RN .        Shift worked: NIGHT    Shift summary and any significant changes:    No significant changes noted during my shift.  Po meds administered .         Concerns for physician to address:    [employer] phone for oncoming shift:  [phone]     Patient Information  Sarah Gordon  79 y.o.  2/24/2025 10:00 AM by Bria Hartley MD. Sarah Gordon was admitted from [facility]    Problem List  Patient Active Problem List    Diagnosis Date Noted    Disorientation 03/03/2025    Cerebral vascular disease 03/01/2025    Acute alteration in mental status 02/25/2025    Seizures ([facility]) 02/25/2025    Encephalopathy acute 02/24/2025    Hip fracture ([facility]) 04/27/2021    NSTEMI, initial episode of care ([facility]) 02/28/2018    DM (diabetes mellitus) ([facility]) 02/27/2018    GERD (gastroesophageal reflux disease) 02/27/2018    Asthma 02/27/2018    HTN (hypertension) 02/27/2018    NSTEMI (non-ST elevated myocardial infarction) ([facility]) 02/27/2018     Past Medical History:   Diagnosis Date    A-fib ([facility])     Asthma     Diabetes ([facility])     GERD (gastroesophageal reflux disease)     Hypertension     Other ill-defined conditions(799.89)     hypothyroid       Activity:Level of Assistance: Moderate assist, patient does 50-74%  Number times ambulated in hallways past shift: 1  Number of times OOB to chair past shift: 2    Cardiac:   Cardiac Monitoring: no    Access:   Current line(s): PIV    Respiratory:   O2 Device: None (Room air)    GI:  Last BM (including prior to admit): 03/05/25  Current diet:  ADULT DIET; Regular; 4 carb choices (60 gm/meal)  ADULT ORAL NUTRITION SUPPLEMENT; Lunch, Dinner; Diabetic Oral Supplement  Tolerating current diet: Yes    Pain Management:   Patient states pain is manageable on current regimen: yes    Skin:  Jordi Scale Score: 18  Interventions: turn q2  Pressure injury: no    Patient Safety:  Fall Score: [place] Total Score:  85  Interventions: stay with me program  Self-release roll belt: No  Dexterity to release roll belt: N/A   (must document dexterity  here by stating Yes or No here, otherwise this is a restraint and must follow restraint documentation policy.)    DVT prophylaxis:  DVT prophylaxis: meds    Active Consults:  IP CONSULT TO PHARMACY  IP CONSULT TO CASE MANAGEMENT  IP CONSULT TO DIABETES MANAGEMENT    Length of Stay:  Expected LOS: 11  Actual LOS: 10    RAMÓN LYNN RN

## 2025-03-07 NOTE — PLAN OF CARE
Problem: Chronic Conditions and Co-morbidities  Goal: Patient's chronic conditions and co-morbidity symptoms are monitored and maintained or improved  Outcome: Progressing     Problem: Discharge Planning  Goal: Discharge to home or other facility with appropriate resources  Outcome: Progressing     Problem: Pain  Goal: Verbalizes/displays adequate comfort level or baseline comfort level  Outcome: Progressing     Problem: Neurosensory - Adult  Goal: Achieves stable or improved neurological status  Outcome: Progressing  Goal: Achieves maximal functionality and self care  Outcome: Progressing     Problem: ABCDS Injury Assessment  Goal: Absence of physical injury  Outcome: Progressing     Problem: Confusion  Goal: Confusion, delirium, dementia, or psychosis is improved or at baseline  Description: INTERVENTIONS:  1. Assess for possible contributors to thought disturbance, including medications, impaired vision or hearing, underlying metabolic abnormalities, dehydration, psychiatric diagnoses, and notify attending LIP  2. [place] high risk fall precautions, as indicated  3. Provide frequent short contacts to provide reality reorientation, refocusing and direction  4. Decrease environmental stimuli, including noise as appropriate  5. Monitor and intervene to maintain adequate nutrition, hydration, elimination, sleep and activity  6. If unable to ensure safety without constant attention obtain sitter and review sitter guidelines with assigned personnel  7. Initiate Psychosocial CNS and Spiritual Care consult, as indicated  Outcome: Progressing     Problem: Safety - Adult  Goal: Free from fall injury  Outcome: Progressing     Problem: Skin/Tissue Integrity  Goal: Skin integrity remains intact  Description: 1.  Monitor for areas of redness and/or skin breakdown  2.  Assess vascular access sites hourly  3.  Every 4-6 hours minimum:  Change oxygen saturation probe site  4.  Every 4-6 hours:  If on nasal continuous positive  airway pressure, respiratory therapy assess nares and determine need for appliance change or resting period  Outcome: Progressing

## 2025-03-07 NOTE — DISCHARGE SUMMARY
Physician Discharge Summary     Patient ID:    Sarah Gordon  [id number]  79 y.o.  1945    Admit date: 2/24/2025    Discharge date : 3/7/2025      Final Diagnoses:   Acute metabolic encephalopathy related to seizure disorder  Atrial fibrillation with rapid ventricular response.  Rate controlled  Type 2 diabetes uncontrolled with A1c of 13.1  Hypothyroidism  Generalized weakness secondary to acute illness.    Consultations  Neurologist  Diabetic educator    Reason for Hospitalization:    Alteration in mentation      Hospital Course:     79-year-old lady with history of paroxysmal atrial fibrillation, essential hypertension, diabetes, hypothyroidism brought in by EMS with reports of alteration in mentation. She was reportedly talking on the phone with her friend when friend noticed that she was confused and not speaking right. Presented as a stroke alert. CT scan of the brain without contrast obtained February 24 unremarkable. A follow-up MRI of the brain without contrast negative for acute CVA. She was admitted to the neuro telemetry floor for close monitoring. Also noted with atrial fibrillation with rapid ventricular response upon arrival. Received IV Cardizem with improvement in heart rate. She was started on Xarelto for stroke prevention.  Follow-up MRI with and without contrast of the brain negative for acute pathology.  Patient was seen in consultation by neurologist and had multiple EEGs.  Concerns for some abnormal activity on the left brain.  Dose of Keppra was increased to 1500 mg twice daily.  No further alteration in mentation.  Overall clinically improved but remains weak from acute illness.  Patient will be discharged on insulin given elevated A1c of 13.1.  Diabetic education was requested during this hospital stay.    Discharge Medications:      Medication List        START taking these medications      B-D ULTRAFINE III SHORT PEN 31G X 8 MM Misc  Generic drug: Insulin Pen      acetaminophen 325 MG tablet  Commonly known as: TYLENOL     albuterol sulfate  (90 Base) MCG/ACT inhaler  Commonly known as: PROVENTIL;VENTOLIN;PROAIR     cloNIDine 0.2 MG tablet  Commonly known as: CATAPRES     digoxin 125 MCG tablet  Commonly known as: LANOXIN     esomeprazole 40 MG delayed release capsule  Commonly known as: NEXIUM     insulin glargine 100 UNIT/ML injection vial  Commonly known as: LANTUS  Replaced by: Lantus SoloStar 100 UNIT/ML injection pen     insulin lispro 100 UNIT/ML Soln injection vial  Commonly known as: HUMALOG,ADMELOG     insulin  UNIT/ML injection vial  Commonly known as: HumuLIN N;NovoLIN N     losartan 100 MG tablet  Commonly known as: COZAAR     meloxicam 15 MG tablet  Commonly known as: MOBIC     metoprolol 100 MG tablet  Commonly known as: LOPRESSOR     potassium chloride 20 MEQ extended release tablet  Commonly known as: KLOR-CON M     torsemide 20 MG tablet  Commonly known as: DEMADEX               Where to Get Your Medications        These medications were sent to [facility] [address] [place] [address] [address] [facility] - P [phone] - F [phone] [address] [facility] [address]      Phone: [phone]   B-D ULTRAFINE III SHORT PEN 31G X 8 MM Misc  blood glucose test strips  furosemide 40 MG tablet  glucose monitoring kit  hydrALAZINE 100 MG tablet  Lancets Misc  levETIRAcetam 500 MG tablet  NovoLOG FlexPen 100 UNIT/ML injection pen       Information about where to get these medications is not yet available    Ask your nurse or doctor about these medications  Lantus SoloStar 100 UNIT/ML injection pen           Follow up Care:    Follow-up Information       Follow up With Specialties Details Why Contact Info    Dayna Knox MD Family Medicine Follow up  [address] [facility] [address] [phone]      [facility]  Follow up As needed - [facility] is a mobile urgent care provider that comes to your home. You may call them if

## 2025-03-07 NOTE — DISCHARGE INSTRUCTIONS
HOSPITALIST DISCHARGE INSTRUCTIONS    NAME: Sarah Gordon   :  1945   MRN:  [id number]     Date/Time:  3/7/2025 12:56 PM    ADMIT DATE: 2025     DISCHARGE DATE: 3/7/2025     DISCHARGE DIAGNOSIS:  Acute metabolic encephalopathy related to seizure disorder  Atrial fibrillation with rapid ventricular response.  Rate controlled  Type 2 diabetes uncontrolled with A1c of 13.1  Hypothyroidism  Generalized weakness secondary to acute illness.      MEDICATIONS:  As per medication reconciliation  list  It is important that you take the medication exactly as they are prescribed.   Keep your medication in the bottles provided by the pharmacist and keep a list of the medication names, dosages, and times to be taken in your wallet.   Do not take other medications without consulting your doctor.     Pain Management: per above medications    What to do at Home    Recommended diet:  cardiac diet and diabetic diet    Recommended activity: activity as tolerated    If you have questions regarding the hospital related prescriptions or hospital related issues please call at .    If you experience any of the following symptoms then please call your primary care physician or return to the emergency room if you cannot get hold of your doctor:  Fever, chills, nausea, vomiting, diarrhea, change in mentation, falling, bleeding, shortness of breath,     Follow Up:  PCP  you are to call and set up an appointment to see them in 7-10 days.  Dr Pranav Devlin (neurology) in 1 month for followup      Information obtained by :  I understand that if any problems occur once I am at home I am to contact my physician.    I understand and acknowledge receipt of the instructions indicated above.                                                                                                                                           Physician's or R.N.'s Signature                                                                   Date/Time                                                                                                                                              Patient or Representative Signature                                                          Date/Time

## 2025-03-07 NOTE — CARE COORDINATION
Home with HH, family to transport [phone].   Clear from CM.     Transition of Care Plan:     RUR: 11% (low RUR)   Prior Level of Functioning: Independent  Disposition: Plan A: SNF - [facility], auth pending, P2P offered and denied  Plan B: Home with HH - [facility] PT/OT/SN; SOC 24-48 hours  LENNOX: 03/07  If SNF or IPR: Date FOC offered: 03/03  Date FOC received: 3/03  Accepting facility: [facility]  Date authorization started with reference number: Requested facility start auth 03/05  Date authorization received and expires: Pending; P2P offered   Follow up appointments: PCP/Specialists as indicated  DME needed: None att  Transportation at discharge: Family  IM/IMM Medicare/ letter given: Last given 03/07  Is patient a  and connected with [facility]? N/a              If yes, was [place] transfer form completed and [facility] notified? N/a  Caregiver Contact: Olegario Gordon - [phone]  Discharge Caregiver contacted prior to discharge? CM spoke with daughter in law at bedside   Care Conference needed? Not att  Barriers to discharge: None    0817 - P2P offered, request for additonal information from MD. Due by 1300. CM sent PS message below. Provider acknowledged.     Peer to peer must be completed by 1 pm today 3/7/25 and can be done by calling [phone]. Reference#[id number]. Member ID #[id number].    1039 - P2P denied, recommending HH as patient is close to baseline. Discussed with DIL and patient. Agreeable. Family will be supportive of patient and stay on same level with her while she is adjusting back to home. [facility] previously arranged for HH, CM opened referral back up to them. Info added to AVS.     DIL and son going home now to [facility] home, will be back to transport patient home ~ 1400.     1101 - Of note, DIL reports that patient's partial denture is missing. CM to make RN and HUC aware.      03/07/25 1041   Services At/After Discharge   Transition of Care Consult (CM Consult)

## 2025-03-07 NOTE — PLAN OF CARE
Problem: Chronic Conditions and Co-morbidities  Goal: Patient's chronic conditions and co-morbidity symptoms are monitored and maintained or improved  3/6/2025 2218 by Megan Moore RN  Outcome: Progressing  3/6/2025 0937 by Kathya Sabillon RN  Outcome: Progressing     Problem: Discharge Planning  Goal: Discharge to home or other facility with appropriate resources  3/6/2025 2218 by Megan Moore RN  Outcome: Progressing  3/6/2025 0937 by Kathya Sabillon RN  Outcome: Progressing     Problem: Pain  Goal: Verbalizes/displays adequate comfort level or baseline comfort level  3/6/2025 2218 by Megan Moore RN  Outcome: Progressing  3/6/2025 0937 by Kathya Sabillon RN  Outcome: Progressing     Problem: Neurosensory - Adult  Goal: Achieves stable or improved neurological status  3/6/2025 2218 by Megan Moore RN  Outcome: Progressing  3/6/2025 0937 by Kathya Sabillon RN  Outcome: Progressing  Goal: Achieves maximal functionality and self care  3/6/2025 2218 by Megan Moore RN  Outcome: Progressing  3/6/2025 0937 by Kathya Sabillon RN  Outcome: Progressing     Problem: ABCDS Injury Assessment  Goal: Absence of physical injury  3/6/2025 2218 by Megan Moore RN  Outcome: Progressing  3/6/2025 0937 by Kathya Sabillon RN  Outcome: Progressing     Problem: Confusion  Goal: Confusion, delirium, dementia, or psychosis is improved or at baseline  Description: INTERVENTIONS:  1. Assess for possible contributors to thought disturbance, including medications, impaired vision or hearing, underlying metabolic abnormalities, dehydration, psychiatric diagnoses, and notify attending LIP  2. [place] high risk fall precautions, as indicated  3. Provide frequent short contacts to provide reality reorientation, refocusing and direction  4. Decrease environmental stimuli, including noise as appropriate  5. Monitor and intervene to maintain adequate nutrition, hydration, elimination, sleep and activity  6. If unable to ensure safety  without constant attention obtain sitter and review sitter guidelines with assigned personnel  7. Initiate Psychosocial CNS and Spiritual Care consult, as indicated  3/6/2025 2218 by Megan Moore RN  Outcome: Progressing  3/6/2025 0937 by Kathya Sabillon RN  Outcome: Progressing     Problem: Safety - Adult  Goal: Free from fall injury  3/6/2025 2218 by Megan Moore RN  Outcome: Progressing  3/6/2025 0937 by Kathay Sabillon RN  Outcome: Progressing     Problem: Occupational Therapy - Adult  Goal: By Discharge: Performs self-care activities at highest level of function for planned discharge setting.  See evaluation for individualized goals.  Description: FUNCTIONAL STATUS PRIOR TO ADMISSION:  pt states independence w/ ADLs and mod I for mobility w/ use of RW or Rollator   Receives Help From: Family, Prior Level of Assist for ADLs: Independent,  ,  ,  ,  ,  , Prior Level of Assist for Homemaking: Independent,  , Prior Level of Assist for Transfers: Independent, Active : Yes     HOME SUPPORT: Patient lived alone, has friends who check on her and son who will stay with her at time of dc.    Occupational Therapy Goals:  Initiated 2/25/2025. Weekly re-assessment performed on 3/3/25, continue goals as stated  1.  Patient will perform grooming in stance w/ RW in front with Modified [place] within 7 day(s).  2.  Patient will perform upper body dressing with [place] within 7 day(s).  3.  Patient will perform lower body dressing with [place] within 7 day(s).  4.  Patient will perform toilet transfers with Modified [place]  within 7 day(s).  5.  Patient will perform all aspects of toileting with [place] within 7 day(s).  6.  Patient will participate in upper extremity therapeutic exercise/activities with Supervision for 5 minutes within 7 day(s).    7.  Patient will utilize energy conservation techniques during functional activities with verbal cues within 7 day(s).   3/6/2025 1436 by

## 2025-03-07 NOTE — ADT AUTH CERT
Progress Notes by Adrian Fonseca MD at 3/2/2025 10:51 AM    Author: Adrian Fonseca MD Service: Hospitalist Author Type: Physician   Filed: 3/2/2025 10:54 AM Date of Service: 3/2/2025 10:51 AM Status: Signed   : Adrian Fonseca MD (Physician)   Expand All Collapse All       Hospitalist Progress Note                  Daily Progress Note: 3/2/2025        Hospital Day: 7      Chief complaint:        Chief Complaint   Patient presents with    Altered Mental Status    Hyperglycemia       Pt presents to ED via EMS from home and is moved off the stretcher. EMS reports pt reports she started feeling fuzzy yesterday and couldn't get her thoughts together; friend noted speech problem with jumbling her words and possibly slurred. LKW >24hrs. EMS noted afib RVR on EKG and .          Subjective:   Hospital course to date:  79-year-old lady with history of paroxysmal atrial fibrillation, essential hypertension, diabetes, hypothyroidism brought in by EMS with reports of alteration in mentation.  She was reportedly talking on the phone with her friend when friend noticed that she was confused and not speaking right.  Presented as a stroke alert.  CT scan of the brain without contrast obtained February 24 unremarkable.  A follow-up MRI of the brain without contrast negative for acute CVA.  She was admitted to the neuro telemetry floor for close monitoring.  Also noted with atrial fibrillation with rapid ventricular response upon arrival.  Received IV Cardizem with improvement in heart rate.  She was started on Xarelto for stroke prevention.  In the morning of February 25th, family noted some speech disturbance and right-sided weakness which self resolved.  During my routine rounds at about 9:45 AM, patient was awake alert oriented x 4.  No focal deficits.  Given earlier concerns of neurological change, CT perfusion study was ordered stat.      --------  Patient is seen today for follow-up. More awake and alert. MRI  intracranial abnormality                   Electronically signed by Joann Dumont                Discussion/MDM:      Labs: CBC, BMP  Toxic drug monitoring: Monitor blood sugar due to risk of hypoglycemia while on insulin  Discussion: Discussed with pharmacy                    Assessment:     Acute metabolic encephalopathy  Seizures  --Presume to be seizure related  --continue Keppra 1500 mg BID  -- MRI brain with and without contrast unremarkable  -- Will continue with Xarelto 20 mg daily for stroke prevention  -- PT OT recommended HH  -- Appreciate teleneurology/neurologist input  --No driving  -Neurology cleared the patient for discharge on Keppra for outpatient follow-up     Atrial fibrillation with rapid ventricular response  Secondary hypercoagulable state due to A-fib  --Continue rate control with Cardizem  -- Xarelto for stroke prevention  -- High risk of thromboembolic CVA without anticoagulation     Metabolic encephalopathy  -- No signs of acute infection  -- Chest x-ray urine analysis unremarkable  -- Monitor closely with frequent neuro checks  -She is close to her baseline now     Type 2 diabetes  -- Uncontrolled A1C 13.1  --Increase Lantus to 20 units nightly and pre meal insulin 5 units TID  -- Blood sugar ACHS        Hypothyroidism  -- TSH of 3.98  -- Resume levothyroxine home dose     Hypertension  -Continue PTA hydralazine        Code status: Full code                         Ted Mooney MD          Electronically signed by Ted Mooney MD at 3/6/2025 12:35 PM

## 2025-03-10 ENCOUNTER — HOSPITAL ENCOUNTER (EMERGENCY)
Facility: HOSPITAL | Age: 80
Discharge: HOME | End: 2025-03-10
Payer: MEDICARE

## 2025-03-10 VITALS
SYSTOLIC BLOOD PRESSURE: 123 MMHG | WEIGHT: 200 LBS | RESPIRATION RATE: 18 BRPM | HEIGHT: 70 IN | BODY MASS INDEX: 28.63 KG/M2 | HEART RATE: 89 BPM | DIASTOLIC BLOOD PRESSURE: 74 MMHG | TEMPERATURE: 97.5 F | OXYGEN SATURATION: 97 %

## 2025-03-10 DIAGNOSIS — U07.1 COVID: Primary | ICD-10-CM

## 2025-03-10 LAB
FLUAV RNA SPEC QL NAA+PROBE: NOT DETECTED
FLUBV RNA SPEC QL NAA+PROBE: NOT DETECTED
SARS-COV-2 RNA RESP QL NAA+PROBE: DETECTED
SOURCE: ABNORMAL

## 2025-03-10 PROCEDURE — 87636 SARSCOV2 & INF A&B AMP PRB: CPT

## 2025-03-10 PROCEDURE — 99283 EMERGENCY DEPT VISIT LOW MDM: CPT

## 2025-03-10 RX ORDER — BENZONATATE 200 MG/1
200 CAPSULE ORAL 3 TIMES DAILY PRN
Qty: 30 CAPSULE | Refills: 0 | Status: SHIPPED | OUTPATIENT
Start: 2025-03-10 | End: 2025-03-20

## 2025-03-10 RX ORDER — ALBUTEROL SULFATE 90 UG/1
2 INHALANT RESPIRATORY (INHALATION) EVERY 6 HOURS PRN
Qty: 18 G | Refills: 0 | Status: SHIPPED | OUTPATIENT
Start: 2025-03-10

## 2025-03-10 ASSESSMENT — PAIN - FUNCTIONAL ASSESSMENT: PAIN_FUNCTIONAL_ASSESSMENT: NONE - DENIES PAIN

## 2025-03-11 NOTE — ED PROVIDER NOTES
[facility] EMERGENCY DEPARTMENT  EMERGENCY DEPARTMENT ENCOUNTER       Pt Name: Sarah Gordon  MRN: [id number]  Birthdate 1945  Date of evaluation: 3/10/2025  Provider: KASSY Bacon   PCP: Joellen Mayorga MD  Note Started: 9:27 PM EDT 3/10/25     CHIEF COMPLAINT       Chief Complaint   Patient presents with    Cold Symptoms     Patient brought in by EMS from home for flu-like symptoms. She reports cough, congestion, chills, and body aches. She was just discharged from here a few days ago after having a 14 day stay per family.      HISTORY OF PRESENT ILLNESS: 1 or more elements      History From: Patient, Patient's Son, and Daughter-in-law  HPI Limitations: None     Sarah Gordon is a 79 y.o. female who presents by EMS for evaluation of URI complaints. She was admitted to the hospital on on 2/24/25 and just discharged on 3/7/25. She was feeling well at discharge but then started feeling bad the following day. Reports generalized malaise, chills without fever, cough, and congestion. No treatment at home prior to arrival.      Nursing Notes were all reviewed and agreed with or any disagreements were addressed in the HPI.     REVIEW OF SYSTEMS      Review of Systems     Positives and Pertinent negatives as per HPI.    PAST HISTORY     Past Medical History:  Past Medical History:   Diagnosis Date    A-fib (HCC)     Asthma     Diabetes (HCC)     GERD (gastroesophageal reflux disease)     Hypertension     Other ill-defined conditions(799.89)     hypothyroid       Past Surgical History:  Past Surgical History:   Procedure Laterality Date    GYN      hysterectomy    ORTHOPEDIC SURGERY       LEFT KNEE ARTHROSCOPY    OTHER SURGICAL HISTORY      lipoma removal       Family History:  Family History   Problem Relation Age of Onset    No Known Problems Mother     No Known Problems Father        Social History:  Social History     Tobacco Use    Smoking status: Never    Smokeless tobacco: Never   Substance Use

## 2025-03-11 NOTE — DISCHARGE INSTRUCTIONS
Thank You!    It was a pleasure taking care of you in our Emergency Department today. We know that when you come to our Emergency Department, you are entrusting us with your health, comfort, and safety. Our physicians and nurses honor that trust, and truly appreciate the opportunity to care for you and your loved ones.      We also value your feedback. If you receive a survey about your Emergency Department experience today, please fill it out.  We care about our patients' feedback, and we listen to what you have to say.  Thank you.    KASSY Bacon  ________________________________________________________________________  I have included a copy of your lab results and/or radiologic studies from today's visit so you can have them easily available at your follow-up visit. We hope you feel better and please do not hesitate to contact the ED if you have any questions at all!    Recent Results (from the past 12 hours)   COVID-19 & Influenza Combo    Collection Time: 03/10/25  8:18 PM    Specimen: Nasopharyngeal   Result Value Ref Range    Source Nasopharyngeal      SARS-CoV-2, PCR Detected (A) NOTD      Rapid Influenza A By PCR Not detected NOTD      Rapid Influenza B By PCR Not detected NOTD       The exam and treatment you received in the Emergency Department were for an urgent problem and are not intended as complete care. It is important that you follow up with a doctor, nurse practitioner, or physician assistant for ongoing care. If your symptoms become worse or you do not improve as expected and you are unable to reach your usual health care provider, you should return to the Emergency Department. We are available 24 hours a day.    Please take your discharge instructions with you when you go to your follow-up appointment.     If a prescription has been provided, please have it filled as soon as possible to prevent a delay in treatment. Read the entire medication instruction sheet provided to you by the

## 2025-03-28 ENCOUNTER — TELEPHONE (OUTPATIENT)
Age: 80
End: 2025-03-28

## 2025-03-28 NOTE — TELEPHONE ENCOUNTER
Patient's daughter-in-law, Devorah, called stating that patient's Cardiologist who advised that her Keppra levels are 4 times what they should be in her blood stream. He instructed them to have patient stop taking it for two days and then cut her dosage in half ( 1 and 1/2 tablet in AM and 1 1/2 tablet in PM) after the two days. Devorah is concerned and would like to speak with neurology regarding this. Patient is scheduled for a hospital follow up on 3/31. Please advise. [phone]    (Also advised Devorah we may need patient's verbal consent for us to release information to us since patient has not yet been seen in office. Devorah verbalized understanding and advised that her  is POA of patient.)      Prescription in chart:    levETIRAcetam (KEPPRA) 500 MG tablet   Sig: Take 3 tablets by mouth 2 times daily           Dose: 1,500 mg

## 2025-03-30 NOTE — PROGRESS NOTES
[facility] NEUROLOGY CLINIC  In Office FOLLOW-UP VISIT         Sarah Gordon is a 79 y.o. female who presents today for the following:  Chief Complaint   Patient presents with    Follow-Up from Hospital    Extremity Weakness    Fatigue         ASSESSMENT AND PLAN  Patient is known to this practice.  Chart and history reviewed in detail at today's office visit.    1. Seizure (HCC)  Assessment & Plan:  Patient denied any seizure-like activity.    Her seizures have been stable on current regimen.    It was reported that her labs were recently checked by her cardiologist and was found to have a Keppra level significantly elevated at 195, which may due to her current dosage of 1500 mg twice daily.    The dosage of Keppra will be reduced to 1000 mg twice a day after 2-day break.  Keppra level will be monitored daily until this stabilized.  Labs will be conducted today and tomorrow to monitor her response to the medication adjustment.    Patient was advised to maintain adequate hydration.  If she tolerates to reduce dosage well, it will be maintained.  However, if she experiences adverse effects or if the levels remain high, further adjustment may be necessary, including the addition of another medication.    For seizure safety:  - No driving, height/ladders, tub baths, pools, bodies of water, power tools until seizure free x 6 months  - If you have another seizure and the shaking last under 5 minutes, you are not injured, and you return to yourself quickly, no need to call EMS, just call my office at the number above.    - If you have  A seizure and the shaking lasts longer than 5 minutes, you are hurt or you have multiple seizures back to back without returning to yourself then call EMS.    The [facility]'s Seizure Policy states that a person must be seizure-free or blackout-free for at least six months before driving.     If a person is currently licensed and [facility] is notified that the person has

## 2025-03-31 ENCOUNTER — OFFICE VISIT (OUTPATIENT)
Age: 80
End: 2025-03-31
Payer: MEDICARE

## 2025-03-31 VITALS
BODY MASS INDEX: 28.52 KG/M2 | DIASTOLIC BLOOD PRESSURE: 74 MMHG | OXYGEN SATURATION: 97 % | WEIGHT: 198.8 LBS | SYSTOLIC BLOOD PRESSURE: 132 MMHG | RESPIRATION RATE: 16 BRPM | HEART RATE: 89 BPM | TEMPERATURE: 97.3 F

## 2025-03-31 DIAGNOSIS — Z51.81 THERAPEUTIC DRUG MONITORING: ICD-10-CM

## 2025-03-31 DIAGNOSIS — E05.90 SUBCLINICAL HYPERTHYROIDISM: ICD-10-CM

## 2025-03-31 DIAGNOSIS — R41.89 COGNITIVE IMPAIRMENT: ICD-10-CM

## 2025-03-31 DIAGNOSIS — R56.9 SEIZURE (HCC): Primary | ICD-10-CM

## 2025-03-31 DIAGNOSIS — Z86.73 HISTORY OF STROKE: ICD-10-CM

## 2025-03-31 DIAGNOSIS — Z09 HOSPITAL DISCHARGE FOLLOW-UP: ICD-10-CM

## 2025-03-31 PROBLEM — R41.3 MEMORY DIFFICULTY: Status: ACTIVE | Noted: 2025-03-31

## 2025-03-31 PROCEDURE — 1159F MED LIST DOCD IN RCRD: CPT

## 2025-03-31 PROCEDURE — 3078F DIAST BP <80 MM HG: CPT

## 2025-03-31 PROCEDURE — 1160F RVW MEDS BY RX/DR IN RCRD: CPT

## 2025-03-31 PROCEDURE — 3075F SYST BP GE 130 - 139MM HG: CPT

## 2025-03-31 PROCEDURE — 99215 OFFICE O/P EST HI 40 MIN: CPT

## 2025-03-31 PROCEDURE — 1126F AMNT PAIN NOTED NONE PRSNT: CPT

## 2025-03-31 PROCEDURE — 1123F ACP DISCUSS/DSCN MKR DOCD: CPT

## 2025-03-31 RX ORDER — HYDROCHLOROTHIAZIDE 12.5 MG/1
CAPSULE ORAL
COMMUNITY
Start: 2025-03-15

## 2025-03-31 ASSESSMENT — ENCOUNTER SYMPTOMS
RESPIRATORY NEGATIVE: 1
EYES NEGATIVE: 1
ALLERGIC/IMMUNOLOGIC NEGATIVE: 1
GASTROINTESTINAL NEGATIVE: 1

## 2025-03-31 NOTE — ASSESSMENT & PLAN NOTE
Given her Keppra level being elevated, will check patient's Keppra level today on 3/21/2025, 4/1/2025, and 4/2/2025 for evaluation.

## 2025-03-31 NOTE — PATIENT INSTRUCTIONS
As per our discussion,    Overall, you did really well today.    As for the Keppra, I would advise that you stop taking it for the next 2 days and we will check your labs today, tomorrow, and the next day.  As for the labs for the next 2 days, I would advise that you get them early in the morning before you take your first dose for better results.    After 2 days, I would advise to start taking Keppra 1000 mg in which you will take 2 tablets twice a day instead of 3 tablets twice a day.  If your levels continue to remain high, may consider decreasing it further.    I went ahead and add a basic metabolic panel to check for your kidney functions.  Again, I would encourage that you continue to follow-up with your primary care provider and your other specialists for your comorbid conditions as appropriate.    For seizure safety:  - No driving, height/ladders, tub baths, pools, bodies of water, power tools until seizure free x 6 months  - If you have another seizure and the shaking last under 5 minutes, you are not injured, and you return to yourself quickly, no need to call EMS, just call my office at the number above.    - If you have  A seizure and the shaking lasts longer than 5 minutes, you are hurt or you have multiple seizures back to back without returning to yourself then call EMSThe [facility]'s Seiz[facility] Policy states that a person must be seizure-free or blackout-free for at least six months before driving.     If a person is currently licensed and [facility] is notified that the person has experienced a seizure, loss of consciousness or blackout, [facility] will suspend the person's driving privilege for a period of six months from the date of the last episode.     It was a pleasure to see you and your family today    Will see you back in 3 months or sooner if needed    Please do not hesitate to reach out for any questions or concerns

## 2025-03-31 NOTE — ASSESSMENT & PLAN NOTE
Patient was found to have elevated free T4 at 2.36 and normal TSH at 1.420.    Patient is on levothyroxine.    Patient was advised to follow-up with her primary care provider for further evaluation and management.    In review of her thyroid levels and the need for specialist referral was discussed.  However, will defer this to her primary care provider.    Family and patient verbalized understanding and agreed with plan of care.

## 2025-03-31 NOTE — TELEPHONE ENCOUNTER
Message  Received: 3 days ago  Mingo Goldberg APRN - NP  Skylar Pennington, LPN  Caller: Unspecified (3 days ago, 10:54 AM)  Skylar,    Will discuss this at the next appointment.  Could you please asked the patient to bring the results of the Keppra with her?  I am not able to locate the lab result showing that Was 4 times high.    Thank you,  Yusuf        Called pt, verified with two pt identifiers, got verbal consent from pt to speak to Olegario. Advised to bring in pt's lab results and her medications to today's appt so the NP can view them. He advised they will bring with them to today's appt. Thanked Olegario and advised we will see them today. Olegario verbalized understanding.

## 2025-03-31 NOTE — PROGRESS NOTES
No chief complaint on file.    \"Have you been to the ER, urgent care clinic since your last visit?  Hospitalized since your last visit?\"    YES - When: approximately 3  weeks ago.  Where and Why: [facility] ED.    “Have you seen or consulted any other health care providers outside of [facility] since your last visit?”    YES - When: approximately 3  weeks ago.  Where and Why: seizures.                Financial Resource Strain: Not on file      Food Insecurity: No Food Insecurity (2/25/2025)    Hunger Vital Sign     Worried About Running Out of Food in the Last Year: Never true     Ran Out of Food in the Last Year: Never true            2/28/2023     9:16 AM   PHQ-9    Little interest or pleasure in doing things 0   Feeling down, depressed, or hopeless 0   PHQ-2 Score 0   PHQ-9 Total Score 0       Health Maintenance Due   Topic Date Due    Depression Screen  Never done    Diabetic Alb to Cr ratio (uACR) test  Never done    DTaP/Tdap/Td vaccine (1 - Tdap) Never done    Pneumococcal 50+ years Vaccine (1 of 2 - PCV) 04/29/1964    DEXA (modify frequency per FRAX score)  Never done    Respiratory Syncytial Virus (RSV) Pregnant or age 60 yrs+ (1 - 1-dose 75+ series) Never done    Shingles vaccine (2 of 3) 05/29/2021    Flu vaccine (1) 08/01/2024    COVID-19 Vaccine (3 - 2024-25 season) 09/01/2024    Annual Wellness Visit (Medicare Advantage)  Never done

## 2025-03-31 NOTE — ASSESSMENT & PLAN NOTE
Patient denied any seizure-like activity.    Her seizures have been stable on current regimen.    It was reported that her labs were recently checked by her cardiologist and was found to have a Keppra level significantly elevated at 195, which may due to her current dosage of 1500 mg twice daily.    The dosage of Keppra will be reduced to 1000 mg twice a day after 2-day break.  Keppra level will be monitored daily until this stabilized.  Labs will be conducted today and tomorrow to monitor her response to the medication adjustment.    Patient was advised to maintain adequate hydration.  If she tolerates to reduce dosage well, it will be maintained.  However, if she experiences adverse effects or if the levels remain high, further adjustment may be necessary, including the addition of another medication.    For seizure safety:  - No driving, height/ladders, tub baths, pools, bodies of water, power tools until seizure free x 6 months  - If you have another seizure and the shaking last under 5 minutes, you are not injured, and you return to yourself quickly, no need to call EMS, just call my office at the number above.    - If you have  A seizure and the shaking lasts longer than 5 minutes, you are hurt or you have multiple seizures back to back without returning to yourself then call EMS.    [facility]'s Seizure Policy states that a person must be seizure-free or blackout-free for at least six months before driving.     If a person is currently licensed and [facility] is notified that the person has experienced a seizure, loss of consciousness or blackout, [facility] will suspend the person's driving privilege for a period of six months from the date of the last episode.

## 2025-03-31 NOTE — ASSESSMENT & PLAN NOTE
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    Stable without recurrence of stroke symptoms    Her stroke risk factors include hypertension, A-fib, diabetes, prior stroke    Patient is to continue on Xarelto 20 mg daily.    Patient is to continue to work to all of her comorbid conditions as managed by PCP and other specialists as appropriate    RECOMMENDATIONS:  - BP goal is less than 140/90  - Goal HbA1c is less than 7.  Current hemoglobin A1c was 11.0 on 3/26/2025.  Patient is to continue with aggressive glycemic control.  - LDL less than 70     Stroke education was provided today in regards to risk factors for stroke and lifestyle modifications to help minimize the risk of future stroke.    This included medication compliance, regular follow up with primary care physician,  and healthy lifestyle habits (nutrition/exercise).

## 2025-03-31 NOTE — ASSESSMENT & PLAN NOTE
Greatly improved.    Patient was awake, alert and oriented to self, place, however, her current neurological examination revealed mild cognitive impairment in which patient was confused to date and time.  This could be chronic in etiology.    Differential diagnosis include metabolic abnormality, aging, new onset of dementia.    Patient had labs completed on 3/28/2025 by her cardiologist which showed a glucose of 175, BUN 35, creatinine 2.09, GFR 23, calcium 10.8, potassium 3.3, hemoglobin A1c 10.0, TSH 1.420, T4 2.36.    Per family, patient was previously reported to be severely dehydrated and was taken off of Lasix.    Will repeat BMP given at that time patient was not feeling well and was not eating or drinking.  Will make additional recommendation based on what her labs show.    In the meantime, patient was advised to continue to follow-up with her primary care provider for hydration status management and adjust medications as necessary.  Patient is to optimize nutritional and hydration status.    Will continue to monitor patient for this.  If her cognitive impairment persist, may consider a referral to neuropsych for evaluation.    Patient was encouraged to engage in approximately 2.5 hours of physician approved physical activity on a weekly basis.    Patient was encouraged to maintain a healthy diet. Also was informed of the Mediterranean diet, which has been associated with reduced risk for neurodegenerative conditions as well as heart disease.    Patient was encouraged to maintain a cognitively stimulated lifestyle, also incorporating social interaction.

## 2025-04-01 DIAGNOSIS — R56.9 SEIZURE (HCC): ICD-10-CM

## 2025-04-01 DIAGNOSIS — Z51.81 THERAPEUTIC DRUG MONITORING: ICD-10-CM

## 2025-04-01 LAB
ALBUMIN SERPL-MCNC: 3.8 G/DL (ref 3.5–5)
ALBUMIN/GLOB SERPL: 1.1 (ref 1.1–2.2)
ALP SERPL-CCNC: 92 U/L (ref 45–117)
ALT SERPL-CCNC: 19 U/L (ref 12–78)
ANION GAP SERPL CALC-SCNC: 9 MMOL/L (ref 2–12)
AST SERPL-CCNC: 22 U/L (ref 15–37)
BILIRUB SERPL-MCNC: 0.7 MG/DL (ref 0.2–1)
BUN SERPL-MCNC: 25 MG/DL (ref 6–20)
BUN/CREAT SERPL: 16 (ref 12–20)
CALCIUM SERPL-MCNC: 10.7 MG/DL (ref 8.5–10.1)
CHLORIDE SERPL-SCNC: 102 MMOL/L (ref 97–108)
CO2 SERPL-SCNC: 30 MMOL/L (ref 21–32)
CREAT SERPL-MCNC: 1.61 MG/DL (ref 0.55–1.02)
GLOBULIN SER CALC-MCNC: 3.6 G/DL (ref 2–4)
GLUCOSE SERPL-MCNC: 124 MG/DL (ref 65–100)
POTASSIUM SERPL-SCNC: 3 MMOL/L (ref 3.5–5.1)
PROT SERPL-MCNC: 7.4 G/DL (ref 6.4–8.2)
SODIUM SERPL-SCNC: 141 MMOL/L (ref 136–145)

## 2025-04-02 ENCOUNTER — RESULTS FOLLOW-UP (OUTPATIENT)
Age: 80
End: 2025-04-02

## 2025-04-02 ENCOUNTER — TELEPHONE (OUTPATIENT)
Age: 80
End: 2025-04-02

## 2025-04-02 DIAGNOSIS — R56.9 SEIZURE (HCC): ICD-10-CM

## 2025-04-02 DIAGNOSIS — Z51.81 THERAPEUTIC DRUG MONITORING: ICD-10-CM

## 2025-04-02 NOTE — TELEPHONE ENCOUNTER
Makayla/[facility] called stating that the recvd the incorrect lab sample for pt. They recvd a lab sample in a purple tube. They would need to have the sample in a serum. They can be reached at [phone] option 1 ext [phone]

## 2025-04-02 NOTE — RESULT ENCOUNTER NOTE
Ms. Gordon, the potassium level still low and your kidney function test was slightly elevated.  Your blood glucose was improved.  I recommend that you reach out to your primary care provider for evaluation and management of your potassium and your kidney function.    I have not received any labs for Keppra, however, Keppra can take 1 to 2 days to come back.    There is no additional recommendation at this time from me.  Will continue with same plan of care. Remember to restart Keppra at 1000 mg twice a day.    Thank you,  Yusuf

## 2025-04-03 LAB — LEVETIRACETAM SERPL-MCNC: 35.9 UG/ML (ref 10–40)

## 2025-04-04 ENCOUNTER — TELEPHONE (OUTPATIENT)
Age: 80
End: 2025-04-04

## 2025-04-04 NOTE — TELEPHONE ENCOUNTER
Denisse with [employer] are returned call, she wanted to verify the pt's Keppra. Advised looks like pt has Keppra 500 mg tab so she would be taking 2 tabs twice daily for her dose. Denisse verbalized understanding and thanked me for the call.

## 2025-04-04 NOTE — TELEPHONE ENCOUNTER
Denisse/Shea Saucedo would like to clarify if pt is to take keppra 1000 mg 2 times daily. Please call her at [phone]

## 2025-04-04 NOTE — TELEPHONE ENCOUNTER
Returned call to Denisse brantley/ [employer] and left voice message stating that pt is on Keppra 1000 mg BID but looks like pt has Keppra 500 mg tab so it would be 2 tabs twice daily. Advised to call me back if any questions.

## 2025-04-04 NOTE — TELEPHONE ENCOUNTER
Called [facility] and left voice message to call me back regarding lab order. If a new one is needed or what is needed from us.

## 2025-04-07 NOTE — TELEPHONE ENCOUNTER
Called Makayla at [employer] and left voice message that I am returning her call and I see the labs resulted in out system but I am wondering if there is anything further needed from us. Advised to call me back and let me know.

## 2025-04-09 ENCOUNTER — OFFICE VISIT (OUTPATIENT)
Age: 80
End: 2025-04-09
Payer: MEDICARE

## 2025-04-09 ENCOUNTER — RESULTS FOLLOW-UP (OUTPATIENT)
Age: 80
End: 2025-04-09

## 2025-04-09 DIAGNOSIS — E11.65 TYPE 2 DIABETES MELLITUS WITH HYPERGLYCEMIA, UNSPECIFIED WHETHER LONG TERM INSULIN USE (HCC): Primary | ICD-10-CM

## 2025-04-09 PROCEDURE — G0108 DIAB MANAGE TRN  PER INDIV: HCPCS

## 2025-04-09 NOTE — PROGRESS NOTES
[facility]  Diabetes Self-Management Education & Support Program    Reason for Referral: T2DM   Referral Source: Angelic Arzate APRN - C*  Services requested: DSMES       ASSESSMENT    From my perspective, the participant would benefit from DSMES specifically related to reducing risks, healthy eating, monitoring, taking medications, physical activity, healthy coping, and problem solving. Will adapt DSMES program to build on participant's skills score, confidence score, and preparedness score as noted in the Diabetes Skills, Confidence, and Preparedness Index.    During the program, we will focus on providing DSMES that specifically addresses participant's interest in reducing risks, healthy eating, monitoring, taking medications, physical activity, healthy coping, and problem solving, as shown by their reported readiness to change.    The participant would be best served by attending weekly group class series.    Diabetes Self-Management Education Follow-up Visit: 5/7/25       Clinical Presentation  Sarah Gordon is a 79 y.o. White female referred for diabetes self-management education. Participant has Type 2 DM on insulin for 11-20 years. Family history positive for diabetes.     Patient reports not receiving DSMES services in the past.     Most recent A1c value:   Lab Results   Component Value Date/Time    LABA1C 13.1 02/26/2025 04:53 AM       Diabetes-related medical history:  Macrovascular disease  cerebral vascular accident      Diabetes-related medications:  Current dosing: Lantus SoloStar Sopn - 100 UNIT/ML- 30 units at night 8:30-9pm once daily   NovoLOG FlexPen Sopn - 100 UNIT/ML   100-200 5 units   200-300 10 units   Over 300 15 units     Blood Pressure Management  This patient does not have an active medication from one of the medication groupers.      Lipid Management  This patient does not have an active medication from one of the medication groupers.      Clot

## 2025-04-10 NOTE — RESULT ENCOUNTER NOTE
Ms. Gordon,    I hope you are doing well.  The lab that was checked to look at the Keppra level came back within normal limits.  We do not need to repeat Keppra level after this.    Will continue Keppra 1000 mg twice daily.    Thank you,  Yusuf

## 2025-04-18 ENCOUNTER — TELEPHONE (OUTPATIENT)
Age: 80
End: 2025-04-18

## 2025-04-18 RX ORDER — LEVETIRACETAM 500 MG/1
1000 TABLET ORAL 2 TIMES DAILY
Qty: 120 TABLET | Refills: 5 | Status: SHIPPED | OUTPATIENT
Start: 2025-04-18 | End: 2025-10-15

## 2025-04-18 NOTE — TELEPHONE ENCOUNTER
Patient's daughter, Devorah, called to confirm prescription.    Levetiracetam (Keppra) 500 mg - instructions: 1,000 mg twice a day    Np Yusuf's note from 3/31/25 hospital follow up    \"The dosage of Keppra will be reduced to 1000 mg twice a day after 2-day break. Keppra level will be monitored daily until this stabilized. Labs will be conducted today and tomorrow to monitor her response to the medication adjustment.\"

## 2025-04-18 NOTE — TELEPHONE ENCOUNTER
Called pharmacy and spoke to tech to verify no refills were remaining on Rx sent on 3/3/25. She advised no, pt has used all refills. The Rx changed at office visit.     Called Devorah on [employer] and advised I want to verify pt's Keppra script before I send in that refill request. Advised to please call me back or she can send me a my chart message.    If you have any questions or concerns about today's appointment, the verbal and/or written instructions you were given for follow up care, please call our office at [phone]. [facility] - [address]    [phone] office  [phone] fax    PATIENT PRE AND POST [address] [place] [address] Road  [phone]    Before Surgery Instructions:   1) You must have someone available to drive you to and from your procedure and stay with you for the first 24 hours. 2) It is very important that you have nothing to eat or drink after midnight the night before your surgery. This includes chewing gum or sucking on hard candy. Take only heart, blood pressure and cholesterol medications the morning of surgery with only a sip of water. 3) Please stop taking Plavix 10 days prior to your surgery. Stop taking Coumadin 5 days prior to your surgery. Stop taking all Aspirin or Aspirin containing products 7 days prior to your surgery. Stop taking Advil, Motrin, Aleve, and etc. 3 days prior to your surgery. 4) If you take any diabetic medications please consult with your primary care physician on how to take them on the day of your surgery  5) Please stop all Herbal products 2 weeks prior to your surgery. 6) Please arrive at the hospital 2 hours prior to your surgery, unless you have been otherwise instructed. 7) Patients having an operation on their colon will be given a separate instruction sheet on their Bowel Prep. 8) For any pre-operative work up check in at the main entrance to [address] [place] [address], and then go to Patient Registration. These studies are done on a walk in basis they are open from 7:00am to 5:00pm Monday through Friday. 9) Please wash your surgical site the morning of your surgery with soap and water.   10) If you are of child bearing age you will have pregnancy test done the morning of your surgery as soon as you arrive. 11) You may be contacted to change your surgery time. At times this is necessary due to equipment or staffing needs. After Surgery Instructions: You will need to be seen in the office for a follow-up visit 7-14 days after your surgery. Please call after you have had the procedure to make this appointment. Unless otherwise instructed, you may remove your outer bandage and shower 48 hours after your surgery. If you develop a fever greater than 101, have any significant drainage, bleeding, swelling and/or pus of the wound. Please call our office immediately. Surgery Date and Time:  Thursday, September 27, 2018 at 1:30pm    Please check in at [facility], enter through the Emergency Room entrance and go up to the second floor. Please check in by 11:30am the day of your surgery. You may contact Pavithra Neff with any questions at [phone].

## 2025-04-18 NOTE — TELEPHONE ENCOUNTER
Patient's daughter, Devorah, called stating that patient will run out of her seizure medication tomorrow. Devorah asked that we please refill this asap. LOV 3/31/2025      levETIRAcetam (KEPPRA) 500 MG tablet     [address] - P [phone] - F [phone]

## 2025-04-30 PROBLEM — Z09 HOSPITAL DISCHARGE FOLLOW-UP: Status: RESOLVED | Noted: 2025-03-31 | Resolved: 2025-04-30

## 2025-08-20 ENCOUNTER — APPOINTMENT (OUTPATIENT)
Facility: HOSPITAL | Age: 80
End: 2025-08-20
Payer: MEDICARE

## 2025-08-20 ENCOUNTER — HOSPITAL ENCOUNTER (INPATIENT)
Facility: HOSPITAL | Age: 80
LOS: 2 days | Discharge: HOME HEALTH | End: 2025-08-22
Admitting: STUDENT IN AN ORGANIZED HEALTH CARE EDUCATION/TRAINING PROGRAM
Payer: MEDICARE

## 2025-08-20 DIAGNOSIS — R06.02 SHORTNESS OF BREATH: ICD-10-CM

## 2025-08-20 DIAGNOSIS — I21.4 NSTEMI (NON-ST ELEVATED MYOCARDIAL INFARCTION) (HCC): ICD-10-CM

## 2025-08-20 DIAGNOSIS — R53.1 GENERALIZED WEAKNESS: ICD-10-CM

## 2025-08-20 DIAGNOSIS — R65.21 SEPSIS WITH ACUTE ORGAN DYSFUNCTION AND SEPTIC SHOCK, DUE TO UNSPECIFIED ORGANISM, UNSPECIFIED ORGAN DYSFUNCTION TYPE (HCC): ICD-10-CM

## 2025-08-20 DIAGNOSIS — E11.65 HYPERGLYCEMIA DUE TO DIABETES MELLITUS (HCC): ICD-10-CM

## 2025-08-20 DIAGNOSIS — R53.83 FATIGUE, UNSPECIFIED TYPE: Primary | ICD-10-CM

## 2025-08-20 DIAGNOSIS — A41.9 SEPSIS WITH ACUTE ORGAN DYSFUNCTION AND SEPTIC SHOCK, DUE TO UNSPECIFIED ORGANISM, UNSPECIFIED ORGAN DYSFUNCTION TYPE (HCC): ICD-10-CM

## 2025-08-20 PROBLEM — N39.0 SEPSIS DUE TO URINARY TRACT INFECTION (HCC): Status: ACTIVE | Noted: 2025-08-20

## 2025-08-20 LAB
ALBUMIN SERPL-MCNC: 3.7 G/DL (ref 3.5–5.2)
ALBUMIN/GLOB SERPL: 1 (ref 1.1–2.2)
ALP SERPL-CCNC: 107 U/L (ref 35–104)
ALT SERPL-CCNC: 6 U/L (ref 10–35)
ANION GAP SERPL CALC-SCNC: 21 MMOL/L (ref 2–14)
APPEARANCE UR: ABNORMAL
AST SERPL-CCNC: 16 U/L (ref 10–35)
BACTERIA URNS QL MICRO: ABNORMAL /HPF
BASE EXCESS BLDV CALC-SCNC: 8.4 MMOL/L
BASOPHILS # BLD: 0.05 K/UL (ref 0–0.1)
BASOPHILS NFR BLD: 0.3 % (ref 0–1)
BILIRUB SERPL-MCNC: 0.6 MG/DL (ref 0–1.2)
BILIRUB UR QL: NEGATIVE
BUN SERPL-MCNC: 42 MG/DL (ref 8–23)
BUN/CREAT SERPL: 20 (ref 12–20)
CALCIUM SERPL-MCNC: 11 MG/DL (ref 8.8–10.2)
CHLORIDE SERPL-SCNC: 83 MMOL/L (ref 98–107)
CO2 SERPL-SCNC: 28 MMOL/L (ref 20–29)
COLOR UR: ABNORMAL
COMMENT:: NORMAL
CREAT SERPL-MCNC: 2.11 MG/DL (ref 0.6–1)
DIFFERENTIAL METHOD BLD: ABNORMAL
EKG ATRIAL RATE: 48 BPM
EKG ATRIAL RATE: 89 BPM
EKG DIAGNOSIS: NORMAL
EKG DIAGNOSIS: NORMAL
EKG Q-T INTERVAL: 324 MS
EKG Q-T INTERVAL: 488 MS
EKG QRS DURATION: 80 MS
EKG QRS DURATION: 84 MS
EKG QTC CALCULATION (BAZETT): 366 MS
EKG QTC CALCULATION (BAZETT): 537 MS
EKG R AXIS: 1 DEGREES
EKG R AXIS: 14 DEGREES
EKG T AXIS: 224 DEGREES
EKG T AXIS: 234 DEGREES
EKG VENTRICULAR RATE: 73 BPM
EKG VENTRICULAR RATE: 77 BPM
EOSINOPHIL # BLD: 0.08 K/UL (ref 0–0.4)
EOSINOPHIL NFR BLD: 0.5 % (ref 0–7)
EPITH CASTS URNS QL MICRO: ABNORMAL /LPF
ERYTHROCYTE [DISTWIDTH] IN BLOOD BY AUTOMATED COUNT: 12.3 % (ref 11.5–14.5)
GLOBULIN SER CALC-MCNC: 3.7 G/DL (ref 2–4)
GLUCOSE BLD STRIP.AUTO-MCNC: 401 MG/DL (ref 65–117)
GLUCOSE SERPL-MCNC: 281 MG/DL (ref 65–100)
GLUCOSE UR STRIP.AUTO-MCNC: 250 MG/DL
HCO3 BLDV-SCNC: 33.1 MMOL/L (ref 23–28)
HCT VFR BLD AUTO: 42.7 % (ref 35–47)
HGB BLD-MCNC: 14.2 G/DL (ref 11.5–16)
HGB UR QL STRIP: NEGATIVE
IMM GRANULOCYTES # BLD AUTO: 0.12 K/UL (ref 0–0.04)
IMM GRANULOCYTES NFR BLD AUTO: 0.7 % (ref 0–0.5)
INR PPP: 1.3 (ref 0.9–1.1)
KETONES UR QL STRIP.AUTO: ABNORMAL MG/DL
LACTATE SERPL-SCNC: 2.1 MMOL/L (ref 0.5–2)
LACTATE SERPL-SCNC: 5.8 MMOL/L (ref 0.5–2)
LEUKOCYTE ESTERASE UR QL STRIP.AUTO: ABNORMAL
LIPASE SERPL-CCNC: 54 U/L (ref 13–60)
LYMPHOCYTES # BLD: 4.32 K/UL (ref 0.8–3.5)
LYMPHOCYTES NFR BLD: 26.4 % (ref 12–49)
MAGNESIUM SERPL-MCNC: 1.9 MG/DL (ref 1.6–2.4)
MCH RBC QN AUTO: 27.4 PG (ref 26–34)
MCHC RBC AUTO-ENTMCNC: 33.3 G/DL (ref 30–36.5)
MCV RBC AUTO: 82.3 FL (ref 80–99)
MONOCYTES # BLD: 1.23 K/UL (ref 0–1)
MONOCYTES NFR BLD: 7.5 % (ref 5–13)
NEUTS SEG # BLD: 10.54 K/UL (ref 1.8–8)
NEUTS SEG NFR BLD: 64.6 % (ref 32–75)
NITRITE UR QL STRIP.AUTO: POSITIVE
NRBC # BLD: 0 K/UL (ref 0–0.01)
NRBC BLD-RTO: 0 PER 100 WBC
NT PRO BNP: 2086 PG/ML (ref 0–450)
PCO2 BLDV: 44.1 MMHG (ref 41–51)
PH BLDV: 7.48 (ref 7.32–7.42)
PH UR STRIP: 5.5 (ref 5–8)
PHOSPHATE SERPL-MCNC: 1.3 MG/DL (ref 2.5–4.5)
PLATELET # BLD AUTO: 310 K/UL (ref 150–400)
PMV BLD AUTO: 11.6 FL (ref 8.9–12.9)
PO2 BLDV: <27 MMHG (ref 25–40)
POTASSIUM SERPL-SCNC: 2.3 MMOL/L (ref 3.5–5.1)
PROCALCITONIN SERPL-MCNC: 0.09 NG/ML
PROT SERPL-MCNC: 7.3 G/DL (ref 6.4–8.3)
PROT UR STRIP-MCNC: ABNORMAL MG/DL
PROTHROMBIN TIME: 14.1 SEC (ref 9.2–11.2)
RBC # BLD AUTO: 5.19 M/UL (ref 3.8–5.2)
RBC #/AREA URNS HPF: ABNORMAL /HPF (ref 0–5)
SERVICE CMNT-IMP: ABNORMAL
SERVICE CMNT-IMP: ABNORMAL
SODIUM SERPL-SCNC: 132 MMOL/L (ref 136–145)
SP GR UR REFRACTOMETRY: 1.01
SPECIMEN HOLD: NORMAL
SPECIMEN TYPE: ABNORMAL
T4 FREE SERPL-MCNC: 2.9 NG/DL (ref 0.9–1.6)
TROPONIN T SERPL HS-MCNC: 28.7 NG/L (ref 0–14)
TROPONIN T SERPL HS-MCNC: 33.9 NG/L (ref 0–14)
TSH, 3RD GENERATION: 0.12 UIU/ML (ref 0.27–4.2)
URINE CULTURE IF INDICATED: ABNORMAL
UROBILINOGEN UR QL STRIP.AUTO: 0.2 EU/DL (ref 0.2–1)
WBC # BLD AUTO: 16.3 K/UL (ref 3.6–11)
WBC URNS QL MICRO: >100 /HPF (ref 0–4)

## 2025-08-20 PROCEDURE — 87040 BLOOD CULTURE FOR BACTERIA: CPT

## 2025-08-20 PROCEDURE — 6370000000 HC RX 637 (ALT 250 FOR IP)

## 2025-08-20 PROCEDURE — 6360000002 HC RX W HCPCS

## 2025-08-20 PROCEDURE — 85610 PROTHROMBIN TIME: CPT

## 2025-08-20 PROCEDURE — 96361 HYDRATE IV INFUSION ADD-ON: CPT

## 2025-08-20 PROCEDURE — 96365 THER/PROPH/DIAG IV INF INIT: CPT

## 2025-08-20 PROCEDURE — 84484 ASSAY OF TROPONIN QUANT: CPT

## 2025-08-20 PROCEDURE — 2580000003 HC RX 258: Performed by: STUDENT IN AN ORGANIZED HEALTH CARE EDUCATION/TRAINING PROGRAM

## 2025-08-20 PROCEDURE — 2500000003 HC RX 250 WO HCPCS: Performed by: STUDENT IN AN ORGANIZED HEALTH CARE EDUCATION/TRAINING PROGRAM

## 2025-08-20 PROCEDURE — 6360000002 HC RX W HCPCS: Performed by: STUDENT IN AN ORGANIZED HEALTH CARE EDUCATION/TRAINING PROGRAM

## 2025-08-20 PROCEDURE — 82803 BLOOD GASES ANY COMBINATION: CPT

## 2025-08-20 PROCEDURE — 83880 ASSAY OF NATRIURETIC PEPTIDE: CPT

## 2025-08-20 PROCEDURE — 81001 URINALYSIS AUTO W/SCOPE: CPT

## 2025-08-20 PROCEDURE — 2580000003 HC RX 258: Performed by: NURSE PRACTITIONER

## 2025-08-20 PROCEDURE — 84439 ASSAY OF FREE THYROXINE: CPT

## 2025-08-20 PROCEDURE — 83690 ASSAY OF LIPASE: CPT

## 2025-08-20 PROCEDURE — 3E033XZ INTRODUCTION OF VASOPRESSOR INTO PERIPHERAL VEIN, PERCUTANEOUS APPROACH: ICD-10-PCS | Performed by: STUDENT IN AN ORGANIZED HEALTH CARE EDUCATION/TRAINING PROGRAM

## 2025-08-20 PROCEDURE — 84145 PROCALCITONIN (PCT): CPT

## 2025-08-20 PROCEDURE — 6370000000 HC RX 637 (ALT 250 FOR IP): Performed by: INTERNAL MEDICINE

## 2025-08-20 PROCEDURE — 84100 ASSAY OF PHOSPHORUS: CPT

## 2025-08-20 PROCEDURE — 80053 COMPREHEN METABOLIC PANEL: CPT

## 2025-08-20 PROCEDURE — 87088 URINE BACTERIA CULTURE: CPT

## 2025-08-20 PROCEDURE — 6370000000 HC RX 637 (ALT 250 FOR IP): Performed by: STUDENT IN AN ORGANIZED HEALTH CARE EDUCATION/TRAINING PROGRAM

## 2025-08-20 PROCEDURE — 83605 ASSAY OF LACTIC ACID: CPT

## 2025-08-20 PROCEDURE — 93005 ELECTROCARDIOGRAM TRACING: CPT

## 2025-08-20 PROCEDURE — 96375 TX/PRO/DX INJ NEW DRUG ADDON: CPT

## 2025-08-20 PROCEDURE — 2580000003 HC RX 258

## 2025-08-20 PROCEDURE — 87086 URINE CULTURE/COLONY COUNT: CPT

## 2025-08-20 PROCEDURE — 83735 ASSAY OF MAGNESIUM: CPT

## 2025-08-20 PROCEDURE — 84443 ASSAY THYROID STIM HORMONE: CPT

## 2025-08-20 PROCEDURE — 36415 COLL VENOUS BLD VENIPUNCTURE: CPT

## 2025-08-20 PROCEDURE — 2500000003 HC RX 250 WO HCPCS: Performed by: NURSE PRACTITIONER

## 2025-08-20 PROCEDURE — 2000000000 HC ICU R&B

## 2025-08-20 PROCEDURE — 96367 TX/PROPH/DG ADDL SEQ IV INF: CPT

## 2025-08-20 PROCEDURE — 85025 COMPLETE CBC W/AUTO DIFF WBC: CPT

## 2025-08-20 PROCEDURE — 71046 X-RAY EXAM CHEST 2 VIEWS: CPT

## 2025-08-20 PROCEDURE — 87186 SC STD MICRODIL/AGAR DIL: CPT

## 2025-08-20 PROCEDURE — 99285 EMERGENCY DEPT VISIT HI MDM: CPT

## 2025-08-20 PROCEDURE — 82962 GLUCOSE BLOOD TEST: CPT

## 2025-08-20 RX ORDER — INSULIN GLARGINE 100 [IU]/ML
15 INJECTION, SOLUTION SUBCUTANEOUS DAILY
Status: DISCONTINUED | OUTPATIENT
Start: 2025-08-20 | End: 2025-08-22 | Stop reason: HOSPADM

## 2025-08-20 RX ORDER — GLUCAGON 1 MG/ML
1 KIT INJECTION PRN
Status: DISCONTINUED | OUTPATIENT
Start: 2025-08-20 | End: 2025-08-22 | Stop reason: HOSPADM

## 2025-08-20 RX ORDER — SODIUM CHLORIDE 0.9 % (FLUSH) 0.9 %
5-40 SYRINGE (ML) INJECTION EVERY 12 HOURS SCHEDULED
Status: DISCONTINUED | OUTPATIENT
Start: 2025-08-20 | End: 2025-08-22 | Stop reason: HOSPADM

## 2025-08-20 RX ORDER — ACETAMINOPHEN 325 MG/1
650 TABLET ORAL EVERY 6 HOURS PRN
Status: DISCONTINUED | OUTPATIENT
Start: 2025-08-20 | End: 2025-08-22 | Stop reason: HOSPADM

## 2025-08-20 RX ORDER — INSULIN LISPRO 100 [IU]/ML
0-8 INJECTION, SOLUTION INTRAVENOUS; SUBCUTANEOUS
Status: DISCONTINUED | OUTPATIENT
Start: 2025-08-20 | End: 2025-08-22

## 2025-08-20 RX ORDER — SODIUM CHLORIDE 0.9 % (FLUSH) 0.9 %
5-40 SYRINGE (ML) INJECTION PRN
Status: DISCONTINUED | OUTPATIENT
Start: 2025-08-20 | End: 2025-08-22 | Stop reason: HOSPADM

## 2025-08-20 RX ORDER — POTASSIUM CHLORIDE 7.45 MG/ML
10 INJECTION INTRAVENOUS
Status: COMPLETED | OUTPATIENT
Start: 2025-08-20 | End: 2025-08-20

## 2025-08-20 RX ORDER — ACETAMINOPHEN 500 MG
1000 TABLET ORAL
Status: COMPLETED | OUTPATIENT
Start: 2025-08-20 | End: 2025-08-20

## 2025-08-20 RX ORDER — 0.9 % SODIUM CHLORIDE 0.9 %
1000 INTRAVENOUS SOLUTION INTRAVENOUS ONCE
Status: COMPLETED | OUTPATIENT
Start: 2025-08-20 | End: 2025-08-20

## 2025-08-20 RX ORDER — THIAMINE HYDROCHLORIDE 100 MG/ML
100 INJECTION, SOLUTION INTRAMUSCULAR; INTRAVENOUS DAILY
Status: DISCONTINUED | OUTPATIENT
Start: 2025-08-20 | End: 2025-08-21

## 2025-08-20 RX ORDER — SODIUM CHLORIDE 9 MG/ML
INJECTION, SOLUTION INTRAVENOUS PRN
Status: DISCONTINUED | OUTPATIENT
Start: 2025-08-20 | End: 2025-08-22 | Stop reason: HOSPADM

## 2025-08-20 RX ORDER — ACETAMINOPHEN 650 MG/1
650 SUPPOSITORY RECTAL EVERY 6 HOURS PRN
Status: DISCONTINUED | OUTPATIENT
Start: 2025-08-20 | End: 2025-08-22 | Stop reason: HOSPADM

## 2025-08-20 RX ORDER — METOPROLOL SUCCINATE 50 MG/1
100 TABLET, EXTENDED RELEASE ORAL DAILY
Status: DISCONTINUED | OUTPATIENT
Start: 2025-08-21 | End: 2025-08-22

## 2025-08-20 RX ORDER — POTASSIUM CHLORIDE 1500 MG/1
20 TABLET, EXTENDED RELEASE ORAL ONCE
Status: COMPLETED | OUTPATIENT
Start: 2025-08-20 | End: 2025-08-20

## 2025-08-20 RX ORDER — MAGNESIUM SULFATE IN WATER 40 MG/ML
2000 INJECTION, SOLUTION INTRAVENOUS
Status: COMPLETED | OUTPATIENT
Start: 2025-08-20 | End: 2025-08-20

## 2025-08-20 RX ORDER — SODIUM CHLORIDE, SODIUM LACTATE, POTASSIUM CHLORIDE, AND CALCIUM CHLORIDE .6; .31; .03; .02 G/100ML; G/100ML; G/100ML; G/100ML
1000 INJECTION, SOLUTION INTRAVENOUS ONCE
Status: COMPLETED | OUTPATIENT
Start: 2025-08-20 | End: 2025-08-20

## 2025-08-20 RX ORDER — ONDANSETRON 2 MG/ML
2 INJECTION INTRAMUSCULAR; INTRAVENOUS EVERY 6 HOURS PRN
Status: DISCONTINUED | OUTPATIENT
Start: 2025-08-20 | End: 2025-08-22 | Stop reason: HOSPADM

## 2025-08-20 RX ORDER — POTASSIUM CHLORIDE 7.45 MG/ML
10 INJECTION INTRAVENOUS
Status: DISCONTINUED | OUTPATIENT
Start: 2025-08-20 | End: 2025-08-20 | Stop reason: SDUPTHER

## 2025-08-20 RX ORDER — SODIUM CHLORIDE, SODIUM LACTATE, POTASSIUM CHLORIDE, AND CALCIUM CHLORIDE .6; .31; .03; .02 G/100ML; G/100ML; G/100ML; G/100ML
500 INJECTION, SOLUTION INTRAVENOUS ONCE
Status: COMPLETED | OUTPATIENT
Start: 2025-08-20 | End: 2025-08-20

## 2025-08-20 RX ORDER — ACETAMINOPHEN 325 MG/1
650 TABLET ORAL EVERY 4 HOURS PRN
Status: DISCONTINUED | OUTPATIENT
Start: 2025-08-20 | End: 2025-08-21

## 2025-08-20 RX ORDER — DEXTROSE MONOHYDRATE 100 MG/ML
INJECTION, SOLUTION INTRAVENOUS CONTINUOUS PRN
Status: DISCONTINUED | OUTPATIENT
Start: 2025-08-20 | End: 2025-08-22 | Stop reason: HOSPADM

## 2025-08-20 RX ORDER — LEVETIRACETAM 500 MG/1
1000 TABLET ORAL 2 TIMES DAILY
Status: DISCONTINUED | OUTPATIENT
Start: 2025-08-20 | End: 2025-08-22 | Stop reason: HOSPADM

## 2025-08-20 RX ORDER — ENOXAPARIN SODIUM 100 MG/ML
30 INJECTION SUBCUTANEOUS DAILY
Status: DISCONTINUED | OUTPATIENT
Start: 2025-08-20 | End: 2025-08-20 | Stop reason: ALTCHOICE

## 2025-08-20 RX ORDER — DILTIAZEM HYDROCHLORIDE 120 MG/1
240 CAPSULE, COATED, EXTENDED RELEASE ORAL DAILY
Status: DISCONTINUED | OUTPATIENT
Start: 2025-08-21 | End: 2025-08-22 | Stop reason: HOSPADM

## 2025-08-20 RX ORDER — HYDRALAZINE HYDROCHLORIDE 50 MG/1
100 TABLET, FILM COATED ORAL EVERY 8 HOURS SCHEDULED
Status: DISCONTINUED | OUTPATIENT
Start: 2025-08-21 | End: 2025-08-22 | Stop reason: HOSPADM

## 2025-08-20 RX ORDER — LEVOTHYROXINE SODIUM 88 UG/1
88 TABLET ORAL DAILY
Status: DISCONTINUED | OUTPATIENT
Start: 2025-08-21 | End: 2025-08-22 | Stop reason: HOSPADM

## 2025-08-20 RX ADMIN — POTASSIUM CHLORIDE 10 MEQ: 7.46 INJECTION, SOLUTION INTRAVENOUS at 19:05

## 2025-08-20 RX ADMIN — ACETAMINOPHEN 1000 MG: 500 TABLET ORAL at 16:18

## 2025-08-20 RX ADMIN — SODIUM CHLORIDE, SODIUM LACTATE, POTASSIUM CHLORIDE, AND CALCIUM CHLORIDE 1000 ML: .6; .31; .03; .02 INJECTION, SOLUTION INTRAVENOUS at 18:08

## 2025-08-20 RX ADMIN — MELATONIN 3 MG: at 22:59

## 2025-08-20 RX ADMIN — MAGNESIUM SULFATE HEPTAHYDRATE 2000 MG: 40 INJECTION, SOLUTION INTRAVENOUS at 16:16

## 2025-08-20 RX ADMIN — POTASSIUM BICARBONATE 40 MEQ: 782 TABLET, EFFERVESCENT ORAL at 22:48

## 2025-08-20 RX ADMIN — POTASSIUM CHLORIDE 10 MEQ: 7.46 INJECTION, SOLUTION INTRAVENOUS at 16:18

## 2025-08-20 RX ADMIN — LEVETIRACETAM 1000 MG: 500 TABLET, FILM COATED ORAL at 22:33

## 2025-08-20 RX ADMIN — POTASSIUM CHLORIDE 20 MEQ: 1500 TABLET, EXTENDED RELEASE ORAL at 16:18

## 2025-08-20 RX ADMIN — INSULIN LISPRO 8 UNITS: 100 INJECTION, SOLUTION INTRAVENOUS; SUBCUTANEOUS at 22:50

## 2025-08-20 RX ADMIN — SODIUM CHLORIDE, POTASSIUM CHLORIDE, SODIUM LACTATE AND CALCIUM CHLORIDE 1000 ML: 600; 310; 30; 20 INJECTION, SOLUTION INTRAVENOUS at 17:00

## 2025-08-20 RX ADMIN — ACETAMINOPHEN 650 MG: 325 TABLET ORAL at 22:32

## 2025-08-20 RX ADMIN — POTASSIUM CHLORIDE 10 MEQ: 7.46 INJECTION, SOLUTION INTRAVENOUS at 18:05

## 2025-08-20 RX ADMIN — SODIUM CHLORIDE, PRESERVATIVE FREE 10 ML: 5 INJECTION INTRAVENOUS at 22:46

## 2025-08-20 RX ADMIN — INSULIN GLARGINE 15 UNITS: 100 INJECTION, SOLUTION SUBCUTANEOUS at 22:46

## 2025-08-20 RX ADMIN — CEFEPIME 2000 MG: 2 INJECTION, POWDER, FOR SOLUTION INTRAVENOUS at 15:09

## 2025-08-20 RX ADMIN — SODIUM CHLORIDE 1000 ML: 0.9 INJECTION, SOLUTION INTRAVENOUS at 14:30

## 2025-08-20 RX ADMIN — SODIUM CHLORIDE 4 MCG/MIN: 0.9 INJECTION, SOLUTION INTRAVENOUS at 23:20

## 2025-08-20 RX ADMIN — SODIUM CHLORIDE, SODIUM LACTATE, POTASSIUM CHLORIDE, AND CALCIUM CHLORIDE 500 ML: .6; .31; .03; .02 INJECTION, SOLUTION INTRAVENOUS at 22:46

## 2025-08-20 RX ADMIN — THIAMINE HYDROCHLORIDE 100 MG: 100 INJECTION, SOLUTION INTRAMUSCULAR; INTRAVENOUS at 22:54

## 2025-08-20 RX ADMIN — POTASSIUM PHOSPHATE, MONOBASIC AND POTASSIUM PHOSPHATE, DIBASIC 30 MMOL: 224; 236 INJECTION, SOLUTION, CONCENTRATE INTRAVENOUS at 20:54

## 2025-08-20 ASSESSMENT — PAIN SCALES - GENERAL
PAINLEVEL_OUTOF10: 2
PAINLEVEL_OUTOF10: 5
PAINLEVEL_OUTOF10: 0

## 2025-08-20 ASSESSMENT — PAIN - FUNCTIONAL ASSESSMENT
PAIN_FUNCTIONAL_ASSESSMENT: 0-10
PAIN_FUNCTIONAL_ASSESSMENT: 0-10

## 2025-08-20 ASSESSMENT — PAIN DESCRIPTION - DESCRIPTORS: DESCRIPTORS: ACHING

## 2025-08-20 ASSESSMENT — LIFESTYLE VARIABLES
HOW OFTEN DO YOU HAVE A DRINK CONTAINING ALCOHOL: NEVER
HOW MANY STANDARD DRINKS CONTAINING ALCOHOL DO YOU HAVE ON A TYPICAL DAY: PATIENT DOES NOT DRINK

## 2025-08-20 ASSESSMENT — PAIN DESCRIPTION - LOCATION: LOCATION: HEAD

## 2025-08-21 LAB
ANION GAP SERPL CALC-SCNC: 10 MMOL/L (ref 2–14)
ANION GAP SERPL CALC-SCNC: 13 MMOL/L (ref 2–14)
ANION GAP SERPL CALC-SCNC: 15 MMOL/L (ref 2–14)
BUN SERPL-MCNC: 29 MG/DL (ref 8–23)
BUN SERPL-MCNC: 33 MG/DL (ref 8–23)
BUN SERPL-MCNC: 35 MG/DL (ref 8–23)
BUN/CREAT SERPL: 18 (ref 12–20)
BUN/CREAT SERPL: 21 (ref 12–20)
BUN/CREAT SERPL: 21 (ref 12–20)
CALCIUM SERPL-MCNC: 9.1 MG/DL (ref 8.8–10.2)
CALCIUM SERPL-MCNC: 9.2 MG/DL (ref 8.8–10.2)
CALCIUM SERPL-MCNC: 9.7 MG/DL (ref 8.8–10.2)
CHLORIDE SERPL-SCNC: 91 MMOL/L (ref 98–107)
CHLORIDE SERPL-SCNC: 92 MMOL/L (ref 98–107)
CHLORIDE SERPL-SCNC: 94 MMOL/L (ref 98–107)
CO2 SERPL-SCNC: 27 MMOL/L (ref 20–29)
CO2 SERPL-SCNC: 29 MMOL/L (ref 20–29)
CO2 SERPL-SCNC: 29 MMOL/L (ref 20–29)
CREAT SERPL-MCNC: 1.58 MG/DL (ref 0.6–1)
CREAT SERPL-MCNC: 1.58 MG/DL (ref 0.6–1)
CREAT SERPL-MCNC: 1.65 MG/DL (ref 0.6–1)
ERYTHROCYTE [DISTWIDTH] IN BLOOD BY AUTOMATED COUNT: 12.6 % (ref 11.5–14.5)
EST. AVERAGE GLUCOSE BLD GHB EST-MCNC: 315 MG/DL
GLUCOSE BLD STRIP.AUTO-MCNC: 237 MG/DL (ref 65–117)
GLUCOSE BLD STRIP.AUTO-MCNC: 276 MG/DL (ref 65–117)
GLUCOSE BLD STRIP.AUTO-MCNC: 328 MG/DL (ref 65–117)
GLUCOSE BLD STRIP.AUTO-MCNC: 384 MG/DL (ref 65–117)
GLUCOSE BLD STRIP.AUTO-MCNC: 440 MG/DL (ref 65–117)
GLUCOSE BLD STRIP.AUTO-MCNC: 443 MG/DL (ref 65–117)
GLUCOSE BLD STRIP.AUTO-MCNC: 492 MG/DL (ref 65–117)
GLUCOSE BLD STRIP.AUTO-MCNC: 530 MG/DL (ref 65–117)
GLUCOSE BLD STRIP.AUTO-MCNC: 538 MG/DL (ref 65–117)
GLUCOSE BLD STRIP.AUTO-MCNC: 574 MG/DL (ref 65–117)
GLUCOSE BLD STRIP.AUTO-MCNC: 577 MG/DL (ref 65–117)
GLUCOSE SERPL-MCNC: 437 MG/DL (ref 65–100)
GLUCOSE SERPL-MCNC: 453 MG/DL (ref 65–100)
GLUCOSE SERPL-MCNC: 489 MG/DL (ref 65–100)
HBA1C MFR BLD: 12.6 % (ref 4–5.6)
HCT VFR BLD AUTO: 35.2 % (ref 35–47)
HGB BLD-MCNC: 11.6 G/DL (ref 11.5–16)
MAGNESIUM SERPL-MCNC: 2 MG/DL (ref 1.6–2.4)
MAGNESIUM SERPL-MCNC: 2.2 MG/DL (ref 1.6–2.4)
MCH RBC QN AUTO: 27.6 PG (ref 26–34)
MCHC RBC AUTO-ENTMCNC: 33 G/DL (ref 30–36.5)
MCV RBC AUTO: 83.6 FL (ref 80–99)
NRBC # BLD: 0 K/UL (ref 0–0.01)
NRBC BLD-RTO: 0 PER 100 WBC
PHOSPHATE SERPL-MCNC: 3.2 MG/DL (ref 2.5–4.5)
PLATELET # BLD AUTO: 277 K/UL (ref 150–400)
PMV BLD AUTO: 12 FL (ref 8.9–12.9)
POTASSIUM SERPL-SCNC: 2.9 MMOL/L (ref 3.5–5.1)
POTASSIUM SERPL-SCNC: 3.5 MMOL/L (ref 3.5–5.1)
POTASSIUM SERPL-SCNC: 3.9 MMOL/L (ref 3.5–5.1)
PROCALCITONIN SERPL-MCNC: 0.09 NG/ML
RBC # BLD AUTO: 4.21 M/UL (ref 3.8–5.2)
SERVICE CMNT-IMP: ABNORMAL
SODIUM SERPL-SCNC: 133 MMOL/L (ref 136–145)
WBC # BLD AUTO: 10.8 K/UL (ref 3.6–11)

## 2025-08-21 PROCEDURE — 2580000003 HC RX 258: Performed by: INTERNAL MEDICINE

## 2025-08-21 PROCEDURE — 2000000000 HC ICU R&B

## 2025-08-21 PROCEDURE — 82962 GLUCOSE BLOOD TEST: CPT

## 2025-08-21 PROCEDURE — 2580000003 HC RX 258: Performed by: STUDENT IN AN ORGANIZED HEALTH CARE EDUCATION/TRAINING PROGRAM

## 2025-08-21 PROCEDURE — 6360000002 HC RX W HCPCS: Performed by: STUDENT IN AN ORGANIZED HEALTH CARE EDUCATION/TRAINING PROGRAM

## 2025-08-21 PROCEDURE — 85027 COMPLETE CBC AUTOMATED: CPT

## 2025-08-21 PROCEDURE — 2580000003 HC RX 258: Performed by: HOSPITALIST

## 2025-08-21 PROCEDURE — 84145 PROCALCITONIN (PCT): CPT

## 2025-08-21 PROCEDURE — 83735 ASSAY OF MAGNESIUM: CPT

## 2025-08-21 PROCEDURE — 36415 COLL VENOUS BLD VENIPUNCTURE: CPT

## 2025-08-21 PROCEDURE — 83036 HEMOGLOBIN GLYCOSYLATED A1C: CPT

## 2025-08-21 PROCEDURE — 6370000000 HC RX 637 (ALT 250 FOR IP): Performed by: INTERNAL MEDICINE

## 2025-08-21 PROCEDURE — 80048 BASIC METABOLIC PNL TOTAL CA: CPT

## 2025-08-21 PROCEDURE — 2500000003 HC RX 250 WO HCPCS: Performed by: STUDENT IN AN ORGANIZED HEALTH CARE EDUCATION/TRAINING PROGRAM

## 2025-08-21 PROCEDURE — 6370000000 HC RX 637 (ALT 250 FOR IP): Performed by: HOSPITALIST

## 2025-08-21 PROCEDURE — 6370000000 HC RX 637 (ALT 250 FOR IP): Performed by: STUDENT IN AN ORGANIZED HEALTH CARE EDUCATION/TRAINING PROGRAM

## 2025-08-21 PROCEDURE — 84100 ASSAY OF PHOSPHORUS: CPT

## 2025-08-21 PROCEDURE — 6360000002 HC RX W HCPCS: Performed by: INTERNAL MEDICINE

## 2025-08-21 RX ORDER — POLYETHYLENE GLYCOL 3350 17 G/17G
17 POWDER, FOR SOLUTION ORAL DAILY PRN
Status: DISCONTINUED | OUTPATIENT
Start: 2025-08-21 | End: 2025-08-22 | Stop reason: HOSPADM

## 2025-08-21 RX ORDER — GAUZE BANDAGE 2" X 2"
100 BANDAGE TOPICAL DAILY
Status: DISCONTINUED | OUTPATIENT
Start: 2025-08-21 | End: 2025-08-22 | Stop reason: HOSPADM

## 2025-08-21 RX ORDER — GLUCAGON 1 MG/ML
1 KIT INJECTION PRN
Status: DISCONTINUED | OUTPATIENT
Start: 2025-08-21 | End: 2025-08-21 | Stop reason: SDUPTHER

## 2025-08-21 RX ORDER — POTASSIUM CHLORIDE 1.5 G/1.58G
40 POWDER, FOR SOLUTION ORAL ONCE
Status: COMPLETED | OUTPATIENT
Start: 2025-08-21 | End: 2025-08-21

## 2025-08-21 RX ORDER — POTASSIUM CHLORIDE 1500 MG/1
40 TABLET, EXTENDED RELEASE ORAL
Status: ACTIVE | OUTPATIENT
Start: 2025-08-21 | End: 2025-08-21

## 2025-08-21 RX ORDER — INSULIN LISPRO 100 [IU]/ML
15 INJECTION, SOLUTION INTRAVENOUS; SUBCUTANEOUS ONCE
Status: COMPLETED | OUTPATIENT
Start: 2025-08-21 | End: 2025-08-21

## 2025-08-21 RX ORDER — DEXTROSE MONOHYDRATE 100 MG/ML
INJECTION, SOLUTION INTRAVENOUS CONTINUOUS PRN
Status: DISCONTINUED | OUTPATIENT
Start: 2025-08-21 | End: 2025-08-21 | Stop reason: SDUPTHER

## 2025-08-21 RX ORDER — SODIUM CHLORIDE, SODIUM LACTATE, POTASSIUM CHLORIDE, CALCIUM CHLORIDE 600; 310; 30; 20 MG/100ML; MG/100ML; MG/100ML; MG/100ML
INJECTION, SOLUTION INTRAVENOUS CONTINUOUS
Status: DISCONTINUED | OUTPATIENT
Start: 2025-08-21 | End: 2025-08-22

## 2025-08-21 RX ADMIN — SODIUM CHLORIDE, SODIUM LACTATE, POTASSIUM CHLORIDE, AND CALCIUM CHLORIDE: .6; .31; .03; .02 INJECTION, SOLUTION INTRAVENOUS at 10:00

## 2025-08-21 RX ADMIN — Medication 100 MG: at 19:52

## 2025-08-21 RX ADMIN — POLYETHYLENE GLYCOL 3350 17 G: 17 POWDER, FOR SOLUTION ORAL at 09:57

## 2025-08-21 RX ADMIN — RIVAROXABAN 15 MG: 15 TABLET, FILM COATED ORAL at 18:04

## 2025-08-21 RX ADMIN — LEVETIRACETAM 1000 MG: 500 TABLET, FILM COATED ORAL at 21:05

## 2025-08-21 RX ADMIN — SODIUM CHLORIDE, PRESERVATIVE FREE 10 ML: 5 INJECTION INTRAVENOUS at 19:53

## 2025-08-21 RX ADMIN — MELATONIN 3 MG: at 19:52

## 2025-08-21 RX ADMIN — POTASSIUM CHLORIDE 40 MEQ: 1500 TABLET, EXTENDED RELEASE ORAL at 09:56

## 2025-08-21 RX ADMIN — INSULIN LISPRO 15 UNITS: 100 INJECTION, SOLUTION INTRAVENOUS; SUBCUTANEOUS at 10:56

## 2025-08-21 RX ADMIN — Medication 1 AMPULE: at 19:55

## 2025-08-21 RX ADMIN — INSULIN HUMAN 11 UNITS: 100 INJECTION, SOLUTION PARENTERAL at 18:50

## 2025-08-21 RX ADMIN — SODIUM CHLORIDE: 0.9 INJECTION, SOLUTION INTRAVENOUS at 15:43

## 2025-08-21 RX ADMIN — SODIUM CHLORIDE, SODIUM LACTATE, POTASSIUM CHLORIDE, AND CALCIUM CHLORIDE: .6; .31; .03; .02 INJECTION, SOLUTION INTRAVENOUS at 21:38

## 2025-08-21 RX ADMIN — ACETAMINOPHEN 650 MG: 325 TABLET ORAL at 13:04

## 2025-08-21 RX ADMIN — POTASSIUM CHLORIDE 40 MEQ: 1500 TABLET, EXTENDED RELEASE ORAL at 13:05

## 2025-08-21 RX ADMIN — SODIUM CHLORIDE, PRESERVATIVE FREE 10 ML: 5 INJECTION INTRAVENOUS at 09:16

## 2025-08-21 RX ADMIN — CEFEPIME 1000 MG: 1 INJECTION, POWDER, FOR SOLUTION INTRAMUSCULAR; INTRAVENOUS at 03:05

## 2025-08-21 RX ADMIN — POTASSIUM CHLORIDE 40 MEQ: 1.5 FOR SOLUTION ORAL at 16:13

## 2025-08-21 RX ADMIN — SODIUM CHLORIDE 7.7 UNITS/HR: 9 INJECTION, SOLUTION INTRAVENOUS at 18:38

## 2025-08-21 RX ADMIN — LEVOTHYROXINE SODIUM 88 MCG: 0.09 TABLET ORAL at 06:49

## 2025-08-21 RX ADMIN — CEFEPIME 2000 MG: 2 INJECTION, POWDER, FOR SOLUTION INTRAVENOUS at 15:44

## 2025-08-21 RX ADMIN — INSULIN GLARGINE 15 UNITS: 100 INJECTION, SOLUTION SUBCUTANEOUS at 09:16

## 2025-08-21 RX ADMIN — LEVETIRACETAM 1000 MG: 500 TABLET, FILM COATED ORAL at 09:28

## 2025-08-21 RX ADMIN — SODIUM CHLORIDE: 0.9 INJECTION, SOLUTION INTRAVENOUS at 03:02

## 2025-08-21 ASSESSMENT — PAIN SCALES - GENERAL
PAINLEVEL_OUTOF10: 2
PAINLEVEL_OUTOF10: 0

## 2025-08-21 ASSESSMENT — PAIN DESCRIPTION - ORIENTATION: ORIENTATION: RIGHT;LEFT;ANTERIOR

## 2025-08-21 ASSESSMENT — PAIN - FUNCTIONAL ASSESSMENT
PAIN_FUNCTIONAL_ASSESSMENT: 0-10
PAIN_FUNCTIONAL_ASSESSMENT: 0-10

## 2025-08-21 ASSESSMENT — PAIN DESCRIPTION - LOCATION: LOCATION: HEAD

## 2025-08-21 ASSESSMENT — PAIN DESCRIPTION - DESCRIPTORS: DESCRIPTORS: ACHING

## 2025-08-22 VITALS
WEIGHT: 179.68 LBS | HEART RATE: 120 BPM | OXYGEN SATURATION: 96 % | RESPIRATION RATE: 20 BRPM | DIASTOLIC BLOOD PRESSURE: 78 MMHG | HEIGHT: 70 IN | SYSTOLIC BLOOD PRESSURE: 109 MMHG | BODY MASS INDEX: 25.72 KG/M2 | TEMPERATURE: 97.9 F

## 2025-08-22 PROBLEM — R73.09 HEMOGLOBIN A1C GREATER THAN 9%, INDICATING POOR DIABETIC CONTROL: Status: ACTIVE | Noted: 2025-08-22

## 2025-08-22 PROBLEM — R53.83 FATIGUE: Status: ACTIVE | Noted: 2025-08-22

## 2025-08-22 LAB
ANION GAP SERPL CALC-SCNC: 12 MMOL/L (ref 2–14)
BACTERIA SPEC CULT: ABNORMAL
BUN SERPL-MCNC: 24 MG/DL (ref 8–23)
BUN/CREAT SERPL: 19 (ref 12–20)
CALCIUM SERPL-MCNC: 9.4 MG/DL (ref 8.8–10.2)
CC UR VC: ABNORMAL
CHLORIDE SERPL-SCNC: 96 MMOL/L (ref 98–107)
CO2 SERPL-SCNC: 30 MMOL/L (ref 20–29)
CREAT SERPL-MCNC: 1.27 MG/DL (ref 0.6–1)
ERYTHROCYTE [DISTWIDTH] IN BLOOD BY AUTOMATED COUNT: 12.2 % (ref 11.5–14.5)
GLUCOSE BLD STRIP.AUTO-MCNC: 114 MG/DL (ref 65–117)
GLUCOSE BLD STRIP.AUTO-MCNC: 131 MG/DL (ref 65–117)
GLUCOSE BLD STRIP.AUTO-MCNC: 159 MG/DL (ref 65–117)
GLUCOSE BLD STRIP.AUTO-MCNC: 219 MG/DL (ref 65–117)
GLUCOSE BLD STRIP.AUTO-MCNC: 257 MG/DL (ref 65–117)
GLUCOSE BLD STRIP.AUTO-MCNC: 261 MG/DL (ref 65–117)
GLUCOSE BLD STRIP.AUTO-MCNC: 287 MG/DL (ref 65–117)
GLUCOSE SERPL-MCNC: 129 MG/DL (ref 65–100)
HCT VFR BLD AUTO: 37.1 % (ref 35–47)
HGB BLD-MCNC: 12.3 G/DL (ref 11.5–16)
MAGNESIUM SERPL-MCNC: 1.8 MG/DL (ref 1.6–2.4)
MCH RBC QN AUTO: 27.8 PG (ref 26–34)
MCHC RBC AUTO-ENTMCNC: 33.2 G/DL (ref 30–36.5)
MCV RBC AUTO: 83.7 FL (ref 80–99)
NRBC # BLD: 0 K/UL (ref 0–0.01)
NRBC BLD-RTO: 0 PER 100 WBC
PHOSPHATE SERPL-MCNC: 2.3 MG/DL (ref 2.5–4.5)
PLATELET # BLD AUTO: 224 K/UL (ref 150–400)
PMV BLD AUTO: 11.8 FL (ref 8.9–12.9)
POTASSIUM SERPL-SCNC: 3.1 MMOL/L (ref 3.5–5.1)
RBC # BLD AUTO: 4.43 M/UL (ref 3.8–5.2)
SERVICE CMNT-IMP: ABNORMAL
SERVICE CMNT-IMP: NORMAL
SODIUM SERPL-SCNC: 138 MMOL/L (ref 136–145)
WBC # BLD AUTO: 8.4 K/UL (ref 3.6–11)

## 2025-08-22 PROCEDURE — 99222 1ST HOSP IP/OBS MODERATE 55: CPT | Performed by: CLINICAL NURSE SPECIALIST

## 2025-08-22 PROCEDURE — 2580000003 HC RX 258: Performed by: INTERNAL MEDICINE

## 2025-08-22 PROCEDURE — 2500000003 HC RX 250 WO HCPCS: Performed by: STUDENT IN AN ORGANIZED HEALTH CARE EDUCATION/TRAINING PROGRAM

## 2025-08-22 PROCEDURE — 82962 GLUCOSE BLOOD TEST: CPT

## 2025-08-22 PROCEDURE — 83735 ASSAY OF MAGNESIUM: CPT

## 2025-08-22 PROCEDURE — 97530 THERAPEUTIC ACTIVITIES: CPT

## 2025-08-22 PROCEDURE — 6360000002 HC RX W HCPCS: Performed by: INTERNAL MEDICINE

## 2025-08-22 PROCEDURE — 80048 BASIC METABOLIC PNL TOTAL CA: CPT

## 2025-08-22 PROCEDURE — 6370000000 HC RX 637 (ALT 250 FOR IP): Performed by: STUDENT IN AN ORGANIZED HEALTH CARE EDUCATION/TRAINING PROGRAM

## 2025-08-22 PROCEDURE — 85027 COMPLETE CBC AUTOMATED: CPT

## 2025-08-22 PROCEDURE — 6370000000 HC RX 637 (ALT 250 FOR IP): Performed by: NURSE PRACTITIONER

## 2025-08-22 PROCEDURE — 84100 ASSAY OF PHOSPHORUS: CPT

## 2025-08-22 PROCEDURE — 97116 GAIT TRAINING THERAPY: CPT

## 2025-08-22 PROCEDURE — 6370000000 HC RX 637 (ALT 250 FOR IP): Performed by: HOSPITALIST

## 2025-08-22 PROCEDURE — 36415 COLL VENOUS BLD VENIPUNCTURE: CPT

## 2025-08-22 PROCEDURE — 97163 PT EVAL HIGH COMPLEX 45 MIN: CPT

## 2025-08-22 RX ORDER — INSULIN LISPRO 100 [IU]/ML
0-8 INJECTION, SOLUTION INTRAVENOUS; SUBCUTANEOUS EVERY 4 HOURS
Status: DISCONTINUED | OUTPATIENT
Start: 2025-08-22 | End: 2025-08-22 | Stop reason: HOSPADM

## 2025-08-22 RX ORDER — POTASSIUM CHLORIDE 1500 MG/1
20 TABLET, EXTENDED RELEASE ORAL DAILY
Qty: 7 TABLET | Refills: 0 | Status: SHIPPED | OUTPATIENT
Start: 2025-08-22 | End: 2025-08-29

## 2025-08-22 RX ORDER — LEVOFLOXACIN 500 MG/1
500 TABLET, FILM COATED ORAL DAILY
Qty: 7 TABLET | Refills: 0 | Status: SHIPPED | OUTPATIENT
Start: 2025-08-22 | End: 2025-09-01

## 2025-08-22 RX ORDER — POTASSIUM CHLORIDE 1500 MG/1
40 TABLET, EXTENDED RELEASE ORAL ONCE
Status: COMPLETED | OUTPATIENT
Start: 2025-08-22 | End: 2025-08-22

## 2025-08-22 RX ORDER — METOPROLOL SUCCINATE 50 MG/1
50 TABLET, EXTENDED RELEASE ORAL DAILY
Qty: 30 TABLET | Refills: 3 | Status: SHIPPED | OUTPATIENT
Start: 2025-08-23

## 2025-08-22 RX ORDER — GLIMEPIRIDE 1 MG/1
1 TABLET ORAL
Qty: 30 TABLET | Refills: 3 | Status: SHIPPED | OUTPATIENT
Start: 2025-08-22

## 2025-08-22 RX ORDER — METOPROLOL SUCCINATE 50 MG/1
50 TABLET, EXTENDED RELEASE ORAL DAILY
Status: DISCONTINUED | OUTPATIENT
Start: 2025-08-23 | End: 2025-08-22 | Stop reason: HOSPADM

## 2025-08-22 RX ADMIN — CEFEPIME 2000 MG: 2 INJECTION, POWDER, FOR SOLUTION INTRAVENOUS at 16:08

## 2025-08-22 RX ADMIN — Medication 1 AMPULE: at 08:44

## 2025-08-22 RX ADMIN — CEFEPIME 2000 MG: 2 INJECTION, POWDER, FOR SOLUTION INTRAVENOUS at 02:41

## 2025-08-22 RX ADMIN — POTASSIUM CHLORIDE 40 MEQ: 1500 TABLET, EXTENDED RELEASE ORAL at 06:23

## 2025-08-22 RX ADMIN — LEVOTHYROXINE SODIUM 88 MCG: 0.09 TABLET ORAL at 06:23

## 2025-08-22 RX ADMIN — SODIUM CHLORIDE, PRESERVATIVE FREE 10 ML: 5 INJECTION INTRAVENOUS at 09:28

## 2025-08-22 RX ADMIN — RIVAROXABAN 15 MG: 15 TABLET, FILM COATED ORAL at 16:00

## 2025-08-22 RX ADMIN — INSULIN LISPRO 4 UNITS: 100 INJECTION, SOLUTION INTRAVENOUS; SUBCUTANEOUS at 16:01

## 2025-08-22 RX ADMIN — LEVETIRACETAM 1000 MG: 500 TABLET, FILM COATED ORAL at 09:24

## 2025-08-22 RX ADMIN — INSULIN GLARGINE 15 UNITS: 100 INJECTION, SOLUTION SUBCUTANEOUS at 09:32

## 2025-08-22 RX ADMIN — POTASSIUM BICARBONATE 40 MEQ: 782 TABLET, EFFERVESCENT ORAL at 11:35

## 2025-08-22 RX ADMIN — INSULIN LISPRO 4 UNITS: 100 INJECTION, SOLUTION INTRAVENOUS; SUBCUTANEOUS at 10:00

## 2025-08-22 ASSESSMENT — PAIN SCALES - GENERAL
PAINLEVEL_OUTOF10: 0

## 2025-08-24 LAB
BACTERIA SPEC CULT: NORMAL
BACTERIA SPEC CULT: NORMAL
SERVICE CMNT-IMP: NORMAL
SERVICE CMNT-IMP: NORMAL

## 2025-08-26 LAB
BACTERIA SPEC CULT: NORMAL
BACTERIA SPEC CULT: NORMAL
SERVICE CMNT-IMP: NORMAL
SERVICE CMNT-IMP: NORMAL

## (undated) DEVICE — SPONGE GZ W4XL4IN COT 12 PLY TYP VII WVN C FLD DSGN

## (undated) DEVICE — DERMABOND SKIN ADH 0.7ML -- DERMABOND ADVANCED 12/BX

## (undated) DEVICE — STERILE POLYISOPRENE POWDER-FREE SURGICAL GLOVES: Brand: PROTEXIS

## (undated) DEVICE — SPONGE LAP 18X18IN STRL -- 5/PK

## (undated) DEVICE — SURGICAL PROCEDURE PACK BASIN MAJ SET CUST NO CAUT

## (undated) DEVICE — ROCKER SWITCH PENCIL BLADE ELECTRODE, HOLSTER: Brand: EDGE

## (undated) DEVICE — C-ARM: Brand: UNBRANDED

## (undated) DEVICE — SUTURE ABSORBABLE BRAIDED 2-0 CT-1 27 IN UD VICRYL J259H

## (undated) DEVICE — SOL IRR STRL H2O 1000ML BTL --

## (undated) DEVICE — PACK,BASIC,SIRUS,V: Brand: MEDLINE

## (undated) DEVICE — KIT POS FOAM HANA TBL

## (undated) DEVICE — GOWN,SIRUS,NONRNF,SETINSLV,XL,20/CS: Brand: MEDLINE

## (undated) DEVICE — K-WIRE

## (undated) DEVICE — SUTURE MCRYL SZ 3-0 L27IN ABSRB UD L19MM PS-2 3/8 CIR PRIM Y427H

## (undated) DEVICE — PREP SKN CHLRAPRP APL 26ML STR --

## (undated) DEVICE — REM POLYHESIVE ADULT PATIENT RETURN ELECTRODE: Brand: VALLEYLAB

## (undated) DEVICE — OPTIFOAM GENTLE SA, POSTOP, 4X8: Brand: MEDLINE

## (undated) DEVICE — Z DISCONTINUED NO SUB IDED PIN DISPNS W FLTR LUER FLD MINISPIKE

## (undated) DEVICE — GARMENT,MEDLINE,DVT,INT,CALF,MED, GEN2: Brand: MEDLINE

## (undated) DEVICE — SOL IRR SOD CL 0.9% 1000ML BTL --

## (undated) DEVICE — COVER,TABLE,60X90,STERILE: Brand: MEDLINE

## (undated) DEVICE — INFECTION CONTROL KIT SYS

## (undated) DEVICE — SUTURE VCRL SZ 1 L27IN ABSRB VLT L36MM CT-1 1/2 CIR J341H

## (undated) DEVICE — PADDING CST 4IN STERILE --

## (undated) DEVICE — DRILL, AO SMALL: Brand: GAMMA

## (undated) DEVICE — SUTURE VCRL SZ 0 L27IN ABSRB VLT L36MM CT-1 1/2 CIR J340H